# Patient Record
Sex: FEMALE | Race: WHITE | NOT HISPANIC OR LATINO
[De-identification: names, ages, dates, MRNs, and addresses within clinical notes are randomized per-mention and may not be internally consistent; named-entity substitution may affect disease eponyms.]

---

## 2017-01-05 ENCOUNTER — RESULT REVIEW (OUTPATIENT)
Age: 72
End: 2017-01-05

## 2017-01-06 ENCOUNTER — APPOINTMENT (OUTPATIENT)
Dept: HEMATOLOGY ONCOLOGY | Facility: CLINIC | Age: 72
End: 2017-01-06

## 2017-01-06 VITALS
RESPIRATION RATE: 16 BRPM | SYSTOLIC BLOOD PRESSURE: 135 MMHG | OXYGEN SATURATION: 98 % | TEMPERATURE: 97.6 F | BODY MASS INDEX: 28.38 KG/M2 | HEART RATE: 76 BPM | WEIGHT: 165.32 LBS | DIASTOLIC BLOOD PRESSURE: 79 MMHG

## 2017-01-10 ENCOUNTER — RESULT REVIEW (OUTPATIENT)
Age: 72
End: 2017-01-10

## 2017-01-11 ENCOUNTER — APPOINTMENT (OUTPATIENT)
Dept: INFUSION THERAPY | Facility: HOSPITAL | Age: 72
End: 2017-01-11

## 2017-01-11 ENCOUNTER — LABORATORY RESULT (OUTPATIENT)
Age: 72
End: 2017-01-11

## 2017-01-18 ENCOUNTER — APPOINTMENT (OUTPATIENT)
Dept: SURGERY | Facility: CLINIC | Age: 72
End: 2017-01-18

## 2017-01-20 ENCOUNTER — APPOINTMENT (OUTPATIENT)
Dept: HEMATOLOGY ONCOLOGY | Facility: CLINIC | Age: 72
End: 2017-01-20

## 2017-01-25 ENCOUNTER — OUTPATIENT (OUTPATIENT)
Dept: OUTPATIENT SERVICES | Facility: HOSPITAL | Age: 72
LOS: 1 days | Discharge: HOME | End: 2017-01-25

## 2017-01-25 ENCOUNTER — APPOINTMENT (OUTPATIENT)
Dept: INFUSION THERAPY | Facility: HOSPITAL | Age: 72
End: 2017-01-25

## 2017-01-25 DIAGNOSIS — Z98.51 TUBAL LIGATION STATUS: Chronic | ICD-10-CM

## 2017-01-25 DIAGNOSIS — Z87.442 PERSONAL HISTORY OF URINARY CALCULI: Chronic | ICD-10-CM

## 2017-01-25 DIAGNOSIS — Z98.89 OTHER SPECIFIED POSTPROCEDURAL STATES: Chronic | ICD-10-CM

## 2017-01-25 DIAGNOSIS — C50.311 MALIGNANT NEOPLASM OF LOWER-INNER QUADRANT OF RIGHT FEMALE BREAST: ICD-10-CM

## 2017-01-25 DIAGNOSIS — C44.91 BASAL CELL CARCINOMA OF SKIN, UNSPECIFIED: Chronic | ICD-10-CM

## 2017-01-25 DIAGNOSIS — I67.1 CEREBRAL ANEURYSM, NONRUPTURED: Chronic | ICD-10-CM

## 2017-01-26 ENCOUNTER — RESULT REVIEW (OUTPATIENT)
Age: 72
End: 2017-01-26

## 2017-01-27 ENCOUNTER — APPOINTMENT (OUTPATIENT)
Dept: HEMATOLOGY ONCOLOGY | Facility: CLINIC | Age: 72
End: 2017-01-27

## 2017-01-27 VITALS
TEMPERATURE: 97.9 F | WEIGHT: 167.11 LBS | SYSTOLIC BLOOD PRESSURE: 144 MMHG | BODY MASS INDEX: 28.68 KG/M2 | RESPIRATION RATE: 16 BRPM | OXYGEN SATURATION: 98 % | HEART RATE: 87 BPM | DIASTOLIC BLOOD PRESSURE: 72 MMHG

## 2017-01-27 LAB
BASOPHILS # BLD AUTO: 0 K/UL — SIGNIFICANT CHANGE UP (ref 0–0.2)
BASOPHILS NFR BLD AUTO: 0.4 % — SIGNIFICANT CHANGE UP (ref 0–2)
EOSINOPHIL # BLD AUTO: 0.3 K/UL — SIGNIFICANT CHANGE UP (ref 0–0.5)
EOSINOPHIL NFR BLD AUTO: 11.5 % — HIGH (ref 0–6)
HCT VFR BLD CALC: 39.6 % — SIGNIFICANT CHANGE UP (ref 34.5–45)
HGB BLD-MCNC: 13.7 G/DL — SIGNIFICANT CHANGE UP (ref 11.5–15.5)
LYMPHOCYTES # BLD AUTO: 1 K/UL — SIGNIFICANT CHANGE UP (ref 1–3.3)
LYMPHOCYTES # BLD AUTO: 34.1 % — SIGNIFICANT CHANGE UP (ref 13–44)
MCHC RBC-ENTMCNC: 30.4 PG — SIGNIFICANT CHANGE UP (ref 27–34)
MCHC RBC-ENTMCNC: 34.7 GM/DL — SIGNIFICANT CHANGE UP (ref 32–36)
MCV RBC AUTO: 87.7 FL — SIGNIFICANT CHANGE UP (ref 80–100)
MONOCYTES # BLD AUTO: 0.4 K/UL — SIGNIFICANT CHANGE UP (ref 0–0.9)
MONOCYTES NFR BLD AUTO: 13.9 % — SIGNIFICANT CHANGE UP (ref 2–14)
NEUTROPHILS # BLD AUTO: 1.2 K/UL — LOW (ref 1.8–7.4)
NEUTROPHILS NFR BLD AUTO: 40.2 % — LOW (ref 43–77)
PLATELET # BLD AUTO: 229 K/UL — SIGNIFICANT CHANGE UP (ref 150–400)
RBC # BLD: 4.51 M/UL — SIGNIFICANT CHANGE UP (ref 3.8–5.2)
RBC # FLD: 14.3 % — SIGNIFICANT CHANGE UP (ref 10.3–14.5)
WBC # BLD: 2.8 K/UL — LOW (ref 3.8–10.5)
WBC # FLD AUTO: 2.8 K/UL — LOW (ref 3.8–10.5)

## 2017-02-05 ENCOUNTER — RESULT REVIEW (OUTPATIENT)
Age: 72
End: 2017-02-05

## 2017-02-06 ENCOUNTER — LABORATORY RESULT (OUTPATIENT)
Age: 72
End: 2017-02-06

## 2017-02-06 ENCOUNTER — APPOINTMENT (OUTPATIENT)
Dept: INFUSION THERAPY | Facility: HOSPITAL | Age: 72
End: 2017-02-06

## 2017-02-06 LAB
HCT VFR BLD CALC: 42.3 % — SIGNIFICANT CHANGE UP (ref 34.5–45)
HGB BLD-MCNC: 14.4 G/DL — SIGNIFICANT CHANGE UP (ref 11.5–15.5)
MCHC RBC-ENTMCNC: 30 PG — SIGNIFICANT CHANGE UP (ref 27–34)
MCHC RBC-ENTMCNC: 34.1 GM/DL — SIGNIFICANT CHANGE UP (ref 32–36)
MCV RBC AUTO: 88 FL — SIGNIFICANT CHANGE UP (ref 80–100)
PLATELET # BLD AUTO: 229 K/UL — SIGNIFICANT CHANGE UP (ref 150–400)
RBC # BLD: 4.81 M/UL — SIGNIFICANT CHANGE UP (ref 3.8–5.2)
RBC # FLD: 13.8 % — SIGNIFICANT CHANGE UP (ref 10.3–14.5)
WBC # BLD: 7.1 K/UL — SIGNIFICANT CHANGE UP (ref 3.8–10.5)
WBC # FLD AUTO: 7.1 K/UL — SIGNIFICANT CHANGE UP (ref 3.8–10.5)

## 2017-02-07 DIAGNOSIS — E86.0 DEHYDRATION: ICD-10-CM

## 2017-02-07 DIAGNOSIS — Z51.11 ENCOUNTER FOR ANTINEOPLASTIC CHEMOTHERAPY: ICD-10-CM

## 2017-02-07 DIAGNOSIS — R11.2 NAUSEA WITH VOMITING, UNSPECIFIED: ICD-10-CM

## 2017-02-15 ENCOUNTER — APPOINTMENT (OUTPATIENT)
Dept: RADIATION ONCOLOGY | Facility: CLINIC | Age: 72
End: 2017-02-15

## 2017-02-15 ENCOUNTER — APPOINTMENT (OUTPATIENT)
Dept: INFUSION THERAPY | Facility: HOSPITAL | Age: 72
End: 2017-02-15

## 2017-02-15 VITALS
WEIGHT: 163.45 LBS | RESPIRATION RATE: 16 BRPM | DIASTOLIC BLOOD PRESSURE: 73 MMHG | OXYGEN SATURATION: 100 % | SYSTOLIC BLOOD PRESSURE: 151 MMHG | BODY MASS INDEX: 27.9 KG/M2 | HEIGHT: 64 IN | HEART RATE: 83 BPM | TEMPERATURE: 96.8 F

## 2017-02-17 ENCOUNTER — APPOINTMENT (OUTPATIENT)
Dept: HEMATOLOGY ONCOLOGY | Facility: CLINIC | Age: 72
End: 2017-02-17

## 2017-02-17 VITALS
OXYGEN SATURATION: 97 % | HEART RATE: 95 BPM | BODY MASS INDEX: 28.38 KG/M2 | DIASTOLIC BLOOD PRESSURE: 74 MMHG | RESPIRATION RATE: 16 BRPM | WEIGHT: 165.34 LBS | TEMPERATURE: 97.9 F | SYSTOLIC BLOOD PRESSURE: 155 MMHG

## 2017-02-24 ENCOUNTER — APPOINTMENT (OUTPATIENT)
Dept: HEMATOLOGY ONCOLOGY | Facility: CLINIC | Age: 72
End: 2017-02-24

## 2017-02-24 ENCOUNTER — EMERGENCY (EMERGENCY)
Facility: HOSPITAL | Age: 72
LOS: 1 days | Discharge: HOME | End: 2017-02-24
Attending: EMERGENCY MEDICINE | Admitting: EMERGENCY MEDICINE
Payer: MEDICARE

## 2017-02-24 VITALS
RESPIRATION RATE: 18 BRPM | OXYGEN SATURATION: 99 % | HEART RATE: 79 BPM | TEMPERATURE: 98 F | SYSTOLIC BLOOD PRESSURE: 194 MMHG | DIASTOLIC BLOOD PRESSURE: 74 MMHG

## 2017-02-24 DIAGNOSIS — Z98.89 OTHER SPECIFIED POSTPROCEDURAL STATES: Chronic | ICD-10-CM

## 2017-02-24 DIAGNOSIS — C44.91 BASAL CELL CARCINOMA OF SKIN, UNSPECIFIED: Chronic | ICD-10-CM

## 2017-02-24 DIAGNOSIS — R55 SYNCOPE AND COLLAPSE: ICD-10-CM

## 2017-02-24 DIAGNOSIS — Z98.51 TUBAL LIGATION STATUS: Chronic | ICD-10-CM

## 2017-02-24 DIAGNOSIS — I67.1 CEREBRAL ANEURYSM, NONRUPTURED: Chronic | ICD-10-CM

## 2017-02-24 DIAGNOSIS — Z87.442 PERSONAL HISTORY OF URINARY CALCULI: Chronic | ICD-10-CM

## 2017-02-24 LAB
ALBUMIN SERPL ELPH-MCNC: 4.6 G/DL — SIGNIFICANT CHANGE UP (ref 3.3–5)
ALP SERPL-CCNC: 80 U/L — SIGNIFICANT CHANGE UP (ref 40–120)
ALT FLD-CCNC: 30 U/L RC — SIGNIFICANT CHANGE UP (ref 10–45)
ANION GAP SERPL CALC-SCNC: 19 MMOL/L — HIGH (ref 5–17)
AST SERPL-CCNC: 22 U/L — SIGNIFICANT CHANGE UP (ref 10–40)
BILIRUB SERPL-MCNC: 0.2 MG/DL — SIGNIFICANT CHANGE UP (ref 0.2–1.2)
BUN SERPL-MCNC: 20 MG/DL — SIGNIFICANT CHANGE UP (ref 7–23)
CALCIUM SERPL-MCNC: 9.2 MG/DL — SIGNIFICANT CHANGE UP (ref 8.4–10.5)
CHLORIDE SERPL-SCNC: 101 MMOL/L — SIGNIFICANT CHANGE UP (ref 96–108)
CO2 SERPL-SCNC: 21 MMOL/L — LOW (ref 22–31)
CREAT SERPL-MCNC: 0.66 MG/DL — SIGNIFICANT CHANGE UP (ref 0.5–1.3)
GAS PNL BLDV: SIGNIFICANT CHANGE UP
GLUCOSE SERPL-MCNC: 228 MG/DL — HIGH (ref 70–99)
HCT VFR BLD CALC: 41.6 % — SIGNIFICANT CHANGE UP (ref 34.5–45)
HGB BLD-MCNC: 14.2 G/DL — SIGNIFICANT CHANGE UP (ref 11.5–15.5)
MCHC RBC-ENTMCNC: 30.2 PG — SIGNIFICANT CHANGE UP (ref 27–34)
MCHC RBC-ENTMCNC: 34.2 GM/DL — SIGNIFICANT CHANGE UP (ref 32–36)
MCV RBC AUTO: 88.2 FL — SIGNIFICANT CHANGE UP (ref 80–100)
PLATELET # BLD AUTO: 235 K/UL — SIGNIFICANT CHANGE UP (ref 150–400)
POTASSIUM SERPL-MCNC: 3.5 MMOL/L — SIGNIFICANT CHANGE UP (ref 3.5–5.3)
POTASSIUM SERPL-SCNC: 3.5 MMOL/L — SIGNIFICANT CHANGE UP (ref 3.5–5.3)
PROT SERPL-MCNC: 6.6 G/DL — SIGNIFICANT CHANGE UP (ref 6–8.3)
RBC # BLD: 4.71 M/UL — SIGNIFICANT CHANGE UP (ref 3.8–5.2)
RBC # FLD: 14.2 % — SIGNIFICANT CHANGE UP (ref 10.3–14.5)
SODIUM SERPL-SCNC: 141 MMOL/L — SIGNIFICANT CHANGE UP (ref 135–145)
WBC # BLD: 1.6 K/UL — LOW (ref 3.8–10.5)
WBC # FLD AUTO: 1.6 K/UL — LOW (ref 3.8–10.5)

## 2017-02-24 RX ORDER — SODIUM CHLORIDE 9 MG/ML
1000 INJECTION INTRAMUSCULAR; INTRAVENOUS; SUBCUTANEOUS ONCE
Qty: 0 | Refills: 0 | Status: COMPLETED | OUTPATIENT
Start: 2017-02-24 | End: 2017-02-24

## 2017-02-24 RX ORDER — ONDANSETRON 8 MG/1
4 TABLET, FILM COATED ORAL ONCE
Qty: 0 | Refills: 0 | Status: COMPLETED | OUTPATIENT
Start: 2017-02-24 | End: 2017-02-24

## 2017-02-24 RX ADMIN — ONDANSETRON 4 MILLIGRAM(S): 8 TABLET, FILM COATED ORAL at 22:52

## 2017-02-24 RX ADMIN — SODIUM CHLORIDE 2000 MILLILITER(S): 9 INJECTION INTRAMUSCULAR; INTRAVENOUS; SUBCUTANEOUS at 22:52

## 2017-02-24 NOTE — ED ADULT NURSE NOTE - PMH
Aneurysm  cerebral  Anxiety    Asthma    Basal cell carcinoma    Benign intracranial hypertension  1980 r/t "tetracyclines"  Breast CA  right  Chronic fatigue    CVA (cerebral vascular accident)  2012  Depression    Diverticulitis    DM (diabetes mellitus)    Fibromyalgia    Hearing loss  left  HTN (hypertension)    Kidney stones  hx of  Lumbar disc disease    Reflux esophagitis    Shoulder disorder  right bone spur  Vertigo

## 2017-02-24 NOTE — ED PROVIDER NOTE - MEDICAL DECISION MAKING DETAILS
71F Breast cancer s/p lumpectomy on chemotherapy, DM presents with near syncopal episode in the setting of acute nausea, vomiting and diarrhea. Suspect dehydration 2/2 viral gastroenteritis  Plan IVF cbc cmp vbg ekg reassess. 71F Breast cancer s/p lumpectomy on chemotherapy, DM presents with near syncopal episode in the setting of acute nausea, vomiting and diarrhea. Suspect dehydration 2/2 viral gastroenteritis  Plan IVF cbc cmp vbg ekg reassess.ZR

## 2017-02-24 NOTE — ED PROVIDER NOTE - OBJECTIVE STATEMENT
71F history of breast cancer (s/p r partial mastectomy) on chemotherapy (cycle 8 CMF one week prior) DM2 presents with near syncopal episode witnessed by  at home.   Patient reports one day of nausea, vomiting 2-3 semi formed BM and abdominal discomfort. Patient denies fever, chills, chest pain, palpitations  Patient's  had similar symptoms 2 days prior that resolved.   states fingerstick at time of 1 minute of unresponsiveness as patient attempted to stand was 190.

## 2017-02-24 NOTE — ED ADULT NURSE NOTE - OBJECTIVE STATEMENT
72 y/o female arrived to ED c.o nausea, vomiting and syncope. Pt states that she had one episode of vomiting this morning, with weakness and nausea throughout day, only drinking gatorade and having a few crackers. pt had syncople episode while sitting in chair lasting approximately one minute.  had stomach bug on wednesday with vomiting and nausea. Pt a&ox3, +weak, neg sob, neg chest pain, abd soft nontender, + nausea, pulse motor sensoryx4, skin warm dry intact. 20g IV placed LFA by EMS    Pt hx breast ca, last chemo session two weeks ago

## 2017-02-24 NOTE — ED ADULT NURSE NOTE - PSH
Basal cell carcinoma  removed - left eyelid- 3/15  Cerebral aneurysm  repair with coil and stent -   H/O shoulder surgery  right - 2001  H/O tubal ligation    H/O:     History of D&C    History of kidney stones

## 2017-02-25 ENCOUNTER — TRANSCRIPTION ENCOUNTER (OUTPATIENT)
Age: 72
End: 2017-02-25

## 2017-02-25 VITALS
SYSTOLIC BLOOD PRESSURE: 113 MMHG | HEART RATE: 79 BPM | TEMPERATURE: 98 F | OXYGEN SATURATION: 97 % | RESPIRATION RATE: 18 BRPM | DIASTOLIC BLOOD PRESSURE: 53 MMHG

## 2017-02-25 DIAGNOSIS — E86.0 DEHYDRATION: ICD-10-CM

## 2017-02-25 DIAGNOSIS — C50.911 MALIGNANT NEOPLASM OF UNSPECIFIED SITE OF RIGHT FEMALE BREAST: ICD-10-CM

## 2017-02-25 DIAGNOSIS — I10 ESSENTIAL (PRIMARY) HYPERTENSION: ICD-10-CM

## 2017-02-25 DIAGNOSIS — E11.9 TYPE 2 DIABETES MELLITUS WITHOUT COMPLICATIONS: ICD-10-CM

## 2017-02-25 DIAGNOSIS — R41.89 OTHER SYMPTOMS AND SIGNS INVOLVING COGNITIVE FUNCTIONS AND AWARENESS: ICD-10-CM

## 2017-02-25 DIAGNOSIS — I63.9 CEREBRAL INFARCTION, UNSPECIFIED: ICD-10-CM

## 2017-02-25 LAB
ANION GAP SERPL CALC-SCNC: 14 MMOL/L — SIGNIFICANT CHANGE UP (ref 5–17)
APPEARANCE UR: CLEAR — SIGNIFICANT CHANGE UP
BASOPHILS # BLD AUTO: 0 K/UL — SIGNIFICANT CHANGE UP (ref 0–0.2)
BASOPHILS # BLD AUTO: 0 K/UL — SIGNIFICANT CHANGE UP (ref 0–0.2)
BASOPHILS NFR BLD AUTO: 0 % — SIGNIFICANT CHANGE UP (ref 0–2)
BASOPHILS NFR BLD AUTO: 0.7 % — SIGNIFICANT CHANGE UP (ref 0–2)
BILIRUB UR-MCNC: NEGATIVE — SIGNIFICANT CHANGE UP
BUN SERPL-MCNC: 17 MG/DL — SIGNIFICANT CHANGE UP (ref 7–23)
CALCIUM SERPL-MCNC: 9.4 MG/DL — SIGNIFICANT CHANGE UP (ref 8.4–10.5)
CHLORIDE SERPL-SCNC: 104 MMOL/L — SIGNIFICANT CHANGE UP (ref 96–108)
CO2 SERPL-SCNC: 23 MMOL/L — SIGNIFICANT CHANGE UP (ref 22–31)
COLOR SPEC: SIGNIFICANT CHANGE UP
CREAT SERPL-MCNC: 0.78 MG/DL — SIGNIFICANT CHANGE UP (ref 0.5–1.3)
DIFF PNL FLD: NEGATIVE — SIGNIFICANT CHANGE UP
EOSINOPHIL # BLD AUTO: 0.1 K/UL — SIGNIFICANT CHANGE UP (ref 0–0.5)
EOSINOPHIL # BLD AUTO: 0.2 K/UL — SIGNIFICANT CHANGE UP (ref 0–0.5)
EOSINOPHIL NFR BLD AUTO: 13.6 % — HIGH (ref 0–6)
EOSINOPHIL NFR BLD AUTO: 7.2 % — HIGH (ref 0–6)
GLUCOSE SERPL-MCNC: 179 MG/DL — HIGH (ref 70–99)
GLUCOSE UR QL: >1000
HCT VFR BLD CALC: 37.7 % — SIGNIFICANT CHANGE UP (ref 34.5–45)
HGB BLD-MCNC: 12.5 G/DL — SIGNIFICANT CHANGE UP (ref 11.5–15.5)
IMM GRANULOCYTES NFR BLD AUTO: 1.4 % — SIGNIFICANT CHANGE UP (ref 0–1.5)
KETONES UR-MCNC: ABNORMAL
LEUKOCYTE ESTERASE UR-ACNC: NEGATIVE — SIGNIFICANT CHANGE UP
LYMPHOCYTES # BLD AUTO: 0.2 K/UL — LOW (ref 1–3.3)
LYMPHOCYTES # BLD AUTO: 0.43 K/UL — LOW (ref 1–3.3)
LYMPHOCYTES # BLD AUTO: 15.7 % — SIGNIFICANT CHANGE UP (ref 13–44)
LYMPHOCYTES # BLD AUTO: 30.9 % — SIGNIFICANT CHANGE UP (ref 13–44)
MANUAL SMEAR VERIFICATION: SIGNIFICANT CHANGE UP
MCHC RBC-ENTMCNC: 29.1 PG — SIGNIFICANT CHANGE UP (ref 27–34)
MCHC RBC-ENTMCNC: 33.2 GM/DL — SIGNIFICANT CHANGE UP (ref 32–36)
MCV RBC AUTO: 87.9 FL — SIGNIFICANT CHANGE UP (ref 80–100)
MONOCYTES # BLD AUTO: 0.1 K/UL — SIGNIFICANT CHANGE UP (ref 0–0.9)
MONOCYTES # BLD AUTO: 0.3 K/UL — SIGNIFICANT CHANGE UP (ref 0–0.9)
MONOCYTES NFR BLD AUTO: 16.6 % — HIGH (ref 2–14)
MONOCYTES NFR BLD AUTO: 7.2 % — SIGNIFICANT CHANGE UP (ref 2–14)
NEUTROPHILS # BLD AUTO: 0.74 K/UL — LOW (ref 1.8–7.4)
NEUTROPHILS # BLD AUTO: 0.8 K/UL — LOW (ref 1.8–7.4)
NEUTROPHILS NFR BLD AUTO: 53.3 % — SIGNIFICANT CHANGE UP (ref 43–77)
NEUTROPHILS NFR BLD AUTO: 53.5 % — SIGNIFICANT CHANGE UP (ref 43–77)
NITRITE UR-MCNC: NEGATIVE — SIGNIFICANT CHANGE UP
PH UR: 5.5 — SIGNIFICANT CHANGE UP (ref 4.8–8)
PLAT MORPH BLD: NORMAL — SIGNIFICANT CHANGE UP
PLATELET # BLD AUTO: 240 K/UL — SIGNIFICANT CHANGE UP (ref 150–400)
POTASSIUM SERPL-MCNC: 5 MMOL/L — SIGNIFICANT CHANGE UP (ref 3.5–5.3)
POTASSIUM SERPL-SCNC: 5 MMOL/L — SIGNIFICANT CHANGE UP (ref 3.5–5.3)
PROT UR-MCNC: NEGATIVE — SIGNIFICANT CHANGE UP
RBC # BLD: 4.29 M/UL — SIGNIFICANT CHANGE UP (ref 3.8–5.2)
RBC # FLD: 14.9 % — HIGH (ref 10.3–14.5)
RBC BLD AUTO: NORMAL — SIGNIFICANT CHANGE UP
SODIUM SERPL-SCNC: 141 MMOL/L — SIGNIFICANT CHANGE UP (ref 135–145)
SP GR SPEC: >1.03 — HIGH (ref 1.01–1.02)
UROBILINOGEN FLD QL: NEGATIVE — SIGNIFICANT CHANGE UP
WBC # BLD: 1.39 K/UL — LOW (ref 3.8–10.5)
WBC # FLD AUTO: 1.39 K/UL — LOW (ref 3.8–10.5)

## 2017-02-25 RX ORDER — DEXTROSE 50 % IN WATER 50 %
25 SYRINGE (ML) INTRAVENOUS ONCE
Qty: 0 | Refills: 0 | Status: DISCONTINUED | OUTPATIENT
Start: 2017-02-25 | End: 2017-02-25

## 2017-02-25 RX ORDER — AMLODIPINE BESYLATE 2.5 MG/1
5 TABLET ORAL DAILY
Qty: 0 | Refills: 0 | Status: DISCONTINUED | OUTPATIENT
Start: 2017-02-25 | End: 2017-02-25

## 2017-02-25 RX ORDER — INSULIN LISPRO 100/ML
VIAL (ML) SUBCUTANEOUS
Qty: 0 | Refills: 0 | Status: DISCONTINUED | OUTPATIENT
Start: 2017-02-25 | End: 2017-02-25

## 2017-02-25 RX ORDER — ONDANSETRON 8 MG/1
4 TABLET, FILM COATED ORAL EVERY 6 HOURS
Qty: 0 | Refills: 0 | Status: DISCONTINUED | OUTPATIENT
Start: 2017-02-25 | End: 2017-02-25

## 2017-02-25 RX ORDER — MONTELUKAST 4 MG/1
10 TABLET, CHEWABLE ORAL AT BEDTIME
Qty: 0 | Refills: 0 | Status: DISCONTINUED | OUTPATIENT
Start: 2017-02-25 | End: 2017-02-25

## 2017-02-25 RX ORDER — INSULIN LISPRO 100/ML
VIAL (ML) SUBCUTANEOUS AT BEDTIME
Qty: 0 | Refills: 0 | Status: DISCONTINUED | OUTPATIENT
Start: 2017-02-25 | End: 2017-02-25

## 2017-02-25 RX ORDER — ENOXAPARIN SODIUM 100 MG/ML
40 INJECTION SUBCUTANEOUS EVERY 24 HOURS
Qty: 0 | Refills: 0 | Status: DISCONTINUED | OUTPATIENT
Start: 2017-02-25 | End: 2017-02-25

## 2017-02-25 RX ORDER — DIAZEPAM 5 MG
5 TABLET ORAL
Qty: 0 | Refills: 0 | Status: DISCONTINUED | OUTPATIENT
Start: 2017-02-25 | End: 2017-02-25

## 2017-02-25 RX ORDER — DULOXETINE HYDROCHLORIDE 30 MG/1
20 CAPSULE, DELAYED RELEASE ORAL
Qty: 0 | Refills: 0 | Status: DISCONTINUED | OUTPATIENT
Start: 2017-02-25 | End: 2017-02-25

## 2017-02-25 RX ORDER — GLUCAGON INJECTION, SOLUTION 0.5 MG/.1ML
1 INJECTION, SOLUTION SUBCUTANEOUS ONCE
Qty: 0 | Refills: 0 | Status: DISCONTINUED | OUTPATIENT
Start: 2017-02-25 | End: 2017-02-25

## 2017-02-25 RX ORDER — INSULIN GLARGINE 100 [IU]/ML
34 INJECTION, SOLUTION SUBCUTANEOUS AT BEDTIME
Qty: 0 | Refills: 0 | Status: DISCONTINUED | OUTPATIENT
Start: 2017-02-25 | End: 2017-02-25

## 2017-02-25 RX ORDER — TRAZODONE HCL 50 MG
100 TABLET ORAL AT BEDTIME
Qty: 0 | Refills: 0 | Status: DISCONTINUED | OUTPATIENT
Start: 2017-02-25 | End: 2017-02-25

## 2017-02-25 RX ORDER — ASPIRIN/CALCIUM CARB/MAGNESIUM 324 MG
81 TABLET ORAL THREE TIMES A DAY
Qty: 0 | Refills: 0 | Status: DISCONTINUED | OUTPATIENT
Start: 2017-02-25 | End: 2017-02-25

## 2017-02-25 RX ORDER — FAMOTIDINE 10 MG/ML
20 INJECTION INTRAVENOUS DAILY
Qty: 0 | Refills: 0 | Status: DISCONTINUED | OUTPATIENT
Start: 2017-02-25 | End: 2017-02-25

## 2017-02-25 RX ORDER — CLOPIDOGREL BISULFATE 75 MG/1
75 TABLET, FILM COATED ORAL DAILY
Qty: 0 | Refills: 0 | Status: DISCONTINUED | OUTPATIENT
Start: 2017-02-25 | End: 2017-02-25

## 2017-02-25 RX ORDER — SODIUM CHLORIDE 9 MG/ML
1000 INJECTION INTRAMUSCULAR; INTRAVENOUS; SUBCUTANEOUS
Qty: 0 | Refills: 0 | Status: COMPLETED | OUTPATIENT
Start: 2017-02-25 | End: 2017-02-25

## 2017-02-25 RX ORDER — DEXTROSE 50 % IN WATER 50 %
12.5 SYRINGE (ML) INTRAVENOUS ONCE
Qty: 0 | Refills: 0 | Status: DISCONTINUED | OUTPATIENT
Start: 2017-02-25 | End: 2017-02-25

## 2017-02-25 RX ORDER — DEXTROSE 50 % IN WATER 50 %
1 SYRINGE (ML) INTRAVENOUS ONCE
Qty: 0 | Refills: 0 | Status: DISCONTINUED | OUTPATIENT
Start: 2017-02-25 | End: 2017-02-25

## 2017-02-25 RX ORDER — SODIUM CHLORIDE 9 MG/ML
1000 INJECTION, SOLUTION INTRAVENOUS
Qty: 0 | Refills: 0 | Status: DISCONTINUED | OUTPATIENT
Start: 2017-02-25 | End: 2017-02-25

## 2017-02-25 RX ORDER — PANTOPRAZOLE SODIUM 20 MG/1
40 TABLET, DELAYED RELEASE ORAL
Qty: 0 | Refills: 0 | Status: DISCONTINUED | OUTPATIENT
Start: 2017-02-25 | End: 2017-02-25

## 2017-02-25 RX ORDER — HEPARIN SODIUM 5000 [USP'U]/ML
5000 INJECTION INTRAVENOUS; SUBCUTANEOUS EVERY 8 HOURS
Qty: 0 | Refills: 0 | Status: DISCONTINUED | OUTPATIENT
Start: 2017-02-25 | End: 2017-02-25

## 2017-02-25 RX ORDER — LOSARTAN POTASSIUM 100 MG/1
100 TABLET, FILM COATED ORAL DAILY
Qty: 0 | Refills: 0 | Status: DISCONTINUED | OUTPATIENT
Start: 2017-02-25 | End: 2017-02-25

## 2017-02-25 RX ADMIN — AMLODIPINE BESYLATE 5 MILLIGRAM(S): 2.5 TABLET ORAL at 05:46

## 2017-02-25 RX ADMIN — PANTOPRAZOLE SODIUM 40 MILLIGRAM(S): 20 TABLET, DELAYED RELEASE ORAL at 05:46

## 2017-02-25 RX ADMIN — Medication 5 MILLIGRAM(S): at 05:46

## 2017-02-25 RX ADMIN — DULOXETINE HYDROCHLORIDE 20 MILLIGRAM(S): 30 CAPSULE, DELAYED RELEASE ORAL at 09:56

## 2017-02-25 RX ADMIN — SODIUM CHLORIDE 100 MILLILITER(S): 9 INJECTION INTRAMUSCULAR; INTRAVENOUS; SUBCUTANEOUS at 06:21

## 2017-02-25 NOTE — DISCHARGE NOTE ADULT - ADDITIONAL INSTRUCTIONS
1. please schedule an appointment with your oncologist on Monday morning   2. please schedule an appointment with your internist to follow up with your Diabetes

## 2017-02-25 NOTE — DISCHARGE NOTE ADULT - PATIENT PORTAL LINK FT
“You can access the [facility] Patient Portal, offered by [facility], by registering with the following website: [email or web address]”

## 2017-02-25 NOTE — H&P ADULT. - ASSESSMENT
71 F hx HTN, DM2, asthma, cerebral aneurysm s/p stent/coiling, CVA, anxiety, hypothyroid, basal cell ca, R breast ca s/p lumpectomy, on chemo 71 F hx HTN, DM2, asthma, cerebral aneurysm s/p stent/coiling, CVA, anxiety, hypothyroid, basal cell ca, R breast ca s/p lumpectomy, on chemo (last 2/6/17) p/w unresponsiveness, nausea/vomiting a/w dehydration likely due to gastroenteritis and syncope.

## 2017-02-25 NOTE — H&P ADULT. - PROBLEM SELECTOR PLAN 2
- suspect syncope from volume depletion  - will tele monitor for arrhythmia, EKG with NSR on presentation  - TTE ordered to eval structural and valvular heart dz, but can be done as outpatient.  Last TTE about 5 yrs ago as CVA work-up by Dr. Soler (Cardio), reported normal  - will treat with IV and oral hydration, Orthostatic neg after 1L NS bolus given  - fingersticks 190 upon EMS arrival per , here in 's

## 2017-02-25 NOTE — H&P ADULT. - PROBLEM SELECTOR PLAN 1
due to nausea, vomiting with sick contact from  - likely gastroenteritis  - will treat with IVF and anti emetics  - trial of diet and meds in am due to nausea, vomiting with sick contact from  - likely gastroenteritis  - will treat with IVF and anti emetics  - trial of diet and meds in am  - will hold naturethroid (non formulary) per patient request

## 2017-02-25 NOTE — H&P ADULT. - HISTORY OF PRESENT ILLNESS
71 F hx HTN, DM2, asthma, cerebral aneurysm s/p stent/coiling, CVA, anxiety, hypothyroid, basal cell ca, R breast ca s/p lumpectomy, on chemo (s/p 7 cycle of 5FU/cytotox/methotrexate, last 2/6/17) presented with an episode of unresponsiveness witnessed by .  Patient was not feeling well on the morning of presentation, had a loose BM, nauseous and did not eat or drink much.  Patient felt weak when got up to go to bed around 8-9pm from watching TV, helped by , who noticed the patient felt heavy, eyes fluttering, and unresponsive for a brief moment (a few seconds).  He sat her down in a chair, checked her pause, "carotid pause approx. 75 bpm", then called 911.  Patient felt nauseous and vomited (non bloody bilious).  When ambulance arrived, patient was responsive, but still feeling weak,  Patient still felt nauseous in ED, received Zofran x1 with improvement of nausea, 1L NS improved some weakness.  Denied falling, focal weakness, cough, abd pain, fever, CP or SOB.  + sick contact-  had similar GI symptoms (nausea, vomiting, loose stool) 2 days prior, now resolved. 71 F hx HTN, DM2, asthma, cerebral aneurysm s/p stent/coiling, CVA, anxiety, hypothyroid, basal cell ca, R breast ca s/p lumpectomy, on chemo (s/p 7 cycle of 5FU/cytotox/methotrexate, last 2/6/17) presented with an episode of unresponsiveness witnessed by .  Patient was not feeling well on the morning of presentation, had a loose BM, nauseous and did not eat or drink much.  Patient felt weak when got up to go to bed around 8-9pm from watching TV, helped by , who noticed the patient felt heavy, eyes fluttering, and unresponsive for a brief moment (a minute).  He sat her down in a chair, checked her pause, "carotid pause approx. 75 bpm", then called 911.  Patient felt nauseous and vomited (non bloody bilious, medications took recently).  When ambulance arrived, patient was responsive, but still feeling weak,  Patient still felt nauseous in ED, received Zofran x1 with improvement of nausea, 1L NS improved some weakness.  Denied falling, focal weakness, cough, abd pain, fever, CP or SOB.  + sick contact-  had similar GI symptoms (nausea, vomiting, loose stool) 2 days prior, now resolved.

## 2017-02-25 NOTE — DISCHARGE NOTE ADULT - HOSPITAL COURSE
Initial HPI and Brief Hospital Course:     71 F hx HTN, DM2, asthma, cerebral aneurysm s/p stent/coiling, CVA, anxiety, hypothyroid, basal cell ca, R breast ca s/p lumpectomy, on chemo (s/p 7 cycle of 5FU/cytotox/methotrexate, last 2/6/17) presented with an episode of unresponsiveness witnessed by .  Patient was not feeling well on the morning of presentation, had a loose BM, nauseous and did not eat or drink much.  Patient felt weak when got up to go to bed around 8-9pm from watching TV, helped by , who noticed the patient felt heavy, eyes fluttering, and unresponsive for a brief moment (a minute).  He sat her down in a chair, checked her pause, "carotid pause approx. 75 bpm", then called 911.  Patient felt nauseous and vomited (non bloody bilious, medications took recently).  When ambulance arrived, patient was responsive, but still feeling weak,  Patient still felt nauseous in ED, received Zofran x1 with improvement of nausea, 1L NS improved some weakness.  Denied falling, focal weakness, cough, abd pain, fever, CP or SOB.  + sick contact-  had similar GI symptoms (nausea, vomiting, loose stool) 2 days prior, now resolved.      In the ED she received, 2L NS and reports feeling significantly improved, no longer lightheaded. She was evaluated by oncology and thought to be safe to continue oncologic outpatient treatment as symptoms likely related to dehydration 2/2 viral gastroenteritis with limited PO intake. She is medically improved and medically optimized for discharge with oncology and primary care follow up. Counseled the patient and  about adequate fluid intake and returning to the ED if any clinical worsening or recurrence of symptoms. Pt and spouse agreeable with discharge plan.

## 2017-02-25 NOTE — DISCHARGE NOTE ADULT - CARE PLAN
Principal Discharge DX:	Near syncope  Goal:	Resolution of symptoms  Instructions for follow-up, activity and diet:	Fainting usually is caused by fear, stress, pain, standing too long, over tired, overheated, going to bathroom, or coughing  Blood pressure can drop if you do not drink enough, blood pressure medication, alcohol, bleeding,  Consult with your doctor about driving  Avoid activity or condition that causes your syncope  Lay down with your feet up when you feel like you might faint  Secondary Diagnosis:	Type 2 diabetes mellitus without complication, with long-term current use of insulin  Instructions for follow-up, activity and diet:	HgA1C this admission.  Make sure you get your HgA1c checked every three months.  If you take oral diabetes medications, check your blood glucose two times a day.  If you take insulin, check your blood glucose before meals and at bedtime.  It's important not to skip any meals.  Keep a log of your blood glucose results and always take it with you to your doctor appointments.  Keep a list of your current medications including injectables and over the counter medications and bring this medication list with you to all your doctor appointments.  If you have not seen your ophthalmologist this year call for appointment.  Check your feet daily for redness, sores, or openings. Do not self treat. If no improvement in two days call your primary care physician for an appointment.  Low blood sugar (hypoglycemia) is a blood sugar below 70mg/dl. Check your blood sugar if you feel signs/symptoms of hypoglycemia. If your blood sugar is below 70 take 15 grams of carbohydrates (ex 4 oz of apple juice, 3-4 glucose tablets, or 4-6 oz of regular soda) wait 15 minutes and repeat blood sugar to make sure it comes up above 70.  If your blood sugar is above 70 and you are due for a meal, have a meal.  If you are not due for a meal have a snack.  This snack helps keeps your blood sugar at a safe range.  Secondary Diagnosis:	Essential hypertension  Instructions for follow-up, activity and diet:	Low salt diet  Activity as tolerated.  Take all medication as prescribed.  Follow up with your medical doctor for routine blood pressure monitoring at your next visit.  Notify your doctor if you have any of the following symptoms:   Dizziness, Lightheadedness, Blurry vision, Headache, Chest pain, Shortness of breath  Secondary Diagnosis:	Dehydration  Instructions for follow-up, activity and diet:	Drink at least 8 glasses of water per day to prevent dehydration

## 2017-02-25 NOTE — DISCHARGE NOTE ADULT - PLAN OF CARE
Resolution of symptoms Fainting usually is caused by fear, stress, pain, standing too long, over tired, overheated, going to bathroom, or coughing  Blood pressure can drop if you do not drink enough, blood pressure medication, alcohol, bleeding,  Consult with your doctor about driving  Avoid activity or condition that causes your syncope  Lay down with your feet up when you feel like you might faint Low salt diet  Activity as tolerated.  Take all medication as prescribed.  Follow up with your medical doctor for routine blood pressure monitoring at your next visit.  Notify your doctor if you have any of the following symptoms:   Dizziness, Lightheadedness, Blurry vision, Headache, Chest pain, Shortness of breath HgA1C this admission.  Make sure you get your HgA1c checked every three months.  If you take oral diabetes medications, check your blood glucose two times a day.  If you take insulin, check your blood glucose before meals and at bedtime.  It's important not to skip any meals.  Keep a log of your blood glucose results and always take it with you to your doctor appointments.  Keep a list of your current medications including injectables and over the counter medications and bring this medication list with you to all your doctor appointments.  If you have not seen your ophthalmologist this year call for appointment.  Check your feet daily for redness, sores, or openings. Do not self treat. If no improvement in two days call your primary care physician for an appointment.  Low blood sugar (hypoglycemia) is a blood sugar below 70mg/dl. Check your blood sugar if you feel signs/symptoms of hypoglycemia. If your blood sugar is below 70 take 15 grams of carbohydrates (ex 4 oz of apple juice, 3-4 glucose tablets, or 4-6 oz of regular soda) wait 15 minutes and repeat blood sugar to make sure it comes up above 70.  If your blood sugar is above 70 and you are due for a meal, have a meal.  If you are not due for a meal have a snack.  This snack helps keeps your blood sugar at a safe range. Drink at least 8 glasses of water per day to prevent dehydration

## 2017-02-25 NOTE — DISCHARGE NOTE ADULT - MEDICATION SUMMARY - MEDICATIONS TO TAKE
I will START or STAY ON the medications listed below when I get home from the hospital:    naturethyroid  --   1 Gram by mouth 6 days a week ( tuesday/ wednesday/ thursday/ friday/ saturday/sunday)  -- Indication: For hypothroid     naturethyroid  --   2 Grams by mouth  on monday morning  -- Indication: For hypothroid     Aspirin Low Dose 81 mg oral delayed release tablet  -- 1 tab(s) by mouth 3 times a day  -- Indication: For Coronary artery disease    losartan 100 mg oral tablet  -- 1 tab(s) by mouth once a day in morning  -- Indication: For blood pressure     diazepam 5 mg oral tablet  -- 1 tab(s) by mouth 2 times a day  -- Indication: For Sedative     DULoxetine 20 mg oral delayed release capsule  -- 1 cap(s) by mouth 2 times a day (with meals)- with breakfast and lunch  -- Indication: For antidepressant     traZODone 100 mg oral tablet  -- 1 tab(s) by mouth once a day (at bedtime)  -- Indication: For antidepresant     Invokana 100 mg oral tablet  -- 1 tab(s) by mouth once a day in morning before breakfast  -- Indication: For Diabetes type 2    Glumetza 500 mg oral tablet, extended release  -- 1 tab(s) by mouth 2 times a day ( with breakfast and dinner)  -- Indication: For Diabetes type 2     Lantus 100 units/mL subcutaneous solution  -- 34 unit(s) subcutaneous once a day (at bedtime)  -- Indication: For Diabetes type 2     SymlinPen 120 subcutaneous solution  -- 120 microgram(s) subcutaneous 3 times a day - 1/2/hour before meals  -- Indication: For Diabetes type 2     Victoza 18 mg/3 mL subcutaneous solution  -- 1.8 milligram(s) subcutaneous once a day- in morning before breakfast  -- Indication: For Diabetes type 2     ZyrTEC 10 mg oral tablet  -- 1 tab(s) by mouth once a day with dinner  -- Indication: For antihistamine     clopidogrel 75 mg oral tablet  -- 1 tab(s) by mouth once a day with lunch  -- Indication: For blood thinner/ coronary artery disease    amLODIPine 5 mg oral tablet  -- 1 tab(s) by mouth once a day in morning  -- Indication: For blood pressure     Bentyl 10 mg oral capsule  --  by mouth  , 1 - 3 capsules per day as needed  -- Indication: For IBS     Pepcid 20 mg oral tablet  -- 1 tab(s) by mouth once a day before dinner  -- Indication: For prevent GI upset    montelukast 10 mg oral tablet  -- 1 tab(s) by mouth once a day (at bedtime)  -- Indication: For respiratory agent     pantoprazole 40 mg oral delayed release tablet  -- 1 tab(s) by mouth once a day in morning( before breakfast)  -- Indication: For prevent GI upset

## 2017-02-25 NOTE — DISCHARGE NOTE ADULT - CARE PROVIDER_API CALL
Brittany Howell), Medical Oncology  [address] [place] [address]  Phone: [phone]  Fax: [phone]

## 2017-02-25 NOTE — DISCHARGE NOTE ADULT - SECONDARY DIAGNOSIS.
Type 2 diabetes mellitus without complication, with long-term current use of insulin Essential hypertension Dehydration

## 2017-02-25 NOTE — H&P ADULT. - PROBLEM SELECTOR PLAN 3
- normally controlled per patient  - will monitor fingersticks and cover with short acting insulin sliding scale due to decrease PO intake  - will hold oral regiment.  Patient is unwilling to try Lantus inpatient

## 2017-02-26 LAB
CULTURE RESULTS: SIGNIFICANT CHANGE UP
SPECIMEN SOURCE: SIGNIFICANT CHANGE UP

## 2017-02-28 ENCOUNTER — OUTPATIENT (OUTPATIENT)
Dept: OUTPATIENT SERVICES | Facility: HOSPITAL | Age: 72
LOS: 1 days | Discharge: HOME | End: 2017-02-28

## 2017-02-28 ENCOUNTER — RESULT REVIEW (OUTPATIENT)
Age: 72
End: 2017-02-28

## 2017-02-28 DIAGNOSIS — C44.91 BASAL CELL CARCINOMA OF SKIN, UNSPECIFIED: Chronic | ICD-10-CM

## 2017-02-28 DIAGNOSIS — Z17.0 ESTROGEN RECEPTOR POSITIVE STATUS [ER+]: ICD-10-CM

## 2017-02-28 DIAGNOSIS — Z98.89 OTHER SPECIFIED POSTPROCEDURAL STATES: Chronic | ICD-10-CM

## 2017-02-28 DIAGNOSIS — Z87.442 PERSONAL HISTORY OF URINARY CALCULI: Chronic | ICD-10-CM

## 2017-02-28 DIAGNOSIS — Z98.51 TUBAL LIGATION STATUS: Chronic | ICD-10-CM

## 2017-02-28 DIAGNOSIS — D70.1 AGRANULOCYTOSIS SECONDARY TO CANCER CHEMOTHERAPY: ICD-10-CM

## 2017-02-28 DIAGNOSIS — C50.311 MALIGNANT NEOPLASM OF LOWER-INNER QUADRANT OF RIGHT FEMALE BREAST: ICD-10-CM

## 2017-02-28 DIAGNOSIS — I67.1 CEREBRAL ANEURYSM, NONRUPTURED: Chronic | ICD-10-CM

## 2017-03-01 ENCOUNTER — APPOINTMENT (OUTPATIENT)
Dept: INFUSION THERAPY | Facility: HOSPITAL | Age: 72
End: 2017-03-01

## 2017-03-01 LAB
HCT VFR BLD CALC: 39.3 % — SIGNIFICANT CHANGE UP (ref 34.5–45)
HGB BLD-MCNC: 13.6 G/DL — SIGNIFICANT CHANGE UP (ref 11.5–15.5)
MCHC RBC-ENTMCNC: 29.6 PG — SIGNIFICANT CHANGE UP (ref 27–34)
MCHC RBC-ENTMCNC: 34.6 G/DL — SIGNIFICANT CHANGE UP (ref 32–36)
MCV RBC AUTO: 85.7 FL — SIGNIFICANT CHANGE UP (ref 80–100)
PLATELET # BLD AUTO: 261 K/UL — SIGNIFICANT CHANGE UP (ref 150–400)
RBC # BLD: 4.58 M/UL — SIGNIFICANT CHANGE UP (ref 3.8–5.2)
RBC # FLD: 13.1 % — SIGNIFICANT CHANGE UP (ref 10.3–14.5)
WBC # BLD: 5.2 K/UL — SIGNIFICANT CHANGE UP (ref 3.8–10.5)
WBC # FLD AUTO: 5.2 K/UL — SIGNIFICANT CHANGE UP (ref 3.8–10.5)

## 2017-03-02 DIAGNOSIS — R11.2 NAUSEA WITH VOMITING, UNSPECIFIED: ICD-10-CM

## 2017-03-02 DIAGNOSIS — Z51.11 ENCOUNTER FOR ANTINEOPLASTIC CHEMOTHERAPY: ICD-10-CM

## 2017-03-02 LAB
CULTURE RESULTS: SIGNIFICANT CHANGE UP
CULTURE RESULTS: SIGNIFICANT CHANGE UP
SPECIMEN SOURCE: SIGNIFICANT CHANGE UP
SPECIMEN SOURCE: SIGNIFICANT CHANGE UP

## 2017-03-16 ENCOUNTER — RESULT REVIEW (OUTPATIENT)
Age: 72
End: 2017-03-16

## 2017-03-17 ENCOUNTER — APPOINTMENT (OUTPATIENT)
Dept: HEMATOLOGY ONCOLOGY | Facility: CLINIC | Age: 72
End: 2017-03-17

## 2017-03-17 VITALS
DIASTOLIC BLOOD PRESSURE: 78 MMHG | TEMPERATURE: 98.3 F | WEIGHT: 161.82 LBS | SYSTOLIC BLOOD PRESSURE: 152 MMHG | BODY MASS INDEX: 27.78 KG/M2 | RESPIRATION RATE: 16 BRPM | OXYGEN SATURATION: 97 % | HEART RATE: 78 BPM

## 2017-03-17 LAB
EOSINOPHIL # BLD AUTO: 0.4 K/UL — SIGNIFICANT CHANGE UP (ref 0–0.5)
EOSINOPHIL NFR BLD AUTO: 12 % — HIGH (ref 0–6)
HCT VFR BLD CALC: 39.6 % — SIGNIFICANT CHANGE UP (ref 34.5–45)
HGB BLD-MCNC: 13.5 G/DL — SIGNIFICANT CHANGE UP (ref 11.5–15.5)
LYMPHOCYTES # BLD AUTO: 0.8 K/UL — LOW (ref 1–3.3)
LYMPHOCYTES # BLD AUTO: 42 % — SIGNIFICANT CHANGE UP (ref 13–44)
MCHC RBC-ENTMCNC: 29.8 PG — SIGNIFICANT CHANGE UP (ref 27–34)
MCHC RBC-ENTMCNC: 34.2 G/DL — SIGNIFICANT CHANGE UP (ref 32–36)
MCV RBC AUTO: 87 FL — SIGNIFICANT CHANGE UP (ref 80–100)
MONOCYTES # BLD AUTO: 0.4 K/UL — SIGNIFICANT CHANGE UP (ref 0–0.9)
MONOCYTES NFR BLD AUTO: 16 % — HIGH (ref 2–14)
NEUTROPHILS # BLD AUTO: 0.7 K/UL — LOW (ref 1.8–7.4)
NEUTROPHILS NFR BLD AUTO: 30 % — LOW (ref 43–77)
PLAT MORPH BLD: NORMAL — SIGNIFICANT CHANGE UP
PLATELET # BLD AUTO: 316 K/UL — SIGNIFICANT CHANGE UP (ref 150–400)
RBC # BLD: 4.55 M/UL — SIGNIFICANT CHANGE UP (ref 3.8–5.2)
RBC # FLD: 13.7 % — SIGNIFICANT CHANGE UP (ref 10.3–14.5)
RBC BLD AUTO: SIGNIFICANT CHANGE UP
WBC # BLD: 2.3 K/UL — LOW (ref 3.8–10.5)
WBC # FLD AUTO: 2.3 K/UL — LOW (ref 3.8–10.5)

## 2017-03-24 LAB
25(OH)D3 SERPL-MCNC: 24.9 NG/ML
ALBUMIN SERPL ELPH-MCNC: 4.9 G/DL
ALP BLD-CCNC: 76 U/L
ALT SERPL-CCNC: 29 U/L
ANION GAP SERPL CALC-SCNC: 17 MMOL/L
AST SERPL-CCNC: 24 U/L
BILIRUB SERPL-MCNC: 0.2 MG/DL
BUN SERPL-MCNC: 19 MG/DL
CALCIUM SERPL-MCNC: 10.8 MG/DL
CHLORIDE SERPL-SCNC: 103 MMOL/L
CO2 SERPL-SCNC: 25 MMOL/L
CREAT SERPL-MCNC: 0.82 MG/DL
GLUCOSE SERPL-MCNC: 118 MG/DL
POTASSIUM SERPL-SCNC: 4.6 MMOL/L
PROT SERPL-MCNC: 7.1 G/DL
SODIUM SERPL-SCNC: 145 MMOL/L

## 2017-03-24 PROCEDURE — 82435 ASSAY OF BLOOD CHLORIDE: CPT

## 2017-03-24 PROCEDURE — 81003 URINALYSIS AUTO W/O SCOPE: CPT

## 2017-03-24 PROCEDURE — 99285 EMERGENCY DEPT VISIT HI MDM: CPT | Mod: 25

## 2017-03-24 PROCEDURE — 85027 COMPLETE CBC AUTOMATED: CPT

## 2017-03-24 PROCEDURE — 82565 ASSAY OF CREATININE: CPT

## 2017-03-24 PROCEDURE — 82330 ASSAY OF CALCIUM: CPT

## 2017-03-24 PROCEDURE — 82803 BLOOD GASES ANY COMBINATION: CPT

## 2017-03-24 PROCEDURE — 84132 ASSAY OF SERUM POTASSIUM: CPT

## 2017-03-24 PROCEDURE — 84295 ASSAY OF SERUM SODIUM: CPT

## 2017-03-24 PROCEDURE — 80053 COMPREHEN METABOLIC PANEL: CPT

## 2017-03-24 PROCEDURE — 82947 ASSAY GLUCOSE BLOOD QUANT: CPT

## 2017-03-24 PROCEDURE — 85014 HEMATOCRIT: CPT

## 2017-03-24 PROCEDURE — 87086 URINE CULTURE/COLONY COUNT: CPT

## 2017-03-24 PROCEDURE — 96374 THER/PROPH/DIAG INJ IV PUSH: CPT

## 2017-03-24 PROCEDURE — 93005 ELECTROCARDIOGRAM TRACING: CPT

## 2017-03-24 PROCEDURE — 83605 ASSAY OF LACTIC ACID: CPT

## 2017-03-24 PROCEDURE — 80048 BASIC METABOLIC PNL TOTAL CA: CPT

## 2017-03-24 PROCEDURE — 87040 BLOOD CULTURE FOR BACTERIA: CPT

## 2017-04-03 ENCOUNTER — OUTPATIENT (OUTPATIENT)
Dept: OUTPATIENT SERVICES | Facility: HOSPITAL | Age: 72
LOS: 1 days | Discharge: HOME | End: 2017-04-03
Payer: MEDICARE

## 2017-04-03 DIAGNOSIS — Z98.51 TUBAL LIGATION STATUS: Chronic | ICD-10-CM

## 2017-04-03 DIAGNOSIS — Z98.89 OTHER SPECIFIED POSTPROCEDURAL STATES: Chronic | ICD-10-CM

## 2017-04-03 DIAGNOSIS — C44.91 BASAL CELL CARCINOMA OF SKIN, UNSPECIFIED: Chronic | ICD-10-CM

## 2017-04-03 DIAGNOSIS — Z87.442 PERSONAL HISTORY OF URINARY CALCULI: Chronic | ICD-10-CM

## 2017-04-03 DIAGNOSIS — I67.1 CEREBRAL ANEURYSM, NONRUPTURED: Chronic | ICD-10-CM

## 2017-04-03 PROCEDURE — 77427 RADIATION TX MANAGEMENT X5: CPT

## 2017-04-10 PROCEDURE — 77427 RADIATION TX MANAGEMENT X5: CPT

## 2017-04-14 PROCEDURE — 77280 THER RAD SIMULAJ FIELD SMPL: CPT | Mod: 26

## 2017-04-17 PROCEDURE — 77427 RADIATION TX MANAGEMENT X5: CPT

## 2017-04-18 ENCOUNTER — OTHER (OUTPATIENT)
Age: 72
End: 2017-04-18

## 2017-04-18 PROCEDURE — 77334 RADIATION TREATMENT AID(S): CPT | Mod: 26

## 2017-04-18 PROCEDURE — 77321 SPECIAL TELETX PORT PLAN: CPT | Mod: 26

## 2017-04-24 ENCOUNTER — OTHER (OUTPATIENT)
Age: 72
End: 2017-04-24

## 2017-05-04 RX ORDER — NYSTATIN 100000 [USP'U]/G
100000 POWDER TOPICAL
Qty: 60 | Refills: 0 | Status: COMPLETED | COMMUNITY
Start: 2017-04-18 | End: 2017-04-19

## 2017-05-11 ENCOUNTER — OUTPATIENT (OUTPATIENT)
Dept: OUTPATIENT SERVICES | Facility: HOSPITAL | Age: 72
LOS: 1 days | Discharge: HOME | End: 2017-05-11

## 2017-05-11 DIAGNOSIS — Z98.89 OTHER SPECIFIED POSTPROCEDURAL STATES: Chronic | ICD-10-CM

## 2017-05-11 DIAGNOSIS — C50.311 MALIGNANT NEOPLASM OF LOWER-INNER QUADRANT OF RIGHT FEMALE BREAST: ICD-10-CM

## 2017-05-11 DIAGNOSIS — Z87.442 PERSONAL HISTORY OF URINARY CALCULI: Chronic | ICD-10-CM

## 2017-05-11 DIAGNOSIS — I67.1 CEREBRAL ANEURYSM, NONRUPTURED: Chronic | ICD-10-CM

## 2017-05-11 DIAGNOSIS — Z98.51 TUBAL LIGATION STATUS: Chronic | ICD-10-CM

## 2017-05-11 DIAGNOSIS — C44.91 BASAL CELL CARCINOMA OF SKIN, UNSPECIFIED: Chronic | ICD-10-CM

## 2017-05-12 ENCOUNTER — APPOINTMENT (OUTPATIENT)
Dept: HEMATOLOGY ONCOLOGY | Facility: CLINIC | Age: 72
End: 2017-05-12

## 2017-05-12 VITALS
OXYGEN SATURATION: 97 % | SYSTOLIC BLOOD PRESSURE: 147 MMHG | BODY MASS INDEX: 29.48 KG/M2 | TEMPERATURE: 97.9 F | WEIGHT: 171.74 LBS | DIASTOLIC BLOOD PRESSURE: 75 MMHG | RESPIRATION RATE: 16 BRPM | HEART RATE: 95 BPM

## 2017-05-24 ENCOUNTER — APPOINTMENT (OUTPATIENT)
Dept: RADIATION ONCOLOGY | Facility: CLINIC | Age: 72
End: 2017-05-24

## 2017-05-24 VITALS
SYSTOLIC BLOOD PRESSURE: 153 MMHG | DIASTOLIC BLOOD PRESSURE: 76 MMHG | RESPIRATION RATE: 15 BRPM | HEART RATE: 95 BPM | WEIGHT: 167.11 LBS | BODY MASS INDEX: 28.68 KG/M2 | OXYGEN SATURATION: 96 %

## 2017-06-07 ENCOUNTER — OUTPATIENT (OUTPATIENT)
Dept: OUTPATIENT SERVICES | Facility: HOSPITAL | Age: 72
LOS: 1 days | End: 2017-06-07
Payer: MEDICARE

## 2017-06-07 ENCOUNTER — APPOINTMENT (OUTPATIENT)
Dept: MAMMOGRAPHY | Facility: IMAGING CENTER | Age: 72
End: 2017-06-07

## 2017-06-07 ENCOUNTER — APPOINTMENT (OUTPATIENT)
Dept: ULTRASOUND IMAGING | Facility: IMAGING CENTER | Age: 72
End: 2017-06-07

## 2017-06-07 DIAGNOSIS — Z87.442 PERSONAL HISTORY OF URINARY CALCULI: Chronic | ICD-10-CM

## 2017-06-07 DIAGNOSIS — Z98.89 OTHER SPECIFIED POSTPROCEDURAL STATES: Chronic | ICD-10-CM

## 2017-06-07 DIAGNOSIS — I67.1 CEREBRAL ANEURYSM, NONRUPTURED: Chronic | ICD-10-CM

## 2017-06-07 DIAGNOSIS — Z98.51 TUBAL LIGATION STATUS: Chronic | ICD-10-CM

## 2017-06-07 DIAGNOSIS — Z00.8 ENCOUNTER FOR OTHER GENERAL EXAMINATION: ICD-10-CM

## 2017-06-07 DIAGNOSIS — C44.91 BASAL CELL CARCINOMA OF SKIN, UNSPECIFIED: Chronic | ICD-10-CM

## 2017-06-07 PROCEDURE — 77066 DX MAMMO INCL CAD BI: CPT

## 2017-06-07 PROCEDURE — 76641 ULTRASOUND BREAST COMPLETE: CPT

## 2017-06-07 PROCEDURE — G0279: CPT

## 2017-07-26 ENCOUNTER — OUTPATIENT (OUTPATIENT)
Dept: OUTPATIENT SERVICES | Facility: HOSPITAL | Age: 72
LOS: 1 days | Discharge: HOME | End: 2017-07-26

## 2017-07-26 DIAGNOSIS — Z98.89 OTHER SPECIFIED POSTPROCEDURAL STATES: Chronic | ICD-10-CM

## 2017-07-26 DIAGNOSIS — I67.1 CEREBRAL ANEURYSM, NONRUPTURED: Chronic | ICD-10-CM

## 2017-07-26 DIAGNOSIS — C50.311 MALIGNANT NEOPLASM OF LOWER-INNER QUADRANT OF RIGHT FEMALE BREAST: ICD-10-CM

## 2017-07-26 DIAGNOSIS — Z87.442 PERSONAL HISTORY OF URINARY CALCULI: Chronic | ICD-10-CM

## 2017-07-26 DIAGNOSIS — C44.91 BASAL CELL CARCINOMA OF SKIN, UNSPECIFIED: Chronic | ICD-10-CM

## 2017-07-26 DIAGNOSIS — Z98.51 TUBAL LIGATION STATUS: Chronic | ICD-10-CM

## 2017-07-28 ENCOUNTER — APPOINTMENT (OUTPATIENT)
Dept: HEMATOLOGY ONCOLOGY | Facility: CLINIC | Age: 72
End: 2017-07-28
Payer: COMMERCIAL

## 2017-07-28 VITALS
BODY MASS INDEX: 28.38 KG/M2 | SYSTOLIC BLOOD PRESSURE: 140 MMHG | RESPIRATION RATE: 16 BRPM | TEMPERATURE: 99.5 F | OXYGEN SATURATION: 97 % | WEIGHT: 165.32 LBS | HEART RATE: 100 BPM | DIASTOLIC BLOOD PRESSURE: 80 MMHG

## 2017-07-28 DIAGNOSIS — D70.1 AGRANULOCYTOSIS SECONDARY TO CANCER CHEMOTHERAPY: ICD-10-CM

## 2017-07-28 PROCEDURE — 99215 OFFICE O/P EST HI 40 MIN: CPT

## 2017-08-18 ENCOUNTER — APPOINTMENT (OUTPATIENT)
Dept: PHYSICAL MEDICINE AND REHAB | Facility: CLINIC | Age: 72
End: 2017-08-18
Payer: MEDICARE

## 2017-08-18 VITALS
WEIGHT: 166 LBS | DIASTOLIC BLOOD PRESSURE: 74 MMHG | TEMPERATURE: 98.3 F | HEART RATE: 88 BPM | OXYGEN SATURATION: 97 % | BODY MASS INDEX: 28.34 KG/M2 | HEIGHT: 64 IN | SYSTOLIC BLOOD PRESSURE: 131 MMHG

## 2017-08-18 PROCEDURE — 99203 OFFICE O/P NEW LOW 30 MIN: CPT

## 2017-08-21 ENCOUNTER — TRANSCRIPTION ENCOUNTER (OUTPATIENT)
Age: 72
End: 2017-08-21

## 2017-08-30 ENCOUNTER — APPOINTMENT (OUTPATIENT)
Dept: MRI IMAGING | Facility: IMAGING CENTER | Age: 72
End: 2017-08-30

## 2017-08-31 ENCOUNTER — FORM ENCOUNTER (OUTPATIENT)
Age: 72
End: 2017-08-31

## 2017-09-01 ENCOUNTER — OUTPATIENT (OUTPATIENT)
Dept: OUTPATIENT SERVICES | Facility: HOSPITAL | Age: 72
LOS: 1 days | End: 2017-09-01
Payer: MEDICARE

## 2017-09-01 ENCOUNTER — APPOINTMENT (OUTPATIENT)
Dept: MRI IMAGING | Facility: IMAGING CENTER | Age: 72
End: 2017-09-01
Payer: MEDICARE

## 2017-09-01 DIAGNOSIS — Z87.442 PERSONAL HISTORY OF URINARY CALCULI: Chronic | ICD-10-CM

## 2017-09-01 DIAGNOSIS — Z98.89 OTHER SPECIFIED POSTPROCEDURAL STATES: Chronic | ICD-10-CM

## 2017-09-01 DIAGNOSIS — Z98.51 TUBAL LIGATION STATUS: Chronic | ICD-10-CM

## 2017-09-01 DIAGNOSIS — M54.2 CERVICALGIA: ICD-10-CM

## 2017-09-01 DIAGNOSIS — C44.91 BASAL CELL CARCINOMA OF SKIN, UNSPECIFIED: Chronic | ICD-10-CM

## 2017-09-01 DIAGNOSIS — I67.1 CEREBRAL ANEURYSM, NONRUPTURED: Chronic | ICD-10-CM

## 2017-09-01 PROCEDURE — 72141 MRI NECK SPINE W/O DYE: CPT | Mod: 26

## 2017-09-01 PROCEDURE — 72141 MRI NECK SPINE W/O DYE: CPT

## 2017-09-22 ENCOUNTER — TRANSCRIPTION ENCOUNTER (OUTPATIENT)
Age: 72
End: 2017-09-22

## 2017-10-27 ENCOUNTER — APPOINTMENT (OUTPATIENT)
Dept: PHYSICAL MEDICINE AND REHAB | Facility: CLINIC | Age: 72
End: 2017-10-27
Payer: MEDICARE

## 2017-10-27 VITALS
HEART RATE: 111 BPM | OXYGEN SATURATION: 97 % | SYSTOLIC BLOOD PRESSURE: 142 MMHG | DIASTOLIC BLOOD PRESSURE: 78 MMHG | TEMPERATURE: 98.2 F

## 2017-10-27 DIAGNOSIS — M77.12 LATERAL EPICONDYLITIS, LEFT ELBOW: ICD-10-CM

## 2017-10-27 PROCEDURE — 99213 OFFICE O/P EST LOW 20 MIN: CPT

## 2017-10-30 ENCOUNTER — TRANSCRIPTION ENCOUNTER (OUTPATIENT)
Age: 72
End: 2017-10-30

## 2017-12-01 ENCOUNTER — OUTPATIENT (OUTPATIENT)
Dept: OUTPATIENT SERVICES | Facility: HOSPITAL | Age: 72
LOS: 1 days | End: 2017-12-01
Payer: MEDICARE

## 2017-12-01 ENCOUNTER — APPOINTMENT (OUTPATIENT)
Dept: MRI IMAGING | Facility: IMAGING CENTER | Age: 72
End: 2017-12-01
Payer: MEDICARE

## 2017-12-01 DIAGNOSIS — C44.91 BASAL CELL CARCINOMA OF SKIN, UNSPECIFIED: Chronic | ICD-10-CM

## 2017-12-01 DIAGNOSIS — Z87.442 PERSONAL HISTORY OF URINARY CALCULI: Chronic | ICD-10-CM

## 2017-12-01 DIAGNOSIS — Z98.89 OTHER SPECIFIED POSTPROCEDURAL STATES: Chronic | ICD-10-CM

## 2017-12-01 DIAGNOSIS — I67.1 CEREBRAL ANEURYSM, NONRUPTURED: Chronic | ICD-10-CM

## 2017-12-01 DIAGNOSIS — Z98.51 TUBAL LIGATION STATUS: Chronic | ICD-10-CM

## 2017-12-01 DIAGNOSIS — Z00.8 ENCOUNTER FOR OTHER GENERAL EXAMINATION: ICD-10-CM

## 2017-12-01 PROCEDURE — C8908: CPT

## 2017-12-01 PROCEDURE — 0159T: CPT | Mod: 26

## 2017-12-01 PROCEDURE — 82565 ASSAY OF CREATININE: CPT

## 2017-12-01 PROCEDURE — C8937: CPT

## 2017-12-01 PROCEDURE — A9585: CPT

## 2017-12-01 PROCEDURE — 77059 MRI BREAST BILATERAL: CPT | Mod: 26

## 2017-12-06 ENCOUNTER — APPOINTMENT (OUTPATIENT)
Dept: MRI IMAGING | Facility: CLINIC | Age: 72
End: 2017-12-06
Payer: SELF-PAY

## 2017-12-06 ENCOUNTER — OUTPATIENT (OUTPATIENT)
Dept: OUTPATIENT SERVICES | Facility: HOSPITAL | Age: 72
LOS: 1 days | End: 2017-12-06

## 2017-12-06 DIAGNOSIS — C44.91 BASAL CELL CARCINOMA OF SKIN, UNSPECIFIED: Chronic | ICD-10-CM

## 2017-12-06 DIAGNOSIS — I67.1 CEREBRAL ANEURYSM, NONRUPTURED: Chronic | ICD-10-CM

## 2017-12-06 DIAGNOSIS — Z98.51 TUBAL LIGATION STATUS: Chronic | ICD-10-CM

## 2017-12-06 DIAGNOSIS — Z98.89 OTHER SPECIFIED POSTPROCEDURAL STATES: Chronic | ICD-10-CM

## 2017-12-06 DIAGNOSIS — Z87.442 PERSONAL HISTORY OF URINARY CALCULI: Chronic | ICD-10-CM

## 2017-12-06 DIAGNOSIS — Z00.8 ENCOUNTER FOR OTHER GENERAL EXAMINATION: ICD-10-CM

## 2017-12-06 PROCEDURE — C8908: CPT

## 2017-12-06 PROCEDURE — 0159T: CPT | Mod: 26

## 2017-12-06 PROCEDURE — 77059 MRI BREAST BILATERAL: CPT | Mod: 26

## 2017-12-06 PROCEDURE — C8937: CPT

## 2017-12-11 ENCOUNTER — OUTPATIENT (OUTPATIENT)
Dept: OUTPATIENT SERVICES | Facility: HOSPITAL | Age: 72
LOS: 1 days | Discharge: HOME | End: 2017-12-11

## 2017-12-11 DIAGNOSIS — Z17.0 ESTROGEN RECEPTOR POSITIVE STATUS [ER+]: ICD-10-CM

## 2017-12-11 DIAGNOSIS — D70.1 AGRANULOCYTOSIS SECONDARY TO CANCER CHEMOTHERAPY: ICD-10-CM

## 2017-12-11 DIAGNOSIS — Z98.89 OTHER SPECIFIED POSTPROCEDURAL STATES: Chronic | ICD-10-CM

## 2017-12-11 DIAGNOSIS — C44.91 BASAL CELL CARCINOMA OF SKIN, UNSPECIFIED: Chronic | ICD-10-CM

## 2017-12-11 DIAGNOSIS — C50.311 MALIGNANT NEOPLASM OF LOWER-INNER QUADRANT OF RIGHT FEMALE BREAST: ICD-10-CM

## 2017-12-11 DIAGNOSIS — Z87.442 PERSONAL HISTORY OF URINARY CALCULI: Chronic | ICD-10-CM

## 2017-12-11 DIAGNOSIS — Z98.51 TUBAL LIGATION STATUS: Chronic | ICD-10-CM

## 2017-12-11 DIAGNOSIS — I67.1 CEREBRAL ANEURYSM, NONRUPTURED: Chronic | ICD-10-CM

## 2017-12-15 ENCOUNTER — APPOINTMENT (OUTPATIENT)
Dept: HEMATOLOGY ONCOLOGY | Facility: CLINIC | Age: 72
End: 2017-12-15
Payer: MEDICARE

## 2017-12-15 VITALS
BODY MASS INDEX: 29.14 KG/M2 | DIASTOLIC BLOOD PRESSURE: 80 MMHG | WEIGHT: 169.73 LBS | HEART RATE: 103 BPM | SYSTOLIC BLOOD PRESSURE: 124 MMHG | TEMPERATURE: 97.8 F | OXYGEN SATURATION: 98 % | RESPIRATION RATE: 16 BRPM

## 2017-12-15 PROCEDURE — 99215 OFFICE O/P EST HI 40 MIN: CPT

## 2017-12-15 RX ORDER — CLARITHROMYCIN 500 MG/1
500 TABLET, FILM COATED ORAL
Qty: 20 | Refills: 0 | Status: DISCONTINUED | COMMUNITY
Start: 2017-05-31

## 2017-12-15 RX ORDER — LOTEPREDNOL ETABONATE 5 MG/G
0.5 OINTMENT OPHTHALMIC
Qty: 3 | Refills: 0 | Status: DISCONTINUED | COMMUNITY
Start: 2017-08-04 | End: 2017-12-15

## 2017-12-15 RX ORDER — PEN NEEDLE, DIABETIC 29 G X1/2"
31G X 5 MM NEEDLE, DISPOSABLE MISCELLANEOUS
Qty: 450 | Refills: 0 | Status: ACTIVE | COMMUNITY
Start: 2017-06-16

## 2017-12-15 RX ORDER — DICLOFENAC SODIUM 10 MG/G
1 GEL TOPICAL
Qty: 1 | Refills: 0 | Status: DISCONTINUED | COMMUNITY
Start: 2017-10-27 | End: 2017-12-15

## 2018-02-08 ENCOUNTER — APPOINTMENT (OUTPATIENT)
Dept: GERIATRICS | Facility: CLINIC | Age: 73
End: 2018-02-08
Payer: MEDICARE

## 2018-02-08 VITALS
TEMPERATURE: 98.2 F | HEART RATE: 87 BPM | BODY MASS INDEX: 28.51 KG/M2 | RESPIRATION RATE: 16 BRPM | WEIGHT: 167 LBS | DIASTOLIC BLOOD PRESSURE: 68 MMHG | SYSTOLIC BLOOD PRESSURE: 130 MMHG | OXYGEN SATURATION: 98 % | HEIGHT: 64 IN

## 2018-02-08 PROCEDURE — 99215 OFFICE O/P EST HI 40 MIN: CPT | Mod: 25,GC

## 2018-02-08 PROCEDURE — G0439: CPT

## 2018-02-08 RX ORDER — MOXIFLOXACIN HYDROCHLORIDE 5 MG/ML
0.5 SOLUTION/ DROPS OPHTHALMIC
Qty: 3 | Refills: 0 | Status: COMPLETED | COMMUNITY
Start: 2017-08-04 | End: 2018-02-08

## 2018-02-08 RX ORDER — LIDOCAINE 5% 700 MG/1
5 PATCH TOPICAL
Qty: 30 | Refills: 3 | Status: COMPLETED | COMMUNITY
Start: 2017-08-18 | End: 2018-02-08

## 2018-02-11 LAB
24R-OH-CALCIDIOL SERPL-MCNC: 59.2 PG/ML
ALBUMIN SERPL ELPH-MCNC: 4.8 G/DL
ALP BLD-CCNC: 95 U/L
ALT SERPL-CCNC: 26 U/L
ANION GAP SERPL CALC-SCNC: 17 MMOL/L
AST SERPL-CCNC: 21 U/L
BASOPHILS # BLD AUTO: 0.03 K/UL
BASOPHILS NFR BLD AUTO: 0.4 %
BILIRUB SERPL-MCNC: 0.2 MG/DL
BUN SERPL-MCNC: 15 MG/DL
CALCIUM SERPL-MCNC: 10.2 MG/DL
CHLORIDE SERPL-SCNC: 102 MMOL/L
CO2 SERPL-SCNC: 26 MMOL/L
CREAT SERPL-MCNC: 0.91 MG/DL
EOSINOPHIL # BLD AUTO: 0.32 K/UL
EOSINOPHIL NFR BLD AUTO: 4.6 %
FOLATE SERPL-MCNC: >20 NG/ML
GLUCOSE SERPL-MCNC: 163 MG/DL
HBA1C MFR BLD HPLC: 7.3 %
HCT VFR BLD CALC: 45.5 %
HGB BLD-MCNC: 14.8 G/DL
IMM GRANULOCYTES NFR BLD AUTO: 0 %
LYMPHOCYTES # BLD AUTO: 1.54 K/UL
LYMPHOCYTES NFR BLD AUTO: 22.4 %
MAGNESIUM SERPL-MCNC: 2 MG/DL
MAN DIFF?: NORMAL
MCHC RBC-ENTMCNC: 28 PG
MCHC RBC-ENTMCNC: 32.5 GM/DL
MCV RBC AUTO: 86.2 FL
MONOCYTES # BLD AUTO: 0.43 K/UL
MONOCYTES NFR BLD AUTO: 6.2 %
NEUTROPHILS # BLD AUTO: 4.57 K/UL
NEUTROPHILS NFR BLD AUTO: 66.4 %
PLATELET # BLD AUTO: 286 K/UL
POTASSIUM SERPL-SCNC: 4.9 MMOL/L
PROT SERPL-MCNC: 6.8 G/DL
RBC # BLD: 5.28 M/UL
RBC # FLD: 14.3 %
SODIUM SERPL-SCNC: 145 MMOL/L
TSH SERPL-ACNC: 0.09 UIU/ML
VIT B12 SERPL-MCNC: >2000 PG/ML
WBC # FLD AUTO: 6.89 K/UL

## 2018-02-28 ENCOUNTER — APPOINTMENT (OUTPATIENT)
Dept: GERIATRICS | Facility: CLINIC | Age: 73
End: 2018-02-28
Payer: MEDICARE

## 2018-02-28 VITALS
RESPIRATION RATE: 16 BRPM | SYSTOLIC BLOOD PRESSURE: 158 MMHG | DIASTOLIC BLOOD PRESSURE: 70 MMHG | OXYGEN SATURATION: 97 % | TEMPERATURE: 97 F | WEIGHT: 165 LBS | HEIGHT: 64 IN | HEART RATE: 94 BPM | BODY MASS INDEX: 28.17 KG/M2

## 2018-02-28 PROCEDURE — 99215 OFFICE O/P EST HI 40 MIN: CPT

## 2018-02-28 PROCEDURE — 99354: CPT

## 2018-03-26 ENCOUNTER — APPOINTMENT (OUTPATIENT)
Dept: CARDIOLOGY | Facility: CLINIC | Age: 73
End: 2018-03-26
Payer: MEDICARE

## 2018-03-26 VITALS
WEIGHT: 162 LBS | OXYGEN SATURATION: 97 % | TEMPERATURE: 98.3 F | HEIGHT: 64 IN | SYSTOLIC BLOOD PRESSURE: 138 MMHG | BODY MASS INDEX: 27.66 KG/M2 | HEART RATE: 92 BPM | RESPIRATION RATE: 18 BRPM | DIASTOLIC BLOOD PRESSURE: 80 MMHG

## 2018-03-26 DIAGNOSIS — Z92.21 PERSONAL HISTORY OF ANTINEOPLASTIC CHEMOTHERAPY: ICD-10-CM

## 2018-03-26 PROCEDURE — 93000 ELECTROCARDIOGRAM COMPLETE: CPT

## 2018-03-26 PROCEDURE — 99204 OFFICE O/P NEW MOD 45 MIN: CPT

## 2018-03-28 LAB
CHOLEST SERPL-MCNC: 254 MG/DL
CHOLEST/HDLC SERPL: 4.3 RATIO
HDLC SERPL-MCNC: 59 MG/DL
LDLC SERPL CALC-MCNC: 147 MG/DL
TRIGL SERPL-MCNC: 242 MG/DL
TSH SERPL-ACNC: 0.78 UIU/ML

## 2018-04-16 ENCOUNTER — APPOINTMENT (OUTPATIENT)
Dept: SURGERY | Facility: CLINIC | Age: 73
End: 2018-04-16
Payer: MEDICARE

## 2018-04-16 PROCEDURE — 99213K: CUSTOM

## 2018-04-17 ENCOUNTER — RX RENEWAL (OUTPATIENT)
Age: 73
End: 2018-04-17

## 2018-05-02 ENCOUNTER — RX RENEWAL (OUTPATIENT)
Age: 73
End: 2018-05-02

## 2018-06-13 ENCOUNTER — APPOINTMENT (OUTPATIENT)
Dept: GERIATRICS | Facility: CLINIC | Age: 73
End: 2018-06-13
Payer: MEDICARE

## 2018-06-13 VITALS
HEIGHT: 64 IN | TEMPERATURE: 98.7 F | WEIGHT: 165 LBS | DIASTOLIC BLOOD PRESSURE: 70 MMHG | SYSTOLIC BLOOD PRESSURE: 130 MMHG | BODY MASS INDEX: 28.17 KG/M2 | RESPIRATION RATE: 16 BRPM | HEART RATE: 90 BPM | OXYGEN SATURATION: 97 %

## 2018-06-13 PROCEDURE — 99215 OFFICE O/P EST HI 40 MIN: CPT

## 2018-07-06 ENCOUNTER — OUTPATIENT (OUTPATIENT)
Dept: OUTPATIENT SERVICES | Facility: HOSPITAL | Age: 73
LOS: 1 days | End: 2018-07-06
Payer: MEDICARE

## 2018-07-06 ENCOUNTER — APPOINTMENT (OUTPATIENT)
Dept: ULTRASOUND IMAGING | Facility: CLINIC | Age: 73
End: 2018-07-06
Payer: MEDICARE

## 2018-07-06 ENCOUNTER — APPOINTMENT (OUTPATIENT)
Dept: MAMMOGRAPHY | Facility: CLINIC | Age: 73
End: 2018-07-06
Payer: MEDICARE

## 2018-07-06 DIAGNOSIS — Z98.89 OTHER SPECIFIED POSTPROCEDURAL STATES: Chronic | ICD-10-CM

## 2018-07-06 DIAGNOSIS — Z00.8 ENCOUNTER FOR OTHER GENERAL EXAMINATION: ICD-10-CM

## 2018-07-06 DIAGNOSIS — Z98.51 TUBAL LIGATION STATUS: Chronic | ICD-10-CM

## 2018-07-06 DIAGNOSIS — I67.1 CEREBRAL ANEURYSM, NONRUPTURED: Chronic | ICD-10-CM

## 2018-07-06 DIAGNOSIS — C44.91 BASAL CELL CARCINOMA OF SKIN, UNSPECIFIED: Chronic | ICD-10-CM

## 2018-07-06 DIAGNOSIS — Z87.442 PERSONAL HISTORY OF URINARY CALCULI: Chronic | ICD-10-CM

## 2018-07-06 PROCEDURE — G0279: CPT

## 2018-07-06 PROCEDURE — 76641 ULTRASOUND BREAST COMPLETE: CPT | Mod: 26,50

## 2018-07-06 PROCEDURE — G0279: CPT | Mod: 26

## 2018-07-06 PROCEDURE — 77066 DX MAMMO INCL CAD BI: CPT | Mod: 26

## 2018-07-06 PROCEDURE — 76641 ULTRASOUND BREAST COMPLETE: CPT

## 2018-07-06 PROCEDURE — 77066 DX MAMMO INCL CAD BI: CPT

## 2018-07-26 ENCOUNTER — OUTPATIENT (OUTPATIENT)
Dept: OUTPATIENT SERVICES | Facility: HOSPITAL | Age: 73
LOS: 1 days | Discharge: HOME | End: 2018-07-26

## 2018-07-26 DIAGNOSIS — D70.1 AGRANULOCYTOSIS SECONDARY TO CANCER CHEMOTHERAPY: ICD-10-CM

## 2018-07-26 DIAGNOSIS — I67.1 CEREBRAL ANEURYSM, NONRUPTURED: Chronic | ICD-10-CM

## 2018-07-26 DIAGNOSIS — C44.91 BASAL CELL CARCINOMA OF SKIN, UNSPECIFIED: Chronic | ICD-10-CM

## 2018-07-26 DIAGNOSIS — Z98.51 TUBAL LIGATION STATUS: Chronic | ICD-10-CM

## 2018-07-26 DIAGNOSIS — Z98.89 OTHER SPECIFIED POSTPROCEDURAL STATES: Chronic | ICD-10-CM

## 2018-07-26 DIAGNOSIS — Z17.0 ESTROGEN RECEPTOR POSITIVE STATUS [ER+]: ICD-10-CM

## 2018-07-26 DIAGNOSIS — C50.311 MALIGNANT NEOPLASM OF LOWER-INNER QUADRANT OF RIGHT FEMALE BREAST: ICD-10-CM

## 2018-07-26 DIAGNOSIS — Z87.442 PERSONAL HISTORY OF URINARY CALCULI: Chronic | ICD-10-CM

## 2018-08-03 ENCOUNTER — APPOINTMENT (OUTPATIENT)
Dept: HEMATOLOGY ONCOLOGY | Facility: CLINIC | Age: 73
End: 2018-08-03
Payer: MEDICARE

## 2018-08-03 VITALS
HEART RATE: 90 BPM | SYSTOLIC BLOOD PRESSURE: 163 MMHG | BODY MASS INDEX: 28 KG/M2 | TEMPERATURE: 98.2 F | WEIGHT: 163.14 LBS | OXYGEN SATURATION: 99 % | DIASTOLIC BLOOD PRESSURE: 66 MMHG | RESPIRATION RATE: 16 BRPM

## 2018-08-03 PROCEDURE — 99215 OFFICE O/P EST HI 40 MIN: CPT

## 2018-08-03 RX ORDER — ANASTROZOLE TABLETS 1 MG/1
1 TABLET ORAL
Qty: 90 | Refills: 3 | Status: DISCONTINUED | COMMUNITY
Start: 2017-05-12 | End: 2018-08-03

## 2018-09-28 ENCOUNTER — APPOINTMENT (OUTPATIENT)
Dept: GERIATRICS | Facility: CLINIC | Age: 73
End: 2018-09-28
Payer: MEDICARE

## 2018-09-28 VITALS
WEIGHT: 164.6 LBS | HEART RATE: 91 BPM | OXYGEN SATURATION: 95 % | TEMPERATURE: 98.1 F | DIASTOLIC BLOOD PRESSURE: 70 MMHG | SYSTOLIC BLOOD PRESSURE: 136 MMHG | BODY MASS INDEX: 28.25 KG/M2

## 2018-09-28 DIAGNOSIS — K11.7 DISTURBANCES OF SALIVARY SECRETION: ICD-10-CM

## 2018-09-28 PROCEDURE — 99214 OFFICE O/P EST MOD 30 MIN: CPT

## 2018-12-11 ENCOUNTER — FORM ENCOUNTER (OUTPATIENT)
Age: 73
End: 2018-12-11

## 2018-12-12 ENCOUNTER — APPOINTMENT (OUTPATIENT)
Dept: RADIOLOGY | Facility: CLINIC | Age: 73
End: 2018-12-12
Payer: MEDICARE

## 2018-12-12 ENCOUNTER — OUTPATIENT (OUTPATIENT)
Dept: OUTPATIENT SERVICES | Facility: HOSPITAL | Age: 73
LOS: 1 days | End: 2018-12-12
Payer: MEDICARE

## 2018-12-12 ENCOUNTER — APPOINTMENT (OUTPATIENT)
Dept: ULTRASOUND IMAGING | Facility: CLINIC | Age: 73
End: 2018-12-12
Payer: MEDICARE

## 2018-12-12 DIAGNOSIS — Z98.51 TUBAL LIGATION STATUS: Chronic | ICD-10-CM

## 2018-12-12 DIAGNOSIS — Z98.89 OTHER SPECIFIED POSTPROCEDURAL STATES: Chronic | ICD-10-CM

## 2018-12-12 DIAGNOSIS — C50.311 MALIGNANT NEOPLASM OF LOWER-INNER QUADRANT OF RIGHT FEMALE BREAST: ICD-10-CM

## 2018-12-12 DIAGNOSIS — I67.1 CEREBRAL ANEURYSM, NONRUPTURED: Chronic | ICD-10-CM

## 2018-12-12 DIAGNOSIS — Z87.442 PERSONAL HISTORY OF URINARY CALCULI: Chronic | ICD-10-CM

## 2018-12-12 DIAGNOSIS — C44.91 BASAL CELL CARCINOMA OF SKIN, UNSPECIFIED: Chronic | ICD-10-CM

## 2018-12-12 PROCEDURE — 77080 DXA BONE DENSITY AXIAL: CPT | Mod: 26

## 2018-12-12 PROCEDURE — 77080 DXA BONE DENSITY AXIAL: CPT

## 2018-12-12 PROCEDURE — 76700 US EXAM ABDOM COMPLETE: CPT

## 2018-12-12 PROCEDURE — 76700 US EXAM ABDOM COMPLETE: CPT | Mod: 26

## 2018-12-14 ENCOUNTER — OUTPATIENT (OUTPATIENT)
Dept: OUTPATIENT SERVICES | Facility: HOSPITAL | Age: 73
LOS: 1 days | End: 2018-12-14
Payer: MEDICARE

## 2018-12-14 ENCOUNTER — APPOINTMENT (OUTPATIENT)
Dept: ULTRASOUND IMAGING | Facility: CLINIC | Age: 73
End: 2018-12-14
Payer: MEDICARE

## 2018-12-14 ENCOUNTER — APPOINTMENT (OUTPATIENT)
Dept: RADIOLOGY | Facility: CLINIC | Age: 73
End: 2018-12-14
Payer: MEDICARE

## 2018-12-14 ENCOUNTER — APPOINTMENT (OUTPATIENT)
Dept: MRI IMAGING | Facility: CLINIC | Age: 73
End: 2018-12-14
Payer: MEDICARE

## 2018-12-14 DIAGNOSIS — Z98.89 OTHER SPECIFIED POSTPROCEDURAL STATES: Chronic | ICD-10-CM

## 2018-12-14 DIAGNOSIS — I67.1 CEREBRAL ANEURYSM, NONRUPTURED: Chronic | ICD-10-CM

## 2018-12-14 DIAGNOSIS — Z98.51 TUBAL LIGATION STATUS: Chronic | ICD-10-CM

## 2018-12-14 DIAGNOSIS — Z00.8 ENCOUNTER FOR OTHER GENERAL EXAMINATION: ICD-10-CM

## 2018-12-14 DIAGNOSIS — C44.91 BASAL CELL CARCINOMA OF SKIN, UNSPECIFIED: Chronic | ICD-10-CM

## 2018-12-14 DIAGNOSIS — Z87.442 PERSONAL HISTORY OF URINARY CALCULI: Chronic | ICD-10-CM

## 2018-12-14 PROCEDURE — A9585: CPT

## 2018-12-14 PROCEDURE — 82565 ASSAY OF CREATININE: CPT

## 2018-12-14 PROCEDURE — 0159T: CPT | Mod: 26

## 2018-12-14 PROCEDURE — 77059 MRI BREAST BILATERAL: CPT | Mod: 26

## 2018-12-14 PROCEDURE — C8908: CPT

## 2018-12-14 PROCEDURE — C8937: CPT

## 2018-12-28 DIAGNOSIS — R92.8 OTHER ABNORMAL AND INCONCLUSIVE FINDINGS ON DIAGNOSTIC IMAGING OF BREAST: ICD-10-CM

## 2018-12-28 DIAGNOSIS — Z85.3 PERSONAL HISTORY OF MALIGNANT NEOPLASM OF BREAST: ICD-10-CM

## 2019-01-23 ENCOUNTER — RX RENEWAL (OUTPATIENT)
Age: 74
End: 2019-01-23

## 2019-03-20 ENCOUNTER — OUTPATIENT (OUTPATIENT)
Dept: OUTPATIENT SERVICES | Facility: HOSPITAL | Age: 74
LOS: 1 days | Discharge: HOME | End: 2019-03-20

## 2019-03-20 DIAGNOSIS — Z87.442 PERSONAL HISTORY OF URINARY CALCULI: Chronic | ICD-10-CM

## 2019-03-20 DIAGNOSIS — I67.1 CEREBRAL ANEURYSM, NONRUPTURED: Chronic | ICD-10-CM

## 2019-03-20 DIAGNOSIS — Z98.89 OTHER SPECIFIED POSTPROCEDURAL STATES: Chronic | ICD-10-CM

## 2019-03-20 DIAGNOSIS — C44.91 BASAL CELL CARCINOMA OF SKIN, UNSPECIFIED: Chronic | ICD-10-CM

## 2019-03-20 DIAGNOSIS — C50.311 MALIGNANT NEOPLASM OF LOWER-INNER QUADRANT OF RIGHT FEMALE BREAST: ICD-10-CM

## 2019-03-20 DIAGNOSIS — Z98.51 TUBAL LIGATION STATUS: Chronic | ICD-10-CM

## 2019-03-27 NOTE — OB HISTORY
[Menarche Age: ____] : age at menarche was [unfilled] [Menopause Age: ____] : age at menopause was [unfilled] [___] :  Spontaneous: [unfilled]

## 2019-03-28 ENCOUNTER — TRANSCRIPTION ENCOUNTER (OUTPATIENT)
Age: 74
End: 2019-03-28

## 2019-03-28 ENCOUNTER — APPOINTMENT (OUTPATIENT)
Dept: HEMATOLOGY ONCOLOGY | Facility: CLINIC | Age: 74
End: 2019-03-28
Payer: MEDICARE

## 2019-03-28 ENCOUNTER — RESULT REVIEW (OUTPATIENT)
Age: 74
End: 2019-03-28

## 2019-03-28 VITALS
DIASTOLIC BLOOD PRESSURE: 75 MMHG | WEIGHT: 161.6 LBS | TEMPERATURE: 97.7 F | SYSTOLIC BLOOD PRESSURE: 153 MMHG | HEART RATE: 83 BPM | RESPIRATION RATE: 16 BRPM | BODY MASS INDEX: 27.74 KG/M2 | OXYGEN SATURATION: 98 %

## 2019-03-28 LAB
HCT VFR BLD CALC: 43.5 % — SIGNIFICANT CHANGE UP (ref 34.5–45)
HGB BLD-MCNC: 15 G/DL — SIGNIFICANT CHANGE UP (ref 11.5–15.5)
MCHC RBC-ENTMCNC: 28.8 PG — SIGNIFICANT CHANGE UP (ref 27–34)
MCHC RBC-ENTMCNC: 34.6 G/DL — SIGNIFICANT CHANGE UP (ref 32–36)
MCV RBC AUTO: 83.3 FL — SIGNIFICANT CHANGE UP (ref 80–100)
PLATELET # BLD AUTO: 281 K/UL — SIGNIFICANT CHANGE UP (ref 150–400)
RBC # BLD: 5.22 M/UL — HIGH (ref 3.8–5.2)
RBC # FLD: 12.7 % — SIGNIFICANT CHANGE UP (ref 10.3–14.5)
WBC # BLD: 6.8 K/UL — SIGNIFICANT CHANGE UP (ref 3.8–10.5)
WBC # FLD AUTO: 6.8 K/UL — SIGNIFICANT CHANGE UP (ref 3.8–10.5)

## 2019-03-28 PROCEDURE — 99214 OFFICE O/P EST MOD 30 MIN: CPT

## 2019-03-28 RX ORDER — ANASTROZOLE TABLETS 1 MG/1
1 TABLET ORAL
Qty: 90 | Refills: 0 | Status: DISCONTINUED | COMMUNITY
Start: 2019-01-24 | End: 2019-03-28

## 2019-03-29 LAB
25(OH)D3 SERPL-MCNC: 49.1 NG/ML
ALBUMIN SERPL ELPH-MCNC: 5 G/DL
ALP BLD-CCNC: 119 U/L
ALT SERPL-CCNC: 29 U/L
ANION GAP SERPL CALC-SCNC: 15 MMOL/L
AST SERPL-CCNC: 21 U/L
BILIRUB SERPL-MCNC: 0.3 MG/DL
BUN SERPL-MCNC: 21 MG/DL
CALCIUM SERPL-MCNC: 10.3 MG/DL
CHLORIDE SERPL-SCNC: 101 MMOL/L
CO2 SERPL-SCNC: 26 MMOL/L
CREAT SERPL-MCNC: 0.79 MG/DL
GLUCOSE SERPL-MCNC: 118 MG/DL
POTASSIUM SERPL-SCNC: 4.8 MMOL/L
PROT SERPL-MCNC: 6.8 G/DL
SODIUM SERPL-SCNC: 142 MMOL/L

## 2019-03-30 NOTE — REVIEW OF SYSTEMS
[Joint Pain] : joint pain [Joint Stiffness] : joint stiffness [Muscle Pain] : muscle pain [Negative] : Allergic/Immunologic [Fever] : no fever [Chills] : no chills [Night Sweats] : no night sweats [Fatigue] : no fatigue [Abdominal Pain] : no abdominal pain [Constipation] : no constipation [Diarrhea] : no diarrhea [Muscle Weakness] : no muscle weakness [Confused] : no confusion [Dizziness] : no dizziness [Fainting] : no fainting [Difficulty Walking] : no difficulty walking [de-identified] : Peripheral numbness and tingling

## 2019-03-30 NOTE — HISTORY OF PRESENT ILLNESS
[Disease: _____________________] : Disease: [unfilled] [T: ___] : T[unfilled] [N: ___] : N[unfilled] [M: ___] : M[unfilled] [AJCC Stage: ____] : AJCC Stage: [unfilled] [de-identified] : Right breast cancer diagnosed at the age of 70\par 7/19/16 Right breast partial mastectomy with sentinel lymph node biopsy showed  a 6 mm invasive ductal carcinoma, SBR 6/9, ER 90%, IL 0%, HER-2 negative, 2 negative SLN\par 8/1/16 Oncotype DX recurrence score of 37, which predicts a 10 year risk of distant recurrence on Tamoxifen alone of 25%\par 9/16- 2/17 CMF given IV x 8 cycles \par 3/27/17 - 4/21/17 Radiation therapy with Dr. Agudelo (total of 5240 cGY in 25 fractions)\par 6/19/17 Anastrozole started [de-identified] : 6 mm IDC, SBR 6/9, ER 90%, OK 0%, HER-2 negative, Ki-67 15%, 2 negative SLN, Oncotype score = 37 (RR 25%) [de-identified] : Isabelle continues on anastrozole which she has been on since  06/17. We had previously discussed a change to letrozole but she did not start it as she been experiencing a lot of GI issues with more GERD and diarrhea and her gastroenterologist suggested that she hold the Letrozole for several months until her symptoms improve.\par In the last 6 months she has noticed increased arthralgias in the hands, wrists and elbow with some difficulty in opening bottles. She has also developed trigger fingers in the right 3 and 4th digit and left 3rd digit. She continues to have chronic lower back pain and new onset of mild ankle pain. \par The neuropathy persists and adds to the arthralgia pain.  She is feeling overwhelmed by the constant struggle to manage all her symptoms.\par She finds massages help and had tried acupuncture but the person was too far away to go on a regular basis\par She still gets hot flashes at night but they are not too problematic. \par \par Routine Health Maintenance:\par Mammogram and breast ultrasound: 7/6/18 [facility]\par Breast MRI 12/18 [facility]\par Pap Smear: 5/16\par Bone density: 8/18  T scores of 0.3 in the spine, -0.3 in the total hip and -0.7 in the femoral neck\par Colonoscopy: overdue so will schedule in 2018 not scheduled yet due to IBS; f/u GI\par \par \par \par \par

## 2019-03-30 NOTE — PHYSICAL EXAM
[Fully active, able to carry on all pre-disease performance without restriction] : Status 0 - Fully active, able to carry on all pre-disease performance without restriction [Normal] : affect appropriate [de-identified] : Right breast: + erythema from radiation therapy and peeling of the skin especially along the areolar region with some tenderness on examination

## 2019-05-29 ENCOUNTER — APPOINTMENT (OUTPATIENT)
Dept: SURGERY | Facility: CLINIC | Age: 74
End: 2019-05-29
Payer: MEDICARE

## 2019-05-29 PROCEDURE — 99213K: CUSTOM

## 2019-06-06 RX ORDER — LETROZOLE TABLETS 2.5 MG/1
2.5 TABLET, FILM COATED ORAL DAILY
Qty: 90 | Refills: 3 | Status: DISCONTINUED | COMMUNITY
Start: 2018-08-03 | End: 2019-06-06

## 2019-06-11 ENCOUNTER — APPOINTMENT (OUTPATIENT)
Dept: GERIATRICS | Facility: CLINIC | Age: 74
End: 2019-06-11

## 2019-06-11 ENCOUNTER — APPOINTMENT (OUTPATIENT)
Dept: ORTHOPEDIC SURGERY | Facility: CLINIC | Age: 74
End: 2019-06-11
Payer: MEDICARE

## 2019-06-11 VITALS — SYSTOLIC BLOOD PRESSURE: 133 MMHG | HEART RATE: 99 BPM | DIASTOLIC BLOOD PRESSURE: 73 MMHG
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
## 2019-06-11 DIAGNOSIS — M54.16 RADICULOPATHY, LUMBAR REGION: ICD-10-CM

## 2019-06-11 PROCEDURE — 99204 OFFICE O/P NEW MOD 45 MIN: CPT

## 2019-06-11 PROCEDURE — 73522 X-RAY EXAM HIPS BI 3-4 VIEWS: CPT

## 2019-06-11 NOTE — PHYSICAL EXAM
[de-identified] : Patient is well nourished, well-developed, in no acute distress, with appropriate mood and affect. The patient is oriented to time, place, and person. Respirations are even and unlabored. Gait evaluation does not reveal a limp. There is no inguinal adenopathy. \par The right limb is well-perfused and showed 2+ dp/pt pulses, without skin lesions, shows a grossly normal motor and sensory examination. Examination of the hip shows no skin lesions. Hip motion is full and painless from 0-90 degrees extension to flexion, 20 degrees adduction and 20 degrees abduction, and 15 degrees internal and 30 degrees external rotation. Stinchfield test is negative. FADIR test is negative. JAEL test is negative. The left limb is well-perfused and showed 2+ dp/pt pulses, without skin lesions, shows a grossly normal motor and sensory examination. Examination of the hip shows no skin lesions. Hip motion is full and painless from 0-90 degrees extension to flexion, 20 degrees adduction and 20 degrees abduction, and 15 degrees internal and 30 degrees external rotation. Stinchfield test is negative.  FADIR test is negative. JAEL test is negative. Leg lengths are approximately equal. Both hips are stable and muscle strength is normal with good strength with resisted abduction and adduction. Pedal pulses are palpable.  Examination of the lumbar spine reveals full range of motion without pain. There is no tenderness to palpation of the osseous structures but there is tenderness to palpation about the right more than left paravertebral soft tissues. There is no muscle spasm. Straight leg raise is negative bilaterally.  She does have tenderness to palpation over the greater trochanter of the left. [de-identified] : AP and lateral x-rays of the bilateral hip, pelvis, and femur were ordered and taken in the office and demonstrate no evidence of degenerative joint disease of the hip with maintained joint space and no evidence of fractures or other intraarticular pathology.  The lower levels of the lumbar spine are seen on the AP pelvis radiograph demonstrates degenerative disc disease of the lumbar spine.

## 2019-06-11 NOTE — DISCUSSION/SUMMARY
[de-identified] : This patient has bilateral lower buttock contusions particularly over the left greater trochanter.  Further she has a strain of her lumbar paraspinal musculature.  I gave her a Medrol Dosepak for some of her pain as she cannot take NSAIDs.  I recommended ice or heat.  I also recommended following up with physiatry in 1 week if the pain does not improve and gave her a referral for this.  Follow-up on a as needed basis.

## 2019-06-20 ENCOUNTER — APPOINTMENT (OUTPATIENT)
Dept: GERIATRICS | Facility: CLINIC | Age: 74
End: 2019-06-20
Payer: MEDICARE

## 2019-06-20 VITALS
OXYGEN SATURATION: 97 % | SYSTOLIC BLOOD PRESSURE: 140 MMHG | DIASTOLIC BLOOD PRESSURE: 70 MMHG | WEIGHT: 161 LBS | TEMPERATURE: 97.7 F | BODY MASS INDEX: 27.49 KG/M2 | HEIGHT: 64 IN | HEART RATE: 97 BPM | RESPIRATION RATE: 14 BRPM

## 2019-06-20 PROCEDURE — 99215 OFFICE O/P EST HI 40 MIN: CPT

## 2019-06-20 RX ORDER — LEVOFLOXACIN 500 MG/1
500 TABLET, FILM COATED ORAL
Qty: 10 | Refills: 0 | Status: COMPLETED | COMMUNITY
Start: 2019-02-11 | End: 2019-06-20

## 2019-06-20 RX ORDER — DICYCLOMINE HYDROCHLORIDE 20 MG/1
20 TABLET ORAL
Qty: 360 | Refills: 0 | Status: COMPLETED | COMMUNITY
Start: 2019-03-01 | End: 2019-06-20

## 2019-06-20 RX ORDER — METHYLPREDNISOLONE 4 MG/1
4 TABLET ORAL
Qty: 1 | Refills: 0 | Status: COMPLETED | COMMUNITY
Start: 2019-06-11 | End: 2019-06-20

## 2019-06-20 NOTE — ASSESSMENT
[FreeTextEntry1] : 74 yo female with breast ca history who was on anastrazole but stopped it bc of pain.  Wanted to see if hse stopped it would her pain decreased. But she doesn’t think she should stop anstrazole now bc she hurst bc of fall.  She is consider restarting anastrazole - held it bc of pain and wanted to see how she did off of it and if her aches and pains improved off of it. But since she is sore with the fall she cant tell so she might as well restart it. I agree and will notify Dr. Howell.\par \par Wants to consider Cannibis restart at higher ratio. \par Will discuss with Demetrice Rodriguez who prescribed it originally.\par \par No change in meds\par Continue nystatin until skin rash resolved. then use powder to keep skin dry. \par \par Prevnar given\par Shingrix needed. Recommend she go to pharmacy to get.\par \par Check labs - cbc, cmp, hgbaic, lipid, tsh, b12 and folate, \par

## 2019-06-20 NOTE — HISTORY OF PRESENT ILLNESS
[FreeTextEntry1] : 74 yo female who here for complete evaluation.  Pt with bilateral cataracts last month - doing well\par SHe has occasional loose stool - stool samples done by Dr Bingham and were negative for c diff.\par Pt fell 2 weeks ago in her home.  She saw ortho who xrayed leg. Not fracutred.  Pt now with Candida rash under breast\par \par Pt has a recent history of breast ca - on anastrazole, gives her aches and recently stopped to see if feels better off of it but then fell and ow in pain. Pt wondering if she should restart until her fall injuries resolve.\par \par Bome densitometyr in recent past says normal bone mass. \par

## 2019-06-20 NOTE — PHYSICAL EXAM
[General Appearance - Alert] : alert [General Appearance - In No Acute Distress] : in no acute distress [Sclera] : the sclera and conjunctiva were normal [PERRL With Normal Accommodation] : pupils were equal in size, round, and reactive to light [Extraocular Movements] : extraocular movements were intact [Normal Oral Mucosa] : normal oral mucosa [No Oral Pallor] : no oral pallor [No Oral Cyanosis] : no oral cyanosis [Outer Ear] : the ears and nose were normal in appearance [Oropharynx] : The oropharynx was normal [Neck Appearance] : the appearance of the neck was normal [Neck Cervical Mass (___cm)] : no neck mass was observed [Jugular Venous Distention Increased] : there was no jugular-venous distention [Thyroid Diffuse Enlargement] : the thyroid was not enlarged [Thyroid Nodule] : there were no palpable thyroid nodules [Auscultation Breath Sounds / Voice Sounds] : lungs were clear to auscultation bilaterally [Heart Rate And Rhythm] : heart rate was normal and rhythm regular [Heart Sounds] : normal S1 and S2 [Heart Sounds Gallop] : no gallops [Murmurs] : no murmurs [Heart Sounds Pericardial Friction Rub] : no pericardial rub [Full Pulse] : the pedal pulses are present [Edema] : there was no peripheral edema [Breast Appearance] : normal in appearance [Breast Palpation Mass] : no palpable masses [Bowel Sounds] : normal bowel sounds [Abdomen Soft] : soft [Abdomen Tenderness] : non-tender [Abdomen Mass (___ Cm)] : no abdominal mass palpated [External Female Genitalia] : normal external genitalia [Urethral Meatus] : normal urethra [Urinary Bladder Findings] : the bladder was normal on palpation [Cervical Lymph Nodes Enlarged Posterior Bilaterally] : posterior cervical [Cervical Lymph Nodes Enlarged Anterior Bilaterally] : anterior cervical [Supraclavicular Lymph Nodes Enlarged Bilaterally] : supraclavicular [Axillary Lymph Nodes Enlarged Bilaterally] : axillary [Femoral Lymph Nodes Enlarged Bilaterally] : femoral [No CVA Tenderness] : no ~M costovertebral angle tenderness [Inguinal Lymph Nodes Enlarged Bilaterally] : inguinal [No Spinal Tenderness] : no spinal tenderness [Nail Clubbing] : no clubbing  or cyanosis of the fingernails [Musculoskeletal - Swelling] : no joint swelling seen [Motor Tone] : muscle strength and tone were normal [Skin Color & Pigmentation] : normal skin color and pigmentation [Skin Turgor] : normal skin turgor [] : no rash [Sensation] : the sensory exam was normal to light touch and pinprick [Deep Tendon Reflexes (DTR)] : deep tendon reflexes were 2+ and symmetric [Oriented To Time, Place, And Person] : oriented to person, place, and time [No Focal Deficits] : no focal deficits [Impaired Insight] : insight and judgment were intact [Affect] : the affect was normal

## 2019-07-13 ENCOUNTER — APPOINTMENT (OUTPATIENT)
Dept: MAMMOGRAPHY | Facility: CLINIC | Age: 74
End: 2019-07-13
Payer: MEDICARE

## 2019-07-13 ENCOUNTER — OUTPATIENT (OUTPATIENT)
Dept: OUTPATIENT SERVICES | Facility: HOSPITAL | Age: 74
LOS: 1 days | End: 2019-07-13
Payer: MEDICARE

## 2019-07-13 ENCOUNTER — APPOINTMENT (OUTPATIENT)
Dept: ULTRASOUND IMAGING | Facility: CLINIC | Age: 74
End: 2019-07-13
Payer: MEDICARE

## 2019-07-13 DIAGNOSIS — I67.1 CEREBRAL ANEURYSM, NONRUPTURED: Chronic | ICD-10-CM

## 2019-07-13 DIAGNOSIS — C44.91 BASAL CELL CARCINOMA OF SKIN, UNSPECIFIED: Chronic | ICD-10-CM

## 2019-07-13 DIAGNOSIS — Z00.00 ENCOUNTER FOR GENERAL ADULT MEDICAL EXAMINATION WITHOUT ABNORMAL FINDINGS: ICD-10-CM

## 2019-07-13 DIAGNOSIS — Z87.442 PERSONAL HISTORY OF URINARY CALCULI: Chronic | ICD-10-CM

## 2019-07-13 DIAGNOSIS — Z98.89 OTHER SPECIFIED POSTPROCEDURAL STATES: Chronic | ICD-10-CM

## 2019-07-13 DIAGNOSIS — Z98.51 TUBAL LIGATION STATUS: Chronic | ICD-10-CM

## 2019-07-13 PROCEDURE — 77066 DX MAMMO INCL CAD BI: CPT | Mod: 26

## 2019-07-13 PROCEDURE — G0279: CPT | Mod: 26

## 2019-07-13 PROCEDURE — 76641 ULTRASOUND BREAST COMPLETE: CPT

## 2019-07-13 PROCEDURE — 77066 DX MAMMO INCL CAD BI: CPT

## 2019-07-13 PROCEDURE — G0279: CPT

## 2019-07-13 PROCEDURE — 76641 ULTRASOUND BREAST COMPLETE: CPT | Mod: 26,50

## 2019-08-26 ENCOUNTER — RX RENEWAL (OUTPATIENT)
Age: 74
End: 2019-08-26

## 2019-08-26 RX ORDER — ANASTROZOLE TABLETS 1 MG/1
1 TABLET ORAL
Qty: 90 | Refills: 1 | Status: DISCONTINUED | COMMUNITY
Start: 2019-06-06 | End: 2019-08-26

## 2019-11-29 ENCOUNTER — OUTPATIENT (OUTPATIENT)
Dept: OUTPATIENT SERVICES | Facility: HOSPITAL | Age: 74
LOS: 1 days | Discharge: HOME | End: 2019-11-29

## 2019-11-29 DIAGNOSIS — C44.91 BASAL CELL CARCINOMA OF SKIN, UNSPECIFIED: Chronic | ICD-10-CM

## 2019-11-29 DIAGNOSIS — I67.1 CEREBRAL ANEURYSM, NONRUPTURED: Chronic | ICD-10-CM

## 2019-11-29 DIAGNOSIS — Z98.89 OTHER SPECIFIED POSTPROCEDURAL STATES: Chronic | ICD-10-CM

## 2019-11-29 DIAGNOSIS — D70.1 AGRANULOCYTOSIS SECONDARY TO CANCER CHEMOTHERAPY: ICD-10-CM

## 2019-11-29 DIAGNOSIS — Z98.51 TUBAL LIGATION STATUS: Chronic | ICD-10-CM

## 2019-11-29 DIAGNOSIS — Z87.442 PERSONAL HISTORY OF URINARY CALCULI: Chronic | ICD-10-CM

## 2019-11-29 DIAGNOSIS — Z17.0 ESTROGEN RECEPTOR POSITIVE STATUS [ER+]: ICD-10-CM

## 2019-11-29 DIAGNOSIS — C50.311 MALIGNANT NEOPLASM OF LOWER-INNER QUADRANT OF RIGHT FEMALE BREAST: ICD-10-CM

## 2019-12-04 ENCOUNTER — APPOINTMENT (OUTPATIENT)
Dept: HEMATOLOGY ONCOLOGY | Facility: CLINIC | Age: 74
End: 2019-12-04
Payer: MEDICARE

## 2019-12-04 VITALS
BODY MASS INDEX: 27.85 KG/M2 | HEART RATE: 88 BPM | SYSTOLIC BLOOD PRESSURE: 127 MMHG | WEIGHT: 162.26 LBS | RESPIRATION RATE: 15 BRPM | TEMPERATURE: 97.7 F | DIASTOLIC BLOOD PRESSURE: 69 MMHG | OXYGEN SATURATION: 98 %

## 2019-12-04 PROCEDURE — 99214 OFFICE O/P EST MOD 30 MIN: CPT

## 2019-12-04 NOTE — PHYSICAL EXAM
[Sclera] : the sclera and conjunctiva were normal [General Appearance - In No Acute Distress] : in no acute distress [General Appearance - Alert] : alert [Normal Oral Mucosa] : normal oral mucosa [Neck Appearance] : the appearance of the neck was normal [Heart Rate And Rhythm] : heart rate was normal and rhythm regular [] : no respiratory distress [Auscultation Breath Sounds / Voice Sounds] : lungs were clear to auscultation bilaterally [Heart Sounds] : normal S1 and S2 [Edema] : there was no peripheral edema [Bowel Sounds] : normal bowel sounds [Abnormal Walk] : normal gait [Abdomen Soft] : soft [Abdomen Tenderness] : non-tender [No Focal Deficits] : no focal deficits [Skin Color & Pigmentation] : normal skin color and pigmentation [Affect] : the affect was normal [Oriented To Time, Place, And Person] : oriented to person, place, and time

## 2019-12-17 NOTE — DATA REVIEWED
[FreeTextEntry1] : MRI C-Spine (11/2017) - \par C1/2: There is moderate arthrosis between the dens and the anterior arch of \par C1. There is prominent cystic change within the dens. \par C2/C3: No central canal or foraminal narrowing. \par C3/C4: Mild circumferential disc bulging and osteophyte formation is present \par with moderate right and mild left facet arthrosis. Disc material minimally \par indents the ventral thecal sac without contact upon the cord. There is mild \par to moderate bilateral foraminal narrowing. \par C4/C5: Mild to moderate circumferential disc bulging and osteophyte \par formation is present with disc indenting the ventral thecal sac and nearly \par contacting the cord. There is mild to moderate bilateral foraminal narrowing. \par C5/C6: Mild to moderate circumferential disc bulging and osteophyte \par formation is present with moderate to severe right and moderate left \par foraminal narrowing. Disc material indents the ventral thecal sac and nearly \par contacts the ventral aspect of the cord. \par C6/C7: Mild to moderate circumferential disc bulging and osteophyte \par formation is present with disc material indenting the ventral thecal sac and \par contacting the ventral aspect of the cord. There is moderate to severe \par bilateral foraminal narrowing. \par C7/T1: There is a small broad-based posterior disc protrusion that indents \par the ventral thecal sac and nearly contacts the cord. There is mild right and \par moderate left foraminal narrowing. \par \par ALIGNMENT: Normal \par CORD: The cord signal is normal. \par MARROW: There is no bone marrow edema or fracture. \par IMAGED BRAIN: T2 signal abnormality is noted within the patrick and brainstem \par and is likely from chronic microvascular ischemic change. \par PERIPHERAL/NECK SOFT TISSUES: Normal \par \par IMPRESSION: Moderate multilevel cervical spondylosis with multilevel central \par canal and foraminal narrowing that is most advanced from C3 through C7 as \par detailed above. \par
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
## 2019-12-17 NOTE — ASSESSMENT
[FreeTextEntry1] : 74yoF with:\par \par 1. Chronic pain syndrome - Re-certification for medicinal cannabis completed in office today. \par Recommend she re-initiate 1:20 THC:CBD.\par May benefit from implementation of hemp-derived CBD.\par \par 2. Depression/anxiety - follows regularly with therapist which has been very helpful for her.\par \par 3. Breast cancer - on exemestane 25mg ; med onc follow up. \par \par RTO as needed, call with any questions.

## 2019-12-23 ENCOUNTER — APPOINTMENT (OUTPATIENT)
Dept: HEMATOLOGY ONCOLOGY | Facility: CLINIC | Age: 74
End: 2019-12-23

## 2020-01-06 ENCOUNTER — APPOINTMENT (OUTPATIENT)
Dept: MRI IMAGING | Facility: CLINIC | Age: 75
End: 2020-01-06
Payer: MEDICARE

## 2020-01-06 ENCOUNTER — OUTPATIENT (OUTPATIENT)
Dept: OUTPATIENT SERVICES | Facility: HOSPITAL | Age: 75
LOS: 1 days | End: 2020-01-06
Payer: MEDICARE

## 2020-01-06 DIAGNOSIS — Z00.8 ENCOUNTER FOR OTHER GENERAL EXAMINATION: ICD-10-CM

## 2020-01-06 DIAGNOSIS — I67.1 CEREBRAL ANEURYSM, NONRUPTURED: Chronic | ICD-10-CM

## 2020-01-06 DIAGNOSIS — Z98.89 OTHER SPECIFIED POSTPROCEDURAL STATES: Chronic | ICD-10-CM

## 2020-01-06 DIAGNOSIS — C44.91 BASAL CELL CARCINOMA OF SKIN, UNSPECIFIED: Chronic | ICD-10-CM

## 2020-01-06 DIAGNOSIS — Z87.442 PERSONAL HISTORY OF URINARY CALCULI: Chronic | ICD-10-CM

## 2020-01-06 DIAGNOSIS — Z98.51 TUBAL LIGATION STATUS: Chronic | ICD-10-CM

## 2020-01-06 PROCEDURE — C8908: CPT

## 2020-01-06 PROCEDURE — A9585: CPT

## 2020-01-06 PROCEDURE — 77049 MRI BREAST C-+ W/CAD BI: CPT | Mod: 26

## 2020-01-06 PROCEDURE — C8937: CPT

## 2020-01-31 ENCOUNTER — OUTPATIENT (OUTPATIENT)
Dept: OUTPATIENT SERVICES | Facility: HOSPITAL | Age: 75
LOS: 1 days | Discharge: HOME | End: 2020-01-31

## 2020-01-31 DIAGNOSIS — Z98.89 OTHER SPECIFIED POSTPROCEDURAL STATES: Chronic | ICD-10-CM

## 2020-01-31 DIAGNOSIS — Z87.442 PERSONAL HISTORY OF URINARY CALCULI: Chronic | ICD-10-CM

## 2020-01-31 DIAGNOSIS — D70.1 AGRANULOCYTOSIS SECONDARY TO CANCER CHEMOTHERAPY: ICD-10-CM

## 2020-01-31 DIAGNOSIS — Z17.0 ESTROGEN RECEPTOR POSITIVE STATUS [ER+]: ICD-10-CM

## 2020-01-31 DIAGNOSIS — C50.311 MALIGNANT NEOPLASM OF LOWER-INNER QUADRANT OF RIGHT FEMALE BREAST: ICD-10-CM

## 2020-01-31 DIAGNOSIS — Z98.51 TUBAL LIGATION STATUS: Chronic | ICD-10-CM

## 2020-01-31 DIAGNOSIS — I67.1 CEREBRAL ANEURYSM, NONRUPTURED: Chronic | ICD-10-CM

## 2020-01-31 DIAGNOSIS — C44.91 BASAL CELL CARCINOMA OF SKIN, UNSPECIFIED: Chronic | ICD-10-CM

## 2020-02-27 ENCOUNTER — APPOINTMENT (OUTPATIENT)
Dept: OBGYN | Facility: CLINIC | Age: 75
End: 2020-02-27

## 2020-03-09 ENCOUNTER — INPATIENT (INPATIENT)
Facility: HOSPITAL | Age: 75
LOS: 2 days | Discharge: HOME | DRG: 379 | End: 2020-03-12
Attending: STUDENT IN AN ORGANIZED HEALTH CARE EDUCATION/TRAINING PROGRAM | Admitting: HOSPITALIST
Payer: MEDICARE

## 2020-03-09 VITALS
TEMPERATURE: 99 F | HEIGHT: 64 IN | WEIGHT: 154.98 LBS | RESPIRATION RATE: 19 BRPM | DIASTOLIC BLOOD PRESSURE: 76 MMHG | SYSTOLIC BLOOD PRESSURE: 161 MMHG | OXYGEN SATURATION: 96 % | HEART RATE: 108 BPM

## 2020-03-09 DIAGNOSIS — Z98.89 OTHER SPECIFIED POSTPROCEDURAL STATES: Chronic | ICD-10-CM

## 2020-03-09 DIAGNOSIS — Z98.51 TUBAL LIGATION STATUS: Chronic | ICD-10-CM

## 2020-03-09 DIAGNOSIS — C44.91 BASAL CELL CARCINOMA OF SKIN, UNSPECIFIED: Chronic | ICD-10-CM

## 2020-03-09 DIAGNOSIS — Z87.442 PERSONAL HISTORY OF URINARY CALCULI: Chronic | ICD-10-CM

## 2020-03-09 DIAGNOSIS — I67.1 CEREBRAL ANEURYSM, NONRUPTURED: Chronic | ICD-10-CM

## 2020-03-09 LAB
ALBUMIN SERPL ELPH-MCNC: 4.6 G/DL — SIGNIFICANT CHANGE UP (ref 3.3–5)
ALP SERPL-CCNC: 102 U/L — SIGNIFICANT CHANGE UP (ref 40–120)
ALT FLD-CCNC: 24 U/L — SIGNIFICANT CHANGE UP (ref 10–45)
ANION GAP SERPL CALC-SCNC: 16 MMOL/L — SIGNIFICANT CHANGE UP (ref 5–17)
APPEARANCE UR: CLEAR — SIGNIFICANT CHANGE UP
APTT BLD: 30.5 SEC — SIGNIFICANT CHANGE UP (ref 27.5–36.3)
AST SERPL-CCNC: 16 U/L — SIGNIFICANT CHANGE UP (ref 10–40)
BASOPHILS # BLD AUTO: 0.05 K/UL — SIGNIFICANT CHANGE UP (ref 0–0.2)
BASOPHILS NFR BLD AUTO: 0.6 % — SIGNIFICANT CHANGE UP (ref 0–2)
BILIRUB SERPL-MCNC: 0.1 MG/DL — LOW (ref 0.2–1.2)
BILIRUB UR-MCNC: NEGATIVE — SIGNIFICANT CHANGE UP
BLD GP AB SCN SERPL QL: NEGATIVE — SIGNIFICANT CHANGE UP
BUN SERPL-MCNC: 23 MG/DL — SIGNIFICANT CHANGE UP (ref 7–23)
CALCIUM SERPL-MCNC: 10.1 MG/DL — SIGNIFICANT CHANGE UP (ref 8.4–10.5)
CHLORIDE SERPL-SCNC: 105 MMOL/L — SIGNIFICANT CHANGE UP (ref 96–108)
CO2 SERPL-SCNC: 21 MMOL/L — LOW (ref 22–31)
COLOR SPEC: YELLOW — SIGNIFICANT CHANGE UP
CREAT SERPL-MCNC: 0.66 MG/DL — SIGNIFICANT CHANGE UP (ref 0.5–1.3)
DIFF PNL FLD: NEGATIVE — SIGNIFICANT CHANGE UP
EOSINOPHIL # BLD AUTO: 0.18 K/UL — SIGNIFICANT CHANGE UP (ref 0–0.5)
EOSINOPHIL NFR BLD AUTO: 2 % — SIGNIFICANT CHANGE UP (ref 0–6)
GLUCOSE SERPL-MCNC: 162 MG/DL — HIGH (ref 70–99)
GLUCOSE UR QL: ABNORMAL
HCT VFR BLD CALC: 41.3 % — SIGNIFICANT CHANGE UP (ref 34.5–45)
HGB BLD-MCNC: 13.2 G/DL — SIGNIFICANT CHANGE UP (ref 11.5–15.5)
IMM GRANULOCYTES NFR BLD AUTO: 0.3 % — SIGNIFICANT CHANGE UP (ref 0–1.5)
INR BLD: 1.01 RATIO — SIGNIFICANT CHANGE UP (ref 0.88–1.16)
KETONES UR-MCNC: NEGATIVE — SIGNIFICANT CHANGE UP
LACTATE BLDV-MCNC: 1.7 MMOL/L — SIGNIFICANT CHANGE UP (ref 0.7–2)
LEUKOCYTE ESTERASE UR-ACNC: NEGATIVE — SIGNIFICANT CHANGE UP
LYMPHOCYTES # BLD AUTO: 1.24 K/UL — SIGNIFICANT CHANGE UP (ref 1–3.3)
LYMPHOCYTES # BLD AUTO: 13.9 % — SIGNIFICANT CHANGE UP (ref 13–44)
MCHC RBC-ENTMCNC: 26.7 PG — LOW (ref 27–34)
MCHC RBC-ENTMCNC: 32 GM/DL — SIGNIFICANT CHANGE UP (ref 32–36)
MCV RBC AUTO: 83.4 FL — SIGNIFICANT CHANGE UP (ref 80–100)
MONOCYTES # BLD AUTO: 0.49 K/UL — SIGNIFICANT CHANGE UP (ref 0–0.9)
MONOCYTES NFR BLD AUTO: 5.5 % — SIGNIFICANT CHANGE UP (ref 2–14)
NEUTROPHILS # BLD AUTO: 6.92 K/UL — SIGNIFICANT CHANGE UP (ref 1.8–7.4)
NEUTROPHILS NFR BLD AUTO: 77.7 % — HIGH (ref 43–77)
NITRITE UR-MCNC: NEGATIVE — SIGNIFICANT CHANGE UP
NRBC # BLD: 0 /100 WBCS — SIGNIFICANT CHANGE UP (ref 0–0)
OB PNL STL: POSITIVE
PH UR: 6 — SIGNIFICANT CHANGE UP (ref 5–8)
PLATELET # BLD AUTO: 330 K/UL — SIGNIFICANT CHANGE UP (ref 150–400)
POTASSIUM SERPL-MCNC: 4.2 MMOL/L — SIGNIFICANT CHANGE UP (ref 3.5–5.3)
POTASSIUM SERPL-SCNC: 4.2 MMOL/L — SIGNIFICANT CHANGE UP (ref 3.5–5.3)
PROT SERPL-MCNC: 7 G/DL — SIGNIFICANT CHANGE UP (ref 6–8.3)
PROT UR-MCNC: NEGATIVE — SIGNIFICANT CHANGE UP
PROTHROM AB SERPL-ACNC: 11.5 SEC — SIGNIFICANT CHANGE UP (ref 10–12.9)
RBC # BLD: 4.95 M/UL — SIGNIFICANT CHANGE UP (ref 3.8–5.2)
RBC # FLD: 14.1 % — SIGNIFICANT CHANGE UP (ref 10.3–14.5)
RH IG SCN BLD-IMP: NEGATIVE — SIGNIFICANT CHANGE UP
RH IG SCN BLD-IMP: NEGATIVE — SIGNIFICANT CHANGE UP
SODIUM SERPL-SCNC: 142 MMOL/L — SIGNIFICANT CHANGE UP (ref 135–145)
SP GR SPEC: 1.03 — HIGH (ref 1.01–1.02)
UROBILINOGEN FLD QL: NEGATIVE — SIGNIFICANT CHANGE UP
WBC # BLD: 8.91 K/UL — SIGNIFICANT CHANGE UP (ref 3.8–10.5)
WBC # FLD AUTO: 8.91 K/UL — SIGNIFICANT CHANGE UP (ref 3.8–10.5)

## 2020-03-09 PROCEDURE — 71045 X-RAY EXAM CHEST 1 VIEW: CPT | Mod: 26

## 2020-03-09 PROCEDURE — 99285 EMERGENCY DEPT VISIT HI MDM: CPT | Mod: GC

## 2020-03-09 PROCEDURE — 74177 CT ABD & PELVIS W/CONTRAST: CPT | Mod: 26

## 2020-03-09 NOTE — ED PROVIDER NOTE - PHYSICAL EXAMINATION
PHYSICAL EXAM:  GENERAL: Sitting comfortable in bed, in no acute distress  HENMT: Atraumatic, moist mucous membranes, no oropharyngeal exudates or vesicles, uvula is midline  EYES: Clear bilaterally, PERRL, EOMs intact b/l  HEART: RRR, S1/S2, no murmur/gallops/rubs  RESPIRATORY: Clear to auscultation bilaterally, no wheezes/rhonchi/rales  ABDOMEN: +BS, soft, nontender, nondistended  RECTUM: No active bleeding, no external lesions, no masses palpated, no tenderness, dark red blood present on glove after exam (chaperone SALOME Ash)  EXTREMITIES: No lower extremity edema, +2 radial pulses b/l  NEURO:  A&Ox4, no focal motor deficits or sensory deficits   Heme/LYMPH: No ecchymosis or bruising, no anterior/posterior cervical or supraclavicular LAD  SKIN:  Skin normal color for race, warm, dry and intact. No evidence of rash.

## 2020-03-09 NOTE — ED ADULT NURSE NOTE - OBJECTIVE STATEMENT
74 year old A&Ox3 female presents to ED with steady coordinated gait complaining of 5-6 episodes of rectal bleeding x 3 days. PMH CVA stent s/p aneurysm, HTN, breast ca, GERD, diverticulitis. Pt states yesterday she noticed " stomach rumbling and dark stools," then progressing to bright red stools today. +ASA and Plavix use. Abdomen soft, nondistended, nontender. Confirms feeling weak, denies dizziness. Pt 100.5 rectally, MD aware. Denies CP, SOB, n/v/,  abdominal pain, urinary symptoms, fatigue, numbness, tingling in upper and lower extremities, HA, blurry vision. Family at bedside. Updated on plan of care.

## 2020-03-09 NOTE — ED PROVIDER NOTE - CLINICAL SUMMARY MEDICAL DECISION MAKING FREE TEXT BOX
71 F hx HTN, DM2, diverticulitis, asthma, cerebral aneurysm s/p stent/coiling, CVA, anxiety, hypothyroid, basal cell ca, R breast ca s/p lumpectomy, on chemo (s/p 7 cycle of 5FU/cytotox/methotrexate, last 2/6/17) presenting with rectal bleeding. Low grade fever, tachycardic, otherwise hemodynamically stable. Mild epigastric TTP on exam. Rectal exam with no sebas bleeding. DDx includes bleeding PUD vs diverticulosis vs diverticulitis vs other lower GI bleeding etiologies. Will obtain basic labs, lactate, type and screen, coags, blood/urine cxs, CXR, CT A/P w/ IV contrast, and reevaluate.

## 2020-03-09 NOTE — ED PROVIDER NOTE - PROGRESS NOTE DETAILS
[facility] EM/IM PGY2: Spoke with GI, recommend keeping NPO, f/u CBC on AM labs. Hemodynamically stable. Started on maintenance fluids, endorsed to medicine for admission.

## 2020-03-09 NOTE — ED PROVIDER NOTE - OBJECTIVE STATEMENT
71 F hx HTN, DM2, diverticulitis, asthma, cerebral aneurysm s/p stent/coiling, CVA, anxiety, hypothyroid, basal cell ca, R breast ca s/p lumpectomy, on chemo (s/p 7 cycle of 5FU/cytotox/methotrexate, last 2/6/17) presenting with rectal bleeding. Patient reports over the past 2 weeks has had increased frequency of BMs (normally 3-4 soft stools per day, but recently more), which progressively became darker colored (dark brown, not black) over the past few days, and today experienced small amount of bright red blood mixed with stool in toilet this AM followed by increased amount of bleeding with next BM this afternoon. She denies prior rectal bleeding. She denies any fevers/chills, lightheadedness/dizziness, chest pain, palpitations, cough SOB, abd pain, n/v, dysuria/hematuria, joint pain/swelling, or rashes, or other bleeding/bruising. No recent travel, no abx use, no recent hospitalizations. Notes last endoscopy ~1-2 years ago with no significant findings, last colonoscopy ~5 years ago.

## 2020-03-09 NOTE — ED PROVIDER NOTE - NS ED ROS FT
Constitutional: no fever and no chills  Eyes: no discharge, no irritation, no pain, no visual changes  ENMT: no ear pain or hearing loss, no dysphagia or throat pain  Neck: no pain, no stiffness, no swollen glands  CV: no chest pain, no palpitations, no edema  Resp: no cough, no shortness of breath  Abd: (+) rectal bleeding, (+) diarrhea, no abdominal pain, no nausea or vomiting  : no dysuria, no hematuria  MSK: no back pain, no neck pain, no joint pain  Neuro: no LOC, no gait abnormality, no headache, no sensory deficits, no weakness  Skin: no rashes, no lacerations, no lesions

## 2020-03-09 NOTE — ED PROVIDER NOTE - ATTENDING CONTRIBUTION TO CARE
74F, extensive pmh, including r breast ca on chemo, presents with rectal bleeding. Pt noted to have dark colored stool over past two weeks, today noted to have bright red blood mixed with stool. No lightheadedness, dizziness, cp, sob, abd pain. No n/v. No f/c.    PE: NAD, NCAT, MMM, Trachea midline, Normal conjunctiva, lungs CTAB, S1/S2 RRR, Normal perfusion, 2+ radial pulses bilat, Abdomen Soft, Mild llq ttp, No rebound/guarding, No LE edema, No deformity of extremities, No rashes,  No focal motor or sensory deficits.     Obtain labs, ct ap. Plan to admit. - Hi Pretty MD

## 2020-03-10 DIAGNOSIS — K62.5 HEMORRHAGE OF ANUS AND RECTUM: ICD-10-CM

## 2020-03-10 DIAGNOSIS — K92.2 GASTROINTESTINAL HEMORRHAGE, UNSPECIFIED: ICD-10-CM

## 2020-03-10 DIAGNOSIS — E11.9 TYPE 2 DIABETES MELLITUS WITHOUT COMPLICATIONS: ICD-10-CM

## 2020-03-10 DIAGNOSIS — Z29.9 ENCOUNTER FOR PROPHYLACTIC MEASURES, UNSPECIFIED: ICD-10-CM

## 2020-03-10 DIAGNOSIS — Z02.9 ENCOUNTER FOR ADMINISTRATIVE EXAMINATIONS, UNSPECIFIED: ICD-10-CM

## 2020-03-10 DIAGNOSIS — C50.919 MALIGNANT NEOPLASM OF UNSPECIFIED SITE OF UNSPECIFIED FEMALE BREAST: ICD-10-CM

## 2020-03-10 LAB
ALBUMIN SERPL ELPH-MCNC: 4.6 G/DL — SIGNIFICANT CHANGE UP (ref 3.3–5)
ALP SERPL-CCNC: 101 U/L — SIGNIFICANT CHANGE UP (ref 40–120)
ALT FLD-CCNC: 23 U/L — SIGNIFICANT CHANGE UP (ref 10–45)
ANION GAP SERPL CALC-SCNC: 15 MMOL/L — SIGNIFICANT CHANGE UP (ref 5–17)
AST SERPL-CCNC: 19 U/L — SIGNIFICANT CHANGE UP (ref 10–40)
BASOPHILS # BLD AUTO: 0.06 K/UL — SIGNIFICANT CHANGE UP (ref 0–0.2)
BASOPHILS NFR BLD AUTO: 0.6 % — SIGNIFICANT CHANGE UP (ref 0–2)
BILIRUB SERPL-MCNC: 0.3 MG/DL — SIGNIFICANT CHANGE UP (ref 0.2–1.2)
BUN SERPL-MCNC: 20 MG/DL — SIGNIFICANT CHANGE UP (ref 7–23)
CALCIUM SERPL-MCNC: 9.9 MG/DL — SIGNIFICANT CHANGE UP (ref 8.4–10.5)
CHLORIDE SERPL-SCNC: 104 MMOL/L — SIGNIFICANT CHANGE UP (ref 96–108)
CO2 SERPL-SCNC: 23 MMOL/L — SIGNIFICANT CHANGE UP (ref 22–31)
CREAT SERPL-MCNC: 0.64 MG/DL — SIGNIFICANT CHANGE UP (ref 0.5–1.3)
EOSINOPHIL # BLD AUTO: 0.29 K/UL — SIGNIFICANT CHANGE UP (ref 0–0.5)
EOSINOPHIL NFR BLD AUTO: 2.9 % — SIGNIFICANT CHANGE UP (ref 0–6)
GLUCOSE BLDC GLUCOMTR-MCNC: 163 MG/DL — HIGH (ref 70–99)
GLUCOSE BLDC GLUCOMTR-MCNC: 176 MG/DL — HIGH (ref 70–99)
GLUCOSE BLDC GLUCOMTR-MCNC: 197 MG/DL — HIGH (ref 70–99)
GLUCOSE BLDC GLUCOMTR-MCNC: 289 MG/DL — HIGH (ref 70–99)
GLUCOSE SERPL-MCNC: 157 MG/DL — HIGH (ref 70–99)
HCT VFR BLD CALC: 37.3 % — SIGNIFICANT CHANGE UP (ref 34.5–45)
HCT VFR BLD CALC: 39.9 % — SIGNIFICANT CHANGE UP (ref 34.5–45)
HGB BLD-MCNC: 11.9 G/DL — SIGNIFICANT CHANGE UP (ref 11.5–15.5)
HGB BLD-MCNC: 12.5 G/DL — SIGNIFICANT CHANGE UP (ref 11.5–15.5)
IMM GRANULOCYTES NFR BLD AUTO: 0.4 % — SIGNIFICANT CHANGE UP (ref 0–1.5)
LYMPHOCYTES # BLD AUTO: 19.9 % — SIGNIFICANT CHANGE UP (ref 13–44)
LYMPHOCYTES # BLD AUTO: 2.01 K/UL — SIGNIFICANT CHANGE UP (ref 1–3.3)
MAGNESIUM SERPL-MCNC: 2.2 MG/DL — SIGNIFICANT CHANGE UP (ref 1.6–2.6)
MCHC RBC-ENTMCNC: 26.5 PG — LOW (ref 27–34)
MCHC RBC-ENTMCNC: 27.2 PG — SIGNIFICANT CHANGE UP (ref 27–34)
MCHC RBC-ENTMCNC: 31.3 GM/DL — LOW (ref 32–36)
MCHC RBC-ENTMCNC: 31.9 GM/DL — LOW (ref 32–36)
MCV RBC AUTO: 84.7 FL — SIGNIFICANT CHANGE UP (ref 80–100)
MCV RBC AUTO: 85.2 FL — SIGNIFICANT CHANGE UP (ref 80–100)
MONOCYTES # BLD AUTO: 0.67 K/UL — SIGNIFICANT CHANGE UP (ref 0–0.9)
MONOCYTES NFR BLD AUTO: 6.6 % — SIGNIFICANT CHANGE UP (ref 2–14)
NEUTROPHILS # BLD AUTO: 7.02 K/UL — SIGNIFICANT CHANGE UP (ref 1.8–7.4)
NEUTROPHILS NFR BLD AUTO: 69.6 % — SIGNIFICANT CHANGE UP (ref 43–77)
NRBC # BLD: 0 /100 WBCS — SIGNIFICANT CHANGE UP (ref 0–0)
NRBC # BLD: 0 /100 WBCS — SIGNIFICANT CHANGE UP (ref 0–0)
PHOSPHATE SERPL-MCNC: 3.3 MG/DL — SIGNIFICANT CHANGE UP (ref 2.5–4.5)
PLATELET # BLD AUTO: 333 K/UL — SIGNIFICANT CHANGE UP (ref 150–400)
PLATELET # BLD AUTO: 367 K/UL — SIGNIFICANT CHANGE UP (ref 150–400)
POTASSIUM SERPL-MCNC: 4.1 MMOL/L — SIGNIFICANT CHANGE UP (ref 3.5–5.3)
POTASSIUM SERPL-SCNC: 4.1 MMOL/L — SIGNIFICANT CHANGE UP (ref 3.5–5.3)
PROT SERPL-MCNC: 7.2 G/DL — SIGNIFICANT CHANGE UP (ref 6–8.3)
RBC # BLD: 4.38 M/UL — SIGNIFICANT CHANGE UP (ref 3.8–5.2)
RBC # BLD: 4.71 M/UL — SIGNIFICANT CHANGE UP (ref 3.8–5.2)
RBC # FLD: 14.2 % — SIGNIFICANT CHANGE UP (ref 10.3–14.5)
RBC # FLD: 14.3 % — SIGNIFICANT CHANGE UP (ref 10.3–14.5)
SODIUM SERPL-SCNC: 142 MMOL/L — SIGNIFICANT CHANGE UP (ref 135–145)
WBC # BLD: 10.09 K/UL — SIGNIFICANT CHANGE UP (ref 3.8–10.5)
WBC # BLD: 8.62 K/UL — SIGNIFICANT CHANGE UP (ref 3.8–10.5)
WBC # FLD AUTO: 10.09 K/UL — SIGNIFICANT CHANGE UP (ref 3.8–10.5)
WBC # FLD AUTO: 8.62 K/UL — SIGNIFICANT CHANGE UP (ref 3.8–10.5)

## 2020-03-10 PROCEDURE — 99223 1ST HOSP IP/OBS HIGH 75: CPT | Mod: GC

## 2020-03-10 PROCEDURE — 99223 1ST HOSP IP/OBS HIGH 75: CPT

## 2020-03-10 RX ORDER — GLUCAGON INJECTION, SOLUTION 0.5 MG/.1ML
1 INJECTION, SOLUTION SUBCUTANEOUS ONCE
Refills: 0 | Status: DISCONTINUED | OUTPATIENT
Start: 2020-03-10 | End: 2020-03-12

## 2020-03-10 RX ORDER — SODIUM CHLORIDE 9 MG/ML
1000 INJECTION INTRAMUSCULAR; INTRAVENOUS; SUBCUTANEOUS
Refills: 0 | Status: DISCONTINUED | OUTPATIENT
Start: 2020-03-10 | End: 2020-03-12

## 2020-03-10 RX ORDER — TRAZODONE HCL 50 MG
100 TABLET ORAL AT BEDTIME
Refills: 0 | Status: DISCONTINUED | OUTPATIENT
Start: 2020-03-10 | End: 2020-03-12

## 2020-03-10 RX ORDER — MONTELUKAST 4 MG/1
10 TABLET, CHEWABLE ORAL DAILY
Refills: 0 | Status: DISCONTINUED | OUTPATIENT
Start: 2020-03-10 | End: 2020-03-12

## 2020-03-10 RX ORDER — SOD SULF/SODIUM/NAHCO3/KCL/PEG
4000 SOLUTION, RECONSTITUTED, ORAL ORAL ONCE
Refills: 0 | Status: DISCONTINUED | OUTPATIENT
Start: 2020-03-10 | End: 2020-03-10

## 2020-03-10 RX ORDER — SODIUM CHLORIDE 9 MG/ML
1000 INJECTION, SOLUTION INTRAVENOUS
Refills: 0 | Status: DISCONTINUED | OUTPATIENT
Start: 2020-03-10 | End: 2020-03-12

## 2020-03-10 RX ORDER — FAMOTIDINE 10 MG/ML
20 INJECTION INTRAVENOUS
Refills: 0 | Status: DISCONTINUED | OUTPATIENT
Start: 2020-03-10 | End: 2020-03-12

## 2020-03-10 RX ORDER — LOSARTAN POTASSIUM 100 MG/1
100 TABLET, FILM COATED ORAL DAILY
Refills: 0 | Status: DISCONTINUED | OUTPATIENT
Start: 2020-03-10 | End: 2020-03-12

## 2020-03-10 RX ORDER — DIAZEPAM 5 MG
5 TABLET ORAL
Refills: 0 | Status: DISCONTINUED | OUTPATIENT
Start: 2020-03-10 | End: 2020-03-12

## 2020-03-10 RX ORDER — AMLODIPINE BESYLATE 2.5 MG/1
5 TABLET ORAL DAILY
Refills: 0 | Status: DISCONTINUED | OUTPATIENT
Start: 2020-03-10 | End: 2020-03-12

## 2020-03-10 RX ORDER — INSULIN LISPRO 100/ML
VIAL (ML) SUBCUTANEOUS EVERY 6 HOURS
Refills: 0 | Status: DISCONTINUED | OUTPATIENT
Start: 2020-03-10 | End: 2020-03-11

## 2020-03-10 RX ORDER — SOD SULF/SODIUM/NAHCO3/KCL/PEG
1000 SOLUTION, RECONSTITUTED, ORAL ORAL EVERY 4 HOURS
Refills: 0 | Status: COMPLETED | OUTPATIENT
Start: 2020-03-10 | End: 2020-03-10

## 2020-03-10 RX ORDER — DEXTROSE 50 % IN WATER 50 %
25 SYRINGE (ML) INTRAVENOUS ONCE
Refills: 0 | Status: DISCONTINUED | OUTPATIENT
Start: 2020-03-10 | End: 2020-03-12

## 2020-03-10 RX ORDER — PANTOPRAZOLE SODIUM 20 MG/1
40 TABLET, DELAYED RELEASE ORAL
Refills: 0 | Status: DISCONTINUED | OUTPATIENT
Start: 2020-03-10 | End: 2020-03-12

## 2020-03-10 RX ORDER — DEXTROSE 50 % IN WATER 50 %
15 SYRINGE (ML) INTRAVENOUS ONCE
Refills: 0 | Status: DISCONTINUED | OUTPATIENT
Start: 2020-03-10 | End: 2020-03-12

## 2020-03-10 RX ORDER — DEXTROSE 50 % IN WATER 50 %
12.5 SYRINGE (ML) INTRAVENOUS ONCE
Refills: 0 | Status: DISCONTINUED | OUTPATIENT
Start: 2020-03-10 | End: 2020-03-12

## 2020-03-10 RX ADMIN — LOSARTAN POTASSIUM 100 MILLIGRAM(S): 100 TABLET, FILM COATED ORAL at 06:52

## 2020-03-10 RX ADMIN — MONTELUKAST 10 MILLIGRAM(S): 4 TABLET, CHEWABLE ORAL at 11:11

## 2020-03-10 RX ADMIN — Medication 1000 MILLILITER(S): at 18:04

## 2020-03-10 RX ADMIN — Medication 1: at 18:04

## 2020-03-10 RX ADMIN — PANTOPRAZOLE SODIUM 40 MILLIGRAM(S): 20 TABLET, DELAYED RELEASE ORAL at 18:04

## 2020-03-10 RX ADMIN — FAMOTIDINE 20 MILLIGRAM(S): 10 INJECTION INTRAVENOUS at 06:52

## 2020-03-10 RX ADMIN — AMLODIPINE BESYLATE 5 MILLIGRAM(S): 2.5 TABLET ORAL at 06:52

## 2020-03-10 RX ADMIN — Medication 3: at 23:30

## 2020-03-10 RX ADMIN — Medication 100 MILLIGRAM(S): at 21:37

## 2020-03-10 RX ADMIN — Medication 1: at 07:02

## 2020-03-10 RX ADMIN — Medication 1000 MILLILITER(S): at 21:36

## 2020-03-10 RX ADMIN — PANTOPRAZOLE SODIUM 40 MILLIGRAM(S): 20 TABLET, DELAYED RELEASE ORAL at 06:52

## 2020-03-10 RX ADMIN — SODIUM CHLORIDE 50 MILLILITER(S): 9 INJECTION INTRAMUSCULAR; INTRAVENOUS; SUBCUTANEOUS at 01:11

## 2020-03-10 RX ADMIN — SODIUM CHLORIDE 75 MILLILITER(S): 9 INJECTION INTRAMUSCULAR; INTRAVENOUS; SUBCUTANEOUS at 23:31

## 2020-03-10 RX ADMIN — Medication 1: at 13:02

## 2020-03-10 RX ADMIN — FAMOTIDINE 20 MILLIGRAM(S): 10 INJECTION INTRAVENOUS at 18:04

## 2020-03-10 NOTE — H&P ADULT - HISTORY OF PRESENT ILLNESS
74 F PMH HTN, T2DM, diverticulitis, asthma, cerebral aneurysm s/p stent/coiling, CVA, anxiety, hypothyroid, basal cell ca s/p excision, R breast ca s/p lumpectomy/chemo/RT, p/w rectal bleeding.  Partner at bedside providing collateral.  Pt states she noticed "darkening" stools that still remained brown over last few days.  Starting this am, she noticed progressively more blood in her stool; first streaks, then eventually whole bowl filled. No associated discrete abd pain, just "churning" sensation. No postprandial sx. No melena. Denies cp, sob, f/c, n/v/d, dysuria, cough, syncope, palpitations, pallor, so, lightheadedness, dizziness.  Last colo 3-4 yrs ago, reportedly normal. In total, has had 4-5 bloody bm today, most recently in ER, with quantity appearing to have slowed down since sx onset.     VS: Tm 100.5, 108 --> 80, 142/65, 18, 99% RA.  Labs: no leukocytosis, hgb stable without acute anemia, rest cbc unrevealing, occult positive, coags unrevealing, cmp with mild hyperglycemia otherwise unrevealing. Lactate wnl. UA nondx. CXR prelim clear lungs. CTAP with +diverticulosis and evidence of contrast extrav in sigmoid colon compatible with active gib. 74 F PMH HTN, T2DM, diverticulitis, asthma, cerebral aneurysm s/p stent/coiling, CVA, anxiety, hypothyroid, basal cell ca s/p excision, R breast ca s/p lumpectomy/chemo/RT, p/w rectal bleeding.  Partner at bedside providing collateral.  Pt states she noticed "darkening" stools that still remained brown over last few days.  Starting this am, she noticed progressively more blood in her stool; first streaks, then eventually whole bowl filled. No associated discrete abd pain, just "churning" sensation. No postprandial sx. No melena. Denies cp, sob, f/c, n/v/d, dysuria, cough, syncope, palpitations, pallor, so, lightheadedness, dizziness. Denies a/c or nsaid exposure.   Last colo 3-4 yrs ago, reportedly normal. In total, has had 4-5 bloody bm today, most recently in ER, with quantity appearing to have slowed down since sx onset.     VS: Tm 100.5, 108 --> 80, 142/65, 18, 99% RA.  Labs: no leukocytosis, hgb stable without acute anemia, rest cbc unrevealing, occult positive, coags unrevealing, cmp with mild hyperglycemia otherwise unrevealing. Lactate wnl. UA nondx. CXR prelim clear lungs. CTAP with +diverticulosis and evidence of contrast extrav in sigmoid colon compatible with active gib.

## 2020-03-10 NOTE — CHART NOTE - NSCHARTNOTEFT_GEN_A_CORE
74 F PMH HTN, T2DM, diverticulitis, asthma, cerebral aneurysm s/p stent/coiling, CVA, anxiety, hypothyroid, basal cell ca s/p excision, R breast ca s/p lumpectomy/chemo/RT, p/w rectal bleeding.    Patient states she did have right blood w/ stool this AM. Denies CP, SOB, abdominal pain, N/V, dizziness.    Vital Signs Last 24 Hrs  T(C): 36.7 (10 Mar 2020 10:58), Max: 38.1 (09 Mar 2020 19:36)  T(F): 98 (10 Mar 2020 10:58), Max: 100.5 (09 Mar 2020 19:36)  HR: 84 (10 Mar 2020 10:58) (80 - 108)  BP: 146/76 (10 Mar 2020 10:58) (142/65 - 175/70)  BP(mean): --  RR: 18 (10 Mar 2020 10:58) (18 - 19)  SpO2: 97% (10 Mar 2020 10:58) (96% - 99%)    Plan:  -f/u gi recs, plan for colonoscopy tomorrow  -cbc q12    [facility]  [phone]

## 2020-03-10 NOTE — H&P ADULT - NSICDXPASTMEDICALHX_GEN_ALL_CORE_FT
PAST MEDICAL HISTORY:  Aneurysm cerebral    Anxiety     Asthma     Basal cell carcinoma     Benign intracranial hypertension 1980 r/t "tetracyclines"    Breast CA right    Chronic fatigue     CVA (cerebral vascular accident) 2012    Depression     Diverticulitis     DM (diabetes mellitus)     Fibromyalgia     Hearing loss left    HTN (hypertension)     Kidney stones hx of    Lumbar disc disease     Reflux esophagitis     Shoulder disorder right bone spur    Vertigo

## 2020-03-10 NOTE — CONSULT NOTE ADULT - ATTENDING COMMENTS
Patient seen and examined. Agree with above. Patient admitted with hematochezia with positive CT for active bleeding in the mid-sigmoid colon. She has no pain, and CT shows no evidence of inflammation or colitis. Most likely a diverticular bleed. Her last BM was a few hours ago, and there is already significantly less blood. Discussed with patient colonoscopy to rule out another etiology and to stop bleeding if a culprit lesion is seen, she is agreeable. Would place on liquid diet today, and prep tonight for colonoscopy tomorrow. All questions answered.

## 2020-03-10 NOTE — H&P ADULT - NSHPPHYSICALEXAM_GEN_ALL_CORE
PHYSICAL EXAM:    Vital Signs Last 24 Hrs  T(C): 36.8 (10 Mar 2020 02:03), Max: 38.1 (09 Mar 2020 19:36)  T(F): 98.3 (10 Mar 2020 02:03), Max: 100.5 (09 Mar 2020 19:36)  HR: 80 (10 Mar 2020 02:03) (80 - 108)  BP: 142/65 (10 Mar 2020 02:03) (142/65 - 169/70)  BP(mean): --  RR: 18 (10 Mar 2020 02:03) (18 - 19)  SpO2: 99% (10 Mar 2020 02:03) (96% - 99%)    GENERAL: NAD, well-groomed, well-developed  HEAD:  Atraumatic, Normocephalic  EYES: EOMI, PERRLA, conjunctiva and sclera clear  ENMT: No tonsillar erythema, exudates, or enlargement; Moist mucous membranes, Good dentition, No lesions  NECK: Supple, No JVD, Normal thyroid  CHEST/LUNG: Clear to percussion bilaterally; No rales, rhonchi, wheezing, or rubs  HEART: Regular rate and rhythm; No murmurs, rubs, or gallops  ABDOMEN: Soft, Nontender, Nondistended; Bowel sounds present  EXTREMITIES:  2+ Peripheral Pulses, No clubbing, cyanosis, or edema  LYMPH: No lymphadenopathy noted  SKIN: No rashes or lesions  NERVOUS SYSTEM:  Alert & Oriented X3, Good concentration; Motor Strength 5/5 B/L upper and lower extremities; DTRs 2+ intact and symmetric PHYSICAL EXAM:    Vital Signs Last 24 Hrs  T(C): 36.8 (10 Mar 2020 02:03), Max: 38.1 (09 Mar 2020 19:36)  T(F): 98.3 (10 Mar 2020 02:03), Max: 100.5 (09 Mar 2020 19:36)  HR: 80 (10 Mar 2020 02:03) (80 - 108)  BP: 142/65 (10 Mar 2020 02:03) (142/65 - 169/70)  BP(mean): --  RR: 18 (10 Mar 2020 02:03) (18 - 19)  SpO2: 99% (10 Mar 2020 02:03) (96% - 99%)    GENERAL: NAD, well-groomed, well-developed  HEAD:  Atraumatic, Normocephalic  EYES: EOMI, PERRLA, conjunctiva and sclera clear  ENMT: No tonsillar erythema, exudates, or enlargement; Moist mucous membranes, Good dentition, No lesions  NECK: Supple, No JVD, Normal thyroid  CHEST/LUNG: Clear to percussion bilaterally; No rales, rhonchi, wheezing, or rubs no resp distress or accessory muscle usage.   HEART: Regular rate and rhythm; No murmurs, rubs, or gallops, no sig Le edema  ABDOMEN: Soft, Nontender, Nondistended; Bowel sounds present, no rebound/guarding  EXTREMITIES:  2+ Peripheral Pulses, No clubbing, cyanosis  LYMPH: No lymphadenopathy noted, no lymphangitis  SKIN: No rashes or lesions, no petechiae  NERVOUS SYSTEM:  Alert & Oriented X3, Good concentration; Motor Strength 5/5 B/L upper and lower extremities; no facial droop, no dysarthria, tongue midline.   PSYCH: no si, no hi, normal affect.

## 2020-03-10 NOTE — CONSULT NOTE ADULT - ASSESSMENT
Impression:    75 y/o F w/ hx of DM, HTN, CVA, anxiety, hypothyroidism, cerebral aneurysm s/p coiling and stenting on DAPT with aspirin and Plavix, diverticulosis, presenting with BRBPR.    # Hematochezia: Likely represents divercular bleeding given findings on CT A/P, however, differential includes colorectal malignancy, colonic angioectasias, and colonic Dieulafoy's lesion. Currently with minimal BRBPR, HD stable, and with Hgb of 13.  # Fever: Patient noted to be febrile in ED with temperature of 100.5, however, with no localizing symptoms. Blood cultures pending. UA is negative. Prelim CXR negative.   # CVA  # Cerebral aneurysm s/p coiling and stenting on DAPT  # DM  # HTN  # Hypothyroidism  # Anxiety    Recommendations:  - Tentatively plan for colonoscopy tomorrow  - Clear liquid diet today  - NPO after midnight  - Monitor CBCs q12h  - Monitor bowel movements  - Transfuse for goal Hgb >/= 7.0  - Maintain active type and screen  - Rest of care per primary team    Please call GI ([phone]) if there are any additional questions or concerns. Please call on-call GI fellow after 5pm and before 8am, and on weekends.

## 2020-03-10 NOTE — H&P ADULT - NSICDXPASTSURGICALHX_GEN_ALL_CORE_FT
PAST SURGICAL HISTORY:  Basal cell carcinoma removed - left eyelid- 3/15    Cerebral aneurysm repair with coil and stent -     H/O shoulder surgery right - 2001    H/O tubal ligation     H/O:      History of D&C     History of kidney stones

## 2020-03-10 NOTE — H&P ADULT - ASSESSMENT
74 F PMH HTN, T2DM, diverticulitis, asthma, cerebral aneurysm s/p stent/coiling, CVA, anxiety, hypothyroid, basal cell ca s/p excision, R breast ca s/p lumpectomy/chemo/RT, p/w rectal bleeding.

## 2020-03-10 NOTE — H&P ADULT - NSHPSOCIALHISTORY_GEN_ALL_CORE
Social History:    Marital Status:  (   )    (   ) Single    (   )    (  )   Occupation:   Lives with: (  ) alone  (  ) children   (  ) spouse   (  ) parents  (  ) other    Substance Use (street drugs): (  ) never used  (  ) other:  Tobacco Usage:  (   ) never smoked   (   ) former smoker   (   ) current smoker  (     ) pack year  (        ) last cigarette date  Alcohol Usage:  Sexual History: Social History:    Marital Status:  (  x )    (   ) Single    (   )    (  )   Occupation: retired   Lives with: (  ) alone  (  ) children   (  x) spouse   (  ) parents  (  ) other    Substance Use (street drugs): ( x ) never used  (  ) other:  Tobacco Usage:  (  ) never smoked   ( x  ) former smoker   (   ) current smoker  (     ) pack year  (        ) last cigarette date  Alcohol Usage: denies

## 2020-03-10 NOTE — CONSULT NOTE ADULT - SUBJECTIVE AND OBJECTIVE BOX
Chief Complaint: BRBPR    HPI:    75 y/o F w/ hx of DM, HTN, CVA, anxiety, hypothyroidism, cerebral aneurysm s/p coiling and stenting on DAPT with aspirin and Plavix, diverticulosis, presenting with BRBPR; GI consulted for evaluation.    The patient endorses acute onset of BRBPR yesterday morning. She noticed a small amount of blood mixed in with her stool at first, however, it quickly increased and she then noticed blood filling up the whole toilet bowel. She had approximately 5 bowel movements yesterday. Her last bowel movement was just prior to my evaluation, and she noted a small amount of bright red blood and normal colored stool. She denied abdominal pain, but stated that she felt like her abdomen was "churning." She otherwise denies black tarry stools and bloody emesis. She denies prior history of GI bleeding. She takes aspirin, but does not take any other NSAIDs.    The patient endorses prior colonoscopy 3 to 4 years ago which was reportedly normal. She had an upper endoscopy last year for regurgitation which she reports as normal. Her primary GI is at [facility].    The patient was noted to be febrile in the ED with a rectal temperature of 100.5.     Allergies:  Augmentin (Rash)  Ceclor (Other)  codeine (Nausea)  Cytotec (Unknown)  Entex (Other)  Norflex (Other)  penicillin (Urticaria; Rash)  ramipril (Other)  shellfish (Rash)  strawberry (Rash)  sulfites - nausea/ gi upset (Other)  tetracycline (Other)    Home Medications:    · 	amLODIPine 5 mg oral tablet: Last Dose Taken:  , 1 tab(s) orally once a day in morning  · 	Aspirin Low Dose 81 mg oral delayed release tablet: Last Dose Taken:  , 1 tab(s) orally 3 times a day  · 	diazepam 5 mg oral tablet: Last Dose Taken:  , 1 tab(s) orally 2 times a day  · 	losartan 100 mg oral tablet: Last Dose Taken:  , 1 tab(s) orally once a day in morning  · 	montelukast 10 mg oral tablet: Last Dose Taken:  , 1 tab(s) orally once a day (at bedtime)  · 	naturethyroid: Last Dose Taken:  ,   1 Gram orally 6 days a week ( tuesday/ wednesday/ thursday/ friday/ saturday/)  · 	clopidogrel 75 mg oral tablet: Last Dose Taken:  , 1 tab(s) orally once a day with lunch  · 	traZODone 100 mg oral tablet: Last Dose Taken:  , 1 tab(s) orally once a day (at bedtime)  · 	ZyrTEC 10 mg oral tablet: Last Dose Taken:  , 1 tab(s) orally once a day with dinner  · 	Lantus 100 units/mL subcutaneous solution: Last Dose Taken:  , 28-32 unit(s) subcutaneous once a day (at bedtime)  · 	SymlinPen 120 subcutaneous solution: Last Dose Taken:  , 120 microgram(s) subcutaneous 3 times a day - 1/2/hour before meals  · 	Victoza 18 mg/3 mL subcutaneous solution: Last Dose Taken:  , 1.8 milligram(s) subcutaneous once a day- in morning before breakfast  · 	exemestane 25 mg oral tablet: Last Dose Taken:  , 1 tab(s) orally once a day  · 	Bentyl 20 mg oral tablet: Last Dose Taken:  , 1 tab(s) orally 4 times a day, As Needed  · 	metFORMIN 500 mg oral tablet, extended release: Last Dose Taken:  , 1 tab(s) orally 2 times a day  · 	pantoprazole 40 mg oral delayed release tablet: Last Dose Taken:  , 1 tab(s) orally 2 times a day  · 	Pepcid 20 mg oral tablet: Last Dose Taken:  , 1 tab(s) orally 2 times a day  · 	Jardiance 10 mg oral tablet: 1 tab(s) orally once a day (in the morning)    Hospital Medications:  amLODIPine   Tablet 5 milliGRAM(s) Oral daily  dextrose 40% Gel 15 Gram(s) Oral once PRN  dextrose 5%. 1000 milliLiter(s) IV Continuous <Continuous>  dextrose 50% Injectable 12.5 Gram(s) IV Push once  dextrose 50% Injectable 25 Gram(s) IV Push once  dextrose 50% Injectable 25 Gram(s) IV Push once  diazepam    Tablet 5 milliGRAM(s) Oral two times a day PRN  dicyclomine 20 milliGRAM(s) Oral four times a day before meals PRN  famotidine    Tablet 20 milliGRAM(s) Oral two times a day  glucagon  Injectable 1 milliGRAM(s) IntraMuscular once PRN  insulin lispro (HumaLOG) corrective regimen sliding scale   SubCutaneous every 6 hours  losartan 100 milliGRAM(s) Oral daily  montelukast 10 milliGRAM(s) Oral daily  pantoprazole    Tablet 40 milliGRAM(s) Oral two times a day  sodium chloride 0.9%. 1000 milliLiter(s) IV Continuous <Continuous>  traZODone 100 milliGRAM(s) Oral at bedtime    PMHX/PSHX:  Asthma  Reflux esophagitis  Depression  Anxiety  DM (diabetes mellitus)  HTN (hypertension)  Kidney stones  Lumbar disc disease  Chronic fatigue  Fibromyalgia  Benign intracranial hypertension  Shoulder disorder  CVA (cerebral vascular accident)  Diverticulitis  Basal cell carcinoma  Hearing loss  Vertigo  Aneurysm  Breast CA  History of kidney stones  H/O tubal ligation  H/O shoulder surgery  Basal cell carcinoma  Cerebral aneurysm  H/O:   History of D&C    Family history:  Family history of CVA    Denies family history of colon cancer/polyps, stomach cancer/polyps, pancreatic cancer/masses, liver cancer/disease, ovarian cancer and endometrial cancer.    Social History:     Tob: Denies  EtOH: Denies  Illicit Drugs: Denies    ROS:     General:  No wt loss, fevers, chills, night sweats, fatigue  Eyes:  Good vision, no reported pain  ENT:  No sore throat, pain, runny nose, dysphagia  CV:  No pain, palpitations, hypo/hypertension  Pulm:  No dyspnea, cough, tachypnea, wheezing  GI:  No pain, No nausea, No vomiting, No diarrhea, No constipation, No weight loss, No fever, No pruritis, + rectal bleeding, No tarry stools, No dysphagia,  :  No pain, bleeding, incontinence, nocturia  Muscle:  No pain, weakness  Neuro:  No weakness, tingling, memory problems  Psych:  No fatigue, insomnia, mood problems, depression  Endocrine:  No polyuria, polydipsia, cold/heat intolerance  Heme:  No petechiae, ecchymosis, easy bruisability  Skin:  No rash, tattoos, scars, edema    PHYSICAL EXAM:     GENERAL:  No acute distress  HEENT:  Normocephalic/atraumatic, no scleral icterus  CHEST:  Clear to auscultation bilaterally, no wheezes/rales/ronchi, no accessory muscle use  HEART:  Regular rate and rhythm, no murmurs/rubs/gallops  ABDOMEN:  Soft, non-tender, non-distended, normoactive bowel sounds  RECTAL: Declined  EXTREMITIES: No cyanosis, clubbing, or edema  SKIN:  No rash/erythema  NEURO:  Alert and oriented x 3    Vital Signs:  Vital Signs Last 24 Hrs  T(C): 36.6 (10 Mar 2020 06:27), Max: 38.1 (09 Mar 2020 19:36)  T(F): 97.9 (10 Mar 2020 06:27), Max: 100.5 (09 Mar 2020 19:36)  HR: 85 (10 Mar 2020 06:27) (80 - 108)  BP: 175/70 (10 Mar 2020 06:27) (142/65 - 175/70)  BP(mean): --  RR: 18 (10 Mar 2020 06:27) (18 - 19)  SpO2: 97% (10 Mar 2020 06:27) (96% - 99%)  Daily Height in cm: 162.56 (09 Mar 2020 18:15)      LABS:                        13.2   8.91  )-----------( 330      ( 09 Mar 2020 20:14 )             41.3     Mean Cell Volume: 83.4 fl (- @ 20:14)        142  |  105  |  23  ----------------------------<  162<H>  4.2   |  21<L>  |  0.66    Ca    10.1      09 Mar 2020 20:14    TPro  7.0  /  Alb  4.6  /  TBili  0.1<L>  /  DBili  x   /  AST  16  /  ALT  24  /  AlkPhos  102  03-09    LIVER FUNCTIONS - ( 09 Mar 2020 20:14 )  Alb: 4.6 g/dL / Pro: 7.0 g/dL / ALK PHOS: 102 U/L / ALT: 24 U/L / AST: 16 U/L / GGT: x           PT/INR - ( 09 Mar 2020 20:14 )   PT: 11.5 sec;   INR: 1.01 ratio         PTT - ( 09 Mar 2020 20:14 )  PTT:30.5 sec  Urinalysis Basic - ( 09 Mar 2020 21:45 )    Color: Yellow / Appearance: Clear / S.028 / pH: x  Gluc: x / Ketone: Negative  / Bili: Negative / Urobili: Negative   Blood: x / Protein: Negative / Nitrite: Negative   Leuk Esterase: Negative / RBC: x / WBC x   Sq Epi: x / Non Sq Epi: x / Bacteria: x               13.2   8.91  )-----------( 330      ( 09 Mar 2020 20:14 )             41.3     Imaging:    < from: CT Abdomen and Pelvis w/ IV Cont (20 @ 21:04) >    EXAM:  CT ABDOMEN AND PELVIS IC                          PROCEDURE DATE:  2020      INTERPRETATION:  CLINICAL INFORMATION: Melena and sebas red blood per rectum. History of diverticulitis. Evaluate for GI bleed.     COMPARISON: None available.    PROCEDURE:   CT of the Abdomen and Pelvis was performed with and without intravenous contrast.  Precontrast, Arterial and Delayed phases were performed.  Intravenous contrast: 90 ml Omnipaque 350. 10 ml discarded.  Oral contrast: None.  Sagittal and coronal reformats were performed.    FINDINGS:    LOWER CHEST: A 3 mm right lower lobe pulmonary nodule (series 7, image 4), a 2 mm right lower lobe pulmonary nodule (series 7, image 7), and a 2 mm left lower lobe nodule (series 7, image 8).    LIVER: Within normal limits.  BILE DUCTS: Normal caliber.  GALLBLADDER: Within normal limits.  SPLEEN: Within normal limits.  PANCREAS: Fatty atrophy.  ADRENALS: Within normal limits.  KIDNEYS/URETERS: The kidneys enhance symmetrically. No hydronephrosis. Bilateral renal cysts. Additional bilateral subcentimeter hypodensities, too small to characterize.    BLADDER: Within normal limits.  REPRODUCTIVE ORGANS: Calcified uterine fibroids. No adnexal masses.    BOWEL: There is intraluminal linear high density material within the sigmoid colon on arterial phase (series 6, image 76), which becomes more amorphous on delayed venous phase compatible with extravasation of contrast and active GI bleed. Colonic diverticulosis without diverticulitis.. No bowel wall thickening or inflammatory changes. No bowel obstruction. Appendix is normal. Duodenal diverticulum in the ampullary region.  PERITONEUM: No ascites. No pneumoperitoneum.  VESSELS: Atherosclerotic calcification. The origins of the celiac axis, SMA, COLLINS and bilateral renal arteries are patent.  RETROPERITONEUM/LYMPH NODES: No lymphadenopathy.    ABDOMINAL WALL: Tiny fat-containing umbilical hernia. Nonspecific skin thickening in the lower ventral abdominal wall.  BONES: Degenerativechanges of the spine. Age-indeterminate superior plate compression deformity of L1.    IMPRESSION:     Extravasation of contrast within the sigmoid colon compatible with active GI bleed.    Colonic diverticulosis without diverticulitis.    Tiny bilateral lower lobe pulmonary nodules measuring up to 3 mm. Recommend correlation with any available prior CT chest or consider nonemergent CT chest for complete evaluation.    These findings were discussed with Dr. LLANES on 3/9/2020 at 9:22 PM by Dr. Linares with read back confirmation.    GEORGETTE LINARES M.D., RADIOLGY RESIDENT  This document has been electronically signed.  KENZIE MARTE M.D., ATTENDING RADIOLOGIST  This document has been electronically signed. Mar  9 2020  9:55PM      < end of copied text >

## 2020-03-10 NOTE — H&P ADULT - NSHPREVIEWOFSYSTEMS_GEN_ALL_CORE
REVIEW OF SYSTEMS:  CONSTITUTIONAL: No weakness. No fevers. No chills. No weight loss. Good appetite.  EYES: No visual changes. No eye pain.  ENT: No hearing difficulty. No vertigo. No dysphagia. No Sinusitis/rhinorrhea.  NECK: No pain. No stiffness/rigidity.  CARDIAC: No chest pain. No palpitations.  RESPIRATORY: No cough. No SOB. No hemoptysis.  GASTROINTESTINAL: No abdominal pain. No nausea. No vomiting. No hematemesis. No diarrhea. No constipation. No melena. No hematochezia.  GENITOURINARY: No dysuria. No frequency. No hesitancy. No hematuria.  NEUROLOGICAL: No numbness. No focal weakness. No incontinence. No headache.  BACK: No back pain.  EXTREMITIES: No lower extremity edema. Full ROM.  SKIN: No rashes. No itching. No other lesions.  PSYCHIATRIC: No depression. No anxiety. No SI/HI.  ALLERGIC: No lip swelling. No hives.  All other review of systems is negative unless indicated above.  [  ] Unable to assess ROS because REVIEW OF SYSTEMS:  CONSTITUTIONAL: No weakness. No fevers. No chills. No weight loss. Good appetite.  EYES: No visual changes. No eye pain.  ENT: No hearing difficulty. No vertigo. No dysphagia. No Sinusitis/rhinorrhea.  NECK: No pain. No stiffness/rigidity.  CARDIAC: No chest pain. No palpitations.  RESPIRATORY: No cough. No SOB. No hemoptysis.  GASTROINTESTINAL: No abdominal pain. No nausea. No vomiting. No hematemesis. No diarrhea. No constipation. No melena. +hematochezia.  GENITOURINARY: No dysuria. No frequency. No hesitancy. No hematuria.  NEUROLOGICAL: No numbness. No focal weakness. No incontinence. No headache.  BACK: No back pain.  EXTREMITIES: No lower extremity edema. Full ROM.  SKIN: No rashes. No itching. No other lesions.  PSYCHIATRIC: No depression. No anxiety. No SI/HI.  ALLERGIC: No lip swelling. No hives.  All other review of systems is negative unless indicated above.

## 2020-03-10 NOTE — H&P ADULT - NSHPPOADEEPVENOUSTHROMB_GEN_A_CORE
Patient becoming increasingly agitated. Patient frequently having urge to go to the bathroom but no urination occuring. RN attempted to bladder scan but patient refused and tried hitting writer.    Patient fixated on cigarettes. Nicotine gum given; however, patient spit the gum out.   no

## 2020-03-10 NOTE — H&P ADULT - NSHPLABSRESULTS_GEN_ALL_CORE
Labs personally reviewed : no leukocytosis, hgb stable without acute anemia, rest cbc unrevealing, occult positive, coags unrevealing, cmp with mild hyperglycemia otherwise unrevealing. Lactate wnl. UA nondx.   Imaging personally reviewed CXR prelim clear lungs. CTAP with +diverticulosis and evidence of contrast extrav in sigmoid colon compatible with active gib.  No eKG in chart.

## 2020-03-10 NOTE — H&P ADULT - PROBLEM SELECTOR PLAN 1
-Pt with painless BRBPR, hx of diverticuli, and evidence of active extrav on CTAP with oral contrast suggestive of diverticular bleed.  Currently hemodynamically stable, without acute anemia on initial labs.   -Keep NPO  -GI eval pending  -temporarily holding aspirin, plavix.  hold pharm vte ppx  -check cbc q12  -hold home antihypertensives for time being unless sbp >180

## 2020-03-11 LAB
GLUCOSE BLDC GLUCOMTR-MCNC: 162 MG/DL — HIGH (ref 70–99)
GLUCOSE BLDC GLUCOMTR-MCNC: 176 MG/DL — HIGH (ref 70–99)
GLUCOSE BLDC GLUCOMTR-MCNC: 194 MG/DL — HIGH (ref 70–99)
HBA1C BLD-MCNC: 7.5 % — HIGH (ref 4–5.6)
HCT VFR BLD CALC: 27.3 % — LOW (ref 34.5–45)
HCT VFR BLD CALC: 28.8 % — LOW (ref 34.5–45)
HCV AB S/CO SERPL IA: 0.16 S/CO — SIGNIFICANT CHANGE UP (ref 0–0.99)
HCV AB SERPL-IMP: SIGNIFICANT CHANGE UP
HGB BLD-MCNC: 8.9 G/DL — LOW (ref 11.5–15.5)
HGB BLD-MCNC: 9.3 G/DL — LOW (ref 11.5–15.5)
MCHC RBC-ENTMCNC: 27.6 PG — SIGNIFICANT CHANGE UP (ref 27–34)
MCHC RBC-ENTMCNC: 27.9 PG — SIGNIFICANT CHANGE UP (ref 27–34)
MCHC RBC-ENTMCNC: 32.3 GM/DL — SIGNIFICANT CHANGE UP (ref 32–36)
MCHC RBC-ENTMCNC: 32.6 GM/DL — SIGNIFICANT CHANGE UP (ref 32–36)
MCV RBC AUTO: 84.5 FL — SIGNIFICANT CHANGE UP (ref 80–100)
MCV RBC AUTO: 86.5 FL — SIGNIFICANT CHANGE UP (ref 80–100)
NRBC # BLD: 0 /100 WBCS — SIGNIFICANT CHANGE UP (ref 0–0)
NRBC # BLD: 0 /100 WBCS — SIGNIFICANT CHANGE UP (ref 0–0)
PLATELET # BLD AUTO: 229 K/UL — SIGNIFICANT CHANGE UP (ref 150–400)
PLATELET # BLD AUTO: 245 K/UL — SIGNIFICANT CHANGE UP (ref 150–400)
RBC # BLD: 3.23 M/UL — LOW (ref 3.8–5.2)
RBC # BLD: 3.33 M/UL — LOW (ref 3.8–5.2)
RBC # FLD: 14.4 % — SIGNIFICANT CHANGE UP (ref 10.3–14.5)
RBC # FLD: 14.6 % — HIGH (ref 10.3–14.5)
WBC # BLD: 5.31 K/UL — SIGNIFICANT CHANGE UP (ref 3.8–10.5)
WBC # BLD: 7.86 K/UL — SIGNIFICANT CHANGE UP (ref 3.8–10.5)
WBC # FLD AUTO: 5.31 K/UL — SIGNIFICANT CHANGE UP (ref 3.8–10.5)
WBC # FLD AUTO: 7.86 K/UL — SIGNIFICANT CHANGE UP (ref 3.8–10.5)

## 2020-03-11 PROCEDURE — 99233 SBSQ HOSP IP/OBS HIGH 50: CPT

## 2020-03-11 PROCEDURE — 45382 COLONOSCOPY W/CONTROL BLEED: CPT | Mod: GC

## 2020-03-11 RX ORDER — INSULIN LISPRO 100/ML
VIAL (ML) SUBCUTANEOUS AT BEDTIME
Refills: 0 | Status: DISCONTINUED | OUTPATIENT
Start: 2020-03-11 | End: 2020-03-12

## 2020-03-11 RX ORDER — INSULIN LISPRO 100/ML
VIAL (ML) SUBCUTANEOUS
Refills: 0 | Status: DISCONTINUED | OUTPATIENT
Start: 2020-03-11 | End: 2020-03-12

## 2020-03-11 RX ADMIN — MONTELUKAST 10 MILLIGRAM(S): 4 TABLET, CHEWABLE ORAL at 13:09

## 2020-03-11 RX ADMIN — Medication 1: at 13:09

## 2020-03-11 RX ADMIN — Medication 100 MILLIGRAM(S): at 21:03

## 2020-03-11 RX ADMIN — Medication 1: at 17:18

## 2020-03-11 RX ADMIN — PANTOPRAZOLE SODIUM 40 MILLIGRAM(S): 20 TABLET, DELAYED RELEASE ORAL at 17:18

## 2020-03-11 RX ADMIN — FAMOTIDINE 20 MILLIGRAM(S): 10 INJECTION INTRAVENOUS at 17:18

## 2020-03-11 RX ADMIN — LOSARTAN POTASSIUM 100 MILLIGRAM(S): 100 TABLET, FILM COATED ORAL at 05:12

## 2020-03-11 RX ADMIN — Medication 1: at 05:11

## 2020-03-11 RX ADMIN — AMLODIPINE BESYLATE 5 MILLIGRAM(S): 2.5 TABLET ORAL at 05:12

## 2020-03-11 RX ADMIN — PANTOPRAZOLE SODIUM 40 MILLIGRAM(S): 20 TABLET, DELAYED RELEASE ORAL at 05:12

## 2020-03-11 RX ADMIN — FAMOTIDINE 20 MILLIGRAM(S): 10 INJECTION INTRAVENOUS at 05:12

## 2020-03-11 NOTE — PROGRESS NOTE ADULT - PROBLEM SELECTOR PLAN 2
-pt on lantus 28-32 units qhs at home, + multiple oral hypoglycemics  -hold oral hypoglycemics  -d/w family option of giving half dose home Lantus given NPO.  At this time, family and pt prefer to hold standing Lantus and continue with only HISS for now.   -fsg qac/qhs

## 2020-03-11 NOTE — PROGRESS NOTE ADULT - SUBJECTIVE AND OBJECTIVE BOX
Pre-Endoscopy Evaluation      Referring Physician: dr. armendariz                                  Procedure: colonoscopy    Indication for Procedure: gib    Pertinent History: 74y old female with PMH of DM, HTN, CVA, anxiety, hypothyroidism, cerebral aneurysm s/p coiling and stenting on DAPT with aspirin and Plavix, diverticulosis, presenting with BRBPR      Sedation by Anesthesia [X]    PAST MEDICAL & SURGICAL HISTORY:  Asthma  Reflux esophagitis  Depression  Anxiety  DM (diabetes mellitus)  HTN (hypertension)  Kidney stones: hx of  Lumbar disc disease  Chronic fatigue  Fibromyalgia  Benign intracranial hypertension:  r/t &quot;tetracyclines&quot;  Shoulder disorder: right bone spur  CVA (cerebral vascular accident):   Diverticulitis  Basal cell carcinoma  Hearing loss: left  Vertigo  Aneurysm: cerebral  Breast CA: right  History of kidney stones:   H/O tubal ligation  H/O shoulder surgery: right - 2001  Basal cell carcinoma: removed - left eyelid- 3/15  Cerebral aneurysm: repair with coil and stent -   H/O:   History of D&C      PMH of Gastroparesis [ ]  Gastric Surgery [ ]  Gastric Outlet Obstruction [ ]    Allergies:    Augmentin (Rash)  Ceclor (Other)  codeine (Nausea)  Cytotec (Unknown)  Entex (Other)  Norflex (Other)  penicillin (Urticaria; Rash)  ramipril (Other)  shellfish (Rash)  strawberry (Rash)  sulfites - nausea/ gi upset (Other)  tetracycline (Other)    Intolerances:    Latex allergy: [ ] yes [X] no    Medications:MEDICATIONS  (STANDING):  amLODIPine   Tablet 5 milliGRAM(s) Oral daily  dextrose 5%. 1000 milliLiter(s) (50 mL/Hr) IV Continuous <Continuous>  dextrose 50% Injectable 12.5 Gram(s) IV Push once  dextrose 50% Injectable 25 Gram(s) IV Push once  dextrose 50% Injectable 25 Gram(s) IV Push once  famotidine    Tablet 20 milliGRAM(s) Oral two times a day  insulin lispro (HumaLOG) corrective regimen sliding scale   SubCutaneous every 6 hours  losartan 100 milliGRAM(s) Oral daily  montelukast 10 milliGRAM(s) Oral daily  pantoprazole    Tablet 40 milliGRAM(s) Oral two times a day  sodium chloride 0.9%. 1000 milliLiter(s) (75 mL/Hr) IV Continuous <Continuous>  sodium chloride 0.9%. 1000 milliLiter(s) (50 mL/Hr) IV Continuous <Continuous>  traZODone 100 milliGRAM(s) Oral at bedtime    MEDICATIONS  (PRN):  dextrose 40% Gel 15 Gram(s) Oral once PRN Blood Glucose LESS THAN 70 milliGRAM(s)/deciliter  diazepam    Tablet 5 milliGRAM(s) Oral two times a day PRN back spasm  dicyclomine 20 milliGRAM(s) Oral four times a day before meals PRN gi upset  glucagon  Injectable 1 milliGRAM(s) IntraMuscular once PRN Glucose LESS THAN 70 milligrams/deciliter      Smoking: [ ] yes  [X] no    AICD/PPM: [ ] yes   [X] no    Pertinent lab data:                        8.9    5.31  )-----------( 245      ( 11 Mar 2020 06:36 )             27.3     03-10    142  |  104  |  20  ----------------------------<  157<H>  4.1   |  23  |  0.64    Ca    9.9      10 Mar 2020 08:54  Phos  3.3     03-10  Mg     2.2     03-10    TPro  7.2  /  Alb  4.6  /  TBili  0.3  /  DBili  x   /  AST  19  /  ALT  23  /  AlkPhos  101  03-10    PT/INR - ( 09 Mar 2020 20:14 )   PT: 11.5 sec;   INR: 1.01 ratio         PTT - ( 09 Mar 2020 20:14 )  PTT:30.5 sec      CAPILLARY BLOOD GLUCOSE  POCT Blood Glucose.: 176 mg/dL (11 Mar 2020 05:10)      Physical Examination:    Daily   Vital Signs Last 24 Hrs  T(C): 36.6 (11 Mar 2020 08:36), Max: 36.7 (10 Mar 2020 10:58)  T(F): 97.9 (11 Mar 2020 08:36), Max: 98.1 (11 Mar 2020 01:05)  HR: 86 (11 Mar 2020 08:36) (82 - 98)  BP: 154/68 (11 Mar 2020 08:36) (127/70 - 157/67)  BP(mean): --  RR: 18 (11 Mar 2020 08:36) (18 - 18)  SpO2: 98% (11 Mar 2020 08:36) (96% - 99%)    Drug Dosing Weight  Height (cm): 162.56 (09 Mar 2020 18:15)  Weight (kg): 70.3 (09 Mar 2020 18:15)  BMI (kg/m2): 26.6 (09 Mar 2020 18:15)  BSA (m2): 1.76 (09 Mar 2020 18:15)    Constitutional: NAD     Neck:  No JVD    Respiratory: CTAB/L    Cardiovascular: S1 and S2    Gastrointestinal: BS+, soft, NT/ND    Extremities: No peripheral edema    Neurological: A/O x 3    : No Antunez    Skin: No rashes    Comments:      The patient is a suitable candidate for the planned procedure unless box checked [ ]  No, explain:

## 2020-03-11 NOTE — PROGRESS NOTE ADULT - PROBLEM SELECTOR PLAN 1
-Pt with painless BRBPR, hx of diverticuli, and evidence of active extrav on CTAP with oral contrast suggestive of diverticular bleed.  Currently hemodynamically stable, today does have hgb drop, vitals stable  -colonoscopy today  -temporarily holding aspirin, plavix.  hold pharm vte ppx  -check cbc q12  -hold home antihypertensives for time being unless sbp >180

## 2020-03-11 NOTE — PROGRESS NOTE ADULT - SUBJECTIVE AND OBJECTIVE BOX
Alee Haq MD  Division of Hospital Medicine  Pager [phone]  If no response or off hours page: [phone]  ---------------------------------------------------------    TORRIE SHETH  74y  Female      Patient is a 74y old  Female who presents with a chief complaint of gib (11 Mar 2020 10:32)      INTERVAL HPI/OVERNIGHT EVENTS:  No acute events overnight. Patient denies abdominal pain, N/V. s/p procedure        REVIEW OF SYSTEMS: 14 point ROS negative unless listed above    T(C): 36.7 (20 @ 12:30), Max: 36.7 (20 @ 01:05)  HR: 85 (20 @ 12:30) (82 - 98)  BP: 124/60 (20 @ 12:30) (124/60 - 157/67)  RR: 18 (20 @ 12:30) (18 - 18)  SpO2: 96% (20 @ 12:30) (96% - 99%)  Wt(kg): --Vital Signs Last 24 Hrs  T(C): 36.7 (11 Mar 2020 12:30), Max: 36.7 (11 Mar 2020 01:05)  T(F): 98.1 (11 Mar 2020 12:30), Max: 98.1 (11 Mar 2020 01:05)  HR: 85 (11 Mar 2020 12:30) (82 - 98)  BP: 124/60 (11 Mar 2020 12:30) (124/60 - 157/67)  BP(mean): --  RR: 18 (11 Mar 2020 12:30) (18 - 18)  SpO2: 96% (11 Mar 2020 12:30) (96% - 99%)    PHYSICAL EXAM:  GENERAL: NAD, well-groomed, well-developed  EYES: conjunctiva and sclera clear  ENMT: Moist mucous membranes  NECK: Supple  CHEST/LUNG: Clear to auscultation bilaterally; No rales, rhonchi, wheezing, or rubs  HEART: Regular rate and rhythm; No murmurs, rubs, or gallops  ABDOMEN: Soft, Nontender, Nondistended  EXTREMITIES:  No clubbing, cyanosis, or edema  SKIN: No rashes or lesions      Consultant(s) Notes Reviewed:  [x ] YES  [ ] NO  Care Discussed with Consultants/Other Providers [ ] YES  [ ] NO    LABS:                        8.9    5.31  )-----------( 245      ( 11 Mar 2020 06:36 )             27.3     03-10    142  |  104  |  20  ----------------------------<  157<H>  4.1   |  23  |  0.64    Ca    9.9      10 Mar 2020 08:54  Phos  3.3     03-10  Mg     2.2     03-10    TPro  7.2  /  Alb  4.6  /  TBili  0.3  /  DBili  x   /  AST  19  /  ALT  23  /  AlkPhos  101  03-10    PT/INR - ( 09 Mar 2020 20:14 )   PT: 11.5 sec;   INR: 1.01 ratio         PTT - ( 09 Mar 2020 20:14 )  PTT:30.5 sec  Urinalysis Basic - ( 09 Mar 2020 21:45 )    Color: Yellow / Appearance: Clear / S.028 / pH: x  Gluc: x / Ketone: Negative  / Bili: Negative / Urobili: Negative   Blood: x / Protein: Negative / Nitrite: Negative   Leuk Esterase: Negative / RBC: x / WBC x   Sq Epi: x / Non Sq Epi: x / Bacteria: x      CAPILLARY BLOOD GLUCOSE      POCT Blood Glucose.: 194 mg/dL (11 Mar 2020 13:04)  POCT Blood Glucose.: 176 mg/dL (11 Mar 2020 05:10)  POCT Blood Glucose.: 289 mg/dL (10 Mar 2020 23:23)  POCT Blood Glucose.: 176 mg/dL (10 Mar 2020 18:03)        Urinalysis Basic - ( 09 Mar 2020 21:45 )    Color: Yellow / Appearance: Clear / S.028 / pH: x  Gluc: x / Ketone: Negative  / Bili: Negative / Urobili: Negative   Blood: x / Protein: Negative / Nitrite: Negative   Leuk Esterase: Negative / RBC: x / WBC x   Sq Epi: x / Non Sq Epi: x / Bacteria: x        RADIOLOGY & ADDITIONAL TESTS:    Imaging Personally Reviewed:  [ ] YES  [ ] NO

## 2020-03-12 ENCOUNTER — TRANSCRIPTION ENCOUNTER (OUTPATIENT)
Age: 75
End: 2020-03-12

## 2020-03-12 VITALS
OXYGEN SATURATION: 98 % | SYSTOLIC BLOOD PRESSURE: 134 MMHG | RESPIRATION RATE: 17 BRPM | DIASTOLIC BLOOD PRESSURE: 79 MMHG | TEMPERATURE: 98 F | HEART RATE: 77 BPM

## 2020-03-12 DIAGNOSIS — I63.9 CEREBRAL INFARCTION, UNSPECIFIED: ICD-10-CM

## 2020-03-12 LAB
ANION GAP SERPL CALC-SCNC: 13 MMOL/L — SIGNIFICANT CHANGE UP (ref 5–17)
BUN SERPL-MCNC: 10 MG/DL — SIGNIFICANT CHANGE UP (ref 7–23)
CALCIUM SERPL-MCNC: 8.9 MG/DL — SIGNIFICANT CHANGE UP (ref 8.4–10.5)
CHLORIDE SERPL-SCNC: 107 MMOL/L — SIGNIFICANT CHANGE UP (ref 96–108)
CO2 SERPL-SCNC: 22 MMOL/L — SIGNIFICANT CHANGE UP (ref 22–31)
CREAT SERPL-MCNC: 0.61 MG/DL — SIGNIFICANT CHANGE UP (ref 0.5–1.3)
GLUCOSE BLDC GLUCOMTR-MCNC: 185 MG/DL — HIGH (ref 70–99)
GLUCOSE BLDC GLUCOMTR-MCNC: 191 MG/DL — HIGH (ref 70–99)
GLUCOSE BLDC GLUCOMTR-MCNC: 197 MG/DL — HIGH (ref 70–99)
GLUCOSE SERPL-MCNC: 171 MG/DL — HIGH (ref 70–99)
HCT VFR BLD CALC: 28.6 % — LOW (ref 34.5–45)
HGB BLD-MCNC: 9 G/DL — LOW (ref 11.5–15.5)
MCHC RBC-ENTMCNC: 26.9 PG — LOW (ref 27–34)
MCHC RBC-ENTMCNC: 31.5 GM/DL — LOW (ref 32–36)
MCV RBC AUTO: 85.4 FL — SIGNIFICANT CHANGE UP (ref 80–100)
NRBC # BLD: 0 /100 WBCS — SIGNIFICANT CHANGE UP (ref 0–0)
PLATELET # BLD AUTO: 221 K/UL — SIGNIFICANT CHANGE UP (ref 150–400)
POTASSIUM SERPL-MCNC: 4 MMOL/L — SIGNIFICANT CHANGE UP (ref 3.5–5.3)
POTASSIUM SERPL-SCNC: 4 MMOL/L — SIGNIFICANT CHANGE UP (ref 3.5–5.3)
RBC # BLD: 3.35 M/UL — LOW (ref 3.8–5.2)
RBC # FLD: 14.5 % — SIGNIFICANT CHANGE UP (ref 10.3–14.5)
SODIUM SERPL-SCNC: 142 MMOL/L — SIGNIFICANT CHANGE UP (ref 135–145)
WBC # BLD: 7.04 K/UL — SIGNIFICANT CHANGE UP (ref 3.8–10.5)
WBC # FLD AUTO: 7.04 K/UL — SIGNIFICANT CHANGE UP (ref 3.8–10.5)

## 2020-03-12 PROCEDURE — 84100 ASSAY OF PHOSPHORUS: CPT

## 2020-03-12 PROCEDURE — 99239 HOSP IP/OBS DSCHRG MGMT >30: CPT | Mod: 25

## 2020-03-12 PROCEDURE — C1889: CPT

## 2020-03-12 PROCEDURE — 85730 THROMBOPLASTIN TIME PARTIAL: CPT

## 2020-03-12 PROCEDURE — 74177 CT ABD & PELVIS W/CONTRAST: CPT

## 2020-03-12 PROCEDURE — 83036 HEMOGLOBIN GLYCOSYLATED A1C: CPT

## 2020-03-12 PROCEDURE — 86803 HEPATITIS C AB TEST: CPT

## 2020-03-12 PROCEDURE — 86901 BLOOD TYPING SEROLOGIC RH(D): CPT

## 2020-03-12 PROCEDURE — 99233 SBSQ HOSP IP/OBS HIGH 50: CPT

## 2020-03-12 PROCEDURE — 85027 COMPLETE CBC AUTOMATED: CPT

## 2020-03-12 PROCEDURE — 82962 GLUCOSE BLOOD TEST: CPT

## 2020-03-12 PROCEDURE — 80053 COMPREHEN METABOLIC PANEL: CPT

## 2020-03-12 PROCEDURE — 86850 RBC ANTIBODY SCREEN: CPT

## 2020-03-12 PROCEDURE — 82272 OCCULT BLD FECES 1-3 TESTS: CPT

## 2020-03-12 PROCEDURE — 87040 BLOOD CULTURE FOR BACTERIA: CPT

## 2020-03-12 PROCEDURE — 85610 PROTHROMBIN TIME: CPT

## 2020-03-12 PROCEDURE — 99232 SBSQ HOSP IP/OBS MODERATE 35: CPT | Mod: GC

## 2020-03-12 PROCEDURE — 71045 X-RAY EXAM CHEST 1 VIEW: CPT

## 2020-03-12 PROCEDURE — 83605 ASSAY OF LACTIC ACID: CPT

## 2020-03-12 PROCEDURE — 86900 BLOOD TYPING SEROLOGIC ABO: CPT

## 2020-03-12 PROCEDURE — 99285 EMERGENCY DEPT VISIT HI MDM: CPT

## 2020-03-12 PROCEDURE — 83735 ASSAY OF MAGNESIUM: CPT

## 2020-03-12 PROCEDURE — 80048 BASIC METABOLIC PNL TOTAL CA: CPT

## 2020-03-12 PROCEDURE — 81003 URINALYSIS AUTO W/O SCOPE: CPT

## 2020-03-12 RX ORDER — ASPIRIN/CALCIUM CARB/MAGNESIUM 324 MG
81 TABLET ORAL THREE TIMES A DAY
Refills: 0 | Status: DISCONTINUED | OUTPATIENT
Start: 2020-03-12 | End: 2020-03-12

## 2020-03-12 RX ORDER — CLOPIDOGREL BISULFATE 75 MG/1
75 TABLET, FILM COATED ORAL DAILY
Refills: 0 | Status: DISCONTINUED | OUTPATIENT
Start: 2020-03-12 | End: 2020-03-12

## 2020-03-12 RX ORDER — ASPIRIN/CALCIUM CARB/MAGNESIUM 324 MG
325 TABLET ORAL DAILY
Refills: 0 | Status: DISCONTINUED | OUTPATIENT
Start: 2020-03-12 | End: 2020-03-12

## 2020-03-12 RX ADMIN — LOSARTAN POTASSIUM 100 MILLIGRAM(S): 100 TABLET, FILM COATED ORAL at 05:55

## 2020-03-12 RX ADMIN — Medication 1: at 07:47

## 2020-03-12 RX ADMIN — FAMOTIDINE 20 MILLIGRAM(S): 10 INJECTION INTRAVENOUS at 17:42

## 2020-03-12 RX ADMIN — AMLODIPINE BESYLATE 5 MILLIGRAM(S): 2.5 TABLET ORAL at 05:55

## 2020-03-12 RX ADMIN — MONTELUKAST 10 MILLIGRAM(S): 4 TABLET, CHEWABLE ORAL at 11:42

## 2020-03-12 RX ADMIN — PANTOPRAZOLE SODIUM 40 MILLIGRAM(S): 20 TABLET, DELAYED RELEASE ORAL at 17:42

## 2020-03-12 RX ADMIN — PANTOPRAZOLE SODIUM 40 MILLIGRAM(S): 20 TABLET, DELAYED RELEASE ORAL at 05:55

## 2020-03-12 RX ADMIN — Medication 1: at 14:41

## 2020-03-12 RX ADMIN — FAMOTIDINE 20 MILLIGRAM(S): 10 INJECTION INTRAVENOUS at 05:55

## 2020-03-12 NOTE — DISCHARGE NOTE PROVIDER - HOSPITAL COURSE
74 F PMH HTN, T2DM, diverticulitis, asthma, cerebral aneurysm s/p stent/coiling, CVA, anxiety, hypothyroid, basal cell ca s/p excision, R breast ca s/p lumpectomy/chemo/RT, p/w rectal bleeding.  Partner at bedside providing collateral.  Pt states she noticed "darkening" stools that still remained brown over last few days.  Starting this am, she noticed progressively more blood in her stool; first streaks, then eventually whole bowl filled. No associated discrete abd pain, just "churning" sensation. No postprandial sx. No melena. Denies cp, sob, f/c, n/v/d, dysuria, cough, syncope, palpitations, pallor, so, lightheadedness, dizziness. Denies a/c or nsaid exposure.   Last colo 3-4 yrs ago, reportedly normal. In total, has had 4-5 bloody bm today, most recently in ER, with quantity appearing to have slowed down since sx onset.         VS: Tm 100.5, 108 --> 80, 142/65, 18, 99% RA.  Labs: no leukocytosis, hgb stable without acute anemia, rest cbc unrevealing, occult positive, coags unrevealing, cmp with mild hyperglycemia otherwise unrevealing. Lactate wnl. UA nondx. CXR prelim clear lungs. CTAP with +diverticulosis and evidence of contrast extrav in sigmoid colon compatible with active gib.     Patient had a colonoscopy which show a diverticular bleed, pt had a clip placed. Patient had no further bleeding. HCT stable 28 percent and no further bleeding. Gastroenterologist advised to resume aspirin and platelet but patient and  would like to hold for another day. Patient is cleared for discharge and follow up with her primary care and gastroenterologist outpatient 74 F PMH HTN, T2DM, diverticulitis, asthma, cerebral aneurysm s/p stent/coiling, CVA, anxiety, hypothyroid, basal cell ca s/p excision, R breast ca s/p lumpectomy/chemo/RT, p/w rectal bleeding.  Partner at bedside providing collateral.  Pt states she noticed "darkening" stools that still remained brown over last few days.  Starting this am, she noticed progressively more blood in her stool; first streaks, then eventually whole bowl filled. No associated discrete abd pain, just "churning" sensation. No postprandial sx. No melena. Denies cp, sob, f/c, n/v/d, dysuria, cough, syncope, palpitations, pallor, so, lightheadedness, dizziness. Denies a/c or nsaid exposure.   Last colo 3-4 yrs ago, reportedly normal. In total, has had 4-5 bloody bm today, most recently in ER, with quantity appearing to have slowed down since sx onset.         VS: Tm 100.5, 108 --> 80, 142/65, 18, 99% RA.  Labs: no leukocytosis, hgb stable without acute anemia, rest cbc unrevealing, occult positive, coags unrevealing, cmp with mild hyperglycemia otherwise unrevealing. Lactate wnl. UA nondx. CXR prelim clear lungs. CTAP with +diverticulosis and evidence of contrast extrav in sigmoid colon compatible with active gib.     Patient had a colonoscopy which show a diverticular bleed, pt had a clip placed. Patient had no further bleeding. HCT stable 28 percent and no further bleeding. Gastroenterologist advised to resume aspirin and platelet but patient and  would like to hold for another day. Patient is cleared for discharge and follow up with her primary care and gastroenterologist outpatient. Patient was also told of findings of pulm nodules captured on Ct abdomen, to be followed up outpatient w/ PCP-Dr. Reyes. Dr. reyes also notified of these findings. 74 F PMH HTN, T2DM, diverticulitis, asthma, cerebral aneurysm s/p stent/coiling, CVA, anxiety, hypothyroid, basal cell ca s/p excision, R breast ca s/p lumpectomy/chemo/RT, p/w rectal bleeding.  Partner at bedside providing collateral.  Pt states she noticed "darkening" stools that still remained brown over last few days.  Starting this am, she noticed progressively more blood in her stool; first streaks, then eventually whole bowl filled. No associated discrete abd pain, just "churning" sensation. No postprandial sx. No melena. Denies cp, sob, f/c, n/v/d, dysuria, cough, syncope, palpitations, pallor, so, lightheadedness, dizziness. Denies a/c or nsaid exposure.   Last colo 3-4 yrs ago, reportedly normal. In total, has had 4-5 bloody bm today, most recently in ER, with quantity appearing to have slowed down since sx onset.         VS: Tm 100.5, 108 --> 80, 142/65, 18, 99% RA.  Labs: no leukocytosis, hgb stable without acute anemia, rest cbc unrevealing, occult positive, coags unrevealing, cmp with mild hyperglycemia otherwise unrevealing. Lactate wnl. UA nondx. CXR prelim clear lungs. CTAP with +diverticulosis and evidence of contrast extrav in sigmoid colon compatible with active gib.     Patient had a colonoscopy which show a diverticular bleed, pt had a clip placed. Patient had no further bleeding. HCT stable 28 percent and no further bleeding. Gastroenterologist advised to resume aspirin and platelet but patient and  would like to hold for another day. Patient is cleared for discharge and follow up with her primary care and gastroenterologist outpatient. Patient was also told of findings of pulm nodules captured on Ct abdomen, to be followed up outpatient w/ PCP-Dr. Reyes. Dr. reyes also notified of these findings.  Discharge planning 40 minutes.

## 2020-03-12 NOTE — PROGRESS NOTE ADULT - ASSESSMENT
73 y/o F w/ hx of DM, HTN, CVA, anxiety, hypothyroidism, cerebral aneurysm s/p coiling and stenting on DAPT with aspirin and Plavix, diverticulosis, presenting with BRBPR.  s/p colonoscopy 03/11:  Diverticulosis in the recto-sigmoid colon, in the sigmoid colon, in the descending colon, at the splenic flexure, in the transverse colon and in ascending colon.- Diverticulosis in the sigmoid colon. There was evidence of recent bleeding from the diverticular opening with a visible vessel. Clips (MR conditional) were placed.- Non-bleeding internal hemorrhoids.    Impression:  # Hematochezia 2nd to diverticular bleed s/p colonoscopy with clip placement as above   # CVA  # Cerebral aneurysm s/p coiling and stenting on DAPT  # DM  # HTN  # Hypothyroidism  # Anxiety    Recommendations:  - awaiting cbc from this morning - if stable Advance diet to regular.  - if h&h stable, No GI contraindication to resume DAPT   - If rebleeds, will consider repeat colonoscopy vs IR intervention 73 y/o F w/ hx of DM, HTN, CVA, anxiety, hypothyroidism, cerebral aneurysm s/p coiling and stenting on DAPT with aspirin and Plavix, diverticulosis, presenting with BRBPR.  s/p colonoscopy 03/11:  Diverticulosis in the recto-sigmoid colon, in the sigmoid colon, in the descending colon, at the splenic flexure, in the transverse colon and in ascending colon.- Diverticulosis in the sigmoid colon. There was evidence of recent bleeding from the diverticular opening with a visible vessel. Clips (MR conditional) were placed.- Non-bleeding internal hemorrhoids.    Impression:  # Hematochezia 2nd to diverticular bleed s/p colonoscopy with clip placement as above   # CVA  # Cerebral aneurysm s/p coiling and stenting on DAPT  # DM  # HTN  # Hypothyroidism  # Anxiety    Recommendations:  - Advance diet to regular.  - No GI contraindication to resume DAPT   - Call us back with questions 73 y/o F w/ hx of DM, HTN, CVA, anxiety, hypothyroidism, cerebral aneurysm s/p coiling and stenting on DAPT with aspirin and Plavix, diverticulosis, presenting with BRBPR.  s/p colonoscopy 03/11:  Diverticulosis in the recto-sigmoid colon, in the sigmoid colon, in the descending colon, at the splenic flexure, in the transverse colon and in ascending colon.- Diverticulosis in the sigmoid colon. There was evidence of recent bleeding from the diverticular opening with a visible vessel. Clips (MR conditional) were placed.- Non-bleeding internal hemorrhoids.    Impression:  # Hematochezia 2nd to diverticular bleed s/p colonoscopy with clip placement as above   # CVA  # Cerebral aneurysm s/p coiling and stenting on DAPT  # DM  # HTN  # Hypothyroidism  # Anxiety    Recommendations:  - Advance diet to regular.  - No GI contraindication to resume DAPT tomorrow.  - If tolerating regular diet, no objection for discharge.  - Call us back with questions

## 2020-03-12 NOTE — PROGRESS NOTE ADULT - ATTENDING COMMENTS
Patient seen and examined. Agree with above. Bleeding now resolved from diverticular bleeding, s/p endoscopic clips therapy. Advance diet as tolerated, no objection to discharge. Resume DAPT as needed tomorrow.
Likely dispo tomorrow if hgb remains stable

## 2020-03-12 NOTE — PROGRESS NOTE ADULT - PROBLEM SELECTOR PLAN 1
-Pt with painless BRBPR, hx of diverticuli, and evidence of active extrav on CTAP with oral contrast suggestive of diverticular bleed.  Currently hemodynamically stable  -s/p colonoscopy: diverticular bleed w/ bleeding vessel s/p clip placement  -temporarily holding aspirin, plavix-will discuss with gi when to start.  hold pharm vte ppx  -check cbc q12  -restarted home meds

## 2020-03-12 NOTE — DISCHARGE NOTE PROVIDER - NSDCMRMEDTOKEN_GEN_ALL_CORE_FT
amLODIPine 5 mg oral tablet: 1 tab(s) orally once a day in morning  Aspirin Low Dose 81 mg oral delayed release tablet: 1 tab(s) orally 3 times a day  Bentyl 20 mg oral tablet: 1 tab(s) orally 4 times a day, As Needed  clopidogrel 75 mg oral tablet: 1 tab(s) orally once a day with lunch  diazepam 5 mg oral tablet: 1 tab(s) orally 2 times a day  exemestane 25 mg oral tablet: 1 tab(s) orally once a day  Jardiance 10 mg oral tablet: 1 tab(s) orally once a day (in the morning)  Lantus 100 units/mL subcutaneous solution: 28-32 unit(s) subcutaneous once a day (at bedtime)  losartan 100 mg oral tablet: 1 tab(s) orally once a day in morning  metFORMIN 500 mg oral tablet, extended release: 1 tab(s) orally 2 times a day  montelukast 10 mg oral tablet: 1 tab(s) orally once a day (at bedtime)  naturethyroid:   1 Gram orally 6 days a week ( tuesday/ wednesday/ thursday/ friday/ saturday/sunday)  pantoprazole 40 mg oral delayed release tablet: 1 tab(s) orally 2 times a day  Pepcid 20 mg oral tablet: 1 tab(s) orally 2 times a day  SymlinPen 120 subcutaneous solution: 120 microgram(s) subcutaneous 3 times a day - 1/2/hour before meals  traZODone 100 mg oral tablet: 1 tab(s) orally once a day (at bedtime)  Victoza 18 mg/3 mL subcutaneous solution: 1.8 milligram(s) subcutaneous once a day- in morning before breakfast  ZyrTEC 10 mg oral tablet: 1 tab(s) orally once a day with dinner

## 2020-03-12 NOTE — DISCHARGE NOTE NURSING/CASE MANAGEMENT/SOCIAL WORK - NSDCPEPTSTRK_GEN_ALL_CORE
Call 911 for stroke/Stroke support groups for patients, families, and friends/Prescribed medications/Stroke education booklet/Stroke warning signs and symptoms/Signs and symptoms of stroke/Need for follow up after discharge/Risk factors for stroke

## 2020-03-12 NOTE — PROGRESS NOTE ADULT - SUBJECTIVE AND OBJECTIVE BOX
Alee Haq MD  Division of Hospital Medicine  Pager [phone]  If no response or off hours page: [phone]  ---------------------------------------------------------    TORRIE SHETH  74y  Female      Patient is a 74y old  Female who presents with a chief complaint of gib (12 Mar 2020 06:25)      INTERVAL HPI/OVERNIGHT EVENTS:  Patient had 3BMs this morning, w/ no blood or dark stool. denies abdominal pain, CP, N/v. able to tolerate po breakfast today.         REVIEW OF SYSTEMS: 14 point ROS negative unless listed above    T(C): 36.9 (03-12-20 @ 13:53), Max: 36.9 (03-12-20 @ 13:53)  HR: 77 (03-12-20 @ 13:53) (77 - 86)  BP: 134/79 (03-12-20 @ 13:53) (134/76 - 138/68)  RR: 17 (03-12-20 @ 13:53) (17 - 18)  SpO2: 98% (03-12-20 @ 13:53) (97% - 98%)  Wt(kg): --Vital Signs Last 24 Hrs  T(C): 36.9 (12 Mar 2020 13:53), Max: 36.9 (12 Mar 2020 13:53)  T(F): 98.4 (12 Mar 2020 13:53), Max: 98.4 (12 Mar 2020 13:53)  HR: 77 (12 Mar 2020 13:53) (77 - 86)  BP: 134/79 (12 Mar 2020 13:53) (134/76 - 138/68)  BP(mean): --  RR: 17 (12 Mar 2020 13:53) (17 - 18)  SpO2: 98% (12 Mar 2020 13:53) (97% - 98%)    PHYSICAL EXAM:  GENERAL: NAD, well-groomed  EYES: conjunctiva and sclera clear  ENMT:  Moist mucous membranes  NECK: Supple  CHEST/LUNG: Clear to auscultation bilaterally; No rales, rhonchi, wheezing, or rubs  HEART: Regular rate and rhythm; No murmurs, rubs, or gallops  ABDOMEN: Soft, Nontender, Nondistended  EXTREMITIES: No clubbing, cyanosis, or edema  SKIN: No rashes or lesions      Consultant(s) Notes Reviewed:  [x ] YES  [ ] NO  Care Discussed with Consultants/Other Providers [ x] YES-NP   [ ] NO    LABS:                        9.0    7.04  )-----------( 221      ( 12 Mar 2020 06:38 )             28.6     03-12    142  |  107  |  10  ----------------------------<  171<H>  4.0   |  22  |  0.61    Ca    8.9      12 Mar 2020 06:36          CAPILLARY BLOOD GLUCOSE  191 (11 Mar 2020 21:02)      POCT Blood Glucose.: 185 mg/dL (12 Mar 2020 07:29)  POCT Blood Glucose.: 191 mg/dL (11 Mar 2020 21:02)  POCT Blood Glucose.: 162 mg/dL (11 Mar 2020 17:16)            RADIOLOGY & ADDITIONAL TESTS:    Imaging Personally Reviewed:  [ ] YES  [ ] NO

## 2020-03-12 NOTE — DISCHARGE NOTE PROVIDER - CARE PROVIDER_API CALL
Jean-Paul Self)  Gastroenterology; Internal Medicine  [address] [place] [address]  Phone: [phone]  Fax: [phone]  Established Patient  Follow Up Time: 2 weeks

## 2020-03-12 NOTE — PROGRESS NOTE ADULT - SUBJECTIVE AND OBJECTIVE BOX
Interval Events:   s/p colonoscopy   no abdominal pain    Hospital Medications:  amLODIPine   Tablet 5 milliGRAM(s) Oral daily  dextrose 40% Gel 15 Gram(s) Oral once PRN  dextrose 5%. 1000 milliLiter(s) IV Continuous <Continuous>  dextrose 50% Injectable 12.5 Gram(s) IV Push once  dextrose 50% Injectable 25 Gram(s) IV Push once  dextrose 50% Injectable 25 Gram(s) IV Push once  diazepam    Tablet 5 milliGRAM(s) Oral two times a day PRN  dicyclomine 20 milliGRAM(s) Oral four times a day before meals PRN  famotidine    Tablet 20 milliGRAM(s) Oral two times a day  glucagon  Injectable 1 milliGRAM(s) IntraMuscular once PRN  insulin lispro (HumaLOG) corrective regimen sliding scale   SubCutaneous three times a day before meals  insulin lispro (HumaLOG) corrective regimen sliding scale   SubCutaneous at bedtime  losartan 100 milliGRAM(s) Oral daily  montelukast 10 milliGRAM(s) Oral daily  pantoprazole    Tablet 40 milliGRAM(s) Oral two times a day  sodium chloride 0.9%. 1000 milliLiter(s) IV Continuous <Continuous>  sodium chloride 0.9%. 1000 milliLiter(s) IV Continuous <Continuous>  traZODone 100 milliGRAM(s) Oral at bedtime        ROS:   General:  No fevers, chills or night sweats  ENT:  No sore throat or dysphagia  CV:  No pain or palpitations  Resp:  No dyspnea, cough or  wheezing  GI:  as above  Skin:  No rash or edema  Neuro: no weakness   Hematologic: no bleeding  Musculoskeletal: no muscle pain or join pain  Psych: no agitation      PHYSICAL EXAM:   Vital Signs:  Vital Signs Last 24 Hrs  T(C): 36.4 (12 Mar 2020 05:52), Max: 36.8 (11 Mar 2020 21:09)  T(F): 97.6 (12 Mar 2020 05:52), Max: 98.3 (11 Mar 2020 21:09)  HR: 86 (12 Mar 2020 05:52) (77 - 86)  BP: 138/68 (12 Mar 2020 05:52) (124/60 - 154/68)  BP(mean): --  RR: 17 (12 Mar 2020 05:52) (17 - 18)  SpO2: 97% (12 Mar 2020 05:52) (96% - 98%)  Daily     Daily Weight in k.4 (11 Mar 2020 12:30)    GENERAL:  NAD, Appears stated age  HEENT:  NC/AT,  conjunctivae clear and pink, sclera -anicteric  CHEST:  Normal Effort, Breath sounds clear  HEART:  RRR, S1 + S2, no murmurs  ABDOMEN:  Soft, non-tender, non-distended, normoactive bowel sounds,  no masses  EXTREMITIES  no cyanosis or edema  SKIN:  Warm & Dry. No rash or erythema  NEURO:  Alert, oriented    LABS:                        9.3    7.86  )-----------( 229      ( 11 Mar 2020 19:40 )             28.8     Mean Cell Volume: 86.5 fl (20 @ 19:40)    03-10    142  |  104  |  20  ----------------------------<  157<H>  4.1   |  23  |  0.64    Ca    9.9      10 Mar 2020 08:54  Phos  3.3     03-10  Mg     2.2     03-10    TPro  7.2  /  Alb  4.6  /  TBili  0.3  /  DBili  x   /  AST  19  /  ALT  23  /  AlkPhos  101  03-10    LIVER FUNCTIONS - ( 10 Mar 2020 08:54 )  Alb: 4.6 g/dL / Pro: 7.2 g/dL / ALK PHOS: 101 U/L / ALT: 23 U/L / AST: 19 U/L / GGT: x                                       9.3    7.86  )-----------( 229      ( 11 Mar 2020 19:40 )             28.8                         8.9    5.31  )-----------( 245      ( 11 Mar 2020 06:36 )             27.3                         11.9   8.62  )-----------( 367      ( 10 Mar 2020 20:36 )             37.3                         12.5   10.09 )-----------( 333      ( 10 Mar 2020 08:54 )             39.9                         13.2   8.91  )-----------( 330      ( 09 Mar 2020 20:14 )             41.3       Imaging: Interval Events:   s/p colonoscopy   no abdominal pain  no hematochezia     Hospital Medications:  amLODIPine   Tablet 5 milliGRAM(s) Oral daily  dextrose 40% Gel 15 Gram(s) Oral once PRN  dextrose 5%. 1000 milliLiter(s) IV Continuous <Continuous>  dextrose 50% Injectable 12.5 Gram(s) IV Push once  dextrose 50% Injectable 25 Gram(s) IV Push once  dextrose 50% Injectable 25 Gram(s) IV Push once  diazepam    Tablet 5 milliGRAM(s) Oral two times a day PRN  dicyclomine 20 milliGRAM(s) Oral four times a day before meals PRN  famotidine    Tablet 20 milliGRAM(s) Oral two times a day  glucagon  Injectable 1 milliGRAM(s) IntraMuscular once PRN  insulin lispro (HumaLOG) corrective regimen sliding scale   SubCutaneous three times a day before meals  insulin lispro (HumaLOG) corrective regimen sliding scale   SubCutaneous at bedtime  losartan 100 milliGRAM(s) Oral daily  montelukast 10 milliGRAM(s) Oral daily  pantoprazole    Tablet 40 milliGRAM(s) Oral two times a day  sodium chloride 0.9%. 1000 milliLiter(s) IV Continuous <Continuous>  sodium chloride 0.9%. 1000 milliLiter(s) IV Continuous <Continuous>  traZODone 100 milliGRAM(s) Oral at bedtime        ROS:   General:  No fevers, chills or night sweats  ENT:  No sore throat or dysphagia  CV:  No pain or palpitations  Resp:  No dyspnea, cough or  wheezing  GI:  as above  Skin:  No rash or edema  Neuro: no weakness   Hematologic: no bleeding  Musculoskeletal: no muscle pain or join pain  Psych: no agitation      PHYSICAL EXAM:   Vital Signs:  Vital Signs Last 24 Hrs  T(C): 36.4 (12 Mar 2020 05:52), Max: 36.8 (11 Mar 2020 21:09)  T(F): 97.6 (12 Mar 2020 05:52), Max: 98.3 (11 Mar 2020 21:09)  HR: 86 (12 Mar 2020 05:52) (77 - 86)  BP: 138/68 (12 Mar 2020 05:52) (124/60 - 154/68)  BP(mean): --  RR: 17 (12 Mar 2020 05:52) (17 - 18)  SpO2: 97% (12 Mar 2020 05:52) (96% - 98%)  Daily     Daily Weight in k.4 (11 Mar 2020 12:30)    GENERAL:  NAD, Appears stated age  HEENT:  NC/AT,  conjunctivae clear and pink, sclera -anicteric  CHEST:  Normal Effort, Breath sounds clear  HEART:  RRR, S1 + S2, no murmurs  ABDOMEN:  Soft, non-tender, non-distended, normoactive bowel sounds,  no masses  EXTREMITIES  no cyanosis or edema  SKIN:  Warm & Dry. No rash or erythema  NEURO:  Alert, oriented    LABS:                        9.3    7.86  )-----------( 229      ( 11 Mar 2020 19:40 )             28.8     Mean Cell Volume: 86.5 fl (20 @ 19:40)    03-10    142  |  104  |  20  ----------------------------<  157<H>  4.1   |  23  |  0.64    Ca    9.9      10 Mar 2020 08:54  Phos  3.3     03-10  Mg     2.2     03-10    TPro  7.2  /  Alb  4.6  /  TBili  0.3  /  DBili  x   /  AST  19  /  ALT  23  /  AlkPhos  101  03-10    LIVER FUNCTIONS - ( 10 Mar 2020 08:54 )  Alb: 4.6 g/dL / Pro: 7.2 g/dL / ALK PHOS: 101 U/L / ALT: 23 U/L / AST: 19 U/L / GGT: x                                       9.3    7.86  )-----------( 229      ( 11 Mar 2020 19:40 )             28.8                         8.9    5.31  )-----------( 245      ( 11 Mar 2020 06:36 )             27.3                         11.9   8.62  )-----------( 367      ( 10 Mar 2020 20:36 )             37.3                         12.5   10.09 )-----------( 333      ( 10 Mar 2020 08:54 )             39.9                         13.2   8.91  )-----------( 330      ( 09 Mar 2020 20:14 )             41.3       Imaging: Interval Events:   s/p colonoscopy   no abdominal pain  no hematochezia . Had brown bowel movement.    Hospital Medications:  amLODIPine   Tablet 5 milliGRAM(s) Oral daily  dextrose 40% Gel 15 Gram(s) Oral once PRN  dextrose 5%. 1000 milliLiter(s) IV Continuous <Continuous>  dextrose 50% Injectable 12.5 Gram(s) IV Push once  dextrose 50% Injectable 25 Gram(s) IV Push once  dextrose 50% Injectable 25 Gram(s) IV Push once  diazepam    Tablet 5 milliGRAM(s) Oral two times a day PRN  dicyclomine 20 milliGRAM(s) Oral four times a day before meals PRN  famotidine    Tablet 20 milliGRAM(s) Oral two times a day  glucagon  Injectable 1 milliGRAM(s) IntraMuscular once PRN  insulin lispro (HumaLOG) corrective regimen sliding scale   SubCutaneous three times a day before meals  insulin lispro (HumaLOG) corrective regimen sliding scale   SubCutaneous at bedtime  losartan 100 milliGRAM(s) Oral daily  montelukast 10 milliGRAM(s) Oral daily  pantoprazole    Tablet 40 milliGRAM(s) Oral two times a day  sodium chloride 0.9%. 1000 milliLiter(s) IV Continuous <Continuous>  sodium chloride 0.9%. 1000 milliLiter(s) IV Continuous <Continuous>  traZODone 100 milliGRAM(s) Oral at bedtime        ROS:   General:  No fevers, chills or night sweats  ENT:  No sore throat or dysphagia  CV:  No pain or palpitations  Resp:  No dyspnea, cough or  wheezing  GI:  as above  Skin:  No rash or edema  Neuro: no weakness   Hematologic: no bleeding  Musculoskeletal: no muscle pain or join pain  Psych: no agitation      PHYSICAL EXAM:   Vital Signs:  Vital Signs Last 24 Hrs  T(C): 36.4 (12 Mar 2020 05:52), Max: 36.8 (11 Mar 2020 21:09)  T(F): 97.6 (12 Mar 2020 05:52), Max: 98.3 (11 Mar 2020 21:09)  HR: 86 (12 Mar 2020 05:52) (77 - 86)  BP: 138/68 (12 Mar 2020 05:52) (124/60 - 154/68)  BP(mean): --  RR: 17 (12 Mar 2020 05:52) (17 - 18)  SpO2: 97% (12 Mar 2020 05:52) (96% - 98%)  Daily     Daily Weight in k.4 (11 Mar 2020 12:30)    GENERAL:  NAD, Appears stated age  HEENT:  NC/AT,  conjunctivae clear and pink, sclera -anicteric  CHEST:  Normal Effort, Breath sounds clear  HEART:  RRR, S1 + S2, no murmurs  ABDOMEN:  Soft, non-tender, non-distended, normoactive bowel sounds,  no masses  EXTREMITIES  no cyanosis or edema  SKIN:  Warm & Dry. No rash or erythema  NEURO:  Alert, oriented    LABS:                        9.3    7.86  )-----------( 229      ( 11 Mar 2020 19:40 )             28.8     Mean Cell Volume: 86.5 fl (20 @ 19:40)    03-10    142  |  104  |  20  ----------------------------<  157<H>  4.1   |  23  |  0.64    Ca    9.9      10 Mar 2020 08:54  Phos  3.3     03-10  Mg     2.2     03-10    TPro  7.2  /  Alb  4.6  /  TBili  0.3  /  DBili  x   /  AST  19  /  ALT  23  /  AlkPhos  101  03-10    LIVER FUNCTIONS - ( 10 Mar 2020 08:54 )  Alb: 4.6 g/dL / Pro: 7.2 g/dL / ALK PHOS: 101 U/L / ALT: 23 U/L / AST: 19 U/L / GGT: x                                       9.3    7.86  )-----------( 229      ( 11 Mar 2020 19:40 )             28.8                         8.9    5.31  )-----------( 245      ( 11 Mar 2020 06:36 )             27.3                         11.9   8.62  )-----------( 367      ( 10 Mar 2020 20:36 )             37.3                         12.5   10.09 )-----------( 333      ( 10 Mar 2020 08:54 )             39.9                         13.2   8.91  )-----------( 330      ( 09 Mar 2020 20:14 )             41.3       Imaging:

## 2020-03-12 NOTE — DISCHARGE NOTE PROVIDER - NSDCCPCAREPLAN_GEN_ALL_CORE_FT
PRINCIPAL DISCHARGE DIAGNOSIS  Diagnosis: Rectal bleeding  Assessment and Plan of Treatment:    As per GI  resume plavix and aspirin.  Colonoscopy results :       The perianal exam findings include skin tags.       Hematin (altered blood/coffee-ground-like material) was found in the rectum, in the        recto-sigmoid colon, in the sigmoid colon, in the descending colon, at the splenic flexure        and in the transverse colon.       Multiple small and large-mouthed diverticula were found in the recto-sigmoid colon, in the        sigmoid colon, in the descending colon, at the splenic flexure, transverse colon and        ascending colon.       A cluster of 2 large-mouthed diverticula with a visible vessel in between them was found in        the sigmoid colon. There was evidence of recent bleeding from the diverticular opening with        notable visible vessel and surrounding old blood. For hemostasis and to prevent rebleeding,        two hemostatic clips were successfully placed (MR conditional). There was no bleeding at the        end of the procedure.       Non-bleeding internal hemorrhoids were found during retroflexion. The hemorrhoids were small.                                                                                                        Impression:          - Perianal skin tags found on perianal exam.                       - Diverticulosis in the recto-sigmoid colon, in the sigmoid colon, in the                        descending colon, at the splenic flexure, in the transverse colon and in                        ascending colon.                       - Diverticulosis in the sigmoid colon. There was evidence of recent bleeding                        from the diverticular opening with a visible vessel. Clips (MR conditional)                        were placed.                       - Non-bleeding internal hemorrhoids.                       - No specimens collected.        SECONDARY DISCHARGE DIAGNOSES  Diagnosis: HTN (hypertension)  Assessment and Plan of Treatment: continue cozaar and norvasc , Follow up with your medical doctor to establish long term blood pressure treatment goals.      Diagnosis: Cerebrovascular accident (CVA)  Assessment and Plan of Treatment: s/p cerebral aneurysm s/p stent/coiling, CVA,  resume plavix and aspirin and follow up with neurology outpatient    Diagnosis: Type 2 diabetes mellitus without complication, with long-term current use of insulin  Assessment and Plan of Treatment:   Make sure you get your HgA1c checked every three months.  If you take oral diabetes medications, check your blood glucose two times a day.  If you take insulin, check your blood glucose before meals and at bedtime.  It's important not to skip any meals.  Keep a log of your blood glucose results and always take it with you to your doctor appointments.  Keep a list of your current medications including injectables and over the counter medications and bring this medication list with you to all your doctor appointments.  If you have not seen your ophthalmologist this year call for appointment.  Check your feet daily for redness, sores, or openings. Do not self treat. If no improvement in two days call your primary care physician for an appointment.  Low blood sugar (hypoglycemia) is a blood sugar below 70mg/dl. Check your blood sugar if you feel signs/symptoms of hypoglycemia. If your blood sugar is below 70 take 15 grams of carbohydrates (ex 4 oz of apple juice, 3-4 glucose tablets, or 4-6 oz of regular soda) wait 15 minutes and repeat blood sugar to make sure it comes up above 70.  If your blood sugar is above 70 and you are due for a meal, have a meal.  If you are not due for a meal have a snack.  This snack helps keeps your blood sugar at a safe range.

## 2020-03-12 NOTE — DISCHARGE NOTE PROVIDER - NSDCFUSCHEDAPPT_GEN_ALL_CORE_FT
TORRIE SHETH ; 03/25/2020 ; [facility]  TORRIE SHETH ; 04/02/2020 ; [facility] TORRIE SHETH ; 03/25/2020 ; [facility]  TORRIE SHETH ; 04/02/2020 ; [facility] TORRIE SHETH ; 03/25/2020 ; [facility]  TORRIE SHETH ; 04/02/2020 ; [facility] TORRIE SHETH ; 03/25/2020 ; [facility]  TORRIE SHETH ; 04/02/2020 ; [facility]

## 2020-03-12 NOTE — CHART NOTE - NSCHARTNOTEFT_GEN_A_CORE
Patient diagnosed with a diverticular bleed s/p hemostasis with clip. Gastroenterology cleared for patient to resume DAPT and advanced diet. Patient and  desire to not resume DAPT today and instead tomorrow. Both patient and  are aware of the purpose of both meds and risk of not taking as well,

## 2020-03-12 NOTE — PROGRESS NOTE ADULT - PROBLEM SELECTOR PLAN 2
-pt on lantus 28-32 units qhs at home, + multiple oral hypoglycemics  -hold oral hypoglycemics  -ISS + FG's  -fsg qac/qhs

## 2020-03-12 NOTE — DISCHARGE NOTE NURSING/CASE MANAGEMENT/SOCIAL WORK - PATIENT PORTAL LINK FT
You can access the FollowMyHealth Patient Portal offered by [facility] by registering at the following website: [email or web address]. By joining [facility] FollowMyHealth portal, you will also be able to view your health information using other applications (apps) compatible with our system.

## 2020-03-25 ENCOUNTER — APPOINTMENT (OUTPATIENT)
Dept: OBGYN | Facility: CLINIC | Age: 75
End: 2020-03-25

## 2020-04-02 ENCOUNTER — APPOINTMENT (OUTPATIENT)
Dept: HEMATOLOGY ONCOLOGY | Facility: CLINIC | Age: 75
End: 2020-04-02

## 2020-04-13 ENCOUNTER — LABORATORY RESULT (OUTPATIENT)
Age: 75
End: 2020-04-13

## 2020-04-14 RX ORDER — DICLOFENAC SODIUM 10 MG/G
1 GEL TOPICAL
Qty: 1 | Refills: 3 | Status: COMPLETED | COMMUNITY
Start: 2019-03-28 | End: 2020-04-14

## 2020-04-20 ENCOUNTER — APPOINTMENT (OUTPATIENT)
Dept: INFECTIOUS DISEASE | Facility: CLINIC | Age: 75
End: 2020-04-20
Payer: MEDICARE

## 2020-04-20 PROCEDURE — 99443: CPT

## 2020-04-20 NOTE — CONSULT LETTER
[Dear  ___] : Dear  [unfilled], [Consult Letter:] : I had the pleasure of evaluating your patient, [unfilled]. [Please see my note below.] : Please see my note below. [Consult Closing:] : Thank you very much for allowing me to participate in the care of this patient.  If you have any questions, please do not hesitate to contact me. [FreeTextEntry2] : Dr. Vilma Reyes [Sincerely,] : Sincerely, [FreeTextEntry3] : \par Darcy Mcdaniels MD\par  of Medicine\par Division of Infectious Diseases\par [employer] [facility] [employer] [facility]\par [address] [place] [address] DrCarmine\[place] [address]\par Tel: [phone]\par Fax: [phone]

## 2020-04-20 NOTE — PHYSICAL EXAM
[General Appearance - In No Acute Distress] : in no acute distress [General Appearance - Alert] : alert [] : no respiratory distress [Oriented To Time, Place, And Person] : oriented to person, place, and time [Affect] : the affect was normal

## 2020-04-20 NOTE — ASSESSMENT
[FreeTextEntry1] : This is a 73 yo F with h/o prior brain aneurysm, complicated by CVA 2012, breast cancer, chronic fatigue syndrome, diabetes, hypertension, recent rectal bleed and hospitalized at [facility] 3/2020.  S/p colonoscopy during that admission. Found to have perianal skin tags, diverticulosis with active bleeding.\par \par Recent diarrheal illness x 3 days followed by low grade fevers. Fevers and diarrhea now resolved but pt still with neck pain since early April.\par \par Overall pt improved. Labs reassuring. WBC normal and Hg stable from hospital admission.\par CRP was elevated.\par \par Suspect possible viral illness vs mild diverticultis flare.  COVID19 seems less likely as she has been home and it is has been more than 2 weeks since hospital discharge.  who does her shopping also was tested and was negative.\par \par Regarding fungal dermatitis, recommended she continue nystatin powder.\par \par For now will observe Dr Reyes will plan to repeat her labs next week and will trend CRP.  \par \par Regarding diarrhea - resolved. If returns will check stool for c diff pcr and GI PCR.\par \par Regarding head and neck pain - temporal arteritis seems less likely. Denies jaw claudication, temporal pain or vision changes. \par \par Pt given my contact info in case her status changes.\par \par Time spent >30 minutes.

## 2020-04-20 NOTE — REVIEW OF SYSTEMS
[Fever] : no fever [Chills] : no chills [Body Aches] : body aches [Feeling Tired] : feeling tired [Normal Appetite] : normal appetite [Shortness Of Breath] : no shortness of breath [Sputum] : not coughing up ~M sputum [Cough] : no cough [Joint Pain] : joint pain [Skin Lesions] : skin lesion [As Noted in HPI] : as noted in HPI [Negative] : Heme/Lymph [de-identified] : yeast infection abdomen, under breast

## 2020-04-20 NOTE — HISTORY OF PRESENT ILLNESS
[Home] : at home, [unfilled] , at the time of the visit. [Medical Office: ([facility])___] : at the medical office located in  [Self] : self [Patient] : the patient [FreeTextEntry1] : Telephone Visit:\par \par This is a 75 yo F with h/o prior brain aneurysm, complicated by CVA 2012, breast cancer, chronic fatigue syndrome, diabetes, hypertension, recent rectal bleed and hospitalized at [facility] 3/2020.  S/p colonoscopy during that admission. Found to have perianal skin tags, diverticulosis with active bleeding.\par \par Since hospital stay has neck discomfort with lock.  Happened after vomiting at home 4/2/20. Was eating different foods.  Did have some stomach upset. She thinks this change in diet led to stomach upset. \par \par 3/20/20 developed yeast infection.  She has had this in the past 2014.  She had been heavier in the past and lost weight. Had skin surgery, flap, in the past. Given nystatin-triamcinolone for this with nystatin powder. Did improve and bright redness improved.  But was spreading wider to other side of stomach and more laterally under the breast. She was then given diflucan 100 mg po daily.  Didn't help much 4/7-4/14.   Also given oxycodone 5 mg 1/2 tab twice a day for diffuse pain.  Most severe pain in neck and shoulders. Normally goes for massage therapy but now not able to go so this may causing increased pain. \par \par Pt has had ongoing loose stool since hospital admission. 4/5/20 developed diarrhea through 4/7/20.  \par \par Developed fever 4/12/20 - .3.  Highest 100.3  Diarrhea had stopped by then.  Stool is now formed but not solidy which her normal.  She definitely feels better now.  Fever stopped 4/15/20.  She was taking Tylenol intermittently.\par \par No one else sick in her house.\par She was tested for COVID 4/13/20 and was negative.\par  lives with her.\par Has full time , and he has been doing shopping and driving.\par , , and patient were all tested and were negative.\par  does not live with the patient. \par \par She feels overall better but still has pain in her neck and radiates to her ear and forehead.\par Vision is fine. \par Has pain on left of forehead.  Some pain under jaw on the left side also and the right side.  Top of head is tender to touch. Denies any change in vision. \par

## 2020-04-21 LAB
ALBUMIN SERPL ELPH-MCNC: 4.5 G/DL
ALP BLD-CCNC: 110 U/L
ALT SERPL-CCNC: 14 U/L
ANION GAP SERPL CALC-SCNC: 14 MMOL/L
APPEARANCE: CLEAR
AST SERPL-CCNC: 17 U/L
BASOPHILS # BLD AUTO: 0.05 K/UL
BASOPHILS NFR BLD AUTO: 0.5 %
BILIRUB SERPL-MCNC: <0.2 MG/DL
BILIRUBIN URINE: NEGATIVE
BLOOD URINE: NORMAL
BUN SERPL-MCNC: 18 MG/DL
CALCIUM SERPL-MCNC: 9.8 MG/DL
CHLORIDE SERPL-SCNC: 101 MMOL/L
CO2 SERPL-SCNC: 26 MMOL/L
COLOR: NORMAL
CREAT SERPL-MCNC: 0.62 MG/DL
CRP SERPL-MCNC: 4.64 MG/DL
EOSINOPHIL # BLD AUTO: 0.39 K/UL
EOSINOPHIL NFR BLD AUTO: 3.8 %
ESTIMATED AVERAGE GLUCOSE: 157 MG/DL
GLUCOSE QUALITATIVE U: ABNORMAL
GLUCOSE SERPL-MCNC: 110 MG/DL
HBA1C MFR BLD HPLC: 7.1 %
HCT VFR BLD CALC: 34.5 %
HGB BLD-MCNC: 10.4 G/DL
IMM GRANULOCYTES NFR BLD AUTO: 0.5 %
KETONES URINE: NEGATIVE
LEUKOCYTE ESTERASE URINE: NEGATIVE
LYMPHOCYTES # BLD AUTO: 1.55 K/UL
LYMPHOCYTES NFR BLD AUTO: 14.9 %
MAN DIFF?: NORMAL
MCHC RBC-ENTMCNC: 24.1 PG
MCHC RBC-ENTMCNC: 30.1 GM/DL
MCV RBC AUTO: 80 FL
MONOCYTES # BLD AUTO: 0.53 K/UL
MONOCYTES NFR BLD AUTO: 5.1 %
NEUTROPHILS # BLD AUTO: 7.81 K/UL
NEUTROPHILS NFR BLD AUTO: 75.2 %
NITRITE URINE: NEGATIVE
PH URINE: 5
PLATELET # BLD AUTO: 500 K/UL
POTASSIUM SERPL-SCNC: 5.2 MMOL/L
PROT SERPL-MCNC: 6.9 G/DL
PROTEIN URINE: NORMAL
RBC # BLD: 4.31 M/UL
RBC # FLD: 14.9 %
SODIUM SERPL-SCNC: 141 MMOL/L
SPECIFIC GRAVITY URINE: 1.03
UROBILINOGEN URINE: NORMAL
WBC # FLD AUTO: 10.38 K/UL

## 2020-05-05 ENCOUNTER — OUTPATIENT (OUTPATIENT)
Dept: OUTPATIENT SERVICES | Facility: HOSPITAL | Age: 75
LOS: 1 days | Discharge: HOME | End: 2020-05-05

## 2020-05-05 DIAGNOSIS — Z98.89 OTHER SPECIFIED POSTPROCEDURAL STATES: Chronic | ICD-10-CM

## 2020-05-05 DIAGNOSIS — C44.91 BASAL CELL CARCINOMA OF SKIN, UNSPECIFIED: Chronic | ICD-10-CM

## 2020-05-05 DIAGNOSIS — I67.1 CEREBRAL ANEURYSM, NONRUPTURED: Chronic | ICD-10-CM

## 2020-05-05 DIAGNOSIS — C50.311 MALIGNANT NEOPLASM OF LOWER-INNER QUADRANT OF RIGHT FEMALE BREAST: ICD-10-CM

## 2020-05-05 DIAGNOSIS — Z98.51 TUBAL LIGATION STATUS: Chronic | ICD-10-CM

## 2020-05-05 DIAGNOSIS — D70.0 CONGENITAL AGRANULOCYTOSIS: ICD-10-CM

## 2020-05-05 DIAGNOSIS — Z87.442 PERSONAL HISTORY OF URINARY CALCULI: Chronic | ICD-10-CM

## 2020-05-07 ENCOUNTER — APPOINTMENT (OUTPATIENT)
Dept: HEMATOLOGY ONCOLOGY | Facility: CLINIC | Age: 75
End: 2020-05-07
Payer: MEDICARE

## 2020-05-07 DIAGNOSIS — G62.9 POLYNEUROPATHY, UNSPECIFIED: ICD-10-CM

## 2020-05-07 PROCEDURE — 99214 OFFICE O/P EST MOD 30 MIN: CPT | Mod: 95

## 2020-05-10 PROBLEM — G62.9 NEUROPATHY: Status: ACTIVE | Noted: 2017-07-28

## 2020-05-10 NOTE — REASON FOR VISIT
[Follow-Up Visit] : a follow-up [Home] : at home, [unfilled] , at the time of the visit. [Medical Office: ([facility])___] : at the medical office located in  [Patient] : the patient [Self] : self [FreeTextEntry2] : Breast Cancer

## 2020-05-10 NOTE — HISTORY OF PRESENT ILLNESS
[Disease: _____________________] : Disease: [unfilled] [N: ___] : N[unfilled] [T: ___] : T[unfilled] [M: ___] : M[unfilled] [AJCC Stage: ____] : AJCC Stage: [unfilled] [de-identified] : Right breast cancer diagnosed at the age of 70\par 7/19/16 Right breast partial mastectomy with sentinel lymph node biopsy showed  a 6 mm invasive ductal carcinoma, SBR 6/9, ER 90%, [place] 0%, HER-2 negative, 2 negative SLN\par 8/1/16 Oncotype DX recurrence score of 37, which predicts a 10 year risk of distant recurrence on Tamoxifen alone of 25%\par 9/16- 2/17 CMF given IV x 8 cycles \par 3/27/17 - 4/21/17 Radiation therapy with Dr. Agudelo (total of 5240 cGY in 25 fractions)\par 6/19/17 Anastrozole started, then tried letrozole due to arthralgias which was worse so then went on exemestane in 9/19 [de-identified] : 6 mm IDC, SBR 6/9, ER 90%, CT 0%, HER-2 negative, Ki-67 15%, 2 negative SLN, Oncotype score = 37 (RR 25%) [de-identified] : Isabelle started anastrozole 6/17 but struggled with arthralgias so changed to letrozole which was worse so then started exemestane 9/19. She has noticed considerably less pain on the exemestane. She has much less joint pain aside from the neck which has always been a problem. She is now at what she would consider normal pain. She has no other side effects aside from occasional night sweats. 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 She still has some difficulty focusing for things like reading, but does not have worse memory. She finds this very frustrating as she was an avid reader prior to chemotherapy. \par She was hospitalized in early March for rectal bleeding and diagnosed with a diverticular bleed. She had a colonoscopy repair with staples.\par She followed up with Dr. Reyes and after she got home developed a fungal infection on her skin and then developed a febrile infection but was negative for COVID. She was also seen by Dr. Darcy France in [place]. During that she developed worsening pain in her neck and shoulders and due to the COVID crisis could not get a massage. She was put on 2.5 mg oxycodone for a short time which helped. She had follow up blood worked which showed a mildly elevated CRP but normal ferritin and procalcitonin.\par \par Routine Health Maintenance:\par Mammogram and breast ultrasound: 7/13/19 [facility]\par Breast MRI 12/18 [facility]\par Pap Smear: 5/16\par Bone density: 8/18 T scores of 0.3 in the spine, -0.3 in the total hip and -0.7 in the femoral neck\par Colonoscopy: overdue so will schedule in 2018 not scheduled yet due to IBS; f/u GI\par \par \par \par \par

## 2020-05-10 NOTE — REVIEW OF SYSTEMS
[Joint Stiffness] : joint stiffness [Joint Pain] : joint pain [Muscle Pain] : muscle pain [Negative] : Allergic/Immunologic [Night Sweats] : no night sweats [Fever] : no fever [Chills] : no chills [Fatigue] : no fatigue [Abdominal Pain] : no abdominal pain [Constipation] : no constipation [Diarrhea] : no diarrhea [Muscle Weakness] : no muscle weakness [Confused] : no confusion [Fainting] : no fainting [Dizziness] : no dizziness [Difficulty Walking] : no difficulty walking [de-identified] : Peripheral numbness and tingling

## 2020-05-12 LAB
ALBUMIN SERPL ELPH-MCNC: 4.6 G/DL
ALP BLD-CCNC: 109 U/L
ALT SERPL-CCNC: 19 U/L
ANION GAP SERPL CALC-SCNC: 16 MMOL/L
AST SERPL-CCNC: 17 U/L
BASOPHILS # BLD AUTO: 0.05 K/UL
BASOPHILS NFR BLD AUTO: 0.6 %
BILIRUB SERPL-MCNC: 0.2 MG/DL
BUN SERPL-MCNC: 14 MG/DL
CALCIUM SERPL-MCNC: 10.2 MG/DL
CHLORIDE SERPL-SCNC: 100 MMOL/L
CO2 SERPL-SCNC: 25 MMOL/L
CREAT SERPL-MCNC: 0.71 MG/DL
CRP SERPL-MCNC: 1.09 MG/DL
EOSINOPHIL # BLD AUTO: 0.45 K/UL
EOSINOPHIL NFR BLD AUTO: 5.5 %
FERRITIN SERPL-MCNC: 15 NG/ML
GLUCOSE SERPL-MCNC: 118 MG/DL
HCT VFR BLD CALC: 35.7 %
HGB BLD-MCNC: 10.6 G/DL
IMM GRANULOCYTES NFR BLD AUTO: 0.5 %
LYMPHOCYTES # BLD AUTO: 1.45 K/UL
LYMPHOCYTES NFR BLD AUTO: 17.6 %
MAN DIFF?: NORMAL
MCHC RBC-ENTMCNC: 23.2 PG
MCHC RBC-ENTMCNC: 29.7 GM/DL
MCV RBC AUTO: 78.3 FL
MONOCYTES # BLD AUTO: 0.51 K/UL
MONOCYTES NFR BLD AUTO: 6.2 %
NEUTROPHILS # BLD AUTO: 5.73 K/UL
NEUTROPHILS NFR BLD AUTO: 69.6 %
PLATELET # BLD AUTO: 463 K/UL
POTASSIUM SERPL-SCNC: 4.6 MMOL/L
PROCALCITONIN SERPL-MCNC: 0.05 NG/ML
PROT SERPL-MCNC: 6.7 G/DL
RBC # BLD: 4.56 M/UL
RBC # FLD: 15.3 %
SODIUM SERPL-SCNC: 141 MMOL/L
WBC # FLD AUTO: 8.23 K/UL

## 2020-05-13 ENCOUNTER — APPOINTMENT (OUTPATIENT)
Dept: RHEUMATOLOGY | Facility: CLINIC | Age: 75
End: 2020-05-13
Payer: MEDICARE

## 2020-05-13 DIAGNOSIS — M54.2 CERVICALGIA: ICD-10-CM

## 2020-05-13 PROCEDURE — 99205 OFFICE O/P NEW HI 60 MIN: CPT | Mod: 95

## 2020-05-13 NOTE — REASON FOR VISIT
[Home] : at home, [unfilled] , at the time of the visit. [Patient] : the patient [Self] : self [Other Location: e.g. Home (Enter Location, City,State)___] : at [unfilled] [FreeTextEntry1] : pain [Consultation] : a consultation visit

## 2020-05-15 NOTE — CONSULT LETTER
[Dear  ___] : Dear  [unfilled], [Consult Letter:] : I had the pleasure of evaluating your patient, [unfilled]. [Please see my note below.] : Please see my note below. [Sincerely,] : Sincerely, [Consult Closing:] : Thank you very much for allowing me to participate in the care of this patient.  If you have any questions, please do not hesitate to contact me. [FreeTextEntry2] : Dr. Vilma Reyes\osbaldo Sullivan [FreeTextEntry3] : Binta Kay MD\par

## 2020-05-15 NOTE — ASSESSMENT
[FreeTextEntry1] : Pt with multiple medical issues, multiple medications, and years of pain\par Due to present circumstances she has been without the relief of massage tx and PT \par she denies that this is a total body pain and reports most significant issues fatigue, neck and upper back pain\par Seems most c/w myofacial pain syndrome and also due to the abnl spine noted 3 years ago which has likely progressed.  While she has a fibromyalgia-ness to her symptoms, the lack of far spreading pain makes FMS less likely\par Do not believe her chronic sx are 2/2 inflammatory arthritis or PMR.\par Suggested trial of neurontin initially QHS, advance as tolerated.\par \par Aware that in high doses combined with her current rx could produce excess drowsiness.\par patient counseled on risks/benefits/potential SE of medication\par patient is aware of my assessment and plans\par fuv 4 wks\par

## 2020-05-15 NOTE — HISTORY OF PRESENT ILLNESS
[Arthralgias] : arthralgias [Decreased ROM] : decreased range of motion [Morning Stiffness] : morning stiffness [Muscle Spasms] : muscle spasms [FreeTextEntry1] : Snow\par spoke to patient for 35 min\par \par 74 yof with multiple medical issues, chronic pain included, referred for assessment of pain.\par Of note, she has previously been evaluated by PMR and Ortho as well for pain.\par Notes severe neck pain, stiffness, limited ROM.\par This pain dates back decades - she also has low back pain as well.\par \par [facility] moderate multilevel spondylosis and central canal and foraminal narrowing C3-C7.  Pt admits that prior to the pandemic she was able to get massage and PT therapies that reduced her pain and increased her ROM.\par She has had other joint pain in the past deemed 2/2 OA \par At this time she denies having total body pain \par No numbness or weakness.\par She has not been on neurontin or lyrica in the past.\par She did not tolerate cymbalta.\par Takes Trazodone for sleep.  Valium for muscle relaxer.\par She also is on exemestane given hx of breast ca.  Has had to change this Rx many times due to arthrlagias 2/2 other aromatase inhibitors. Compared to others this is the one for her with least SE.\par pain not specific to AM \par no rashes \par no personal or FHx of psoriasis or IBD\par CRP was mild elevated earlier last month, then trended to near normal\par She was offered but declined to take oxycodone\par  [Chills] : no chills [Fever] : no fever [Skin Lesions] : no lesions [Malar Facial Rash] : no malar facial rash [Skin Nodules] : no skin nodules [Oral Ulcers] : no oral ulcers [Dry Mouth] : no dry mouth [Shortness of Breath] : no shortness of breath [Chest Pain] : no chest pain [Joint Swelling] : no joint swelling [Joint Warmth] : no joint warmth [Joint Deformity] : no joint deformity [Falls] : no falls [Myalgias] : no myalgias [Muscle Weakness] : no muscle weakness [Dry Eyes] : no dry eyes

## 2020-05-15 NOTE — REASON FOR VISIT
[Consultation] : a consultation visit [Home] : at home, [unfilled] , at the time of the visit. [Other Location: e.g. Home (Enter Location, City,State)___] : at [unfilled] [Patient] : the patient [Self] : self [FreeTextEntry1] : pain

## 2020-05-16 LAB
SARS-COV-2 IGG SERPL IA-ACNC: <0.1 INDEX
SARS-COV-2 IGG SERPL QL IA: NEGATIVE

## 2020-07-20 ENCOUNTER — APPOINTMENT (OUTPATIENT)
Dept: SURGERY | Facility: CLINIC | Age: 75
End: 2020-07-20
Payer: MEDICARE

## 2020-07-20 PROCEDURE — 99213K: CUSTOM

## 2020-08-03 ENCOUNTER — TRANSCRIPTION ENCOUNTER (OUTPATIENT)
Age: 75
End: 2020-08-03

## 2020-09-02 ENCOUNTER — APPOINTMENT (OUTPATIENT)
Dept: OBGYN | Facility: CLINIC | Age: 75
End: 2020-09-02
Payer: MEDICARE

## 2020-09-02 VITALS
DIASTOLIC BLOOD PRESSURE: 70 MMHG | HEIGHT: 64 IN | SYSTOLIC BLOOD PRESSURE: 158 MMHG | WEIGHT: 154 LBS | BODY MASS INDEX: 26.29 KG/M2

## 2020-09-02 DIAGNOSIS — Z01.419 ENCOUNTER FOR GYNECOLOGICAL EXAMINATION (GENERAL) (ROUTINE) W/OUT ABNORMAL FINDINGS: ICD-10-CM

## 2020-09-02 DIAGNOSIS — Z87.440 PERSONAL HISTORY OF URINARY (TRACT) INFECTIONS: ICD-10-CM

## 2020-09-02 DIAGNOSIS — Z86.19 PERSONAL HISTORY OF OTHER INFECTIOUS AND PARASITIC DISEASES: ICD-10-CM

## 2020-09-02 PROCEDURE — G0101: CPT

## 2020-09-02 NOTE — PHYSICAL EXAM
[Awake] : awake [Mass] : no breast mass [Acute Distress] : no acute distress [Alert] : alert [Nipple Discharge] : no nipple discharge [Axillary LAD] : no axillary lymphadenopathy [Examination Of The Breasts] : a normal appearance [No Masses] : no breast masses were palpable [Oriented x3] : oriented to person, place, and time [Tender] : non tender [Soft] : soft [Normal] : uterus [Atrophy] : atrophy [Uterine Adnexae] : were not tender and not enlarged [No Bleeding] : there was no active vaginal bleeding

## 2020-09-02 NOTE — CHIEF COMPLAINT
[Initial Visit] : initial GYN visit [FreeTextEntry1] : 2016 Diagnosis of right breast cancer\par Right lumpectomy / RT /Chemo\par She did not get BRAC tested\par emestane currently \par Paps normal

## 2020-09-08 LAB — CYTOLOGY CVX/VAG DOC THIN PREP: ABNORMAL

## 2020-09-17 ENCOUNTER — RESULT REVIEW (OUTPATIENT)
Age: 75
End: 2020-09-17

## 2020-09-17 ENCOUNTER — APPOINTMENT (OUTPATIENT)
Dept: ULTRASOUND IMAGING | Facility: CLINIC | Age: 75
End: 2020-09-17
Payer: MEDICARE

## 2020-09-17 ENCOUNTER — APPOINTMENT (OUTPATIENT)
Dept: MAMMOGRAPHY | Facility: CLINIC | Age: 75
End: 2020-09-17
Payer: MEDICARE

## 2020-09-17 ENCOUNTER — OUTPATIENT (OUTPATIENT)
Dept: OUTPATIENT SERVICES | Facility: HOSPITAL | Age: 75
LOS: 1 days | End: 2020-09-17
Payer: MEDICARE

## 2020-09-17 DIAGNOSIS — Z87.442 PERSONAL HISTORY OF URINARY CALCULI: Chronic | ICD-10-CM

## 2020-09-17 DIAGNOSIS — I67.1 CEREBRAL ANEURYSM, NONRUPTURED: Chronic | ICD-10-CM

## 2020-09-17 DIAGNOSIS — Z98.89 OTHER SPECIFIED POSTPROCEDURAL STATES: Chronic | ICD-10-CM

## 2020-09-17 DIAGNOSIS — Z98.51 TUBAL LIGATION STATUS: Chronic | ICD-10-CM

## 2020-09-17 DIAGNOSIS — C44.91 BASAL CELL CARCINOMA OF SKIN, UNSPECIFIED: Chronic | ICD-10-CM

## 2020-09-17 PROCEDURE — 76641 ULTRASOUND BREAST COMPLETE: CPT | Mod: 26,50

## 2020-09-17 PROCEDURE — G0279: CPT | Mod: 26

## 2020-09-17 PROCEDURE — G0279: CPT

## 2020-09-17 PROCEDURE — 77066 DX MAMMO INCL CAD BI: CPT

## 2020-09-17 PROCEDURE — 77066 DX MAMMO INCL CAD BI: CPT | Mod: 26

## 2020-09-17 PROCEDURE — 76641 ULTRASOUND BREAST COMPLETE: CPT

## 2020-11-09 ENCOUNTER — OUTPATIENT (OUTPATIENT)
Dept: OUTPATIENT SERVICES | Facility: HOSPITAL | Age: 75
LOS: 1 days | Discharge: HOME | End: 2020-11-09

## 2020-11-09 DIAGNOSIS — C50.311 MALIGNANT NEOPLASM OF LOWER-INNER QUADRANT OF RIGHT FEMALE BREAST: ICD-10-CM

## 2020-11-09 DIAGNOSIS — I67.1 CEREBRAL ANEURYSM, NONRUPTURED: Chronic | ICD-10-CM

## 2020-11-09 DIAGNOSIS — Z87.442 PERSONAL HISTORY OF URINARY CALCULI: Chronic | ICD-10-CM

## 2020-11-09 DIAGNOSIS — Z98.51 TUBAL LIGATION STATUS: Chronic | ICD-10-CM

## 2020-11-09 DIAGNOSIS — Z98.89 OTHER SPECIFIED POSTPROCEDURAL STATES: Chronic | ICD-10-CM

## 2020-11-09 DIAGNOSIS — C44.91 BASAL CELL CARCINOMA OF SKIN, UNSPECIFIED: Chronic | ICD-10-CM

## 2020-11-12 ENCOUNTER — APPOINTMENT (OUTPATIENT)
Dept: HEMATOLOGY ONCOLOGY | Facility: CLINIC | Age: 75
End: 2020-11-12
Payer: MEDICARE

## 2020-11-12 PROCEDURE — 99215 OFFICE O/P EST HI 40 MIN: CPT | Mod: 95

## 2020-11-12 NOTE — REVIEW OF SYSTEMS
[Fever] : no fever [Chills] : no chills [Night Sweats] : no night sweats [Fatigue] : no fatigue [Abdominal Pain] : no abdominal pain [Constipation] : no constipation [Diarrhea] : no diarrhea [Muscle Weakness] : no muscle weakness [Confused] : no confusion [Dizziness] : no dizziness [Fainting] : no fainting [Difficulty Walking] : no difficulty walking [de-identified] : Peripheral numbness and tingling

## 2020-11-12 NOTE — HISTORY OF PRESENT ILLNESS
[de-identified] : Right breast cancer diagnosed at the age of 70\par 7/19/16 Right breast partial mastectomy with sentinel lymph node biopsy showed  a 6 mm invasive ductal carcinoma, SBR 6/9, ER 90%, [place] 0%, HER-2 negative, 2 negative SLN\par 8/1/16 Oncotype DX recurrence score of 37, which predicts a 10 year risk of distant recurrence on Tamoxifen alone of 25%\par 9/16- 2/17 CMF given IV x 8 cycles \par 3/27/17 - 4/21/17 Radiation therapy with Dr. Agudelo (total of 5240 cGY in 25 fractions)\par 6/19/17 Anastrozole started, then tried letrozole due to arthralgias which was worse so then went on exemestane in 9/19 [de-identified] : 6 mm IDC, SBR 6/9, ER 90%, [place] 0%, HER-2 negative, Ki-67 15%, 2 negative SLN, Oncotype score = 37 (RR 25%) [de-identified] : Isabelle started anastrozole 6/17 but struggled with arthralgias so changed to letrozole which was worse so then started exemestane 9/19. She has noticed considerably less pain on the exemestane. She has much less joint pain aside from the neck which has always been a problem. She is now at what she would consider normal pain, but that is still a considerable amount of pain. She has no other side effects aside from occasional night sweats. No vaginal dryness.\par Dr. 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 She finds it is worse in the morning and then improves as the day goes on.\par She still has some difficulty focusing for things like reading, but does not have worse memory. She finds this very frustrating as she was an avid reader prior to chemotherapy. \par She has not had any recurrent rectal bleeding.\par She is worried about COVID and it has changed her life a great deal. She is anticipating seeing less of her family now that the weather is getting colder.\par She is seeing a psychologist, Dr. Blackmon in [place], who has been helpful in managing all the stresses and anxieties.\par \par Routine Health Maintenance:\par Mammogram and breast ultrasound: 9/17/20 [facility]IA\par Breast MRI 12/19 [facility]\par Pap Smear: 9/20\par Bone density: 8/18 showed T scores of 0.3 in the spine, -0.3 in the total hip and -0.7 in the femoral neck\par Colonoscopy: 3/20 showed diverticuli\par \par \par \par \par

## 2020-11-12 NOTE — PHYSICAL EXAM
[Normal] : clear to auscultation bilaterally, no dullness, no wheezing [Restricted in physically strenuous activity but ambulatory and able to carry out work of a light or sedentary nature] : Status 1- Restricted in physically strenuous activity but ambulatory and able to carry out work of a light or sedentary nature, e.g., light house work, office work [de-identified] : Normal respiratory effort. Speaking in full sentences without difficulty

## 2020-11-19 ENCOUNTER — APPOINTMENT (OUTPATIENT)
Dept: GERIATRICS | Facility: CLINIC | Age: 75
End: 2020-11-19
Payer: MEDICARE

## 2020-11-19 VITALS
OXYGEN SATURATION: 98 % | RESPIRATION RATE: 16 BRPM | BODY MASS INDEX: 27.66 KG/M2 | WEIGHT: 162 LBS | HEIGHT: 64 IN | DIASTOLIC BLOOD PRESSURE: 74 MMHG | SYSTOLIC BLOOD PRESSURE: 122 MMHG | TEMPERATURE: 97.9 F | HEART RATE: 92 BPM

## 2020-11-19 DIAGNOSIS — Z23 ENCOUNTER FOR IMMUNIZATION: ICD-10-CM

## 2020-11-19 PROCEDURE — 90670 PCV13 VACCINE IM: CPT

## 2020-11-19 PROCEDURE — G0009: CPT

## 2020-11-19 PROCEDURE — 99214 OFFICE O/P EST MOD 30 MIN: CPT | Mod: 25

## 2020-12-01 NOTE — REVIEW OF SYSTEMS
[Feeling Tired] : feeling tired [As Noted in HPI] : as noted in HPI [Arthralgias] : arthralgias [Joint Stiffness] : joint stiffness [Limb Weakness] : limb weakness [Emotional Problems] : emotional problems [Negative] : Heme/Lymph [de-identified] : left sided weakness  [de-identified] : sadness related to her situation

## 2020-12-01 NOTE — PHYSICAL EXAM
[General Appearance - Alert] : alert [General Appearance - In No Acute Distress] : in no acute distress [General Appearance - Well Nourished] : well nourished [Normal Oral Mucosa] : normal oral mucosa [No Oral Pallor] : no oral pallor [Hearing Threshold Finger Rub Not [place]] : hearing was normal [Neck Appearance] : the appearance of the neck was normal [Neck Cervical Mass (___cm)] : no neck mass was observed [Jugular Venous Distention Increased] : there was no jugular-venous distention [Thyroid Nodule] : there were no palpable thyroid nodules [] : no respiratory distress [Respiration, Rhythm And Depth] : normal respiratory rhythm and effort [Exaggerated Use Of Accessory Muscles For Inspiration] : no accessory muscle use [Auscultation Breath Sounds / Voice Sounds] : lungs were clear to auscultation bilaterally [Heart Rate And Rhythm] : heart rate was normal and rhythm regular [Heart Sounds] : normal S1 and S2 [Murmurs] : no murmurs [No CVA Tenderness] : no ~M costovertebral angle tenderness [Involuntary Movements] : no involuntary movements were seen [Musculoskeletal - Swelling] : no joint swelling seen [Hemiplegia Of Right Side] : hemiplegia was present on the right [Motor Strength Lower Extremities Left] : there was weakness of the left lower extremity [Skin Color & Pigmentation] : normal skin color and pigmentation [Skin Lesions] : no skin lesions [Deep Tendon Reflexes (DTR)] : deep tendon reflexes were 2+ and symmetric [Sensation] : the sensory exam was normal to light touch and pinprick [Motor Exam] : the motor exam was normal [5] : knee flexion 5/5 [Motor Strength Knee Left Weakness] : knee weakness was present [4] : knee flexion 4/5 [Oriented To Time, Place, And Person] : oriented to person, place, and time [Impaired Insight] : insight and judgment were intact [Memory Recent] : recent memory was not impaired [Memory Remote] : remote memory was not impaired [Motor Strength Hips Left Weakness] : normal hip strength [Motor Strength Hips Right Weakness] : normal hip strength [Motor Strength Knee Right Weakness] : normal knee strength [FreeTextEntry1] : sad mood and congruent affect

## 2020-12-01 NOTE — HISTORY OF PRESENT ILLNESS
[0] : 1) Little interest or pleasure doing things: Not at all [1] : 2) Feeling down, depressed, or hopeless for several days [PHQ-2 Score ___] : PHQ-2 Score [unfilled] [FreeTextEntry1] : She describes her depression as sadness not hopeless or any other associated signs or symptoms

## 2020-12-01 NOTE — ASSESSMENT
[Daily physical exercise as tolerated] : Daily physical exercise as tolerated [Weight loss counseling given] : Weight loss counseling given [Social support] : social support [Medication Management] : medication management [FreeTextEntry1] : 76 yo female with multiple medical problems but stable and well supported\par \par 1) Firbromyalgia like symptoms - saw rheum in past, pain over shoulder girlde area, takes valium for this\par 2) Breast ca - stable, right breast partial mastectomy s/p radiation HER -2 Negative on exemestane\par 3) CVA - stable \par 4)  Multiple vitamins and supplements taken - recommend stop one of VItamin B supplements. Limit to MVI, Calcium and VIt D.\par 5) HTN - amlodipine\par 6) CAD and CVA - asa ,plavix\par 7) DIabetes - metfomrin, jardiance,lantus, victoza\par \par supplements: vitamin E, folic acid, vit d , co q 10, vit b12 (also taking additional vit b12 complex ) \par

## 2020-12-17 ENCOUNTER — RESULT REVIEW (OUTPATIENT)
Age: 75
End: 2020-12-17

## 2020-12-17 ENCOUNTER — APPOINTMENT (OUTPATIENT)
Dept: RADIOLOGY | Facility: CLINIC | Age: 75
End: 2020-12-17
Payer: MEDICARE

## 2020-12-17 ENCOUNTER — OUTPATIENT (OUTPATIENT)
Dept: OUTPATIENT SERVICES | Facility: HOSPITAL | Age: 75
LOS: 1 days | End: 2020-12-17
Payer: MEDICARE

## 2020-12-17 DIAGNOSIS — Z87.442 PERSONAL HISTORY OF URINARY CALCULI: Chronic | ICD-10-CM

## 2020-12-17 DIAGNOSIS — Z98.89 OTHER SPECIFIED POSTPROCEDURAL STATES: Chronic | ICD-10-CM

## 2020-12-17 DIAGNOSIS — C50.311 MALIGNANT NEOPLASM OF LOWER-INNER QUADRANT OF RIGHT FEMALE BREAST: ICD-10-CM

## 2020-12-17 DIAGNOSIS — I67.1 CEREBRAL ANEURYSM, NONRUPTURED: Chronic | ICD-10-CM

## 2020-12-17 DIAGNOSIS — C44.91 BASAL CELL CARCINOMA OF SKIN, UNSPECIFIED: Chronic | ICD-10-CM

## 2020-12-17 DIAGNOSIS — Z98.51 TUBAL LIGATION STATUS: Chronic | ICD-10-CM

## 2020-12-17 PROCEDURE — 77080 DXA BONE DENSITY AXIAL: CPT | Mod: 26

## 2020-12-17 PROCEDURE — 77080 DXA BONE DENSITY AXIAL: CPT

## 2020-12-18 DIAGNOSIS — Z20.828 CONTACT WITH AND (SUSPECTED) EXPOSURE TO OTHER VIRAL COMMUNICABLE DISEASES: ICD-10-CM

## 2020-12-20 LAB
25(OH)D3 SERPL-MCNC: 41.6 NG/ML
ALBUMIN SERPL ELPH-MCNC: 4.8 G/DL
ALP BLD-CCNC: 136 U/L
ALT SERPL-CCNC: 25 U/L
ANION GAP SERPL CALC-SCNC: 11 MMOL/L
AST SERPL-CCNC: 19 U/L
BASOPHILS # BLD AUTO: 0.06 K/UL
BASOPHILS NFR BLD AUTO: 0.8 %
BILIRUB SERPL-MCNC: 0.2 MG/DL
BUN SERPL-MCNC: 38 MG/DL
CALCIUM SERPL-MCNC: 10.1 MG/DL
CHLORIDE SERPL-SCNC: 100 MMOL/L
CHOLEST SERPL-MCNC: 225 MG/DL
CO2 SERPL-SCNC: 25 MMOL/L
CREAT SERPL-MCNC: 0.84 MG/DL
CRP SERPL-MCNC: 0.81 MG/DL
EOSINOPHIL # BLD AUTO: 0.38 K/UL
EOSINOPHIL NFR BLD AUTO: 5.2 %
ERYTHROCYTE [SEDIMENTATION RATE] IN BLOOD BY WESTERGREN METHOD: 16 MM/HR
ESTIMATED AVERAGE GLUCOSE: 183 MG/DL
FOLATE SERPL-MCNC: 18.5 NG/ML
GLUCOSE SERPL-MCNC: 157 MG/DL
HBA1C MFR BLD HPLC: 8 %
HCT VFR BLD CALC: 37.7 %
HDLC SERPL-MCNC: 50 MG/DL
HGB BLD-MCNC: 11.1 G/DL
IMM GRANULOCYTES NFR BLD AUTO: 0.4 %
LDLC SERPL CALC-MCNC: 145 MG/DL
LYMPHOCYTES # BLD AUTO: 1.38 K/UL
LYMPHOCYTES NFR BLD AUTO: 18.9 %
MAN DIFF?: NORMAL
MCHC RBC-ENTMCNC: 20.7 PG
MCHC RBC-ENTMCNC: 29.4 GM/DL
MCV RBC AUTO: 70.5 FL
MONOCYTES # BLD AUTO: 0.66 K/UL
MONOCYTES NFR BLD AUTO: 9 %
NEUTROPHILS # BLD AUTO: 4.79 K/UL
NEUTROPHILS NFR BLD AUTO: 65.7 %
NONHDLC SERPL-MCNC: 175 MG/DL
PLATELET # BLD AUTO: 346 K/UL
POTASSIUM SERPL-SCNC: 5 MMOL/L
PROT SERPL-MCNC: 6.8 G/DL
RBC # BLD: 5.35 M/UL
RBC # FLD: 19.8 %
SARS-COV-2 N GENE NPH QL NAA+PROBE: NOT DETECTED
SODIUM SERPL-SCNC: 136 MMOL/L
TRIGL SERPL-MCNC: 148 MG/DL
TSH SERPL-ACNC: 0.72 UIU/ML
VIT B12 SERPL-MCNC: 1012 PG/ML
WBC # FLD AUTO: 7.3 K/UL

## 2020-12-23 PROBLEM — Z01.419 ENCOUNTER FOR GYNECOLOGICAL EXAMINATION: Status: RESOLVED | Noted: 2020-09-02 | Resolved: 2020-12-23

## 2020-12-31 RX ORDER — PSYLLIUM HUSK 0.4 G
0.52 CAPSULE ORAL
Refills: 0 | Status: COMPLETED | COMMUNITY
Start: 2020-11-19 | End: 2020-12-31

## 2020-12-31 RX ORDER — LEVOCETIRIZINE DIHYDROCHLORIDE 5 MG/1
5 TABLET ORAL DAILY
Qty: 30 | Refills: 0 | Status: COMPLETED | COMMUNITY
Start: 2020-11-19 | End: 2020-12-31

## 2020-12-31 RX ORDER — NYSTATIN 100000 U/G
100000 OINTMENT TOPICAL 3 TIMES DAILY
Qty: 1 | Refills: 0 | Status: COMPLETED | COMMUNITY
Start: 2019-06-20 | End: 2020-12-31

## 2021-04-15 ENCOUNTER — OUTPATIENT (OUTPATIENT)
Dept: OUTPATIENT SERVICES | Facility: HOSPITAL | Age: 76
LOS: 1 days | End: 2021-04-15
Payer: MEDICARE

## 2021-04-15 ENCOUNTER — RESULT REVIEW (OUTPATIENT)
Age: 76
End: 2021-04-15

## 2021-04-15 ENCOUNTER — APPOINTMENT (OUTPATIENT)
Dept: MRI IMAGING | Facility: CLINIC | Age: 76
End: 2021-04-15
Payer: MEDICARE

## 2021-04-15 DIAGNOSIS — I67.1 CEREBRAL ANEURYSM, NONRUPTURED: Chronic | ICD-10-CM

## 2021-04-15 DIAGNOSIS — Z00.00 ENCOUNTER FOR GENERAL ADULT MEDICAL EXAMINATION WITHOUT ABNORMAL FINDINGS: ICD-10-CM

## 2021-04-15 DIAGNOSIS — Z87.442 PERSONAL HISTORY OF URINARY CALCULI: Chronic | ICD-10-CM

## 2021-04-15 DIAGNOSIS — Z98.51 TUBAL LIGATION STATUS: Chronic | ICD-10-CM

## 2021-04-15 DIAGNOSIS — Z98.89 OTHER SPECIFIED POSTPROCEDURAL STATES: Chronic | ICD-10-CM

## 2021-04-15 DIAGNOSIS — C44.91 BASAL CELL CARCINOMA OF SKIN, UNSPECIFIED: Chronic | ICD-10-CM

## 2021-04-15 PROCEDURE — A9585: CPT

## 2021-04-15 PROCEDURE — C8908: CPT

## 2021-04-15 PROCEDURE — C8937: CPT

## 2021-04-15 PROCEDURE — 77049 MRI BREAST C-+ W/CAD BI: CPT | Mod: 26,MH

## 2021-04-19 ENCOUNTER — RX RENEWAL (OUTPATIENT)
Age: 76
End: 2021-04-19

## 2021-04-20 ENCOUNTER — RESULT REVIEW (OUTPATIENT)
Age: 76
End: 2021-04-20

## 2021-04-20 ENCOUNTER — APPOINTMENT (OUTPATIENT)
Dept: MRI IMAGING | Facility: IMAGING CENTER | Age: 76
End: 2021-04-20
Payer: MEDICARE

## 2021-04-20 ENCOUNTER — OUTPATIENT (OUTPATIENT)
Dept: OUTPATIENT SERVICES | Facility: HOSPITAL | Age: 76
LOS: 1 days | End: 2021-04-20
Payer: MEDICARE

## 2021-04-20 DIAGNOSIS — I67.1 CEREBRAL ANEURYSM, NONRUPTURED: Chronic | ICD-10-CM

## 2021-04-20 DIAGNOSIS — Z98.89 OTHER SPECIFIED POSTPROCEDURAL STATES: Chronic | ICD-10-CM

## 2021-04-20 DIAGNOSIS — Z98.51 TUBAL LIGATION STATUS: Chronic | ICD-10-CM

## 2021-04-20 DIAGNOSIS — Z00.8 ENCOUNTER FOR OTHER GENERAL EXAMINATION: ICD-10-CM

## 2021-04-20 DIAGNOSIS — C44.91 BASAL CELL CARCINOMA OF SKIN, UNSPECIFIED: Chronic | ICD-10-CM

## 2021-04-20 DIAGNOSIS — Z87.442 PERSONAL HISTORY OF URINARY CALCULI: Chronic | ICD-10-CM

## 2021-04-20 PROCEDURE — 77065 DX MAMMO INCL CAD UNI: CPT | Mod: 26,RT

## 2021-04-20 PROCEDURE — 88305 TISSUE EXAM BY PATHOLOGIST: CPT | Mod: 26

## 2021-04-20 PROCEDURE — 19086 BX BREAST ADD LESION MR IMAG: CPT

## 2021-04-20 PROCEDURE — 19086 BX BREAST ADD LESION MR IMAG: CPT | Mod: RT

## 2021-04-20 PROCEDURE — A9585: CPT

## 2021-04-20 PROCEDURE — 88305 TISSUE EXAM BY PATHOLOGIST: CPT

## 2021-04-20 PROCEDURE — 77065 DX MAMMO INCL CAD UNI: CPT

## 2021-04-20 PROCEDURE — 19085 BX BREAST 1ST LESION MR IMAG: CPT | Mod: RT

## 2021-04-20 PROCEDURE — 19085 BX BREAST 1ST LESION MR IMAG: CPT

## 2021-05-14 ENCOUNTER — OUTPATIENT (OUTPATIENT)
Dept: OUTPATIENT SERVICES | Facility: HOSPITAL | Age: 76
LOS: 1 days | Discharge: HOME | End: 2021-05-14

## 2021-05-14 DIAGNOSIS — Z98.89 OTHER SPECIFIED POSTPROCEDURAL STATES: Chronic | ICD-10-CM

## 2021-05-14 DIAGNOSIS — C44.91 BASAL CELL CARCINOMA OF SKIN, UNSPECIFIED: Chronic | ICD-10-CM

## 2021-05-14 DIAGNOSIS — Z87.442 PERSONAL HISTORY OF URINARY CALCULI: Chronic | ICD-10-CM

## 2021-05-14 DIAGNOSIS — C50.311 MALIGNANT NEOPLASM OF LOWER-INNER QUADRANT OF RIGHT FEMALE BREAST: ICD-10-CM

## 2021-05-14 DIAGNOSIS — I67.1 CEREBRAL ANEURYSM, NONRUPTURED: Chronic | ICD-10-CM

## 2021-05-14 DIAGNOSIS — Z98.51 TUBAL LIGATION STATUS: Chronic | ICD-10-CM

## 2021-05-18 ENCOUNTER — APPOINTMENT (OUTPATIENT)
Dept: HEMATOLOGY ONCOLOGY | Facility: CLINIC | Age: 76
End: 2021-05-18

## 2021-06-10 ENCOUNTER — NON-APPOINTMENT (OUTPATIENT)
Age: 76
End: 2021-06-10

## 2021-06-22 ENCOUNTER — OUTPATIENT (OUTPATIENT)
Dept: OUTPATIENT SERVICES | Facility: HOSPITAL | Age: 76
LOS: 1 days | Discharge: HOME | End: 2021-06-22

## 2021-06-22 DIAGNOSIS — Z87.442 PERSONAL HISTORY OF URINARY CALCULI: Chronic | ICD-10-CM

## 2021-06-22 DIAGNOSIS — Z98.51 TUBAL LIGATION STATUS: Chronic | ICD-10-CM

## 2021-06-22 DIAGNOSIS — Z98.89 OTHER SPECIFIED POSTPROCEDURAL STATES: Chronic | ICD-10-CM

## 2021-06-22 DIAGNOSIS — C50.311 MALIGNANT NEOPLASM OF LOWER-INNER QUADRANT OF RIGHT FEMALE BREAST: ICD-10-CM

## 2021-06-22 DIAGNOSIS — I67.1 CEREBRAL ANEURYSM, NONRUPTURED: Chronic | ICD-10-CM

## 2021-06-22 DIAGNOSIS — C44.91 BASAL CELL CARCINOMA OF SKIN, UNSPECIFIED: Chronic | ICD-10-CM

## 2021-06-24 ENCOUNTER — APPOINTMENT (OUTPATIENT)
Dept: GERIATRICS | Facility: CLINIC | Age: 76
End: 2021-06-24
Payer: MEDICARE

## 2021-06-24 VITALS
OXYGEN SATURATION: 97 % | HEIGHT: 64 IN | BODY MASS INDEX: 27 KG/M2 | TEMPERATURE: 95.7 F | DIASTOLIC BLOOD PRESSURE: 80 MMHG | WEIGHT: 158.13 LBS | HEART RATE: 92 BPM | RESPIRATION RATE: 14 BRPM | SYSTOLIC BLOOD PRESSURE: 130 MMHG

## 2021-06-24 DIAGNOSIS — R51.9 HEADACHE, UNSPECIFIED: ICD-10-CM

## 2021-06-24 PROCEDURE — 99215 OFFICE O/P EST HI 40 MIN: CPT

## 2021-06-24 RX ORDER — CHLORHEXIDINE GLUCONATE 4 %
400 LIQUID (ML) TOPICAL DAILY
Qty: 30 | Refills: 3 | Status: COMPLETED | COMMUNITY
Start: 2020-11-19 | End: 2021-06-24

## 2021-06-24 RX ORDER — GLUCOSA SU 2KCL/CHONDROITIN SU 500-400 MG
100 CAPSULE ORAL
Qty: 30 | Refills: 0 | Status: COMPLETED | COMMUNITY
Start: 2020-11-19 | End: 2021-06-24

## 2021-06-24 RX ORDER — MULTIVITAMIN
TABLET ORAL DAILY
Qty: 30 | Refills: 0 | Status: COMPLETED | COMMUNITY
Start: 2020-11-19 | End: 2021-06-24

## 2021-06-25 ENCOUNTER — APPOINTMENT (OUTPATIENT)
Dept: HEMATOLOGY ONCOLOGY | Facility: CLINIC | Age: 76
End: 2021-06-25
Payer: MEDICARE

## 2021-06-25 VITALS
TEMPERATURE: 97.2 F | OXYGEN SATURATION: 97 % | WEIGHT: 157.41 LBS | SYSTOLIC BLOOD PRESSURE: 156 MMHG | HEART RATE: 93 BPM | RESPIRATION RATE: 16 BRPM | HEIGHT: 62.01 IN | DIASTOLIC BLOOD PRESSURE: 73 MMHG | BODY MASS INDEX: 28.6 KG/M2

## 2021-06-25 PROCEDURE — 99214 OFFICE O/P EST MOD 30 MIN: CPT

## 2021-06-25 NOTE — HISTORY OF PRESENT ILLNESS
[No falls in past year] : Patient reported no falls in the past year [Independent] : managing finances [FreeTextEntry1] : 76 yo female seen in office for comprehensive evaluation. Diabetic, fibromyalgia like pain over shoulders, head. No visual loss, saw ophthalmologist recently. No falls. Pt coping with covid. Was difficult with isolation. Her FS have been hard to control recently with new rishabh technology.\par \par Pt now checking both fs and rishabh to compare.\par \par Pt skin rashes resolved under breasts\par \par Eating better,sleeping well [de-identified] : none [de-identified] : No safety concerns identified. [de-identified] : No safety concerns identified. [Getupandgo] : <10

## 2021-06-25 NOTE — ASSESSMENT
[FreeTextEntry1] : 76 yo female with fatigue and feeling cold. Reviewed all meds with pharmacist.  No changes made.  Pt needed reassurance\par \par Draw new labs\par \par 1) DM - check hgb a1c, no changes at this time\par 2) Shoulder and neck and head pain - check esr, now signs of tempoaral arteritis.  More fibromyalgia like, consider gabapentin, pt resonsds to massage therapy, continue, check esr\par 3) Candida skin - controlled\par 4) HTN - controlled check cmp\par 5) Fatigue - check cbc\par 6) thyroid - chek tsh\par 7) hm return in fall for flu shot.

## 2021-06-25 NOTE — PHYSICAL EXAM
[General Appearance - Alert] : alert [General Appearance - In No Acute Distress] : in no acute distress [PERRL With Normal Accommodation] : pupils were equal in size, round, and reactive to light [Sclera] : the sclera and conjunctiva were normal [Extraocular Movements] : extraocular movements were intact [Normal Oral Mucosa] : normal oral mucosa [No Oral Pallor] : no oral pallor [No Oral Cyanosis] : no oral cyanosis [Outer Ear] : the ears and nose were normal in appearance [Oropharynx] : The oropharynx was normal [Neck Appearance] : the appearance of the neck was normal [Neck Cervical Mass (___cm)] : no neck mass was observed [Jugular Venous Distention Increased] : there was no jugular-venous distention [Thyroid Diffuse Enlargement] : the thyroid was not enlarged [Thyroid Nodule] : there were no palpable thyroid nodules [Auscultation Breath Sounds / Voice Sounds] : lungs were clear to auscultation bilaterally [Heart Rate And Rhythm] : heart rate was normal and rhythm regular [Heart Sounds] : normal S1 and S2 [Heart Sounds Gallop] : no gallops [Murmurs] : no murmurs [Heart Sounds Pericardial Friction Rub] : no pericardial rub [Full Pulse] : the pedal pulses are present [Edema] : there was no peripheral edema [Breast Appearance] : normal in appearance [Breast Palpation Mass] : no palpable masses [Bowel Sounds] : normal bowel sounds [Abdomen Soft] : soft [Abdomen Tenderness] : non-tender [Abdomen Mass (___ Cm)] : no abdominal mass palpated [External Female Genitalia] : normal external genitalia [Urinary Bladder Findings] : the bladder was normal on palpation [Urethral Meatus] : normal urethra [Cervical Lymph Nodes Enlarged Posterior Bilaterally] : posterior cervical [Cervical Lymph Nodes Enlarged Anterior Bilaterally] : anterior cervical [Supraclavicular Lymph Nodes Enlarged Bilaterally] : supraclavicular [Axillary Lymph Nodes Enlarged Bilaterally] : axillary [Femoral Lymph Nodes Enlarged Bilaterally] : femoral [Inguinal Lymph Nodes Enlarged Bilaterally] : inguinal [No CVA Tenderness] : no ~M costovertebral angle tenderness [No Spinal Tenderness] : no spinal tenderness [Nail Clubbing] : no clubbing  or cyanosis of the fingernails [Musculoskeletal - Swelling] : no joint swelling seen [Motor Tone] : muscle strength and tone were normal [Skin Color & Pigmentation] : normal skin color and pigmentation [Skin Turgor] : normal skin turgor [] : no rash [Sensation] : the sensory exam was normal to light touch and pinprick [Deep Tendon Reflexes (DTR)] : deep tendon reflexes were 2+ and symmetric [No Focal Deficits] : no focal deficits [Oriented To Time, Place, And Person] : oriented to person, place, and time [Impaired Insight] : insight and judgment were intact [Affect] : the affect was normal

## 2021-06-25 NOTE — HISTORY OF PRESENT ILLNESS
[Disease: _____________________] : Disease: [unfilled] [T: ___] : T[unfilled] [N: ___] : N[unfilled] [M: ___] : M[unfilled] [AJCC Stage: ____] : AJCC Stage: [unfilled] [de-identified] : Right breast cancer diagnosed at the age of 70\par 7/19/16 Right breast partial mastectomy with sentinel lymph node biopsy showed  a 6 mm invasive ductal carcinoma, SBR 6/9, ER 90%, MD 0%, HER-2 negative, 2 negative SLN\par 8/1/16 Oncotype DX recurrence score of 37, which predicts a 10 year risk of distant recurrence on Tamoxifen alone of 25%\par 9/16- 2/17 CMF given IV x 8 cycles \par 3/27/17 - 4/21/17 Radiation therapy with Dr. Agudelo (total of 5240 cGY in 25 fractions)\par 6/19/17 Anastrozole started, then tried letrozole due to arthralgias which was worse so then went on exemestane in 9/19 [de-identified] : 6 mm IDC, SBR 6/9, ER 90%, [place] 0%, HER-2 negative, Ki-67 15%, 2 negative SLN, Oncotype score = 37 (RR 25%) [de-identified] : Isabelle started anastrozole 6/17 but struggled with arthralgias so changed to letrozole which was worse so then started exemestane 9/19. She has noticed less pain on the exemestane. She is now at what she would consider normal pain, but that is still considerable.\par Recently she has been struggling with more pain and particularly increased pain in her sinuses for the last 3-4 months. She saw Dr. 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 She finds it is worse in the morning and then improves as the day goes on.\par Recently she has noticed that things seem to slip out of her hands and she discussed this with Dr. Reyes who thought it could not be simply attributed to aging.\par She still has some difficulty focusing for things like reading, but does not have worse memory. She finds this very frustrating as she was an avid reader prior to chemotherapy. \par She is seeing a psychologist, Dr. Blackmon in [place], who has been helpful in managing all the stresses and anxieties.\par \par Routine Health Maintenance:\par Mammogram and breast ultrasound: 9/17/20 [facility]\par Breast MRI 4/15/21 with biopsy 4/20 which showed fat necrosis and fibrocystic changes\par Pap Smear: 9/20\par Bone density: 12/23/20 showed T scores of 1.1 in spine, -0.5 in total hip, -0.9 in femoral neck\par                        8/18 showed T scores of 0.3 in the spine, -0.3 in the total hip and -0.7 in the femoral neck\par Colonoscopy: 3/20 showed diverticuli\par \par \par \par \par

## 2021-06-25 NOTE — REVIEW OF SYSTEMS
[Joint Pain] : joint pain [Joint Stiffness] : joint stiffness [Muscle Pain] : muscle pain [Negative] : Allergic/Immunologic [Fever] : no fever [Chills] : no chills [Fatigue] : no fatigue [Night Sweats] : no night sweats [Abdominal Pain] : no abdominal pain [Constipation] : no constipation [Diarrhea] : no diarrhea [Muscle Weakness] : no muscle weakness [Confused] : no confusion [Dizziness] : no dizziness [Fainting] : no fainting [Difficulty Walking] : no difficulty walking [de-identified] : Peripheral numbness and tingling

## 2021-06-26 ENCOUNTER — LABORATORY RESULT (OUTPATIENT)
Age: 76
End: 2021-06-26

## 2021-06-28 ENCOUNTER — APPOINTMENT (OUTPATIENT)
Dept: SURGERY | Facility: CLINIC | Age: 76
End: 2021-06-28
Payer: MEDICARE

## 2021-06-28 DIAGNOSIS — R91.1 SOLITARY PULMONARY NODULE: ICD-10-CM

## 2021-06-28 LAB
ALBUMIN SERPL ELPH-MCNC: 4.6 G/DL
ALP BLD-CCNC: 127 U/L
ALT SERPL-CCNC: 14 U/L
ANION GAP SERPL CALC-SCNC: 12 MMOL/L
AST SERPL-CCNC: 15 U/L
BASOPHILS # BLD AUTO: 0.05 K/UL
BASOPHILS NFR BLD AUTO: 0.6 %
BILIRUB SERPL-MCNC: 0.3 MG/DL
BUN SERPL-MCNC: 21 MG/DL
CALCIUM SERPL-MCNC: 10.1 MG/DL
CHLORIDE SERPL-SCNC: 102 MMOL/L
CHOLEST SERPL-MCNC: 200 MG/DL
CO2 SERPL-SCNC: 27 MMOL/L
CREAT SERPL-MCNC: 0.84 MG/DL
EOSINOPHIL # BLD AUTO: 0.36 K/UL
EOSINOPHIL NFR BLD AUTO: 4.6 %
ERYTHROCYTE [SEDIMENTATION RATE] IN BLOOD BY WESTERGREN METHOD: 7 MM/HR
ESTIMATED AVERAGE GLUCOSE: 177 MG/DL
FERRITIN SERPL-MCNC: 13 NG/ML
GLUCOSE SERPL-MCNC: 99 MG/DL
HBA1C MFR BLD HPLC: 7.8 %
HCT VFR BLD CALC: 39.5 %
HDLC SERPL-MCNC: 45 MG/DL
HGB BLD-MCNC: 11.7 G/DL
IMM GRANULOCYTES NFR BLD AUTO: 0.1 %
IRON SATN MFR SERPL: 6 %
IRON SERPL-MCNC: 29 UG/DL
LDLC SERPL CALC-MCNC: 133 MG/DL
LYMPHOCYTES # BLD AUTO: 1.77 K/UL
LYMPHOCYTES NFR BLD AUTO: 22.6 %
MAN DIFF?: NORMAL
MCHC RBC-ENTMCNC: 22 PG
MCHC RBC-ENTMCNC: 29.6 GM/DL
MCV RBC AUTO: 74.4 FL
MONOCYTES # BLD AUTO: 0.51 K/UL
MONOCYTES NFR BLD AUTO: 6.5 %
NEUTROPHILS # BLD AUTO: 5.12 K/UL
NEUTROPHILS NFR BLD AUTO: 65.6 %
NONHDLC SERPL-MCNC: 155 MG/DL
PLATELET # BLD AUTO: 346 K/UL
POTASSIUM SERPL-SCNC: 5 MMOL/L
PROT SERPL-MCNC: 6.4 G/DL
RBC # BLD: 5.31 M/UL
RBC # FLD: 19 %
SODIUM SERPL-SCNC: 141 MMOL/L
TIBC SERPL-MCNC: 445 UG/DL
TRIGL SERPL-MCNC: 111 MG/DL
TSH SERPL-ACNC: 0.38 UIU/ML
UIBC SERPL-MCNC: 416 UG/DL
WBC # FLD AUTO: 7.82 K/UL

## 2021-06-28 PROCEDURE — 99213K: CUSTOM

## 2021-06-29 ENCOUNTER — APPOINTMENT (OUTPATIENT)
Dept: PHYSICAL MEDICINE AND REHAB | Facility: CLINIC | Age: 76
End: 2021-06-29
Payer: MEDICARE

## 2021-06-29 VITALS — SYSTOLIC BLOOD PRESSURE: 133 MMHG | DIASTOLIC BLOOD PRESSURE: 76 MMHG | HEART RATE: 85 BPM | OXYGEN SATURATION: 97 %

## 2021-06-29 DIAGNOSIS — M76.32 ILIOTIBIAL BAND SYNDROME, LEFT LEG: ICD-10-CM

## 2021-06-29 PROCEDURE — 99203 OFFICE O/P NEW LOW 30 MIN: CPT

## 2021-06-29 NOTE — ASSESSMENT
[FreeTextEntry1] : 76 yo female pmh diabetes, CVA with trace L sided residual weakness, fibromyalgia, R breast ca presenting for LLE pain, possible IT band syndrome\par -less likely radicular pain however will consider MRI LS spine if not improving\par -PT ordered\par -patient advised to continue heat prn, can space out valium dose (1/2 tab at a time) as she reports it helps her pain.  \par -follow up in 3 weeks

## 2021-06-29 NOTE — PHYSICAL EXAM
[FreeTextEntry1] : GEN: AAOx3, NAD.\par PSYCH: Normal mood and affect. Responds appropriately to commands.\par EYES: Sclerae Anicteric. No discharge. EOMI.\par RESP: Breathing unlabored.\par CV: DP pulses 2+ and equal. No varicosities noted.\par EXT: No C/C/E.  no warmth appreciated\par SKIN: No lesions noted. \par STRENGTH: b/l UE 5/5,  b/l LE 4+/5\par TONE: Normal, No clonus.\par REFLEXES: 2+ symmetric patella \par SENS: Grossly intact to light touch bilateral lower extremities.\par INSP: Spine alignment is midline, with no evidence of scoliosis.\par GAIT: Non antalgic, normal reciprocating heel to toe during exam, however after sitting for prolonged period has antalgic gait \par LUMBAR ROM: Flexion to 80 degrees\par HIP ROM: Full and pain free bilaterally.\par PALP: There is no tenderness over the midline spinous processes, paravertebral muscles, SIJ, or greater trochanters bilaterally.  + mild upper trapezius ttp, + mild ttp to cervical paraspinals \par SPECIAL: SLR and Slump test negative bilaterally. FADIR, JAEL negative bilaterally.  [place]'s neg b/l\par \par

## 2021-06-30 LAB — CELIACPAN: NORMAL

## 2021-07-02 ENCOUNTER — OUTPATIENT (OUTPATIENT)
Dept: OUTPATIENT SERVICES | Facility: HOSPITAL | Age: 76
LOS: 1 days | End: 2021-07-02
Payer: MEDICARE

## 2021-07-02 ENCOUNTER — APPOINTMENT (OUTPATIENT)
Dept: CT IMAGING | Facility: CLINIC | Age: 76
End: 2021-07-02
Payer: MEDICARE

## 2021-07-02 DIAGNOSIS — I67.1 CEREBRAL ANEURYSM, NONRUPTURED: Chronic | ICD-10-CM

## 2021-07-02 DIAGNOSIS — Z98.89 OTHER SPECIFIED POSTPROCEDURAL STATES: Chronic | ICD-10-CM

## 2021-07-02 DIAGNOSIS — C44.91 BASAL CELL CARCINOMA OF SKIN, UNSPECIFIED: Chronic | ICD-10-CM

## 2021-07-02 DIAGNOSIS — R91.1 SOLITARY PULMONARY NODULE: ICD-10-CM

## 2021-07-02 DIAGNOSIS — Z87.442 PERSONAL HISTORY OF URINARY CALCULI: Chronic | ICD-10-CM

## 2021-07-02 DIAGNOSIS — Z98.51 TUBAL LIGATION STATUS: Chronic | ICD-10-CM

## 2021-07-02 PROCEDURE — 71250 CT THORAX DX C-: CPT

## 2021-07-02 PROCEDURE — 71250 CT THORAX DX C-: CPT | Mod: 26,MH

## 2021-07-20 ENCOUNTER — APPOINTMENT (OUTPATIENT)
Dept: PHYSICAL MEDICINE AND REHAB | Facility: CLINIC | Age: 76
End: 2021-07-20

## 2021-07-22 RX ORDER — AZITHROMYCIN 250 MG/1
250 TABLET, FILM COATED ORAL
Qty: 6 | Refills: 0 | Status: COMPLETED | COMMUNITY
Start: 2020-07-27 | End: 2021-07-22

## 2021-07-22 RX ORDER — NYSTATIN 100000 [USP'U]/G
100000 CREAM TOPICAL TWICE DAILY
Qty: 1 | Refills: 3 | Status: COMPLETED | COMMUNITY
Start: 2021-05-22 | End: 2021-07-22

## 2021-08-26 ENCOUNTER — APPOINTMENT (OUTPATIENT)
Dept: GERIATRICS | Facility: CLINIC | Age: 76
End: 2021-08-26
Payer: MEDICARE

## 2021-08-26 VITALS
RESPIRATION RATE: 15 BRPM | BODY MASS INDEX: 27.62 KG/M2 | HEART RATE: 94 BPM | HEIGHT: 62.01 IN | SYSTOLIC BLOOD PRESSURE: 140 MMHG | OXYGEN SATURATION: 96 % | WEIGHT: 152 LBS | DIASTOLIC BLOOD PRESSURE: 80 MMHG

## 2021-08-26 VITALS — TEMPERATURE: 98.6 F

## 2021-08-26 DIAGNOSIS — J20.8 ACUTE BRONCHITIS DUE TO OTHER SPECIFIED ORGANISMS: ICD-10-CM

## 2021-08-26 DIAGNOSIS — R91.8 OTHER NONSPECIFIC ABNORMAL FINDING OF LUNG FIELD: ICD-10-CM

## 2021-08-26 PROCEDURE — 99214 OFFICE O/P EST MOD 30 MIN: CPT

## 2021-08-30 LAB
ALBUMIN SERPL ELPH-MCNC: 4.7 G/DL
ALP BLD-CCNC: 129 U/L
ALT SERPL-CCNC: 18 U/L
ANION GAP SERPL CALC-SCNC: 13 MMOL/L
AST SERPL-CCNC: 18 U/L
BASOPHILS # BLD AUTO: 0.02 K/UL
BASOPHILS NFR BLD AUTO: 0.4 %
BILIRUB SERPL-MCNC: 0.2 MG/DL
BUN SERPL-MCNC: 16 MG/DL
CALCIUM SERPL-MCNC: 9.9 MG/DL
CHLORIDE SERPL-SCNC: 104 MMOL/L
CO2 SERPL-SCNC: 26 MMOL/L
CREAT SERPL-MCNC: 0.76 MG/DL
EOSINOPHIL # BLD AUTO: 0.35 K/UL
EOSINOPHIL NFR BLD AUTO: 6.5 %
ESTIMATED AVERAGE GLUCOSE: 189 MG/DL
GLUCOSE SERPL-MCNC: 182 MG/DL
HBA1C MFR BLD HPLC: 8.2 %
HCT VFR BLD CALC: 39.2 %
HGB BLD-MCNC: 12.2 G/DL
IMM GRANULOCYTES NFR BLD AUTO: 0.2 %
LYMPHOCYTES # BLD AUTO: 1.21 K/UL
LYMPHOCYTES NFR BLD AUTO: 22.5 %
MAN DIFF?: NORMAL
MCHC RBC-ENTMCNC: 23.3 PG
MCHC RBC-ENTMCNC: 31.1 GM/DL
MCV RBC AUTO: 74.8 FL
MONOCYTES # BLD AUTO: 0.37 K/UL
MONOCYTES NFR BLD AUTO: 6.9 %
NEUTROPHILS # BLD AUTO: 3.42 K/UL
NEUTROPHILS NFR BLD AUTO: 63.5 %
PLATELET # BLD AUTO: 275 K/UL
POTASSIUM SERPL-SCNC: 4.8 MMOL/L
PROT SERPL-MCNC: 6.7 G/DL
RBC # BLD: 5.24 M/UL
RBC # FLD: 19 %
SODIUM SERPL-SCNC: 143 MMOL/L
WBC # FLD AUTO: 5.38 K/UL

## 2021-08-30 NOTE — DATA REVIEWED
[FreeTextEntry1] : CT scan 3/2020: incidental lung nodules bilaterally, tiny B/L pulm nodules <3mm \par CT scan interval f/u 7/2021: RUL and RML jaquan nodules up to 3mm, reassessment needed in 3 mos

## 2021-08-30 NOTE — HISTORY OF PRESENT ILLNESS
[No falls in past year] : Patient reported no falls in the past year [Completely Independent] : Completely independent. [0] : 2) Feeling down, depressed, or hopeless: Not at all (0) [Designated Healthcare Proxy] : Designated healthcare proxy [Relationship: ___] : Relationship: [unfilled] [FreeTextEntry1] : Mrs. Gray is a 74yo female with pmhx of DM2, h/o CVA with fibromyalgia, iron deficiency anemia, h/o breast cancer dx 2016 s/p mastectomy and LN BX, lung nodules that are stable after a year. \par \par Patient presents today with acute complaint of cough, fever, feelings of head and sinus congestion. Sx all started 5 days ago following her 's similar symptoms. Both pt and  were tested on Monday 8/23 for COVID and tests were negative. Patient denies any known covid+ contacts. She had a fever of 100.4-6 for the first 2 days, and then she has remained afebrile. She reports frequent coughing, non-productive, non-bloody. She denies SOB, palpitations, nausea, vomiting. She is tolerating PO foods and liquids without issues. Normal urination patterns. Pt is fully vaccinated for COVID-19, last dose 2/5/2021.\par \par Patient is afebrile in office today and appears in no acute distress. She is able to carry on conversation with full sentences. Intermittent coughing noted. \par \par DM2: patient reports FS are much better controlled. Ranging 150-250. No issues reported, no episodes of hypoglycemia reported. \par \par Lung nodule: CT scan results reviewed: 3/2020 ct ab/pel showed incidental finding of lung nodules in B/L lobes <3mm. Interval follow-up CT July 2021 reviewed, repeat CT scan recommended 3 months later re identified RUL and RML nodules up to 3mm detected. Context - pt has h/o breast cancer. Follow up CT needed Octover 2021\par

## 2021-08-30 NOTE — REVIEW OF SYSTEMS
[Fever] : fever [Earache] : earache [Nasal Discharge] : nasal discharge [Cough] : cough [Chills] : no chills [Feeling Poorly] : not feeling poorly [Feeling Tired] : not feeling tired [Red Eyes] : eyes not red [Loss Of Hearing] : no hearing loss [Sore Throat] : no sore throat [Chest Pain] : no chest pain [Palpitations] : no palpitations [Lower Ext Edema] : no lower extremity edema [Shortness Of Breath] : no shortness of breath [SOB on Exertion] : no shortness of breath during exertion [Abdominal Pain] : no abdominal pain [Vomiting] : no vomiting

## 2021-08-30 NOTE — PHYSICAL EXAM
[Alert] : alert [Well Nourished] : well nourished [No Acute Distress] : in no acute distress [Sclera] : the sclera and conjunctiva were normal [No Oral Pallor] : no oral pallor [Normal Outer Ear/Nose] : the ears and nose were normal in appearance [Normal Lips/Gums] : the lips and gums were normal [Normal Appearance] : the appearance of the neck was normal [Supple] : the neck was supple [No Respiratory Distress] : no respiratory distress [No Acc Muscle Use] : no accessory muscle use [Respiration, Rhythm And Depth] : normal respiratory rhythm and effort [Normal S1, S2] : normal S1 and S2 [Heart Rate And Rhythm] : heart rate was normal and rhythm regular [Edema] : edema was not present [Abdomen Soft] : soft [Abdomen Tenderness] : non-tender [Normal Color / Pigmentation] : normal skin color and pigmentation [Oriented To Time, Place, And Person] : oriented to person, place, and time [Normal Affect] : the affect was normal [Normal Mood] : the mood was normal [de-identified] : Minimal erythema of posterior pharynx; no drainage; TMs not visualized due to cerumen, but no active drainage noted, no pain with examination

## 2021-09-01 ENCOUNTER — NON-APPOINTMENT (OUTPATIENT)
Age: 76
End: 2021-09-01

## 2021-09-13 ENCOUNTER — RX RENEWAL (OUTPATIENT)
Age: 76
End: 2021-09-13

## 2021-10-01 ENCOUNTER — OUTPATIENT (OUTPATIENT)
Dept: OUTPATIENT SERVICES | Facility: HOSPITAL | Age: 76
LOS: 1 days | End: 2021-10-01

## 2021-10-01 ENCOUNTER — APPOINTMENT (OUTPATIENT)
Dept: NUCLEAR MEDICINE | Facility: HOSPITAL | Age: 76
End: 2021-10-01

## 2021-10-01 ENCOUNTER — APPOINTMENT (OUTPATIENT)
Dept: NUCLEAR MEDICINE | Facility: HOSPITAL | Age: 76
End: 2021-10-01
Payer: MEDICARE

## 2021-10-01 DIAGNOSIS — I67.1 CEREBRAL ANEURYSM, NONRUPTURED: Chronic | ICD-10-CM

## 2021-10-01 DIAGNOSIS — Z98.89 OTHER SPECIFIED POSTPROCEDURAL STATES: Chronic | ICD-10-CM

## 2021-10-01 DIAGNOSIS — Z87.442 PERSONAL HISTORY OF URINARY CALCULI: Chronic | ICD-10-CM

## 2021-10-01 DIAGNOSIS — R11.0 NAUSEA: ICD-10-CM

## 2021-10-01 DIAGNOSIS — Z98.51 TUBAL LIGATION STATUS: Chronic | ICD-10-CM

## 2021-10-01 DIAGNOSIS — C44.91 BASAL CELL CARCINOMA OF SKIN, UNSPECIFIED: Chronic | ICD-10-CM

## 2021-10-01 PROCEDURE — 78264 GASTRIC EMPTYING IMG STUDY: CPT | Mod: 26

## 2021-10-14 ENCOUNTER — APPOINTMENT (OUTPATIENT)
Dept: MAMMOGRAPHY | Facility: CLINIC | Age: 76
End: 2021-10-14

## 2021-10-14 ENCOUNTER — TRANSCRIPTION ENCOUNTER (OUTPATIENT)
Age: 76
End: 2021-10-14

## 2021-10-14 ENCOUNTER — APPOINTMENT (OUTPATIENT)
Dept: ULTRASOUND IMAGING | Facility: CLINIC | Age: 76
End: 2021-10-14

## 2021-10-18 ENCOUNTER — MED ADMIN CHARGE (OUTPATIENT)
Age: 76
End: 2021-10-18

## 2021-10-18 ENCOUNTER — APPOINTMENT (OUTPATIENT)
Dept: GERIATRICS | Facility: CLINIC | Age: 76
End: 2021-10-18
Payer: MEDICARE

## 2021-10-18 ENCOUNTER — RX RENEWAL (OUTPATIENT)
Age: 76
End: 2021-10-18

## 2021-10-18 DIAGNOSIS — Z23 ENCOUNTER FOR IMMUNIZATION: ICD-10-CM

## 2021-10-18 PROCEDURE — 90662 IIV NO PRSV INCREASED AG IM: CPT

## 2021-10-18 PROCEDURE — G0008: CPT

## 2021-10-21 PROBLEM — Z23 NEED FOR INFLUENZA VACCINATION: Status: ACTIVE | Noted: 2021-10-18

## 2021-11-01 ENCOUNTER — NON-APPOINTMENT (OUTPATIENT)
Age: 76
End: 2021-11-01

## 2021-12-02 ENCOUNTER — RESULT REVIEW (OUTPATIENT)
Age: 76
End: 2021-12-02

## 2021-12-02 ENCOUNTER — OUTPATIENT (OUTPATIENT)
Dept: OUTPATIENT SERVICES | Facility: HOSPITAL | Age: 76
LOS: 1 days | End: 2021-12-02
Payer: MEDICARE

## 2021-12-02 ENCOUNTER — APPOINTMENT (OUTPATIENT)
Dept: ULTRASOUND IMAGING | Facility: CLINIC | Age: 76
End: 2021-12-02
Payer: MEDICARE

## 2021-12-02 ENCOUNTER — APPOINTMENT (OUTPATIENT)
Dept: MAMMOGRAPHY | Facility: CLINIC | Age: 76
End: 2021-12-02
Payer: MEDICARE

## 2021-12-02 DIAGNOSIS — Z98.89 OTHER SPECIFIED POSTPROCEDURAL STATES: Chronic | ICD-10-CM

## 2021-12-02 DIAGNOSIS — Z00.8 ENCOUNTER FOR OTHER GENERAL EXAMINATION: ICD-10-CM

## 2021-12-02 DIAGNOSIS — I67.1 CEREBRAL ANEURYSM, NONRUPTURED: Chronic | ICD-10-CM

## 2021-12-02 DIAGNOSIS — Z87.442 PERSONAL HISTORY OF URINARY CALCULI: Chronic | ICD-10-CM

## 2021-12-02 DIAGNOSIS — C44.91 BASAL CELL CARCINOMA OF SKIN, UNSPECIFIED: Chronic | ICD-10-CM

## 2021-12-02 DIAGNOSIS — Z98.51 TUBAL LIGATION STATUS: Chronic | ICD-10-CM

## 2021-12-02 PROCEDURE — 76641 ULTRASOUND BREAST COMPLETE: CPT

## 2021-12-02 PROCEDURE — 77067 SCR MAMMO BI INCL CAD: CPT

## 2021-12-02 PROCEDURE — 76641 ULTRASOUND BREAST COMPLETE: CPT | Mod: 26,50

## 2021-12-02 PROCEDURE — 77067 SCR MAMMO BI INCL CAD: CPT | Mod: 26

## 2021-12-02 PROCEDURE — 77063 BREAST TOMOSYNTHESIS BI: CPT | Mod: 26

## 2021-12-02 PROCEDURE — 77063 BREAST TOMOSYNTHESIS BI: CPT

## 2021-12-09 ENCOUNTER — APPOINTMENT (OUTPATIENT)
Dept: ULTRASOUND IMAGING | Facility: CLINIC | Age: 76
End: 2021-12-09

## 2021-12-09 ENCOUNTER — APPOINTMENT (OUTPATIENT)
Dept: MAMMOGRAPHY | Facility: CLINIC | Age: 76
End: 2021-12-09

## 2022-01-07 NOTE — END OF VISIT
Virtual Regular Visit    Verification of patient location:    Patient is located in the following state in which I hold an active license [place]      Assessment/Plan:  1  Chronic diastolic congestive heart failure  Presently she is on furosemide 40 mg daily  Advised her to take 60 mg for 3 days and then 40 mg  Will follow-up in about 4-5 days  Will also request CBC and BMP    2  Essential hypertension  Continue with present regimen    3  Hypothyroidism  Continue with present dose of levothyroxine        Problem List Items Addressed This Visit        Digestive    Gastroesophageal reflux disease - Primary    Relevant Orders    CBC       Endocrine    Hypothyroidism       Cardiovascular and Mediastinum    Chronic congestive heart failure ([facility] [address] )    Diastolic CHF with preserved left ventricular function, NYHA class 2 ([facility] [address] )    Relevant Orders    Basic metabolic panel    CBC               Reason for visit is   Chief Complaint   Patient presents with    Virtual Regular Visit     crackling in the lungs, cough, fatigue         Encounter provider Katrin Grewal MD    Provider located at [address] [id number]      Recent Visits  No visits were found meeting these conditions  Showing recent visits within past 7 days and meeting all other requirements  Today's Visits  Date Type Provider Dept   01/07/22 Telemedicine Katrin Grewal MD [facility]   Showing today's visits and meeting all other requirements  Future Appointments  No visits were found meeting these conditions  Showing future appointments within next 150 days and meeting all other requirements       The patient was identified by name and date of birth   Shay Pelletier was informed that this is a telemedicine visit and that the visit is being conducted through [address] [place] [address] Road [employer] and patient was informed that this is a secure, HIPAA-compliant [] : Fellow platform  She agrees to proceed     My office door was closed  No one else was in the room  She acknowledged consent and understanding of privacy and security of the video platform  The patient has agreed to participate and understands they can discontinue the visit at any time  Patient is aware this is a billable service  Subjective  Jose R Smith is a 80 y o  female complaining of mild cough especially worse at night  No fever chills  Lower extremity swelling is stable         Complaining of mild cough especially at night  No fever chills  No shortness of breath  Lower extremity swelling is stable  Past Medical History:   Diagnosis Date    Allergic     Anemia     Arthritis     Cancer ([facility] )     Carcinoma of stomach ([facility] )     Depression 7/15/2020    Disease of thyroid gland     Fatigue     last assessed 8/5/15; resolved 9/30/16    Gastric cancer ([facility] ) 2013    GERD (gastroesophageal reflux disease)     Hyperlipidemia     Hypertension     Osteoporosis     Senile cataract     last assessed 10/18/13; resolved 2/6/15    Situational depression     last assessed 2/11/15; resolved 7/20/15    Visual impairment        Past Surgical History:   Procedure Laterality Date    COLONOSCOPY      DENTAL SURGERY      EYE SURGERY      GASTRIC RESTRICTION SURGERY      for cancer    JOINT REPLACEMENT      KNEE SURGERY Right 01/2017    TKR    LYMPH NODE BIOPSY      STOMACH SURGERY         Current Outpatient Medications   Medication Sig Dispense Refill    acetaminophen (TYLENOL) 500 mg tablet Take 1 tablet by mouth 2 (two) times a day       amLODIPine (NORVASC) 5 mg tablet Take 1 tablet (5 mg total) by mouth 2 (two) times a day 180 tablet 1    Aspirin 325 MG CAPS Take 325 mg by mouth daily        bisoprolol (ZEBETA) 5 mg tablet Take 1 tablet (5 mg total) by mouth daily Hold for heart rate < 50/min   90 tablet 1    calcium carbonate (TUMS EX) 750 mg chewable tablet Chew 750 mg Three times [FreeTextEntry3] : Pt seen with fellow. Agree with note as written by fellow.  High functioning female with nasal congestion, covid negative, feeling a bit better. Agree with proventil inhaler, labs. I will inform Dr. Howell of need for repeat CT scan of chest in October 2021. No abx as symptoms suggestive of viral infection.  improving. Pt slightly improved. daily as needed      carboxymethylcellulose 0 5 % SOLN Apply 1 drop to eye every other day       Cholecalciferol (VITAMIN D3) 1000 units CAPS Take 1 tablet by mouth daily      ciprofloxacin (CILOXAN) 0 3 % ophthalmic solution Administer 1 drop into the left eye every other day        ferrous sulfate (FeroSul) 325 (65 Fe) mg tablet Take 1 tablet (325 mg total) by mouth 2 (two) times a day with meals (Patient taking differently: Take 325 mg by mouth daily with breakfast )      furosemide (LASIX) 40 mg tablet Take 1 tablet (40 mg total) by mouth daily 5 tablet 1    levothyroxine 25 mcg tablet Take 1 tablet (25 mcg total) by mouth daily 90 tablet 1    mirtazapine (REMERON) 7 5 MG tablet Take 1 tablet (7 5 mg total) by mouth daily at bedtime 90 tablet 1    Multiple Vitamins-Minerals (OCUVITE PRESERVISION PO) Take 1 tablet by mouth daily      omeprazole (PriLOSEC) 40 MG capsule Take 1 capsule (40 mg total) by mouth daily 90 capsule 1    polyethylene glycol-propylene glycol (SYSTANE) 0 4-0 3 % Administer 2 drops to both eyes as needed        prednisoLONE acetate (PRED FORTE) 1 % ophthalmic suspension Administer 1 drop into the left eye every other day        pyridoxine (VITAMIN B6) 100 mg tablet Take 100 mg by mouth daily        losartan (COZAAR) 25 mg tablet Take 3 tablets (75 mg total) by mouth daily (Patient not taking: Reported on 1/7/2022 ) 5 tablet 1    losartan (COZAAR) 50 mg tablet Take 1 tablet (50 mg total) by mouth 2 (two) times a day (Patient not taking: Reported on 1/7/2022 ) 180 tablet 1    potassium chloride (K-DUR,KLOR-CON) 20 mEq tablet Take 1 tablet (20 mEq total) by mouth 2 (two) times a day (Patient not taking: Reported on 1/7/2022 ) 10 tablet 1     No current facility-administered medications for this visit          Allergies   Allergen Reactions    Latex Rash    Oxycodone Nausea Only, Hallucinations and Other (See Comments)     Hallucinations  Hallucinations  Other reaction(s): Hallucinations  Hallucinations  Hallucinations  Other reaction(s): Hallucinations  Hallucinations  Other reaction(s): Hallucinations, Other (See Comments)  Hallucinations  Hallucinations  Other reaction(s): Hallucinations  Hallucinations    Oxycodone-Acetaminophen Nausea Only and Hallucinations    Pollen Extract Allergic Rhinitis       Review of Systems   Constitutional: Negative for fatigue and fever  HENT: Negative for congestion, ear discharge, ear pain, postnasal drip, sinus pressure, sore throat, tinnitus and trouble swallowing  Eyes: Negative for discharge, itching and visual disturbance  Respiratory: Positive for cough  Negative for shortness of breath  Cardiovascular: Positive for leg swelling  Negative for chest pain and palpitations  Gastrointestinal: Negative for abdominal pain, diarrhea, nausea and vomiting  Endocrine: Negative for cold intolerance and polyuria  Genitourinary: Negative for difficulty urinating, dysuria and urgency  Musculoskeletal: Positive for arthralgias and gait problem  Negative for neck pain  Skin: Negative for rash  Allergic/Immunologic: Negative for environmental allergies  Neurological: Negative for dizziness, weakness and headaches  Psychiatric/Behavioral: The patient is not nervous/anxious  Video Exam    There were no vitals filed for this visit  Physical Exam  Constitutional:       General: She is not in acute distress  Appearance: Normal appearance  She is not ill-appearing  HENT:      Right Ear: External ear normal       Left Ear: External ear normal       Nose: No rhinorrhea  Mouth/Throat:      Pharynx: No posterior oropharyngeal erythema  Eyes:      Conjunctiva/sclera: Conjunctivae normal    Cardiovascular:      Comments: 1+ bilateral  lower extremity edema  Surgical wound over right knee area healing well  Pulmonary:      Effort: Pulmonary effort is normal  No respiratory distress     Abdominal:      General: There is no distension  Musculoskeletal:      Cervical back: Normal range of motion  Right lower leg: Edema present  Left lower leg: Edema present  Skin:     Findings: No erythema or rash  Neurological:      Mental Status: She is oriented to person, place, and time  Psychiatric:         Mood and Affect: Mood normal          Behavior: Behavior normal           I spent 15 minutes directly with the patient during this visit    VIRTUAL VISIT DISCLAIMER      Andrea Morenoharpal verbally agrees to participate in [employer]  Pt is aware that [employer] could be limited without vital signs or the ability to perform a full hands-on physical Fern Scruggs understands she or the provider may request at any time to terminate the video visit and request the patient to seek care or treatment in person

## 2022-01-14 NOTE — ED ADULT NURSE NOTE - GENITOURINARY ASSESSMENT
HISTORIAN: mother    Chief Complaint:  Cough and congestion.    Reba Brooks 34 month old female is here for evaluation of upper respiratory infection symptoms. It started a few days ago with cough and congestion. fever to 102.4. They have not noticed any vomiting or diarrhea. No rashes. No other major symptoms. Sick contacts: multiple cases of RSV and COVID at .    All other systems reviewed and are negative.    Problem list, past medical history, and social history reviewed.    Past Medical History:   Diagnosis Date   • Allergy        Current Outpatient Medications   Medication Sig Dispense Refill   • EPINEPHrine (Auvi-Q) 0.1 MG/0.1ML auto-injector Inject 0.1 mLs into the muscle 1 time as needed (anaphylaxis). 2 each 1   • triamcinolone (KENALOG) 0.025 % ointment Apply topically 2 times daily as needed (as needed for irritation). 30 g 0     No current facility-administered medications for this visit.       Allergies as of 01/14/2022 - Reviewed 01/14/2022   Allergen Reaction Noted   • Egg white   (food or med) HIVES 02/17/2020   • Garlic   (food or med) HIVES 02/17/2020   • Tree nuts    (food) Other (See Comments) 02/17/2020       Social History     Tobacco Use   • Smoking status: Never Smoker   • Smokeless tobacco: Never Used   Vaping Use   • Vaping Use: never used   Substance Use Topics   • Alcohol use: Never   • Drug use: Never       Family History   Problem Relation Age of Onset   • Allergic Rhinitis Mother    • Diabetes Maternal Grandmother        History reviewed. No pertinent surgical history.    Objective:     VITAL SIGNS:   Visit Vitals  Pulse 120   Temp 99.1 °F (37.3 °C) (Temporal)   Wt 12.8 kg (28 lb 2.5 oz)     GENERAL: Well-appearing and alert 34 month old female in no acute distress.   HEAD: Normocephalic, atraumatic.   EYES: Conjunctivae clear. Pupils equal, round, and reactive to light. Extraocular movements intact.   EARS: Tympanic membranes clear bilaterally. Normal external ears.   NOSE:  Clear rhinorrhea. Normal nasal mucosa.   MOUTH: Clear. Mucous membranes moist.   NECK: No mass. No lymphadenopathy. Full range of motion.   CHEST: Bilaterally clear to auscultation. No increased work of breathing.   HEART: Regular rate and rhythm. No rubs, murmurs, or gallops. Radial pulses 2+.  ABDOMEN: Soft, nontender, and nondistended. Positive bowel sounds. No hepatosplenomegaly. No masses.   DERMATOLOGY: No rash. Capillary refill less than 2 seconds. Normal skin turgor.         Assessment:     Viral upper respiratory infection with no complications.  COVID testing performed today.     Plan:     The signs and symptoms of worrisome developments, alternative and additional diagnoses, were discussed. They will return to clinic or call as needed. Symptomatic treatment was recommended.  Will contact parents over the weekend for COVID results.      Follow-up:   Return if symptoms worsen or fail to improve.    Associated diagnoses for this encounter:  1. Cough    2. URTI (acute upper respiratory infection)        Orders Placed This Encounter   • 2019 Novel Coronavirus (SARS-CoV-2)        WDL

## 2022-04-21 ENCOUNTER — APPOINTMENT (OUTPATIENT)
Dept: GERIATRICS | Facility: CLINIC | Age: 77
End: 2022-04-21
Payer: MEDICARE

## 2022-04-21 VITALS
BODY MASS INDEX: 29.17 KG/M2 | TEMPERATURE: 97 F | SYSTOLIC BLOOD PRESSURE: 140 MMHG | RESPIRATION RATE: 16 BRPM | OXYGEN SATURATION: 98 % | WEIGHT: 158.5 LBS | DIASTOLIC BLOOD PRESSURE: 64 MMHG | HEIGHT: 62 IN | HEART RATE: 93 BPM

## 2022-04-21 DIAGNOSIS — E83.52 HYPERCALCEMIA: ICD-10-CM

## 2022-04-21 DIAGNOSIS — R42 DIZZINESS AND GIDDINESS: ICD-10-CM

## 2022-04-21 DIAGNOSIS — R79.0 ABNORMAL LVL OF BLOOD MINERAL: ICD-10-CM

## 2022-04-21 PROCEDURE — 99215 OFFICE O/P EST HI 40 MIN: CPT

## 2022-04-21 RX ORDER — AZITHROMYCIN 250 MG/1
250 TABLET, FILM COATED ORAL
Qty: 1 | Refills: 0 | Status: COMPLETED | COMMUNITY
Start: 2021-09-01 | End: 2022-04-21

## 2022-04-21 NOTE — ASSESSMENT
[FreeTextEntry1] : 75 yo female with IDDM,breast ca, vertigo, dysmotility of bowels,  alopecia and family concerns whose weight and vss are stable.\par \par 1) GI symptoms - Dysmotility - Agree with use of  colestipol for motility disorders. She hasn’t started yet\par uses imodium.  Would start with half tab, may need to stop imodium. \par \par 2) Vertigo  - BP controlled\par Has done Cawthorne exercises and improving. Reviewed exercises on youtube video on computer with her.\par She has had no falls and sxs improving\par \par 3) Alopecia evident. Loss of hair - is worse over past 6 months\par taking biotin My biotin proclinical\par Asking about rogaine topical. But advised not to use rogain and let me check labs.  Explained alopecia might be related to exemestane and suggested she ask  Dr. Howell regarding this possiblity. \par will check labs for aplopecia - thyroid stim hormone, fsh, prolactin, VDRL, DHEA , dihydrotestosterone, Androstenindeone , Fee t3 free t4, CMP, luteinizing hormone\par \par 4) Low Fe levels due to GI bleed - check levels today, pt taking fe supplements and tolerating.\par had colonoscopy, had GI bleed as cause of Low iron\par Check ferritin, fe and TIBC and cbc\par \par 5) IDDM - no issues. Sees endocrinologist. diet has been erratic bc of bowel issues. Check hgba1c and lipids.\par \par 6) Breast Ca - seeing dr Howell in 2 weeks.  on exemestane for HR positive Breast cancer. Do not stop bc of alopecia concerns. Pt agrees, but wants to know how long she needs to take.\par \par 7) Caregiver -   had ischemic ophthalmic artery and has vision loss in his left eye. Her independent  [place] struggiling [place] and she is worried about him coping through this. We discussed resources for vision for him. She is comfortable as a caregiver and health care advocate. \par \par Spent 90 minutes with pt.\par \par \par \par \par \par \par

## 2022-04-21 NOTE — HISTORY OF PRESENT ILLNESS
[FreeTextEntry1] : 77 yo female here for followup. Last visit was Aug 2021.  She suffers with multiple medical problems: Breast cancer, HTN, Diabetes type 1, iron def due to a GI bleed, anxiety, muscle cramps almost like a fibromyalgia picture, GI dysmotility, elvated ALP, low magnesium but her primary complaint today is vertigo, alopcia and questions about gi motility and GI doctor recommendation.\par \par 1) GI symptoms - went to GI doc bc having multiple BMs loose in am - trying yogurt and other dietary changes without much benefit. \par Had nuclear study - ok\par Barium swallow - ok\par Did not show slow peristalsis\par Her GI doctor, Dr. Kendrick recommended Colestipol which i believe he did to enhance stool binding/bulking.\par \par He prescribed colestipol for motility disorders. She hasn’t started yet\par uses imodium.  \par \par weight stable\par \par 2) vertigo  - orthostatics\par Has done Cawthorne exercises and improving. Reviewed exercises on youtube video on computer with her.\par She has had no falls and sxs improving\par \par 3) Losing hair - It is worse over past 6 months\par taking biotin My biotin proclinical\par Asking about rogaine topical. \par COuld be related to exemestane? but defer to Dr. Howell\par will check labs for aplopecia - thyroid, fsh, prolactin, DHEA , dihydrotestosterone, Androsteine\par Fee t3 free t4\par \par 4) Low Fe levels - check levels today, pt taking fe supplements and tolerating.\par had colonoscopy, had GI bleed as cause of Low iron\par \par 5) IDDM - no issues. Sees endocrinologist. diet has been erratic bc of bowel issues. Has not had a1c recently.\par \par 6) Breast Ca - seeing dr Howell in 2 weeks.  on exemestane for HR positive Breast cancer. \par \par 7)  had ischemic ophthalmic artery and has vision loss in his left eye. Her independent  [place] and she is worried about him coping through this. We discussed resources for vision for him. She is comfortable as a caregiver and health care advocate. \par \par \par overall pt feels well and coping \par weight stable [No falls in past year] : Patient reported no falls in the past year [Completely Independent] : Completely independent. [0] : 2) Feeling down, depressed, or hopeless: Not at all (0) [Designated Healthcare Proxy] : Designated healthcare proxy [Name: ___] : Health Care Proxy's Name: [unfilled]  [Relationship: ___] : Relationship: [unfilled]

## 2022-04-21 NOTE — PHYSICAL EXAM
[Alert] : alert [No Acute Distress] : in no acute distress [Normal] : the sclera and conjunctiva were normal, extraocular movements were intact, pupils were equal in size, round, and reactive to light [Normal Outer Ear/Nose] : the ears and nose were normal in appearance [Normal Appearance] : the appearance of the neck was normal [Supple] : the neck was supple [No Respiratory Distress] : no respiratory distress 4H: Impaired Joint Mobility, Motor Function, Muscle Performance, and Range of Motion Associated with Joint Arthroplasty [No Acc Muscle Use] : no accessory muscle use [Respiration, Rhythm And Depth] : normal respiratory rhythm and effort [Auscultation Breath Sounds / Voice Sounds] : lungs were clear to auscultation bilaterally [Heart Rate And Rhythm] : heart rate was normal and rhythm regular [Bowel Sounds] : normal bowel sounds [Abdomen Tenderness] : non-tender [Abdomen Soft] : soft [No Spinal Tenderness] : no spinal tenderness [Normal Color / Pigmentation] : normal skin color and pigmentation [Normal Turgor] : normal skin turgor [No Focal Deficits] : no focal deficits [Normal Affect] : the affect was normal [Normal Mood] : the mood was normal [de-identified] : tight muscles  [de-identified] : central obesity  [de-identified] : no aches or pains

## 2022-04-22 LAB
ALBUMIN SERPL ELPH-MCNC: 4.7 G/DL
ALP BLD-CCNC: 129 U/L
ALT SERPL-CCNC: 29 U/L
ANION GAP SERPL CALC-SCNC: 24 MMOL/L
AST SERPL-CCNC: 33 U/L
BASOPHILS # BLD AUTO: 0.05 K/UL
BASOPHILS NFR BLD AUTO: 0.6 %
BILIRUB SERPL-MCNC: 0.2 MG/DL
BUN SERPL-MCNC: 21 MG/DL
CALCIUM SERPL-MCNC: 9.9 MG/DL
CHLORIDE SERPL-SCNC: 103 MMOL/L
CHOLEST SERPL-MCNC: 229 MG/DL
CO2 SERPL-SCNC: 14 MMOL/L
CREAT SERPL-MCNC: 0.7 MG/DL
EGFR: 90 ML/MIN/1.73M2
EOSINOPHIL # BLD AUTO: 0.43 K/UL
EOSINOPHIL NFR BLD AUTO: 5.3 %
ESTIMATED AVERAGE GLUCOSE: 180 MG/DL
FERRITIN SERPL-MCNC: 27 NG/ML
FSH SERPL-MCNC: 40 IU/L
GLUCOSE SERPL-MCNC: 260 MG/DL
HBA1C MFR BLD HPLC: 7.9 %
HCT VFR BLD CALC: 46.1 %
HDLC SERPL-MCNC: 54 MG/DL
HGB BLD-MCNC: 14.9 G/DL
IMM GRANULOCYTES NFR BLD AUTO: 0.4 %
LDLC SERPL CALC-MCNC: 152 MG/DL
LH SERPL-ACNC: 24.5 IU/L
LYMPHOCYTES # BLD AUTO: 1.54 K/UL
LYMPHOCYTES NFR BLD AUTO: 18.8 %
MAGNESIUM SERPL-MCNC: 2.2 MG/DL
MAN DIFF?: NORMAL
MCHC RBC-ENTMCNC: 26.7 PG
MCHC RBC-ENTMCNC: 32.3 GM/DL
MCV RBC AUTO: 82.5 FL
MONOCYTES # BLD AUTO: 0.48 K/UL
MONOCYTES NFR BLD AUTO: 5.9 %
NEUTROPHILS # BLD AUTO: 5.66 K/UL
NEUTROPHILS NFR BLD AUTO: 69 %
NONHDLC SERPL-MCNC: 175 MG/DL
PLATELET # BLD AUTO: 285 K/UL
POTASSIUM SERPL-SCNC: 5.1 MMOL/L
PROLACTIN SERPL-MCNC: 7.9 NG/ML
PROT SERPL-MCNC: 7.3 G/DL
RBC # BLD: 5.59 M/UL
RBC # FLD: 14.5 %
SODIUM SERPL-SCNC: 141 MMOL/L
T PALLIDUM AB SER QL IA: NEGATIVE
T3FREE SERPL-MCNC: 3.31 PG/ML
T4 FREE SERPL-MCNC: 1.1 NG/DL
TRIGL SERPL-MCNC: 117 MG/DL
TSH SERPL-ACNC: 0.69 UIU/ML
WBC # FLD AUTO: 8.19 K/UL

## 2022-04-23 ENCOUNTER — INPATIENT (INPATIENT)
Facility: HOSPITAL | Age: 77
LOS: 2 days | Discharge: HOME | DRG: 379 | End: 2022-04-26
Attending: HOSPITALIST | Admitting: HOSPITALIST
Payer: MEDICARE

## 2022-04-23 VITALS
RESPIRATION RATE: 16 BRPM | HEART RATE: 111 BPM | SYSTOLIC BLOOD PRESSURE: 191 MMHG | HEIGHT: 64 IN | OXYGEN SATURATION: 97 % | DIASTOLIC BLOOD PRESSURE: 76 MMHG | TEMPERATURE: 99 F | WEIGHT: 151.02 LBS

## 2022-04-23 DIAGNOSIS — Z87.442 PERSONAL HISTORY OF URINARY CALCULI: Chronic | ICD-10-CM

## 2022-04-23 DIAGNOSIS — I10 ESSENTIAL (PRIMARY) HYPERTENSION: ICD-10-CM

## 2022-04-23 DIAGNOSIS — Z98.51 TUBAL LIGATION STATUS: Chronic | ICD-10-CM

## 2022-04-23 DIAGNOSIS — K57.30 DIVERTICULOSIS OF LARGE INTESTINE WITHOUT PERFORATION OR ABSCESS WITHOUT BLEEDING: ICD-10-CM

## 2022-04-23 DIAGNOSIS — K92.2 GASTROINTESTINAL HEMORRHAGE, UNSPECIFIED: ICD-10-CM

## 2022-04-23 DIAGNOSIS — F41.9 ANXIETY DISORDER, UNSPECIFIED: ICD-10-CM

## 2022-04-23 DIAGNOSIS — Z98.89 OTHER SPECIFIED POSTPROCEDURAL STATES: Chronic | ICD-10-CM

## 2022-04-23 DIAGNOSIS — I67.1 CEREBRAL ANEURYSM, NONRUPTURED: Chronic | ICD-10-CM

## 2022-04-23 DIAGNOSIS — Z86.59 PERSONAL HISTORY OF OTHER MENTAL AND BEHAVIORAL DISORDERS: ICD-10-CM

## 2022-04-23 DIAGNOSIS — C44.91 BASAL CELL CARCINOMA OF SKIN, UNSPECIFIED: Chronic | ICD-10-CM

## 2022-04-23 DIAGNOSIS — E11.9 TYPE 2 DIABETES MELLITUS WITHOUT COMPLICATIONS: ICD-10-CM

## 2022-04-23 DIAGNOSIS — Z29.9 ENCOUNTER FOR PROPHYLACTIC MEASURES, UNSPECIFIED: ICD-10-CM

## 2022-04-23 DIAGNOSIS — J45.909 UNSPECIFIED ASTHMA, UNCOMPLICATED: ICD-10-CM

## 2022-04-23 LAB
ALBUMIN SERPL ELPH-MCNC: 4.9 G/DL — SIGNIFICANT CHANGE UP (ref 3.3–5)
ALP SERPL-CCNC: 133 U/L — HIGH (ref 40–120)
ALT FLD-CCNC: 27 U/L — SIGNIFICANT CHANGE UP (ref 10–45)
ANION GAP SERPL CALC-SCNC: 17 MMOL/L — SIGNIFICANT CHANGE UP (ref 5–17)
APPEARANCE UR: CLEAR — SIGNIFICANT CHANGE UP
AST SERPL-CCNC: 19 U/L — SIGNIFICANT CHANGE UP (ref 10–40)
BACTERIA # UR AUTO: NEGATIVE — SIGNIFICANT CHANGE UP
BASE EXCESS BLDV CALC-SCNC: 0.7 MMOL/L — SIGNIFICANT CHANGE UP (ref -2–2)
BASOPHILS # BLD AUTO: 0.05 K/UL — SIGNIFICANT CHANGE UP (ref 0–0.2)
BASOPHILS NFR BLD AUTO: 0.5 % — SIGNIFICANT CHANGE UP (ref 0–2)
BILIRUB SERPL-MCNC: 0.3 MG/DL — SIGNIFICANT CHANGE UP (ref 0.2–1.2)
BILIRUB UR-MCNC: NEGATIVE — SIGNIFICANT CHANGE UP
BLD GP AB SCN SERPL QL: NEGATIVE — SIGNIFICANT CHANGE UP
BLOOD GAS VENOUS - CREATININE: SIGNIFICANT CHANGE UP MG/DL (ref 0.5–1.3)
BUN SERPL-MCNC: 24 MG/DL — HIGH (ref 7–23)
CA-I SERPL-SCNC: 1.22 MMOL/L — SIGNIFICANT CHANGE UP (ref 1.15–1.33)
CALCIUM SERPL-MCNC: 10 MG/DL — SIGNIFICANT CHANGE UP (ref 8.4–10.5)
CHLORIDE BLDV-SCNC: 102 MMOL/L — SIGNIFICANT CHANGE UP (ref 96–108)
CHLORIDE SERPL-SCNC: 100 MMOL/L — SIGNIFICANT CHANGE UP (ref 96–108)
CO2 BLDV-SCNC: 27 MMOL/L — HIGH (ref 22–26)
CO2 SERPL-SCNC: 22 MMOL/L — SIGNIFICANT CHANGE UP (ref 22–31)
COLOR SPEC: COLORLESS — SIGNIFICANT CHANGE UP
CREAT SERPL-MCNC: 0.75 MG/DL — SIGNIFICANT CHANGE UP (ref 0.5–1.3)
DIFF PNL FLD: ABNORMAL
EGFR: 82 ML/MIN/1.73M2 — SIGNIFICANT CHANGE UP
EOSINOPHIL # BLD AUTO: 0.19 K/UL — SIGNIFICANT CHANGE UP (ref 0–0.5)
EOSINOPHIL NFR BLD AUTO: 1.9 % — SIGNIFICANT CHANGE UP (ref 0–6)
EPI CELLS # UR: 1 /HPF — SIGNIFICANT CHANGE UP
GAS PNL BLDV: 135 MMOL/L — LOW (ref 136–145)
GAS PNL BLDV: SIGNIFICANT CHANGE UP
GAS PNL BLDV: SIGNIFICANT CHANGE UP
GLUCOSE BLDC GLUCOMTR-MCNC: 161 MG/DL — HIGH (ref 70–99)
GLUCOSE BLDC GLUCOMTR-MCNC: 324 MG/DL — HIGH (ref 70–99)
GLUCOSE BLDV-MCNC: 340 MG/DL — HIGH (ref 70–99)
GLUCOSE SERPL-MCNC: 326 MG/DL — HIGH (ref 70–99)
GLUCOSE UR QL: ABNORMAL
HCO3 BLDV-SCNC: 25 MMOL/L — SIGNIFICANT CHANGE UP (ref 22–29)
HCT VFR BLD CALC: 38.8 % — SIGNIFICANT CHANGE UP (ref 34.5–45)
HCT VFR BLD CALC: 40.9 % — SIGNIFICANT CHANGE UP (ref 34.5–45)
HCT VFR BLDA CALC: 41 % — SIGNIFICANT CHANGE UP (ref 34.5–46.5)
HGB BLD CALC-MCNC: 13.5 G/DL — SIGNIFICANT CHANGE UP (ref 11.7–16.1)
HGB BLD-MCNC: 12.7 G/DL — SIGNIFICANT CHANGE UP (ref 11.5–15.5)
HGB BLD-MCNC: 13.3 G/DL — SIGNIFICANT CHANGE UP (ref 11.5–15.5)
HYALINE CASTS # UR AUTO: 0 /LPF — SIGNIFICANT CHANGE UP (ref 0–2)
IMM GRANULOCYTES NFR BLD AUTO: 0.7 % — SIGNIFICANT CHANGE UP (ref 0–1.5)
KETONES UR-MCNC: NEGATIVE — SIGNIFICANT CHANGE UP
LACTATE BLDV-MCNC: 1.7 MMOL/L — SIGNIFICANT CHANGE UP (ref 0.7–2)
LEUKOCYTE ESTERASE UR-ACNC: NEGATIVE — SIGNIFICANT CHANGE UP
LIDOCAIN IGE QN: 21 U/L — SIGNIFICANT CHANGE UP (ref 7–60)
LYMPHOCYTES # BLD AUTO: 0.82 K/UL — LOW (ref 1–3.3)
LYMPHOCYTES # BLD AUTO: 8.4 % — LOW (ref 13–44)
MCHC RBC-ENTMCNC: 26.2 PG — LOW (ref 27–34)
MCHC RBC-ENTMCNC: 26.4 PG — LOW (ref 27–34)
MCHC RBC-ENTMCNC: 32.5 GM/DL — SIGNIFICANT CHANGE UP (ref 32–36)
MCHC RBC-ENTMCNC: 32.7 GM/DL — SIGNIFICANT CHANGE UP (ref 32–36)
MCV RBC AUTO: 80 FL — SIGNIFICANT CHANGE UP (ref 80–100)
MCV RBC AUTO: 81.3 FL — SIGNIFICANT CHANGE UP (ref 80–100)
MONOCYTES # BLD AUTO: 0.64 K/UL — SIGNIFICANT CHANGE UP (ref 0–0.9)
MONOCYTES NFR BLD AUTO: 6.6 % — SIGNIFICANT CHANGE UP (ref 2–14)
NEUTROPHILS # BLD AUTO: 7.99 K/UL — HIGH (ref 1.8–7.4)
NEUTROPHILS NFR BLD AUTO: 81.9 % — HIGH (ref 43–77)
NITRITE UR-MCNC: NEGATIVE — SIGNIFICANT CHANGE UP
NRBC # BLD: 0 /100 WBCS — SIGNIFICANT CHANGE UP (ref 0–0)
NRBC # BLD: 0 /100 WBCS — SIGNIFICANT CHANGE UP (ref 0–0)
PCO2 BLDV: 40 MMHG — SIGNIFICANT CHANGE UP (ref 39–42)
PH BLDV: 7.41 — SIGNIFICANT CHANGE UP (ref 7.32–7.43)
PH UR: 7.5 — SIGNIFICANT CHANGE UP (ref 5–8)
PLATELET # BLD AUTO: 297 K/UL — SIGNIFICANT CHANGE UP (ref 150–400)
PLATELET # BLD AUTO: 297 K/UL — SIGNIFICANT CHANGE UP (ref 150–400)
PO2 BLDV: 54 MMHG — HIGH (ref 25–45)
POTASSIUM BLDV-SCNC: 4.3 MMOL/L — SIGNIFICANT CHANGE UP (ref 3.5–5.1)
POTASSIUM SERPL-MCNC: 4.3 MMOL/L — SIGNIFICANT CHANGE UP (ref 3.5–5.3)
POTASSIUM SERPL-SCNC: 4.3 MMOL/L — SIGNIFICANT CHANGE UP (ref 3.5–5.3)
PROT SERPL-MCNC: 7.3 G/DL — SIGNIFICANT CHANGE UP (ref 6–8.3)
PROT UR-MCNC: NEGATIVE — SIGNIFICANT CHANGE UP
RBC # BLD: 4.85 M/UL — SIGNIFICANT CHANGE UP (ref 3.8–5.2)
RBC # BLD: 5.03 M/UL — SIGNIFICANT CHANGE UP (ref 3.8–5.2)
RBC # FLD: 14.5 % — SIGNIFICANT CHANGE UP (ref 10.3–14.5)
RBC # FLD: 14.6 % — HIGH (ref 10.3–14.5)
RBC CASTS # UR COMP ASSIST: 2 /HPF — SIGNIFICANT CHANGE UP (ref 0–4)
RH IG SCN BLD-IMP: NEGATIVE — SIGNIFICANT CHANGE UP
SAO2 % BLDV: 85.3 % — SIGNIFICANT CHANGE UP (ref 67–88)
SARS-COV-2 RNA SPEC QL NAA+PROBE: SIGNIFICANT CHANGE UP
SODIUM SERPL-SCNC: 139 MMOL/L — SIGNIFICANT CHANGE UP (ref 135–145)
SP GR SPEC: 1.04 — HIGH (ref 1.01–1.02)
UROBILINOGEN FLD QL: NEGATIVE — SIGNIFICANT CHANGE UP
WBC # BLD: 7.54 K/UL — SIGNIFICANT CHANGE UP (ref 3.8–10.5)
WBC # BLD: 9.76 K/UL — SIGNIFICANT CHANGE UP (ref 3.8–10.5)
WBC # FLD AUTO: 7.54 K/UL — SIGNIFICANT CHANGE UP (ref 3.8–10.5)
WBC # FLD AUTO: 9.76 K/UL — SIGNIFICANT CHANGE UP (ref 3.8–10.5)
WBC UR QL: 0 /HPF — SIGNIFICANT CHANGE UP (ref 0–5)

## 2022-04-23 PROCEDURE — 99223 1ST HOSP IP/OBS HIGH 75: CPT | Mod: GC

## 2022-04-23 PROCEDURE — 99285 EMERGENCY DEPT VISIT HI MDM: CPT | Mod: GC

## 2022-04-23 PROCEDURE — 74174 CTA ABD&PLVS W/CONTRAST: CPT | Mod: 26,MA

## 2022-04-23 RX ORDER — INSULIN LISPRO 100/ML
VIAL (ML) SUBCUTANEOUS AT BEDTIME
Refills: 0 | Status: DISCONTINUED | OUTPATIENT
Start: 2022-04-23 | End: 2022-04-26

## 2022-04-23 RX ORDER — SODIUM CHLORIDE 9 MG/ML
1000 INJECTION, SOLUTION INTRAVENOUS
Refills: 0 | Status: DISCONTINUED | OUTPATIENT
Start: 2022-04-23 | End: 2022-04-26

## 2022-04-23 RX ORDER — AMLODIPINE BESYLATE 2.5 MG/1
5 TABLET ORAL DAILY
Refills: 0 | Status: DISCONTINUED | OUTPATIENT
Start: 2022-04-23 | End: 2022-04-23

## 2022-04-23 RX ORDER — LOSARTAN POTASSIUM 100 MG/1
100 TABLET, FILM COATED ORAL DAILY
Refills: 0 | Status: DISCONTINUED | OUTPATIENT
Start: 2022-04-23 | End: 2022-04-26

## 2022-04-23 RX ORDER — DEXTROSE 50 % IN WATER 50 %
12.5 SYRINGE (ML) INTRAVENOUS ONCE
Refills: 0 | Status: DISCONTINUED | OUTPATIENT
Start: 2022-04-23 | End: 2022-04-26

## 2022-04-23 RX ORDER — GLUCAGON INJECTION, SOLUTION 0.5 MG/.1ML
1 INJECTION, SOLUTION SUBCUTANEOUS ONCE
Refills: 0 | Status: DISCONTINUED | OUTPATIENT
Start: 2022-04-23 | End: 2022-04-26

## 2022-04-23 RX ORDER — LORATADINE 10 MG/1
10 TABLET ORAL DAILY
Refills: 0 | Status: DISCONTINUED | OUTPATIENT
Start: 2022-04-23 | End: 2022-04-26

## 2022-04-23 RX ORDER — PANTOPRAZOLE SODIUM 20 MG/1
40 TABLET, DELAYED RELEASE ORAL
Refills: 0 | Status: DISCONTINUED | OUTPATIENT
Start: 2022-04-23 | End: 2022-04-26

## 2022-04-23 RX ORDER — AMLODIPINE BESYLATE 2.5 MG/1
5 TABLET ORAL DAILY
Refills: 0 | Status: DISCONTINUED | OUTPATIENT
Start: 2022-04-23 | End: 2022-04-26

## 2022-04-23 RX ORDER — DEXTROSE 50 % IN WATER 50 %
15 SYRINGE (ML) INTRAVENOUS ONCE
Refills: 0 | Status: DISCONTINUED | OUTPATIENT
Start: 2022-04-23 | End: 2022-04-26

## 2022-04-23 RX ORDER — INSULIN GLARGINE 100 [IU]/ML
22 INJECTION, SOLUTION SUBCUTANEOUS AT BEDTIME
Refills: 0 | Status: DISCONTINUED | OUTPATIENT
Start: 2022-04-23 | End: 2022-04-24

## 2022-04-23 RX ORDER — MONTELUKAST 4 MG/1
10 TABLET, CHEWABLE ORAL AT BEDTIME
Refills: 0 | Status: DISCONTINUED | OUTPATIENT
Start: 2022-04-23 | End: 2022-04-26

## 2022-04-23 RX ORDER — INSULIN LISPRO 100/ML
VIAL (ML) SUBCUTANEOUS
Refills: 0 | Status: DISCONTINUED | OUTPATIENT
Start: 2022-04-23 | End: 2022-04-26

## 2022-04-23 RX ORDER — ACETAMINOPHEN 500 MG
650 TABLET ORAL EVERY 6 HOURS
Refills: 0 | Status: DISCONTINUED | OUTPATIENT
Start: 2022-04-23 | End: 2022-04-26

## 2022-04-23 RX ORDER — TRAZODONE HCL 50 MG
100 TABLET ORAL AT BEDTIME
Refills: 0 | Status: DISCONTINUED | OUTPATIENT
Start: 2022-04-23 | End: 2022-04-26

## 2022-04-23 RX ORDER — INSULIN GLARGINE 100 [IU]/ML
24 INJECTION, SOLUTION SUBCUTANEOUS AT BEDTIME
Refills: 0 | Status: DISCONTINUED | OUTPATIENT
Start: 2022-04-23 | End: 2022-04-23

## 2022-04-23 RX ORDER — FAMOTIDINE 10 MG/ML
20 INJECTION INTRAVENOUS
Refills: 0 | Status: DISCONTINUED | OUTPATIENT
Start: 2022-04-23 | End: 2022-04-23

## 2022-04-23 RX ORDER — LANOLIN ALCOHOL/MO/W.PET/CERES
3 CREAM (GRAM) TOPICAL AT BEDTIME
Refills: 0 | Status: DISCONTINUED | OUTPATIENT
Start: 2022-04-23 | End: 2022-04-26

## 2022-04-23 RX ORDER — DEXTROSE 50 % IN WATER 50 %
25 SYRINGE (ML) INTRAVENOUS ONCE
Refills: 0 | Status: DISCONTINUED | OUTPATIENT
Start: 2022-04-23 | End: 2022-04-26

## 2022-04-23 RX ORDER — ASPIRIN/CALCIUM CARB/MAGNESIUM 324 MG
81 TABLET ORAL THREE TIMES A DAY
Refills: 0 | Status: DISCONTINUED | OUTPATIENT
Start: 2022-04-23 | End: 2022-04-26

## 2022-04-23 RX ORDER — LOSARTAN POTASSIUM 100 MG/1
100 TABLET, FILM COATED ORAL DAILY
Refills: 0 | Status: DISCONTINUED | OUTPATIENT
Start: 2022-04-23 | End: 2022-04-23

## 2022-04-23 RX ORDER — DIAZEPAM 5 MG
5 TABLET ORAL
Refills: 0 | Status: DISCONTINUED | OUTPATIENT
Start: 2022-04-23 | End: 2022-04-24

## 2022-04-23 RX ADMIN — Medication 100 MILLIGRAM(S): at 22:24

## 2022-04-23 RX ADMIN — MONTELUKAST 10 MILLIGRAM(S): 4 TABLET, CHEWABLE ORAL at 22:25

## 2022-04-23 RX ADMIN — Medication 5 MILLIGRAM(S): at 22:26

## 2022-04-23 RX ADMIN — PANTOPRAZOLE SODIUM 40 MILLIGRAM(S): 20 TABLET, DELAYED RELEASE ORAL at 20:05

## 2022-04-23 RX ADMIN — INSULIN GLARGINE 22 UNIT(S): 100 INJECTION, SOLUTION SUBCUTANEOUS at 22:30

## 2022-04-23 RX ADMIN — Medication 2: at 22:32

## 2022-04-23 NOTE — H&P ADULT - PROBLEM SELECTOR PLAN 2
- Pt home regimen: jardiance 10mg qd, metformin 500 BID, symlin 120mcg TID, tresiba 26-30 U/day, victoza 1.8mg qd  - UA +1000 glucose  - Will start pt on Lantus 24U (80% of 30U triseba) + ISS  - CC diet

## 2022-04-23 NOTE — H&P ADULT - ATTENDING COMMENTS
76 year old female with extensive PMH including HTN, DM2, hypothyroidism, CVA and diverticulosis s/p GI bleed and clipping 2 years ago who presents for GI bleed. Earlier this morning, the patient had a BM with sebas blood which prompted her to come to the ED. Since then, each subsequent BM has gotten less bloody and her most recent one was brown without blood. She had a negative CT A/P and her hemoglobin has been stable as well. We will email GI for evaluation and possible endoscopic intervention if warranted, will follow up recs. Keep on CLD in the event she needs to be prepped for a colonoscopy. Rest of plan as above.    Bo Convissar, DO  Pager [phone]  If no answer [phone]

## 2022-04-23 NOTE — PATIENT PROFILE ADULT - FALL HARM RISK - HARM RISK INTERVENTIONS

## 2022-04-23 NOTE — H&P ADULT - NSHPREVIEWOFSYSTEMS_GEN_ALL_CORE
REVIEW OF SYSTEMS:  CONSTITUTIONAL: No weakness, fevers, chills, sick contacts, or unintended weight loss  EYES: No visual changes or vertigo  ENT: No throat pain, rhinorrhea, or hearing loss   NECK: No pain or stiffness  RESPIRATORY: No cough, wheezing, hemoptysis; No shortness of breath  CARDIOVASCULAR: No chest pain or palpitations  GASTROINTESTINAL: No abdominal or epigastric pain. No nausea, vomiting, or hematemesis; No diarrhea or constipation. No melena or hematochezia.  GENITOURINARY: No dysuria, frequency or hematuria  NEUROLOGICAL: No numbness or weakness  SKIN: No itching, rashes, or bruises  Psych: Good mood, no substance use REVIEW OF SYSTEMS:  CONSTITUTIONAL: No weakness, fevers, chills, sick contacts, or unintended weight loss  EYES: No visual changes or vertigo  ENT: No throat pain, rhinorrhea, or hearing loss   NECK: No pain or stiffness  RESPIRATORY: No cough, wheezing, hemoptysis; No shortness of breath  CARDIOVASCULAR: No chest pain or palpitations  GASTROINTESTINAL: No abdominal or epigastric pain. No nausea, vomiting, or hematemesis; No diarrhea or constipation. +bloody BM  GENITOURINARY: No dysuria, frequency or hematuria  NEUROLOGICAL: No numbness or weakness  SKIN: No itching, rashes, or bruises  Psych: Good mood, no substance use

## 2022-04-23 NOTE — ED PROVIDER NOTE - ATTENDING CONTRIBUTION TO CARE
74F PMH HTN, T2DM, diverticulitis, asthma, cerebral aneurysm s/p stent/coiling, CVA, anxiety, hypothyroid, basal cell ca s/p excision, R breast ca s/p lumpectomy/chemo/RT, p/w rectal bleeding on ASA/Plavix . Pt states that this AM she went to go to the bathroom at 4 AM, pt states that since then her symptoms have been improving, has gone to the bathroom 4 times and every time she sees some brbpr. Pt w/ no blood clots. Pt w/ mild abd pain. Pt w/ no cp, no sob, no nausea no vomiting. pt has had prior episodein the past where she has had prpbr pt had a positive CTA w/ active diverticular bleed. On exam,   Const: appearing stated age, no acute distress  Head: atraumatic, normocephalic  Eyes: no conjunctival injection and no scleral icterus  ENMT: Atraumatic external nose and ears, Moist mucus membranes  Neck: Symmetric, trachea midline,   BACK: no bruising  CVS: +S1/S2, dorsalis pedis/radial pulse 2+ bilaterally  RESP: Unlabored respiratory effort, Clear to auscultation bilaterally  GI: mild LLQ tenderness, Nondistended, soft abdomen  MSK: Extremities w/o deformity or ttp   Skin: Warm, Dry w/ no rashes  Neuro: GCS=15, CNs II-XII intact, motor in all 4 extremities equal, Sensation grossly intact   Psych: Awake, Alert, & Orientedx3;  Appropriate mood and affect, cooperative  Plan for labs imaging and reassessment. concern for diverticular bleed given hx of Plavix usage and asa, pt last dose was yesterday.

## 2022-04-23 NOTE — PATIENT PROFILE ADULT - FUNCTIONAL ASSESSMENT - DAILY ACTIVITY 5.
Samuel Mack is a 50 y.o. male was seen today in follow-up of recent interstim removal     Pt has a hx of polyuria and OAB spanning back to 2011. He has tried and failed vesicare, ditropan, and myrbetriq. Pt reports he has tried PTNS therapy in the past with improvement in symptoms initially but they returned after a period of time. He is also on Flomax. He underwent office cystoscopy with Dr. Melinda Ibanez 8/6/18 and was noted to have moderate BPH and a tight bladder neck. Dr. Melinda Ibanez recommended interstim placement and pt underwent trial in the office on 8/27/18 and placement of interstim sacral nerve stimulator, stage 1 and 2 by Dr. Melinda Ibanez on 10/16/18. Pt reported 70-75% improvement in symptoms following interstim placement but reported discomfort when lying on his right side with sleeping. Device was turned off and pt did not feel the benefits of the device outweighed the discomfort he was experiencing with sleeping. Interstim device and leads removed 5/16/19 by Dr. Melinda Ibanez. He notes following removal he is urinating 12-17 times per day and every 45 minutes at night. Cathlyn Mansoor denies dysuria, hesitancy, weak stream, gross hematuria, flank pain, suprapubic pain, and feeling of incomplete emptying. No chest pain, shortness of breath, nausea, vomiting, fever, chills, leg pain, leg swelling.       Current Outpatient Medications   Medication Sig Dispense Refill    risperiDONE (RISPERDAL) 0.5 MG tablet Take 0.5 mg by mouth 2 times daily      oxybutynin (DITROPAN-XL) 5 MG extended release tablet Take 1 tablet by mouth daily 30 tablet 3    MAGNESIUM OXIDE PO Take by mouth daily      NAPROXEN PO Take by mouth      insulin aspart (NOVOLOG FLEXPEN) 100 UNIT/ML injection pen Inject into the skin 3 times daily (before meals) Up to 50 units daily      metoprolol succinate (TOPROL XL) 50 MG extended release tablet Take 50 mg by mouth daily      FLUoxetine (PROZAC) 40 MG capsule Take 40 mg by mouth daily      traZODone (DESYREL) 150 MG tablet Take 300 mg by mouth nightly      pantoprazole (PROTONIX) 40 MG tablet Take 1 tablet by mouth every morning (before breakfast) 30 tablet 6    losartan (COZAAR) 100 MG tablet take 1 tablet by mouth once daily 30 tablet 5    aspirin EC 81 MG EC tablet Take 1 tablet by mouth daily 30 tablet 11    tiotropium (SPIRIVA RESPIMAT) 2.5 MCG/ACT AERS inhaler Inhale 2 puffs into the lungs every morning (before breakfast) 1 Inhaler 5    budesonide-formoterol (SYMBICORT) 160-4.5 MCG/ACT AERO Inhale 2 puffs into the lungs 2 times daily 1 Inhaler 5    amLODIPine (NORVASC) 5 MG tablet take 1 tablet by mouth once daily 30 tablet 5    Insulin Degludec (TRESIBA FLEXTOUCH SC) Inject 32 Units into the skin every morning       nitroGLYCERIN (NITROSTAT) 0.4 MG SL tablet Place 1 tablet under the tongue every 5 minutes as needed for Chest pain 25 tablet 3    Multiple Vitamin (MULTIVITAMIN) tablet take 1 tablet by mouth once daily 30 tablet 11    albuterol sulfate HFA (PROAIR HFA) 108 (90 BASE) MCG/ACT inhaler Inhale 2 puffs into the lungs every 4 hours as needed for Wheezing Dx: 493.90 1 Inhaler 5    zonisamide (ZONEGRAN) 100 MG capsule Take 1 capsule by mouth daily 30 capsule 5    atorvastatin (LIPITOR) 20 MG tablet Take 1 tablet by mouth daily 30 tablet 5    GuanFACINE ER (INTUNIV) 1 MG TB24 tablet Take 4 mg by mouth nightly       loratadine (CLARITIN) 10 MG tablet Take 1 tablet by mouth daily 30 tablet 11    OXcarbazepine (TRILEPTAL) 300 MG tablet Take 600 mg by mouth daily        No current facility-administered medications for this visit.         Past Medical History  Marco Hartley  has a past medical history of ADHD (attention deficit hyperactivity disorder), Anxiety, Arthritis, Asthma, Bladder dysfunction, Depression, Diabetes mellitus ([address] Fatigue, GERD (gastroesophageal reflux disease), Headache, Hyperlipidemia, Hypertension, MDRO (multiple drug resistant organisms) resistance, OAB (overactive bladder), Seizures ([facility] [address] Sleep apnea, and Type II or unspecified type diabetes mellitus without mention of complication, not stated as uncontrolled. Past Surgical History  The patient  has a past surgical history that includes Eye surgery ([address]); Cataract removal (2010); Foot surgery (Bilateral, 2006); Foot surgery (1996); other surgical history (11/4/15); eye surgery (Right, 92,96); Elbow surgery (Right); Upper gastrointestinal endoscopy (2017); cervical fusion (N/A, 9/27/2017); Hand surgery (Right, 07/2017); Colonoscopy (N/A, 2/26/2018); Upper gastrointestinal endoscopy (N/A, 8/9/2018); pr office/outpt visit,procedure only (N/A, 10/16/2018); and pr revise/remove neuroelectrode (N/A, 5/16/2019). Family History  This patient's family history includes Dementia in his mother; Diabetes in his paternal aunt and paternal grandmother; High Blood Pressure in his brother, father, and sister; Hypertension in his father; Other in his father. Social History  Nica Max  reports that he has never smoked. He has never used smokeless tobacco. He reports that he drinks about 2.4 oz of alcohol per week. He reports that he does not use drugs. Review of Systems  No problems with ears, nose or throat. No problems with eyes. No chest pain, shortness of breath, abdominal pain, extremity pain or weakness, and no neurological deficits. No rashes. No swollen glands or lymph nodes.  symptoms per HPI. The remainder of the review of symptoms is negative. Exam  There were no vitals taken for this visit. Nursing note and vitals reviewed. Constitutional: Alert and oriented times 3, no acute distress and cooperative to examination with appropriate mood and affect. HENT:   Head:        Normocephalic and atraumatic. Mouth/Throat:         Mucous membranes are normal.   Eyes:         EOM are normal. No scleral icterus. PERRLA.    Neck:        Supple, symmetrical, trachea midline, no adenopathy, thyroid symmetric, not enlarged and no tenderness. Cardiovascular:        Normal rate, regular rhythm, S1 S2 heart sounds. No murmurs, rub, or gallops. Pulses:       Radial pulses are 2+/4 bilateral and equal. Posterior tibialis 2+/4 bilateral and equal  Pulmonary/Chest:      Chest symmetric with normal A/P diameter,  CTA with no wheezes, rales, or rhonchi noted. Normal respiratory rate and rhthym. No use of accessory muscles. Abdominal:         Soft. No tenderness. No rebound, no guarding and no CVA tenderness. Bowel sounds present. Musculoskeletal:         Normal range of motion. No edema or tenderness of lower extremities. Incision to right back with edges well approximated. No erythema or drainage. Lymphadenopathy:        No cervical adenopathy. Bilateral supraclavicular adenopathy absent. Extremities: No cyanosis, clubbing, or edema present. Neurological:        Alert and oriented. No cranial nerve deficit. There are no focalizing motor or sensory deficits. CN II-XII are grossly intact. Psychiatric:        Normal mood and affect. Labs   Urine dip demonstrates   No results found for this visit on 05/29/19. Patients recent PSA values are as follows  Lab Results   Component Value Date    PSA 0.48 08/03/2018    PSA 0.46 10/19/2015    PSA 0.52 12/19/2011        Recent BUN/Creatinine:  Lab Results   Component Value Date    BUN 9 05/06/2019    CREATININE 0.9 05/06/2019         Assessment & Plan  Severe urinary incontinence, frequency, urgency, nocturia  BPH    Recovering well from recent procedure. Increase oxybutynin to 10 mg daily. Discussed consideration of bladder botox injections and further PTNS therapy with patient today.      F/u in 6-8 weeks with PVR 4 = No assist / stand by assistance

## 2022-04-23 NOTE — ED PROVIDER NOTE - PROGRESS NOTE DETAILS
Zain PGY3 - W/u notable for colonic diverticula, which is the likely source of bleeding.  Will admit for GI eval.

## 2022-04-23 NOTE — H&P ADULT - NSHPLABSRESULTS_GEN_ALL_CORE
LABS:  cret                        12.7   7.54  )-----------( 297      ( 23 Apr 2022 14:36 )             38.8     04-23    139  |  100  |  24<H>  ----------------------------<  326<H>  4.3   |  22  |  0.75    Ca    10.0      23 Apr 2022 10:11    TPro  7.3  /  Alb  4.9  /  TBili  0.3  /  DBili  x   /  AST  19  /  ALT  27  /  AlkPhos  133<H>  04-23              CT Angio Abdomen and Pelvis w/ IV Cont    IMPRESSION:  No definite site of active GI bleeding.

## 2022-04-23 NOTE — ED ADULT NURSE NOTE - OBJECTIVE STATEMENT
Patient  is alert  and  oriented  x3. Color is  good  and skin warm to touch. She  is  c/o  rectal bleeding and abdominal pain . She  denies  nausea, vomiting  or dizziness.  Vital signs are  stable.

## 2022-04-23 NOTE — H&P ADULT - PROBLEM SELECTOR PLAN 7
- DVT PPx: Lovenox  - Diet: CC for now >> transition to clears 4/24  - PT unnecessary, pt ambulatory > encourage to walk  - Full Code.

## 2022-04-23 NOTE — H&P ADULT - ASSESSMENT
76 F PMH HTN, T2DM, colonic diverticulosis (s/p clipping 2 years ago) diverticulitis, asthma, cerebral aneurysm s/p stent/coiling, CVA, anxiety, hypothyroid, basal cell ca s/p excision, R breast ca s/p lumpectomy/chemo/RT, p/w rectal bleeding. HDS, Hgb 13.3, CT-A/P w/IV contrast without any GI bleed source. Pt admitted for r/o diverticular/lower GI bleed.

## 2022-04-23 NOTE — ED PROVIDER NOTE - OBJECTIVE STATEMENT
76F w/ PMHx of colonic diverticulosis s/p clipping two years ago, asthma, depression, anxiety, HTN, DM, CVA, basal cell carcinoma, breast ca p/w rectal bleeding.  PT. had a BM in the morning per usual and noticed sebas blood.  Denies any abd pain.  States she had multiple BMs after, with progressively less bleeding.  Had similar sxs two years ago and was found to have a recto-sigmoid diverticular bleed that had to be clipped.  Pt. is on asa and plavix.  Denies any n/v, f/c, sick contacts, CP, SOB.

## 2022-04-23 NOTE — ED PROVIDER NOTE - CLINICAL SUMMARY MEDICAL DECISION MAKING FREE TEXT BOX
76F w/ PMHx of recto-sigmoid diverticular bleed p/w painless sebas rectal bleeding since today morning.  Exam as above, VSS.  Sxs likely 2/2 diverticular bleed again.  Will assess for anemia vs elec derangements.  Low likelihood of pt. requiring transfusion, but will do so if needed.  Will obtain CTA abd/pelvis to assess for active GI bleed.  Will reassess and dispo pending results.

## 2022-04-23 NOTE — H&P ADULT - HISTORY OF PRESENT ILLNESS
76 F PMH HTN, T2DM, colonic diverticulosis (s/p clipping 2 years ago) diverticulitis, asthma, cerebral aneurysm s/p stent/coiling, CVA, anxiety, hypothyroid, basal cell ca s/p excision, R breast ca s/p lumpectomy/chemo/RT, p/w rectal bleeding. Pt had a BM this morning and noticed sebas blood (pt on ASA and plavix). Pt denies any abdominal pain, fever/chills, n/v/d, CP/SOB, sick contacts. Pt was admitted for similar presentation in 03/2020 and was found to have a diverticular bleed which was clipped. 76 F PMH HTN, T2DM, colonic diverticulosis (s/p clipping 2 years ago) diverticulitis, asthma, cerebral aneurysm s/p stent/coiling, CVA, anxiety, hypothyroid, basal cell ca s/p excision, R breast ca s/p lumpectomy/chemo/RT, p/w rectal bleeding. Pt states she woke up this morning at 4AM to urinate, and noticed blood in her BM which she describes as brown stool but with surrounding red blood and bloody water (pt on ASA and Plavix). Pt had several bowel movements afterwards, most recent of which was without any blood. Pt denies any abdominal pain, fever/chills, n/v/d, CP/SOB, dysuria, sick contacts, new foods, abnormal bowel movements. Pt was admitted for similar presentation in 03/2020 and was found to have a diverticular bleed which was clipped.

## 2022-04-23 NOTE — H&P ADULT - NSHPPHYSICALEXAM_GEN_ALL_CORE
VITALS:   T(C): 37 (04-23-22 @ 17:05), Max: 37.2 (04-23-22 @ 09:06)  HR: 90 (04-23-22 @ 17:05) (90 - 111)  BP: 123/70 (04-23-22 @ 17:05) (123/70 - 191/76)  RR: 18 (04-23-22 @ 17:05) (16 - 19)  SpO2: 95% (04-23-22 @ 17:05) (95% - 98%)    GENERAL: NAD, lying in bed comfortably  HEAD:  Atraumatic, Normocephalic  EYES: EOMI, PERRLA, conjunctiva and sclera clear  ENT: Moist mucous membranes  NECK: Supple, No JVD  CHEST/LUNG: Clear to auscultation bilaterally; No rales, rhonchi, wheezing, or rubs. Unlabored respirations  HEART: Regular rate and rhythm; No murmurs, rubs, or gallops  ABDOMEN: BSx4; Soft, nontender, nondistended  EXTREMITIES:  2+ Peripheral Pulses, brisk capillary refill. No clubbing, cyanosis, or edema  NERVOUS SYSTEM:  A&Ox3, no focal deficits   SKIN: No rashes or lesions  Psych: Normal speech, normal behavior, normal affect VITALS:   T(C): 37 (04-23-22 @ 17:05), Max: 37.2 (04-23-22 @ 09:06)  HR: 90 (04-23-22 @ 17:05) (90 - 111)  BP: 123/70 (04-23-22 @ 17:05) (123/70 - 191/76)  RR: 18 (04-23-22 @ 17:05) (16 - 19)  SpO2: 95% (04-23-22 @ 17:05) (95% - 98%)    GENERAL: NAD, lying in bed comfortably  HEAD:  Atraumatic, Normocephalic  EYES: EOMI, PERRLA, conjunctiva and sclera clear  ENT: Moist mucous membranes  NECK: Supple, No JVD  CHEST/LUNG: Clear to auscultation bilaterally; No rales, rhonchi, wheezing, or rubs. Unlabored respirations  HEART: Regular rate and rhythm; No murmurs, rubs, or gallops  ABDOMEN: BSx4; Soft, nondistended; mild LLQ tenderness to deep palpation  EXTREMITIES:  2+ Peripheral Pulses, brisk capillary refill. No clubbing, cyanosis, or edema  NERVOUS SYSTEM:  A&Ox3, no focal deficits   SKIN: No rashes or lesions  Psych: Normal speech, normal behavior, normal affect

## 2022-04-23 NOTE — H&P ADULT - PROBLEM SELECTOR PLAN 1
- Presenting sx's and hx 2 years ago of similar sx's and diverticular bleed tx with clipping; No active bleeding at this time, hemodynamically stable, Hgb 13.3  - CT-AP w/IV contrast: No definite site of active GI bleeding  - Pt on ASA - will continue; Plavix - will hold in anticipation of possible colonoscopy  - T&S, Transfuse Hgb <7  - Monitor stools  - FOBT  - C/w home protonix 40mg BID  - GI consulted

## 2022-04-24 LAB
A1C WITH ESTIMATED AVERAGE GLUCOSE RESULT: 7.9 % — HIGH (ref 4–5.6)
ALBUMIN SERPL ELPH-MCNC: 4.4 G/DL — SIGNIFICANT CHANGE UP (ref 3.3–5)
ALP SERPL-CCNC: 116 U/L — SIGNIFICANT CHANGE UP (ref 40–120)
ALT FLD-CCNC: 23 U/L — SIGNIFICANT CHANGE UP (ref 10–45)
ANION GAP SERPL CALC-SCNC: 14 MMOL/L — SIGNIFICANT CHANGE UP (ref 5–17)
AST SERPL-CCNC: 16 U/L — SIGNIFICANT CHANGE UP (ref 10–40)
BILIRUB SERPL-MCNC: 0.3 MG/DL — SIGNIFICANT CHANGE UP (ref 0.2–1.2)
BUN SERPL-MCNC: 22 MG/DL — SIGNIFICANT CHANGE UP (ref 7–23)
CALCIUM SERPL-MCNC: 9.7 MG/DL — SIGNIFICANT CHANGE UP (ref 8.4–10.5)
CHLORIDE SERPL-SCNC: 105 MMOL/L — SIGNIFICANT CHANGE UP (ref 96–108)
CHOLEST SERPL-MCNC: 197 MG/DL — SIGNIFICANT CHANGE UP
CO2 SERPL-SCNC: 24 MMOL/L — SIGNIFICANT CHANGE UP (ref 22–31)
CREAT SERPL-MCNC: 0.76 MG/DL — SIGNIFICANT CHANGE UP (ref 0.5–1.3)
CULTURE RESULTS: SIGNIFICANT CHANGE UP
EGFR: 81 ML/MIN/1.73M2 — SIGNIFICANT CHANGE UP
ESTIMATED AVERAGE GLUCOSE: 180 MG/DL — HIGH (ref 68–114)
GLUCOSE BLDC GLUCOMTR-MCNC: 173 MG/DL — HIGH (ref 70–99)
GLUCOSE BLDC GLUCOMTR-MCNC: 178 MG/DL — HIGH (ref 70–99)
GLUCOSE BLDC GLUCOMTR-MCNC: 198 MG/DL — HIGH (ref 70–99)
GLUCOSE BLDC GLUCOMTR-MCNC: 230 MG/DL — HIGH (ref 70–99)
GLUCOSE SERPL-MCNC: 124 MG/DL — HIGH (ref 70–99)
HCT VFR BLD CALC: 39.5 % — SIGNIFICANT CHANGE UP (ref 34.5–45)
HDLC SERPL-MCNC: 47 MG/DL — LOW
HGB BLD-MCNC: 12.6 G/DL — SIGNIFICANT CHANGE UP (ref 11.5–15.5)
LIPID PNL WITH DIRECT LDL SERPL: 130 MG/DL — HIGH
MAGNESIUM SERPL-MCNC: 2.2 MG/DL — SIGNIFICANT CHANGE UP (ref 1.6–2.6)
MCHC RBC-ENTMCNC: 26.4 PG — LOW (ref 27–34)
MCHC RBC-ENTMCNC: 31.9 GM/DL — LOW (ref 32–36)
MCV RBC AUTO: 82.6 FL — SIGNIFICANT CHANGE UP (ref 80–100)
NON HDL CHOLESTEROL: 150 MG/DL — HIGH
NRBC # BLD: 0 /100 WBCS — SIGNIFICANT CHANGE UP (ref 0–0)
PHOSPHATE SERPL-MCNC: 3.5 MG/DL — SIGNIFICANT CHANGE UP (ref 2.5–4.5)
PLATELET # BLD AUTO: 273 K/UL — SIGNIFICANT CHANGE UP (ref 150–400)
POTASSIUM SERPL-MCNC: 3.8 MMOL/L — SIGNIFICANT CHANGE UP (ref 3.5–5.3)
POTASSIUM SERPL-SCNC: 3.8 MMOL/L — SIGNIFICANT CHANGE UP (ref 3.5–5.3)
PROT SERPL-MCNC: 6.6 G/DL — SIGNIFICANT CHANGE UP (ref 6–8.3)
RBC # BLD: 4.78 M/UL — SIGNIFICANT CHANGE UP (ref 3.8–5.2)
RBC # FLD: 14.8 % — HIGH (ref 10.3–14.5)
SODIUM SERPL-SCNC: 143 MMOL/L — SIGNIFICANT CHANGE UP (ref 135–145)
SPECIMEN SOURCE: SIGNIFICANT CHANGE UP
TRIGL SERPL-MCNC: 100 MG/DL — SIGNIFICANT CHANGE UP
WBC # BLD: 6.03 K/UL — SIGNIFICANT CHANGE UP (ref 3.8–10.5)
WBC # FLD AUTO: 6.03 K/UL — SIGNIFICANT CHANGE UP (ref 3.8–10.5)

## 2022-04-24 PROCEDURE — 99233 SBSQ HOSP IP/OBS HIGH 50: CPT | Mod: GC

## 2022-04-24 PROCEDURE — 99223 1ST HOSP IP/OBS HIGH 75: CPT | Mod: GC

## 2022-04-24 PROCEDURE — 99232 SBSQ HOSP IP/OBS MODERATE 35: CPT | Mod: GC

## 2022-04-24 RX ORDER — INSULIN GLARGINE 100 [IU]/ML
15 INJECTION, SOLUTION SUBCUTANEOUS AT BEDTIME
Refills: 0 | Status: DISCONTINUED | OUTPATIENT
Start: 2022-04-24 | End: 2022-04-26

## 2022-04-24 RX ORDER — SOD SULF/SODIUM/NAHCO3/KCL/PEG
2000 SOLUTION, RECONSTITUTED, ORAL ORAL ONCE
Refills: 0 | Status: COMPLETED | OUTPATIENT
Start: 2022-04-24 | End: 2022-04-24

## 2022-04-24 RX ORDER — DIAZEPAM 5 MG
5 TABLET ORAL AT BEDTIME
Refills: 0 | Status: DISCONTINUED | OUTPATIENT
Start: 2022-04-24 | End: 2022-04-26

## 2022-04-24 RX ORDER — DIAZEPAM 5 MG
2.5 TABLET ORAL DAILY
Refills: 0 | Status: DISCONTINUED | OUTPATIENT
Start: 2022-04-24 | End: 2022-04-26

## 2022-04-24 RX ADMIN — Medication 3 MILLIGRAM(S): at 21:53

## 2022-04-24 RX ADMIN — INSULIN GLARGINE 15 UNIT(S): 100 INJECTION, SOLUTION SUBCUTANEOUS at 21:56

## 2022-04-24 RX ADMIN — Medication 1: at 13:26

## 2022-04-24 RX ADMIN — LOSARTAN POTASSIUM 100 MILLIGRAM(S): 100 TABLET, FILM COATED ORAL at 06:12

## 2022-04-24 RX ADMIN — Medication 81 MILLIGRAM(S): at 21:53

## 2022-04-24 RX ADMIN — Medication 2.5 MILLIGRAM(S): at 10:16

## 2022-04-24 RX ADMIN — MONTELUKAST 10 MILLIGRAM(S): 4 TABLET, CHEWABLE ORAL at 21:53

## 2022-04-24 RX ADMIN — Medication 2.5 MILLIGRAM(S): at 13:27

## 2022-04-24 RX ADMIN — Medication 100 MILLIGRAM(S): at 21:53

## 2022-04-24 RX ADMIN — Medication 2000 MILLILITER(S): at 18:58

## 2022-04-24 RX ADMIN — PANTOPRAZOLE SODIUM 40 MILLIGRAM(S): 20 TABLET, DELAYED RELEASE ORAL at 17:39

## 2022-04-24 RX ADMIN — Medication 2: at 17:38

## 2022-04-24 RX ADMIN — Medication 10 MILLIGRAM(S): at 21:54

## 2022-04-24 RX ADMIN — PANTOPRAZOLE SODIUM 40 MILLIGRAM(S): 20 TABLET, DELAYED RELEASE ORAL at 06:11

## 2022-04-24 RX ADMIN — AMLODIPINE BESYLATE 5 MILLIGRAM(S): 2.5 TABLET ORAL at 06:16

## 2022-04-24 RX ADMIN — Medication 1: at 08:50

## 2022-04-24 RX ADMIN — Medication 5 MILLIGRAM(S): at 21:54

## 2022-04-24 NOTE — CONSULT NOTE ADULT - ASSESSMENT
76 year old woman with PMH HTN, T2DM, colonic diverticulosis (s/p clipping 2 years ago) diverticulitis, asthma, cerebral aneurysm s/p stent/coiling, CVA, anxiety, hypothyroid, basal cell ca s/p excision, R breast ca s/p lumpectomy/chemo/RT, p/w rectal bleeding concerning for diverticular bleed. Oncology consulted for breast cancer management.    #Breast Cancer Stage I  -Onc hx as above  -HR+/ HER2 negative on exemestane  -Exemestane is an aromatase inhibitor and can cause menopause like symptoms, low suspicion for it to cause GIB.  -GI recs pending  -If no plan for scope, can re-start exemestane 25mg daily  -Outpatient follow up with Dr. Diop    Please page with questions or concerns. Will Follow with you.      Man Alcazar M.D.  Hematology/Oncology Fellow PGY4  Pager [phone]  After 5pm, please contact on-call team.   76 year old woman with PMH HTN, T2DM, colonic diverticulosis (s/p clipping 2 years ago) diverticulitis, asthma, cerebral aneurysm s/p stent/coiling, CVA, anxiety, hypothyroid, basal cell ca s/p excision, R breast ca s/p lumpectomy/chemo/RT, p/w rectal bleeding concerning for diverticular bleed. Oncology consulted for breast cancer management.    #Breast Cancer Stage I  -Onc hx as above  -HR+/ HER2 negative on exemestane  -Exemestane is an aromatase inhibitor and can cause menopause like symptoms, low suspicion for it to cause GIB.  -GI recs pending  -If no plan for scope and bleeding improves, can re-start exemestane 25mg daily  -Outpatient follow up with Dr. Diop    Please page with questions or concerns. Will Follow with you.      Man Alcazar M.D.  Hematology/Oncology Fellow PGY4  Pager [phone]  After 5pm, please contact on-call team.

## 2022-04-24 NOTE — PROVIDER CONTACT NOTE (MEDICATION) - ASSESSMENT
A&OX4. VSS. No signs of distress/ bleeding noted. Pt has not had BM thus far. Denies pain, resting comfortably in bed.

## 2022-04-24 NOTE — CONSULT NOTE ADULT - ASSESSMENT
# Hematochezia likely 2/2 diverticular bleeding vs hemorrhoidal bleeding vs angioectasias     Recommendations:  - Emergent endoscopic evaluation is not indicated but patient may benefit from endoscopic evaluation   - Will plan for moviprep per patient request; she is hesitant because she does not believe she can tolerate it  - Tentatively plan for Colonoscopy tomorrow if patient is able to tolerate prep; if not, will dc colonoscopy and monitor for bleeding as agreed with patient and discussed with primary  - Prep ordered by GI  - CLD today, NPO after midnight  - Daily CBC, CMP, coags  - Transfuse if Hgb < 7  - Stat CTA and IR consult if patient develops overt bleeding and becomes HD unstable  - Ensure adequate hyration  - Rest of car per primary    *Preliminary note until signed by Attending    Thank you for involving us in this patient's care.    Belia Almanza MD  Gastroenterology/Hepatology Fellow, PGY-V    NON-URGENT CONSULTS:  Please email [email or web address]  [email or web address]  AT NIGHT AND ON WEEKENDS:  Contact on-call GI fellow via answering service ([phone]) from 5pm-8am and on weekends/holidays  MONDAY-FRIDAY 8AM-5PM:  Pager# [phone] ([facility])  GI Phone# [phone] ([facility]) # Hematochezia likely 2/2 diverticular bleeding vs hemorrhoidal bleeding vs angioectasias     Recommendations:  - Emergent endoscopic evaluation is not indicated but patient may benefit from endoscopic evaluation   - Will plan for moviprep per patient request; she is hesitant because she does not believe she can tolerate it  - Tentatively plan for Colonoscopy tomorrow if patient is able to tolerate prep; if not, may defer colonoscopy and monitor for bleeding as agreed with patient and discussed with primary team  - Prep ordered by GI  - CLD today, NPO after midnight  - Daily CBC, CMP, coags  - Transfuse if Hgb < 7  - Stat CTA and IR consult if patient develops ongoing overt bleeding and/or becomes HD unstable  - Ensure adequate hydration  - Rest of car per primary    *Preliminary note until signed by Attending    Thank you for involving us in this patient's care.    Belia Almanza MD  Gastroenterology/Hepatology Fellow, PGY-V    NON-URGENT CONSULTS:  Please email [email or web address]  [email or web address]  AT NIGHT AND ON WEEKENDS:  Contact on-call GI fellow via answering service ([phone]) from 5pm-8am and on weekends/holidays  MONDAY-FRIDAY 8AM-5PM:  Pager# [phone] ([facility])  GI Phone# [phone] ([facility])

## 2022-04-24 NOTE — CONSULT NOTE ADULT - SUBJECTIVE AND OBJECTIVE BOX
Hematology/Oncology Consult Note    HPI:  76 F PMH HTN, T2DM, colonic diverticulosis (s/p clipping 2 years ago) diverticulitis, asthma, cerebral aneurysm s/p stent/coiling, CVA, anxiety, hypothyroid, basal cell ca s/p excision, R breast ca s/p lumpectomy/chemo/RT, p/w rectal bleeding. Pt states she woke up this morning at 4AM to urinate, and noticed blood in her BM which she describes as brown stool but with surrounding red blood and bloody water (pt on ASA and Plavix). Pt had several bowel movements afterwards, most recent of which was without any blood. Pt denies any abdominal pain, fever/chills, n/v/d, CP/SOB, dysuria, sick contacts, new foods, abnormal bowel movements. Pt was admitted for similar presentation in 2020 and was found to have a diverticular bleed which was clipped. (2022 17:36)    Onc hx:    Right breast cancer diagnosed at the age of 70  16 Right breast partial mastectomy with sentinel lymph node biopsy showed a 6 mm invasive ductal carcinoma, SBR , ER 90%, AL 0%, HER-2 negative, 2 negative SLN  16 Oncotype DX recurrence score of 37, which predicts a 10 year risk of distant recurrence on Tamoxifen alone of 25%  -  CMF given IV x 8 cycles   3/27/17 - 17 Radiation therapy with Dr. Agudelo (total of 5240 cGY in 25 fractions)  17 Anastrozole started, then tried letrozole due to arthralgias which was worse so then went on exemestane in .     Disease: Breast Cancer   Pathology: 6 mm IDC, SBR , ER 90%, AL 0%, HER-2 negative, Ki-67 15%, 2 negative SLN, Oncotype score = 37 (RR 25%)   TNM stage: T1b, N0, M0   AJCC Stage: I       Allergies    Augmentin (Rash)  Ceclor (Other)  codeine (Nausea)  Cytotec (Unknown)  Entex (Other)  Norflex (Other)  penicillin (Urticaria; Rash)  ramipril (Other)  shellfish (Rash)  strawberry (Rash)  sulfites - nausea/ gi upset (Other)  tetracycline (Other)    Intolerances        MEDICATIONS  (STANDING):  amLODIPine   Tablet 5 milliGRAM(s) Oral daily  aspirin enteric coated 81 milliGRAM(s) Oral three times a day  dextrose 5%. 1000 milliLiter(s) (50 mL/Hr) IV Continuous <Continuous>  dextrose 5%. 1000 milliLiter(s) (100 mL/Hr) IV Continuous <Continuous>  dextrose 50% Injectable 25 Gram(s) IV Push once  dextrose 50% Injectable 12.5 Gram(s) IV Push once  dextrose 50% Injectable 25 Gram(s) IV Push once  diazepam    Tablet 5 milliGRAM(s) Oral at bedtime  diazepam    Tablet 2.5 milliGRAM(s) Oral daily  diazepam    Tablet 2.5 milliGRAM(s) Oral daily  glucagon  Injectable 1 milliGRAM(s) IntraMuscular once  insulin glargine Injectable (LANTUS) 22 Unit(s) SubCutaneous at bedtime  insulin lispro (ADMELOG) corrective regimen sliding scale   SubCutaneous three times a day before meals  insulin lispro (ADMELOG) corrective regimen sliding scale   SubCutaneous at bedtime  loratadine 10 milliGRAM(s) Oral daily  losartan 100 milliGRAM(s) Oral daily  montelukast 10 milliGRAM(s) Oral at bedtime  pantoprazole    Tablet 40 milliGRAM(s) Oral two times a day  traZODone 100 milliGRAM(s) Oral at bedtime    MEDICATIONS  (PRN):  acetaminophen     Tablet .. 650 milliGRAM(s) Oral every 6 hours PRN Temp greater or equal to 38C (100.4F), Mild Pain (1 - 3)  dextrose Oral Gel 15 Gram(s) Oral once PRN Blood Glucose LESS THAN 70 milliGRAM(s)/deciliter  melatonin 3 milliGRAM(s) Oral at bedtime PRN Insomnia      PAST MEDICAL & SURGICAL HISTORY:  Breast CA  Aneurysm cerebral  Vertigo  Hearing loss  Basal cell carcinoma  Diverticulitis  CVA (cerebral vascular accident)   Shoulder disorder  right bone spur  Benign intracranial hypertension   r/t &quot;tetracyclines&quot;  Fibromyalgia  Chronic fatigue  Lumbar disc disease  Kidney stones  HTN (hypertension)  DM (diabetes mellitus) type 2  Anxiety/ Depression  Reflux esophagitis  Asthma  History of D&amp;C  H/O:   Cerebral aneurysm repair with coil and stent -   Basal cell carcinoma removed - left eyelid- 3/15  H/O shoulder surgerym right - 2001  H/O tubal ligation          FAMILY HISTORY:  Family history of CVA  mother, father        SOCIAL HISTORY: No EtOH, no tobacco    REVIEW OF SYSTEMS:    CONSTITUTIONAL: No weakness, fevers or chills  EYES/ENT: No visual changes;  No vertigo or throat pain   NECK: No pain or stiffness  RESPIRATORY: No cough, wheezing, hemoptysis; No shortness of breath  CARDIOVASCULAR: No chest pain or palpitations  GASTROINTESTINAL: No abdominal or epigastric pain. No nausea, vomiting, or hematemesis; No diarrhea or constipation. No melena + hematochezia.  GENITOURINARY: No dysuria, frequency or hematuria  NEUROLOGICAL: No numbness or weakness  SKIN: No itching, burning, rashes, or lesions   All other review of systems is negative unless indicated above.    Height (cm): 162.6 ( @ :48)  Weight (kg): 70.307 ( @ :48)  BMI (kg/m2): 26.6 ( @ :48)  BSA (m2): 1.76 ( @ :48)    T(F): 97.7 (22 @ 06:07), Max: 98.6 (22 @ 17:05)  HR: 83 (22 @ 06:07) (83 - 92)  BP: 136/76 (22 @ 06:07)  RR: 18 (22 @ 06:07)  SpO2: 96% (22 @ 06:07) (95% - 98%)    Physical Exam  GENERAL: NAD, well-developed  HEAD:  Atraumatic, Normocephalic  EYES: EOMI, PERRLA, conjunctiva and sclera clear  NECK: Supple, No JVD  CHEST/LUNG: Clear to auscultation bilaterally; No wheeze  HEART: Regular rate and rhythm; No murmurs, rubs, or gallops  ABDOMEN: Soft, Nontender, Nondistended; Bowel sounds present  EXTREMITIES:  2+ Peripheral Pulses, No clubbing, cyanosis, or edema  NEUROLOGY: non-focal  SKIN: No rashes or lesions                          12.6   6.03  )-----------( 273      ( 2022 06:38 )             39.5           143  |  105  |  22  ----------------------------<  124<H>  3.8   |  24  |  0.76    Ca    9.7      2022 06:33  Phos  3.5       Mg     2.2         TPro  6.6  /  Alb  4.4  /  TBili  0.3  /  DBili  x   /  AST  16  /  ALT  23  /  AlkPhos  116        Magnesium, Serum: 2.2 mg/dL ( @ 06:33)  Phosphorus Level, Serum: 3.5 mg/dL ( @ 06:33)          Imaging:  < from: CT Angio Abdomen and Pelvis w/ IV Cont (22 @ 10:29) >  IMPRESSION:  No definite site of active GI bleeding.

## 2022-04-24 NOTE — CONSULT NOTE ADULT - ATTENDING COMMENTS
#Breast Cancer Stage I  -Onc hx as above  -HR+/ HER2 negative on exemestane  -Exemestane is an aromatase inhibitor and can cause menopause like symptoms, low suspicion for it to cause GIB.  -GI recs pending  -If no plan for scope and bleeding improves, can re-start exemestane 25mg daily  -Outpatient follow up with Dr. Howell
#Hematochezia  #History of diverticular bleeding; s/p colonoscopy 2020 with diverticulum with high-risk stigmata s/p clipping  #DAPT    Suspect likely recurrent diverticular bleeding given patient’s history, but hemorrhoids, colitis, ectasias remain on differential, less likely malignancy given recent endoscopic evaluation. CTA without active bleeding. We discussed with patient and her  the nature of diverticular bleeding. Although colonoscopy may not be able to identify the culprit diverticulum and bleeding may stop without intervention, patient expressed interested repeat colonoscopy at this time with the hope that etiology can be found and treated (similar to her prior presentation in 2020). She expressed concern regarding the bowel prep as she previously had difficulty tolerating full inpatient prep, but will try to complete it this evening. Will continue to monitor stool output and Hgb. If HD instability, would recommend IR consultation for possible angiogram and embolization. OK to continue ASA. Would continue to hold Plavix if no absolute contraindication.

## 2022-04-24 NOTE — PROGRESS NOTE ADULT - PROBLEM SELECTOR PLAN 1
- Presenting sx's and hx 2 years ago of similar sx's and diverticular bleed tx with clipping; No active bleeding at this time, hemodynamically stable, Hgb 13.3  - CT-AP w/IV contrast: No definite site of active GI bleeding  - Pt on ASA - will continue; Plavix - will hold in anticipation of possible colonoscopy  - T&S, Transfuse Hgb <7  - Monitor stools  - FOBT  - C/w home protonix 40mg BID  - GI consulted - Presenting sx's and hx 2 years ago of similar sx's and diverticular bleed tx with clipping; No active bleeding at this time, hemodynamically stable, Hgb 13.3  - CT-AP w/IV contrast: No definite site of active GI bleeding  - Prior CVA with coiling: Pt on ASA - will continue; Plavix - will hold in anticipation of possible colonoscopy  - T&S, Transfuse Hgb <7  - Monitor stools  - FOBT  - C/w home protonix 40mg BID  - GI consulted >> f/u reccs

## 2022-04-24 NOTE — PROVIDER CONTACT NOTE (MEDICATION) - SITUATION
Pt admitted w/ GI Bleed, refusing to take Aspirin as per her primary doctor stated not to take after receiving Covid-19 Booster vaccine 2 days ago

## 2022-04-24 NOTE — CONSULT NOTE ADULT - SUBJECTIVE AND OBJECTIVE BOX
Chief Complaint:  Patient is a 76y old  Female who presents with a chief complaint of blood in stool (2022 11:00)      HPI:TORRIE SHETH is a 76 F PMH HTN, T2DM, diverticular bleeding  with visible vessel s/p clips in sigmoid colon with NB internal hemorrhoids at NS by Dr. Self, ?h/o diverticulitis, asthma, cerebral aneurysm s/p stent/coiling (ASA/plavix, last plavix was , on omeprazole 40 bid), CVA, anxiety, hypothyroid, basal cell ca s/p excision, R breast ca s/p lumpectomy/chemo/RT, p/w rectal bleeding. Pt states she woke up the morning of presentation around 4 AM, and noticed blood in her BM which she describes as brown stool but with surrounding red blood and bloody water. No abdominal pain/rectal pain, n/v/d or constipation, no black BMs, no heartburn, f/c. She has been HDS with hgb wnl with elevated BUN and Cr wnl upon admission. Patient now reporting that the bleeding has stopped. However, YUE revealed dark burgundy-maroon colored blood w/o stool. CTA  revealed diverticulosis with a periampullary duodenal diverticulum w/o active bleeding. Patient reporting h/o EGD many years ago for which she states was normal and does not recall the initial indication.     PMHX/PSHX:  Breast CA    Aneurysm    Vertigo    Hearing loss    Basal cell carcinoma    Diverticulitis    CVA (cerebral vascular accident)    Shoulder disorder    Benign intracranial hypertension    Fibromyalgia    Chronic fatigue    Lumbar disc disease    Kidney stones    HTN (hypertension)    DM (diabetes mellitus)    Anxiety    Depression    Reflux esophagitis    Asthma    History of D&amp;C    H/O:     Cerebral aneurysm    Basal cell carcinoma    H/O shoulder surgery    H/O tubal ligation    History of kidney stones      Allergies:  Augmentin (Rash)  Ceclor (Other)  codeine (Nausea)  Cytotec (Unknown)  Entex (Other)  Norflex (Other)  penicillin (Urticaria; Rash)  ramipril (Other)  shellfish (Rash)  strawberry (Rash)  sulfites - nausea/ gi upset (Other)  tetracycline (Other)      Home Medications: reviewed  Hospital Medications:  acetaminophen     Tablet .. 650 milliGRAM(s) Oral every 6 hours PRN  amLODIPine   Tablet 5 milliGRAM(s) Oral daily  aspirin enteric coated 81 milliGRAM(s) Oral three times a day  bisacodyl 10 milliGRAM(s) Oral at bedtime  dextrose 5%. 1000 milliLiter(s) IV Continuous <Continuous>  dextrose 5%. 1000 milliLiter(s) IV Continuous <Continuous>  dextrose 50% Injectable 25 Gram(s) IV Push once  dextrose 50% Injectable 12.5 Gram(s) IV Push once  dextrose 50% Injectable 25 Gram(s) IV Push once  dextrose Oral Gel 15 Gram(s) Oral once PRN  diazepam    Tablet 5 milliGRAM(s) Oral at bedtime  diazepam    Tablet 2.5 milliGRAM(s) Oral daily  diazepam    Tablet 2.5 milliGRAM(s) Oral daily  glucagon  Injectable 1 milliGRAM(s) IntraMuscular once  insulin glargine Injectable (LANTUS) 15 Unit(s) SubCutaneous at bedtime  insulin lispro (ADMELOG) corrective regimen sliding scale   SubCutaneous three times a day before meals  insulin lispro (ADMELOG) corrective regimen sliding scale   SubCutaneous at bedtime  loratadine 10 milliGRAM(s) Oral daily  losartan 100 milliGRAM(s) Oral daily  melatonin 3 milliGRAM(s) Oral at bedtime PRN  montelukast 10 milliGRAM(s) Oral at bedtime  pantoprazole    Tablet 40 milliGRAM(s) Oral two times a day  traZODone 100 milliGRAM(s) Oral at bedtime      Social History:   Tob: Denies  EtOH: Denies  Illicit Drugs: Denies    Family history:  No pertinent family history in first degree relatives    Family history of CVA      ROS:   Complete and normal except as mentioned above.    PHYSICAL EXAM:   Vital Signs:  Vital Signs Last 24 Hrs  T(C): 36.7 (2022 18:05), Max: 36.9 (2022 21:13)  T(F): 98.1 (2022 18:05), Max: 98.5 (2022 21:13)  HR: 82 (2022 18:05) (82 - 88)  BP: 129/74 (2022 18:05) (126/72 - 136/76)  BP(mean): --  RR: 18 (2022 18:05) (18 - 20)  SpO2: 97% (2022 18:05) (96% - 97%)  Daily     Daily     GENERAL: no acute distress  NEURO: alert  HEENT: anicteric sclera, no conjunctival pallor appreciated  CHEST: no respiratory distress, no accessory muscle use  CARDIAC: regular rate   ABDOMEN: soft, non-tender, non-distended, no rebound or guarding  YUE: dark burgundy-maroon colored blood w/o stool  EXTREMITIES: warm, well perfused   SKIN: no lesions noted    LABS: reviewed                        12.6   6.03  )-----------( 273      ( 2022 06:38 )             39.5     04-24    143  |  105  |  22  ----------------------------<  124<H>  3.8   |  24  |  0.76    Ca    9.7      2022 06:33  Phos  3.5     -  Mg     2.2     -24    TPro  6.6  /  Alb  4.4  /  TBili  0.3  /  DBili  x   /  AST  16  /  ALT  23  /  AlkPhos  116  04-24    LIVER FUNCTIONS - ( 2022 06:33 )  Alb: 4.4 g/dL / Pro: 6.6 g/dL / ALK PHOS: 116 U/L / ALT: 23 U/L / AST: 16 U/L / GGT: x             Culture - Urine (collected 2022 14:56)  Source: Clean Catch Clean Catch (Midstream)  Final Report (2022 16:54):    >=3 organisms. Probable collection contamination.        Diagnostic Studies: see sunrise for full report         HPI:TORRIE SHETH is a 76 F PMH HTN, T2DM, diverticular bleeding  with visible vessel s/p clips in sigmoid colon with NB internal hemorrhoids at NS by Dr. Self, ?h/o diverticulitis, asthma, cerebral aneurysm s/p stent/coiling (ASA/plavix, last plavix was , on omeprazole 40 bid), CVA, anxiety, hypothyroid, basal cell ca s/p excision, R breast ca s/p lumpectomy/chemo/RT, p/w rectal bleeding. Pt states she woke up the morning of presentation around 4 AM, and noticed blood in her BM which she describes as brown stool but with surrounding red blood and bloody water. No abdominal pain/rectal pain, n/v/d or constipation, no black BMs, no heartburn, f/c. She has been HDS with hgb wnl with elevated BUN and Cr wnl upon admission. Patient now reporting that the bleeding has slowed. However, YUE revealed dark burgundy-maroon colored blood w/o stool. CTA  revealed diverticulosis with a periampullary duodenal diverticulum w/o active bleeding. Patient reporting h/o EGD many years ago for which she states was normal and does not recall the initial indication.     PMHX/PSHX:  Breast CA    Aneurysm    Vertigo    Hearing loss    Basal cell carcinoma    Diverticulitis    CVA (cerebral vascular accident)    Shoulder disorder    Benign intracranial hypertension    Fibromyalgia    Chronic fatigue    Lumbar disc disease    Kidney stones    HTN (hypertension)    DM (diabetes mellitus)    Anxiety    Depression    Reflux esophagitis    Asthma    History of D&amp;C    H/O:     Cerebral aneurysm    Basal cell carcinoma    H/O shoulder surgery    H/O tubal ligation    History of kidney stones      Allergies:  Augmentin (Rash)  Ceclor (Other)  codeine (Nausea)  Cytotec (Unknown)  Entex (Other)  Norflex (Other)  penicillin (Urticaria; Rash)  ramipril (Other)  shellfish (Rash)  strawberry (Rash)  sulfites - nausea/ gi upset (Other)  tetracycline (Other)      Home Medications: reviewed  Hospital Medications:  acetaminophen     Tablet .. 650 milliGRAM(s) Oral every 6 hours PRN  amLODIPine   Tablet 5 milliGRAM(s) Oral daily  aspirin enteric coated 81 milliGRAM(s) Oral three times a day  bisacodyl 10 milliGRAM(s) Oral at bedtime  dextrose 5%. 1000 milliLiter(s) IV Continuous <Continuous>  dextrose 5%. 1000 milliLiter(s) IV Continuous <Continuous>  dextrose 50% Injectable 25 Gram(s) IV Push once  dextrose 50% Injectable 12.5 Gram(s) IV Push once  dextrose 50% Injectable 25 Gram(s) IV Push once  dextrose Oral Gel 15 Gram(s) Oral once PRN  diazepam    Tablet 5 milliGRAM(s) Oral at bedtime  diazepam    Tablet 2.5 milliGRAM(s) Oral daily  diazepam    Tablet 2.5 milliGRAM(s) Oral daily  glucagon  Injectable 1 milliGRAM(s) IntraMuscular once  insulin glargine Injectable (LANTUS) 15 Unit(s) SubCutaneous at bedtime  insulin lispro (ADMELOG) corrective regimen sliding scale   SubCutaneous three times a day before meals  insulin lispro (ADMELOG) corrective regimen sliding scale   SubCutaneous at bedtime  loratadine 10 milliGRAM(s) Oral daily  losartan 100 milliGRAM(s) Oral daily  melatonin 3 milliGRAM(s) Oral at bedtime PRN  montelukast 10 milliGRAM(s) Oral at bedtime  pantoprazole    Tablet 40 milliGRAM(s) Oral two times a day  traZODone 100 milliGRAM(s) Oral at bedtime      Social History:   Tob: Denies  EtOH: Denies  Illicit Drugs: Denies    Family history:  No pertinent family history in first degree relatives    Family history of CVA      ROS:   Complete and normal except as mentioned above.    PHYSICAL EXAM:   Vital Signs:  Vital Signs Last 24 Hrs  T(C): 36.7 (2022 18:05), Max: 36.9 (2022 21:13)  T(F): 98.1 (2022 18:05), Max: 98.5 (2022 21:13)  HR: 82 (2022 18:05) (82 - 88)  BP: 129/74 (2022 18:05) (126/72 - 136/76)  BP(mean): --  RR: 18 (2022 18:05) (18 - 20)  SpO2: 97% (2022 18:05) (96% - 97%)  Daily     Daily     GENERAL: no acute distress  NEURO: alert  HEENT: anicteric sclera, no conjunctival pallor appreciated  CHEST: no respiratory distress, no accessory muscle use  CARDIAC: regular rate   ABDOMEN: soft, non-tender, non-distended, no rebound or guarding  YUE: dark burgundy-maroon colored blood  EXTREMITIES: warm, well perfused   SKIN: no lesions noted    LABS: reviewed                        12.6   6.03  )-----------( 273      ( 2022 06:38 )             39.5     04-24    143  |  105  |  22  ----------------------------<  124<H>  3.8   |  24  |  0.76    Ca    9.7      2022 06:33  Phos  3.5     04-24  Mg     2.2     04-24    TPro  6.6  /  Alb  4.4  /  TBili  0.3  /  DBili  x   /  AST  16  /  ALT  23  /  AlkPhos  116  04-24    LIVER FUNCTIONS - ( 2022 06:33 )  Alb: 4.4 g/dL / Pro: 6.6 g/dL / ALK PHOS: 116 U/L / ALT: 23 U/L / AST: 16 U/L / GGT: x             Culture - Urine (collected 2022 14:56)  Source: Clean Catch Clean Catch (Midstream)  Final Report (2022 16:54):    >=3 organisms. Probable collection contamination.        Diagnostic Studies: see sunrise for full report

## 2022-04-24 NOTE — PROGRESS NOTE ADULT - PROBLEM SELECTOR PLAN 7
- DVT PPx: Lovenox  - Diet: CC for now >> transition to clears 4/24  - PT unnecessary, pt ambulatory > encourage to walk  - Full Code. - DVT PPx: SCDs  - Diet: CC for now >> transition to clears 4/24  - PT unnecessary, pt ambulatory > encourage to walk  - Full Code.

## 2022-04-25 ENCOUNTER — TRANSCRIPTION ENCOUNTER (OUTPATIENT)
Age: 77
End: 2022-04-25

## 2022-04-25 LAB
ALBUMIN SERPL ELPH-MCNC: 4.6 G/DL — SIGNIFICANT CHANGE UP (ref 3.3–5)
ALP BLD-CCNC: 135 IU/L
ALP BONE CFR SERPL: 33 %
ALP INTEST CFR SERPL: 2 %
ALP LIVER CFR SERPL: 65 %
ALP SERPL-CCNC: 127 U/L — HIGH (ref 40–120)
ALT FLD-CCNC: 28 U/L — SIGNIFICANT CHANGE UP (ref 10–45)
ANION GAP SERPL CALC-SCNC: 15 MMOL/L — SIGNIFICANT CHANGE UP (ref 5–17)
ANION GAP SERPL CALC-SCNC: 18 MMOL/L — HIGH (ref 5–17)
AST SERPL-CCNC: 17 U/L — SIGNIFICANT CHANGE UP (ref 10–40)
BILIRUB SERPL-MCNC: 0.3 MG/DL — SIGNIFICANT CHANGE UP (ref 0.2–1.2)
BUN SERPL-MCNC: 18 MG/DL — SIGNIFICANT CHANGE UP (ref 7–23)
BUN SERPL-MCNC: 19 MG/DL — SIGNIFICANT CHANGE UP (ref 7–23)
CALCIUM SERPL-MCNC: 10.1 MG/DL — SIGNIFICANT CHANGE UP (ref 8.4–10.5)
CALCIUM SERPL-MCNC: 9.9 MG/DL — SIGNIFICANT CHANGE UP (ref 8.4–10.5)
CHLORIDE SERPL-SCNC: 105 MMOL/L — SIGNIFICANT CHANGE UP (ref 96–108)
CHLORIDE SERPL-SCNC: 106 MMOL/L — SIGNIFICANT CHANGE UP (ref 96–108)
CO2 SERPL-SCNC: 22 MMOL/L — SIGNIFICANT CHANGE UP (ref 22–31)
CO2 SERPL-SCNC: 23 MMOL/L — SIGNIFICANT CHANGE UP (ref 22–31)
CREAT SERPL-MCNC: 0.76 MG/DL — SIGNIFICANT CHANGE UP (ref 0.5–1.3)
CREAT SERPL-MCNC: 0.8 MG/DL — SIGNIFICANT CHANGE UP (ref 0.5–1.3)
EGFR: 76 ML/MIN/1.73M2 — SIGNIFICANT CHANGE UP
EGFR: 81 ML/MIN/1.73M2 — SIGNIFICANT CHANGE UP
GLUCOSE BLDC GLUCOMTR-MCNC: 185 MG/DL — HIGH (ref 70–99)
GLUCOSE BLDC GLUCOMTR-MCNC: 189 MG/DL — HIGH (ref 70–99)
GLUCOSE BLDC GLUCOMTR-MCNC: 225 MG/DL — HIGH (ref 70–99)
GLUCOSE BLDC GLUCOMTR-MCNC: 228 MG/DL — HIGH (ref 70–99)
GLUCOSE SERPL-MCNC: 174 MG/DL — HIGH (ref 70–99)
GLUCOSE SERPL-MCNC: 207 MG/DL — HIGH (ref 70–99)
HCT VFR BLD CALC: 41.9 % — SIGNIFICANT CHANGE UP (ref 34.5–45)
HGB BLD-MCNC: 13.3 G/DL — SIGNIFICANT CHANGE UP (ref 11.5–15.5)
IRON SATN MFR SERPL: 10 %
IRON SERPL-MCNC: 40 UG/DL
MAGNESIUM SERPL-MCNC: 2 MG/DL — SIGNIFICANT CHANGE UP (ref 1.6–2.6)
MCHC RBC-ENTMCNC: 26.2 PG — LOW (ref 27–34)
MCHC RBC-ENTMCNC: 31.7 GM/DL — LOW (ref 32–36)
MCV RBC AUTO: 82.6 FL — SIGNIFICANT CHANGE UP (ref 80–100)
NRBC # BLD: 0 /100 WBCS — SIGNIFICANT CHANGE UP (ref 0–0)
PHOSPHATE SERPL-MCNC: 3.6 MG/DL — SIGNIFICANT CHANGE UP (ref 2.5–4.5)
PLATELET # BLD AUTO: 311 K/UL — SIGNIFICANT CHANGE UP (ref 150–400)
POTASSIUM SERPL-MCNC: 4.2 MMOL/L — SIGNIFICANT CHANGE UP (ref 3.5–5.3)
POTASSIUM SERPL-MCNC: 4.2 MMOL/L — SIGNIFICANT CHANGE UP (ref 3.5–5.3)
POTASSIUM SERPL-SCNC: 4.2 MMOL/L — SIGNIFICANT CHANGE UP (ref 3.5–5.3)
POTASSIUM SERPL-SCNC: 4.2 MMOL/L — SIGNIFICANT CHANGE UP (ref 3.5–5.3)
PROT SERPL-MCNC: 7 G/DL — SIGNIFICANT CHANGE UP (ref 6–8.3)
RBC # BLD: 5.07 M/UL — SIGNIFICANT CHANGE UP (ref 3.8–5.2)
RBC # FLD: 14.9 % — HIGH (ref 10.3–14.5)
SODIUM SERPL-SCNC: 144 MMOL/L — SIGNIFICANT CHANGE UP (ref 135–145)
SODIUM SERPL-SCNC: 145 MMOL/L — SIGNIFICANT CHANGE UP (ref 135–145)
TIBC SERPL-MCNC: 397 UG/DL
UIBC SERPL-MCNC: 357 UG/DL
WBC # BLD: 5.81 K/UL — SIGNIFICANT CHANGE UP (ref 3.8–10.5)
WBC # FLD AUTO: 5.81 K/UL — SIGNIFICANT CHANGE UP (ref 3.8–10.5)

## 2022-04-25 PROCEDURE — 45378 DIAGNOSTIC COLONOSCOPY: CPT | Mod: GC

## 2022-04-25 PROCEDURE — 99232 SBSQ HOSP IP/OBS MODERATE 35: CPT | Mod: GC

## 2022-04-25 DEVICE — NET RETRV ROT ROTH 2.5MMX230CM: Type: IMPLANTABLE DEVICE | Status: FUNCTIONAL

## 2022-04-25 RX ORDER — FAMOTIDINE 10 MG/ML
1 INJECTION INTRAVENOUS
Qty: 0 | Refills: 0 | DISCHARGE

## 2022-04-25 RX ORDER — CLOPIDOGREL BISULFATE 75 MG/1
75 TABLET, FILM COATED ORAL DAILY
Refills: 0 | Status: DISCONTINUED | OUTPATIENT
Start: 2022-04-25 | End: 2022-04-26

## 2022-04-25 RX ORDER — INSULIN DEGLUDEC 100 U/ML
26 INJECTION, SOLUTION SUBCUTANEOUS
Qty: 0 | Refills: 0 | DISCHARGE

## 2022-04-25 RX ORDER — SODIUM CHLORIDE 9 MG/ML
500 INJECTION INTRAMUSCULAR; INTRAVENOUS; SUBCUTANEOUS
Refills: 0 | Status: DISCONTINUED | OUTPATIENT
Start: 2022-04-25 | End: 2022-04-26

## 2022-04-25 RX ORDER — INSULIN DEGLUDEC INJECTION 100 U/ML
100 INJECTION, SOLUTION SUBCUTANEOUS
Qty: 20 | Refills: 0 | Status: COMPLETED | COMMUNITY
Start: 2021-03-18 | End: 2022-04-25

## 2022-04-25 RX ADMIN — Medication 1: at 18:28

## 2022-04-25 RX ADMIN — LOSARTAN POTASSIUM 100 MILLIGRAM(S): 100 TABLET, FILM COATED ORAL at 06:05

## 2022-04-25 RX ADMIN — Medication 5 MILLIGRAM(S): at 21:08

## 2022-04-25 RX ADMIN — Medication 2: at 08:17

## 2022-04-25 RX ADMIN — MONTELUKAST 10 MILLIGRAM(S): 4 TABLET, CHEWABLE ORAL at 21:08

## 2022-04-25 RX ADMIN — INSULIN GLARGINE 15 UNIT(S): 100 INJECTION, SOLUTION SUBCUTANEOUS at 21:31

## 2022-04-25 RX ADMIN — Medication 2.5 MILLIGRAM(S): at 08:17

## 2022-04-25 RX ADMIN — AMLODIPINE BESYLATE 5 MILLIGRAM(S): 2.5 TABLET ORAL at 06:05

## 2022-04-25 RX ADMIN — Medication 81 MILLIGRAM(S): at 06:05

## 2022-04-25 RX ADMIN — PANTOPRAZOLE SODIUM 40 MILLIGRAM(S): 20 TABLET, DELAYED RELEASE ORAL at 06:05

## 2022-04-25 RX ADMIN — CLOPIDOGREL BISULFATE 75 MILLIGRAM(S): 75 TABLET, FILM COATED ORAL at 18:28

## 2022-04-25 RX ADMIN — PANTOPRAZOLE SODIUM 40 MILLIGRAM(S): 20 TABLET, DELAYED RELEASE ORAL at 18:28

## 2022-04-25 RX ADMIN — Medication 1: at 11:49

## 2022-04-25 RX ADMIN — Medication 100 MILLIGRAM(S): at 21:08

## 2022-04-25 RX ADMIN — Medication 81 MILLIGRAM(S): at 21:08

## 2022-04-25 NOTE — DISCHARGE NOTE PROVIDER - CARE PROVIDER_API CALL
Vilma Reyes)  Geriatric Medicine; [facility]; [facility] Medicine  [address]  [place] [address]  Phone: [phone]  Fax: [phone]  Established Patient  Follow Up Time: 1-3 days

## 2022-04-25 NOTE — DISCHARGE NOTE PROVIDER - NSDCFUSCHEDAPPT_GEN_ALL_CORE_FT
TORRIE SHETH ; 06/09/2022 ; [facility]  TORRIE SHETH ; 06/16/2022 ; [facility]  [address]

## 2022-04-25 NOTE — DISCHARGE NOTE PROVIDER - NSDCCPCAREPLAN_GEN_ALL_CORE_FT
PRINCIPAL DISCHARGE DIAGNOSIS  Diagnosis: Colonic diverticular disease  Assessment and Plan of Treatment: You came to the hospital with a colonic bleed. You were hemodynamically stable during your admission. Your colonoscopy showed diverticuli in many parts of the colon and internal hemorrhoids. Please add high fiber to your diet and return to the hospital if you experience bleeding and feel dizzy or have low blood pressure. Please follow up with your gastroenterologist within 1 week of hospital discharge and your primary care physician within 1-3 days of hospital discharge. We are holding your plavix. Please ask your neurologist about restarting this when you leave the hospital.       PRINCIPAL DISCHARGE DIAGNOSIS  Diagnosis: Colonic diverticular disease  Assessment and Plan of Treatment: You came to the hospital with a colonic bleed. You were hemodynamically stable during your admission. Your colonoscopy showed diverticuli in many parts of the colon and internal hemorrhoids. Please add high fiber to your diet and return to the hospital if you experience bleeding. Please follow up with your gastroenterologist within 1 week of hospital discharge and your primary care physician within 1-3 days of hospital discharge.  A message was left with your neurologist about whether you need to be on both aspirin and Plavix. Please contact       PRINCIPAL DISCHARGE DIAGNOSIS  Diagnosis: Colonic diverticular disease  Assessment and Plan of Treatment: You came to the hospital with a colonic bleed. You were hemodynamically stable during your admission. Your colonoscopy showed diverticuli in many parts of the colon and internal hemorrhoids. Please add high fiber to your diet and return to the hospital if you experience bleeding. Please follow up with your gastroenterologist within 1 week of hospital discharge and your primary care physician within 1-3 days of hospital discharge.  A message was left with your neurologist about whether you need to be on both aspirin and Plavix. Please contact your neurologist tomorrow.      SECONDARY DISCHARGE DIAGNOSES  Diagnosis: DM (diabetes mellitus), type 2  Assessment and Plan of Treatment: please follow-up with your Endocrinologist within 1 week of discharge. check your finger sticks three times a day before meals and call your Endocrinologist for finger sticks less than 100 or greater than 180    Diagnosis: Lung nodule  Assessment and Plan of Treatment: please follow-up with your primary care doctor within 2 weeks of discharge

## 2022-04-25 NOTE — PROGRESS NOTE ADULT - SUBJECTIVE AND OBJECTIVE BOX
PROGRESS NOTE:     Patient is a 76y old  Female who presents with a chief complaint of blood in stool (2022 19:27)      SUBJECTIVE / OVERNIGHT EVENTS:    ADDITIONAL REVIEW OF SYSTEMS:    MEDICATIONS  (STANDING):  amLODIPine   Tablet 5 milliGRAM(s) Oral daily  aspirin enteric coated 81 milliGRAM(s) Oral three times a day  bisacodyl 10 milliGRAM(s) Oral at bedtime  dextrose 5%. 1000 milliLiter(s) (100 mL/Hr) IV Continuous <Continuous>  dextrose 5%. 1000 milliLiter(s) (50 mL/Hr) IV Continuous <Continuous>  dextrose 50% Injectable 25 Gram(s) IV Push once  dextrose 50% Injectable 12.5 Gram(s) IV Push once  dextrose 50% Injectable 25 Gram(s) IV Push once  diazepam    Tablet 5 milliGRAM(s) Oral at bedtime  diazepam    Tablet 2.5 milliGRAM(s) Oral daily  diazepam    Tablet 2.5 milliGRAM(s) Oral daily  glucagon  Injectable 1 milliGRAM(s) IntraMuscular once  insulin glargine Injectable (LANTUS) 15 Unit(s) SubCutaneous at bedtime  insulin lispro (ADMELOG) corrective regimen sliding scale   SubCutaneous three times a day before meals  insulin lispro (ADMELOG) corrective regimen sliding scale   SubCutaneous at bedtime  loratadine 10 milliGRAM(s) Oral daily  losartan 100 milliGRAM(s) Oral daily  montelukast 10 milliGRAM(s) Oral at bedtime  pantoprazole    Tablet 40 milliGRAM(s) Oral two times a day  traZODone 100 milliGRAM(s) Oral at bedtime    MEDICATIONS  (PRN):  acetaminophen     Tablet .. 650 milliGRAM(s) Oral every 6 hours PRN Temp greater or equal to 38C (100.4F), Mild Pain (1 - 3)  dextrose Oral Gel 15 Gram(s) Oral once PRN Blood Glucose LESS THAN 70 milliGRAM(s)/deciliter  melatonin 3 milliGRAM(s) Oral at bedtime PRN Insomnia      CAPILLARY BLOOD GLUCOSE      POCT Blood Glucose.: 198 mg/dL (2022 21:31)  POCT Blood Glucose.: 230 mg/dL (2022 17:36)  POCT Blood Glucose.: 178 mg/dL (2022 13:20)  POCT Blood Glucose.: 173 mg/dL (2022 08:04)    I&O's Summary    2022 07:01  -  2022 07:00  --------------------------------------------------------  IN: 600 mL / OUT: 0 mL / NET: 600 mL        PHYSICAL EXAM:  Vital Signs Last 24 Hrs  T(C): 36.8 (2022 05:59), Max: 36.8 (2022 05:59)  T(F): 98.2 (2022 05:59), Max: 98.2 (2022 05:59)  HR: 77 (2022 05:59) (77 - 83)  BP: 145/75 (2022 05:59) (120/73 - 147/70)  BP(mean): --  RR: 18 (2022 05:59) (18 - 20)  SpO2: 96% (2022 05:59) (96% - 98%)    CONSTITUTIONAL: NAD, well-developed  RESPIRATORY: Normal respiratory effort; lungs are clear to auscultation bilaterally  CARDIOVASCULAR: Regular rate and rhythm, normal S1 and S2, no murmur/rub/gallop; No lower extremity edema; Peripheral pulses are 2+ bilaterally  ABDOMEN: Nontender to palpation, normoactive bowel sounds, no rebound/guarding; No hepatosplenomegaly  MUSCULOSKELETAL: no clubbing or cyanosis of digits; no joint swelling or tenderness to palpation  PSYCH: A+O to person, place, and time; affect appropriate    LABS:                        12.6   6.03  )-----------( 273      ( 2022 06:38 )             39.5     04-24    143  |  105  |  22  ----------------------------<  124<H>  3.8   |  24  |  0.76    Ca    9.7      2022 06:33  Phos  3.5     04-24  Mg     2.2     04-24    TPro  6.6  /  Alb  4.4  /  TBili  0.3  /  DBili  x   /  AST  16  /  ALT  23  /  AlkPhos  116  04-24          Urinalysis Basic - ( 2022 11:37 )    Color: Colorless / Appearance: Clear / S.044 / pH: x  Gluc: x / Ketone: Negative  / Bili: Negative / Urobili: Negative   Blood: x / Protein: Negative / Nitrite: Negative   Leuk Esterase: Negative / RBC: 2 /hpf / WBC 0 /HPF   Sq Epi: x / Non Sq Epi: 1 /hpf / Bacteria: Negative        Culture - Urine (collected 2022 14:56)  Source: Clean Catch Clean Catch (Midstream)  Final Report (2022 16:54):    >=3 organisms. Probable collection contamination.        RADIOLOGY & ADDITIONAL TESTS:  Results Reviewed:   Imaging Personally Reviewed:  Electrocardiogram Personally Reviewed:    COORDINATION OF CARE:  Care Discussed with Consultants/Other Providers [Y/N]:  Prior or Outpatient Records Reviewed [Y/N]:      ******************************  Authored By: Kleber Sow MD PGY1  Internal Medicine  Pager: [phone]  MS Teams  ******************************   PROGRESS NOTE:     Patient is a 76y old  Female who presents with a chief complaint of blood in stool (2022 19:27)      SUBJECTIVE / OVERNIGHT EVENTS: No acute issues overnight per night team. Pt completed bowel prep overnight for colonoscopy today, says she tolerated it fine although challenging. Pt had reported a dark stool yesterday morning on my convesation with her, denies any dark/blood stools since - most recent one was clear. Pt denies any other sx's, will follow after procedure. Pt in agreement with plan, updated  at bedside.    ADDITIONAL REVIEW OF SYSTEMS:    MEDICATIONS  (STANDING):  amLODIPine   Tablet 5 milliGRAM(s) Oral daily  aspirin enteric coated 81 milliGRAM(s) Oral three times a day  bisacodyl 10 milliGRAM(s) Oral at bedtime  dextrose 5%. 1000 milliLiter(s) (100 mL/Hr) IV Continuous <Continuous>  dextrose 5%. 1000 milliLiter(s) (50 mL/Hr) IV Continuous <Continuous>  dextrose 50% Injectable 25 Gram(s) IV Push once  dextrose 50% Injectable 12.5 Gram(s) IV Push once  dextrose 50% Injectable 25 Gram(s) IV Push once  diazepam    Tablet 5 milliGRAM(s) Oral at bedtime  diazepam    Tablet 2.5 milliGRAM(s) Oral daily  diazepam    Tablet 2.5 milliGRAM(s) Oral daily  glucagon  Injectable 1 milliGRAM(s) IntraMuscular once  insulin glargine Injectable (LANTUS) 15 Unit(s) SubCutaneous at bedtime  insulin lispro (ADMELOG) corrective regimen sliding scale   SubCutaneous three times a day before meals  insulin lispro (ADMELOG) corrective regimen sliding scale   SubCutaneous at bedtime  loratadine 10 milliGRAM(s) Oral daily  losartan 100 milliGRAM(s) Oral daily  montelukast 10 milliGRAM(s) Oral at bedtime  pantoprazole    Tablet 40 milliGRAM(s) Oral two times a day  traZODone 100 milliGRAM(s) Oral at bedtime    MEDICATIONS  (PRN):  acetaminophen     Tablet .. 650 milliGRAM(s) Oral every 6 hours PRN Temp greater or equal to 38C (100.4F), Mild Pain (1 - 3)  dextrose Oral Gel 15 Gram(s) Oral once PRN Blood Glucose LESS THAN 70 milliGRAM(s)/deciliter  melatonin 3 milliGRAM(s) Oral at bedtime PRN Insomnia      CAPILLARY BLOOD GLUCOSE      POCT Blood Glucose.: 198 mg/dL (2022 21:31)  POCT Blood Glucose.: 230 mg/dL (2022 17:36)  POCT Blood Glucose.: 178 mg/dL (2022 13:20)  POCT Blood Glucose.: 173 mg/dL (2022 08:04)    I&O's Summary    2022 07:01  -  2022 07:00  --------------------------------------------------------  IN: 600 mL / OUT: 0 mL / NET: 600 mL        PHYSICAL EXAM:  Vital Signs Last 24 Hrs  T(C): 36.8 (2022 05:59), Max: 36.8 (2022 05:59)  T(F): 98.2 (2022 05:59), Max: 98.2 (2022 05:59)  HR: 77 (2022 05:59) (77 - 83)  BP: 145/75 (2022 05:59) (120/73 - 147/70)  BP(mean): --  RR: 18 (2022 05:59) (18 - 20)  SpO2: 96% (2022 05:59) (96% - 98%)    GENERAL: NAD, lying in bed comfortably  HEAD:  Atraumatic, Normocephalic  EYES: EOMI, PERRLA, conjunctiva and sclera clear  ENT: Moist mucous membranes  NECK: Supple, No JVD  CHEST/LUNG: Clear to auscultation bilaterally; No rales, rhonchi, wheezing, or rubs. Unlabored respirations  HEART: Regular rate and rhythm; No murmurs, rubs, or gallops  ABDOMEN: BSx4; Soft, nondistended, no ttp  EXTREMITIES:  2+ Peripheral Pulses, brisk capillary refill. No clubbing, cyanosis, or edema  NERVOUS SYSTEM:  A&Ox3, no focal deficits   SKIN: No rashes or lesions  Psych: Normal speech, normal behavior, normal affect    LABS:                        12.6   6.03  )-----------( 273      ( 2022 06:38 )             39.5     04-24    143  |  105  |  22  ----------------------------<  124<H>  3.8   |  24  |  0.76    Ca    9.7      2022 06:33  Phos  3.5     04-24  Mg     2.2     04-24    TPro  6.6  /  Alb  4.4  /  TBili  0.3  /  DBili  x   /  AST  16  /  ALT  23  /  AlkPhos  116  04-24          Urinalysis Basic - ( 2022 11:37 )    Color: Colorless / Appearance: Clear / S.044 / pH: x  Gluc: x / Ketone: Negative  / Bili: Negative / Urobili: Negative   Blood: x / Protein: Negative / Nitrite: Negative   Leuk Esterase: Negative / RBC: 2 /hpf / WBC 0 /HPF   Sq Epi: x / Non Sq Epi: 1 /hpf / Bacteria: Negative        Culture - Urine (collected 2022 14:56)  Source: Clean Catch Clean Catch (Midstream)  Final Report (2022 16:54):    >=3 organisms. Probable collection contamination.        RADIOLOGY & ADDITIONAL TESTS:  Results Reviewed:   Imaging Personally Reviewed:  Electrocardiogram Personally Reviewed:    COORDINATION OF CARE:  Care Discussed with Consultants/Other Providers [Y/N]:  Prior or Outpatient Records Reviewed [Y/N]:      ******************************  Authored By: Kleber Sow MD PGY1  Internal Medicine  Pager: [phone]  MS Teams  ******************************

## 2022-04-25 NOTE — PROGRESS NOTE ADULT - PROBLEM SELECTOR PLAN 2
- Pt home regimen: jardiance 10mg qd, metformin 500 BID, symlin 120mcg TID, tresiba 26-30 U/day, victoza 1.8mg qd  - UA +1000 glucose  - Will start pt on Lantus 24U (80% of 30U triseba) + ISS  - CC diet restart home regimen on discharge  - CC diet

## 2022-04-25 NOTE — DISCHARGE NOTE PROVIDER - NSDCMRMEDTOKEN_GEN_ALL_CORE_FT
amLODIPine 5 mg oral tablet: 1 tab(s) orally once a day in morning  Aspirin Low Dose 81 mg oral delayed release tablet: 1 tab(s) orally 3 times a day  Bentyl 20 mg oral tablet: 1 tab(s) orally 4 times a day, As Needed  clopidogrel 75 mg oral tablet: 1 tab(s) orally once a day with lunch  diazepam 5 mg oral tablet: 1 tab(s) orally 2 times a day  exemestane 25 mg oral tablet: 1 tab(s) orally once a day  Jardiance 10 mg oral tablet: 1 tab(s) orally once a day (in the morning)  losartan 100 mg oral tablet: 1 tab(s) orally once a day in morning  metFORMIN 500 mg oral tablet, extended release: 1 tab(s) orally 2 times a day  montelukast 10 mg oral tablet: 1 tab(s) orally once a day (at bedtime)  pantoprazole 40 mg oral delayed release tablet: 1 tab(s) orally 2 times a day  Pepcid 20 mg oral tablet: 1 tab(s) orally 2 times a day  SymlinPen 120 subcutaneous solution: 120 microgram(s) subcutaneous 3 times a day - 1/2/hour before meals  Tigan 300 mg oral capsule: 1 cap(s) orally every 6 hours  traZODone 100 mg oral tablet: 1 tab(s) orally once a day (at bedtime)  Tresiba 100 units/mL subcutaneous solution: 26 unit(s) subcutaneous once a day (at bedtime)  Victoza 18 mg/3 mL subcutaneous solution: 1.8 milligram(s) subcutaneous once a day- in morning before breakfast  Xyzal 5 mg oral tablet: 1 tab(s) orally once a day (in the evening)   amLODIPine 5 mg oral tablet: 1 tab(s) orally once a day in morning  Aspirin Low Dose 81 mg oral delayed release tablet: 1 tab(s) orally 3 times a day  Bentyl 20 mg oral tablet: 1 tab(s) orally 4 times a day, As Needed  diazepam 5 mg oral tablet: 1 tab(s) orally 2 times a day  exemestane 25 mg oral tablet: 1 tab(s) orally once a day  Jardiance 10 mg oral tablet: 1 tab(s) orally once a day (in the morning)  losartan 100 mg oral tablet: 1 tab(s) orally once a day in morning  metFORMIN 500 mg oral tablet, extended release: 1 tab(s) orally 2 times a day  montelukast 10 mg oral tablet: 1 tab(s) orally once a day (at bedtime)  pantoprazole 40 mg oral delayed release tablet: 1 tab(s) orally 2 times a day  Pepcid 20 mg oral tablet: 1 tab(s) orally 2 times a day  SymlinPen 120 subcutaneous solution: 120 microgram(s) subcutaneous 3 times a day - 1/2/hour before meals  Tigan 300 mg oral capsule: 1 cap(s) orally every 6 hours  traZODone 100 mg oral tablet: 1 tab(s) orally once a day (at bedtime)  Tresiba 100 units/mL subcutaneous solution: 26 unit(s) subcutaneous once a day (at bedtime)  Victoza 18 mg/3 mL subcutaneous solution: 1.8 milligram(s) subcutaneous once a day- in morning before breakfast  Xyzal 5 mg oral tablet: 1 tab(s) orally once a day (in the evening)   amLODIPine 5 mg oral tablet: 1 tab(s) orally once a day in morning  Aspirin Low Dose 81 mg oral delayed release tablet: 1 tab(s) orally 3 times a day  diazepam 5 mg oral tablet: 0.5 tab(s) orally once a day in the morning  diazePAM 5 mg oral tablet: 0.5 tab(s) orally once a day in the afternoon  diazePAM 5 mg oral tablet: 1 tab(s) orally once a day in the evening  exemestane 25 mg oral tablet: 1 tab(s) orally once a day  Jardiance 10 mg oral tablet: 1 tab(s) orally once a day (in the morning)  losartan 100 mg oral tablet: 1 tab(s) orally once a day in morning  metFORMIN 500 mg oral tablet, extended release: 1 tab(s) orally 2 times a day  montelukast 10 mg oral tablet: 1 tab(s) orally once a day (at bedtime)  pantoprazole 40 mg oral delayed release tablet: 1 tab(s) orally 2 times a day  Pepcid 20 mg oral tablet: 2 tab(s) orally once a day (at bedtime)  Plavix 75 mg oral tablet: 1 tab(s) orally once a day  SymlinPen 120 subcutaneous solution: 120 microgram(s) subcutaneous 3 times a day - 1/2/hour before meals  traZODone 100 mg oral tablet: 1 tab(s) orally once a day (at bedtime)  Tresiba 100 units/mL subcutaneous solution: 28 unit(s) subcutaneous once a day (at bedtime)  Victoza 18 mg/3 mL subcutaneous solution: 1.8 milligram(s) subcutaneous once a day- in morning before breakfast  Xyzal 5 mg oral tablet: 1 tab(s) orally once a day (in the evening)

## 2022-04-25 NOTE — PRE PROCEDURE NOTE - PRE PROCEDURE EVALUATION
Attending Physician:    Dr. Andrade                        Procedure:   Colonoscopy    Indication for Procedure:   Concern for Malignancy  ________________________________________________________  PAST MEDICAL & SURGICAL HISTORY:    Breast CA-right  Aneurysm-cerebral  Vertigo  Hearing loss-left  Basal cell carcinoma  Diverticulitis  CVA (cerebral vascular accident)   Shoulder disorder-right bone spur  Benign intracranial hypertension- r/t "tetracyclines"  Fibromyalgia  Chronic fatigue  Lumbar disc disease  Kidney stones  HTN (hypertension)  DM (diabetes mellitus)  Anxiety  Depression  Reflux esophagitis  Asthma    History of D&C  H/O:   Cerebral aneurysm repair with coil and stent -   Basal cell carcinoma-removed - left eyelid- 3/15  H/O shoulder surgery-right - 2001  H/O tubal ligation  History of kidney stones       ALLERGIES:  Augmentin (Rash)  Ceclor (Other)  codeine (Nausea)  Cytotec (Unknown)  Entex (Other)  Norflex (Other)  penicillin (Urticaria; Rash)  ramipril (Other)  shellfish (Rash)  strawberry (Rash)  sulfites - nausea/ gi upset (Other)  tetracycline (Other)    HOME MEDICATIONS:  amLODIPine 5 mg oral tablet: 1 tab(s) orally once a day in morning  Aspirin Low Dose 81 mg oral delayed release tablet: 1 tab(s) orally 3 times a day  Bentyl 20 mg oral tablet: 1 tab(s) orally 4 times a day, As Needed  clopidogrel 75 mg oral tablet: 1 tab(s) orally once a day with lunch  diazepam 5 mg oral tablet: 1 tab(s) orally 2 times a day  exemestane 25 mg oral tablet: 1 tab(s) orally once a day  Jardiance 10 mg oral tablet: 1 tab(s) orally once a day (in the morning)  losartan 100 mg oral tablet: 1 tab(s) orally once a day in morning  metFORMIN 500 mg oral tablet, extended release: 1 tab(s) orally 2 times a day  montelukast 10 mg oral tablet: 1 tab(s) orally once a day (at bedtime)  pantoprazole 40 mg oral delayed release tablet: 1 tab(s) orally 2 times a day  Pepcid 20 mg oral tablet: 1 tab(s) orally 2 times a day  SymlinPen 120 subcutaneous solution: 120 microgram(s) subcutaneous 3 times a day - 1/2/hour before meals  Tigan 300 mg oral capsule: 1 cap(s) orally every 6 hours  traZODone 100 mg oral tablet: 1 tab(s) orally once a day (at bedtime)  Tresiba 100 units/mL subcutaneous solution: 26 unit(s) subcutaneous once a day (at bedtime)  Victoza 18 mg/3 mL subcutaneous solution: 1.8 milligram(s) subcutaneous once a day- in morning before breakfast  Xyzal 5 mg oral tablet: 1 tab(s) orally once a day (in the evening)    AICD/PPM: [ ] yes   [X ] no    PERTINENT LAB DATA:                        13.3   5.81  )-----------( 311      ( 2022 07:29 )             41.9         145  |  105  |  18  ----------------------------<  207<H>  4.2   |  22  |  0.80    Ca    10.1      2022 07:29  Phos  3.6       Mg     2.0       TPro  7.0  /  Alb  4.6  /  TBili  0.3  /  DBili  x   /  AST  17  /  ALT  28  /  AlkPhos  127<H>  25    PHYSICAL EXAMINATION:    Height (cm): 162.6  Weight (kg): 70.3  BMI (kg/m2): 26.6  BSA (m2): 1.76T(C): 36.8  HR: 81  BP: 162/62  RR: 18  SpO2: 94%    Constitutional: NAD    Neck:  No JVD  Respiratory: CTAB/L  Cardiovascular: S1 and S2  Gastrointestinal: BS+, soft, NT/ND  Extremities: No peripheral edema  Neurological: A/O x 4      COMMENTS:    The patient is a suitable candidate for the planned procedure unless box checked [ ]  No, explain:

## 2022-04-25 NOTE — DISCHARGE NOTE PROVIDER - CARE PROVIDERS DIRECT ADDRESSES
[email or web address]

## 2022-04-25 NOTE — DISCHARGE NOTE PROVIDER - HOSPITAL COURSE
76 F PMH HTN, T2DM, colonic diverticulosis (s/p clipping 2 years ago) diverticulitis, asthma, cerebral aneurysm s/p stent/coiling, CVA, anxiety, hypothyroid, basal cell ca s/p excision, R breast ca s/p lumpectomy/chemo/RT, p/w rectal bleeding. Pt presented with blood in her BM (pt on ASA and Plavix). Pt was admitted for similar presentation in 03/2020 and was found to have a diverticular bleed which was clipped. 76 F PMH HTN, T2DM, colonic diverticulosis (s/p clipping 2 years ago) diverticulitis, asthma, cerebral aneurysm s/p stent/coiling, CVA, anxiety, hypothyroid, basal cell ca s/p excision, R breast ca s/p lumpectomy/chemo/RT, p/w rectal bleeding. Pt presented with blood in her BM (pt on ASA and Plavix). Pt was admitted for similar presentation in 03/2020 and was found to have a diverticular bleed which was clipped. Patient was HD stable, Hgb 13.3, CT-A/P w/IV contrast without any GI bleed source. Pt admitted for r/o diverticular/lower GI bleed. Plavix was held for colonoscopy. 76 F PMH HTN, T2DM, colonic diverticulosis (s/p clipping 2 years ago in sigmoid colon with NB internal hemorrhoids by Dr. Self) diverticulitis, asthma, cerebral aneurysm s/p stent/coiling (ASA continued, plavix discontinued 4/22, on omeprazole 40 BID), CVA, anxiety, hypothyroid, basal cell ca s/p excision, R breast ca s/p lumpectomy/chemo/RT, p/w rectal bleeding. Pt states she woke up the morning of presentation around 4 AM, and noticed blood in her BM which she describes as brown stool but with surrounding red blood and bloody water. No abdominal pain/rectal pain, n/v/d or constipation, no black BMs, no heartburn, f/c. She has been HDS with hgb wnl with elevated BUN and Cr wnl upon admission. Patient now reporting that the bleeding has slowed. However, YUE revealed dark burgundy-maroon colored blood w/o stool. CTA 4/23 revealed diverticulosis with a periampullary duodenal diverticulum w/o active bleeding. Patient reporting h/o EGD many years ago for which she states was normal and does not recall the initial indication.     Colonoscopy (4/25) revealed diverticulosis in sigmoid colon, transverse colon and ascending colon and non-bleeding internal hemorrhoids. No specimens were collected. GI recommended a high fiber diet and to get surveillance screening outpatient. Patient is HD stable and ready to be discharged home with no home needs.           76 F PMH HTN, T2DM, colonic diverticulosis (s/p clipping 2 years ago in sigmoid colon with NB internal hemorrhoids by Dr. Self) diverticulitis, asthma, cerebral aneurysm s/p stent/coiling, CVA, anxiety, hypothyroid, basal cell ca s/p excision, R breast ca s/p lumpectomy/chemo/RT, p/w rectal bleeding. CTA 4/23 revealed diverticulosis with a periampullary duodenal diverticulum w/o active bleeding. Patient reporting h/o EGD many years ago for which she states was normal and does not recall the initial indication.     Colonoscopy (4/25) revealed diverticulosis in sigmoid colon, transverse colon and ascending colon and non-bleeding internal hemorrhoids. No specimens were collected. GI recommended a high fiber diet and to get surveillance screening outpatient. Patient is HD stable and ready to be discharged home with no home needs.

## 2022-04-25 NOTE — PROGRESS NOTE ADULT - PROBLEM SELECTOR PLAN 1
- Presenting sx's and hx 2 years ago of similar sx's and diverticular bleed tx with clipping; No active bleeding at this time, hemodynamically stable, Hgb 13.3  - CT-AP w/IV contrast: No definite site of active GI bleeding  - Prior CVA with coiling: Pt on ASA - will continue; Plavix - will hold in anticipation of possible colonoscopy  - T&S, Transfuse Hgb <7  - Monitor stools  - FOBT  - C/w home protonix 40mg BID  - GI consulted >> f/u reccs - Presenting sx's and hx 2 years ago of similar sx's and diverticular bleed tx with clipping; No active bleeding at this time, hemodynamically stable, Hgb 13.3  - CT-AP w/IV contrast: No definite site of active GI bleeding  - Prior CVA with coiling: Pt on ASA - will continue; Plavix - will hold in anticipation of possible colonoscopy  - T&S, Transfuse Hgb <7  - Monitor stools  - FOBT  - C/w home protonix 40mg BID  - GI consulted    = Colonoscopy 4/25    = Diverticulosis in the sigmoid colon, in the transverse colon and in the ascending colon. Significant diverticulosis in sigmoid and tortuous colon. Non-bleeding internal hemorrhoids.    = High fiber diet  - Resume home ASA + Plavix (lifelong) - Presenting sx's and hx 2 years ago of similar sx's and diverticular bleed tx with clipping; No active bleeding at this time  - CT-AP w/IV contrast: No definite site of active GI bleeding  - GI consulted    = Colonoscopy 4/25    = Diverticulosis in the sigmoid colon, in the transverse colon and in the ascending colon. Significant diverticulosis in sigmoid and tortuous colon. Non-bleeding internal hemorrhoids.    = High fiber diet  - Resume home ASA + Plavix (lifelong)

## 2022-04-25 NOTE — PROGRESS NOTE ADULT - PROBLEM SELECTOR PLAN 7
- DVT PPx: SCDs  - Diet: CC for now >> transition to clears 4/24  - PT unnecessary, pt ambulatory > encourage to walk  - Full Code.

## 2022-04-25 NOTE — DISCHARGE NOTE PROVIDER - NSFOLLOWUPCLINICS_GEN_ALL_ED_FT
[facility]  Gastroenterology  [address] [place] [address]  Phone: [phone]  Fax:   Follow Up Time: 1 week

## 2022-04-25 NOTE — CHART NOTE - NSCHARTNOTEFT_GEN_A_CORE
Patient seen at bedside, known to our geriatrics service as her PCP is Dr. Vilma Reyes  Patient is s/p bowel prep overnight and awaiting colonoscopy, today.   also at bedside, all appreciative of visit, however, unclear when colonoscopy happening today and eager to get this done as patient states bleeding has stopped and she desires to go home.    Attempted outreach to endoscopy, per RN, plan is hopefully within next 2 hours.  Attempted to page GI, no answer.    Outreach made to primary team to also discuss ?plavix, given recurrent GI bleed.  May consider neuro involvement to assess risk/benefit given hx of coiling.    Remain available. [phone]  Please page if acute issues.
Reference #: [id number]    Others' Prescriptions  Patient Name: Isabelle Ahmadi Date: 1945  Address: [address] [place] [id number]: Female  Rx Written	Rx Dispensed	Drug	Quantity	Days Supply	Prescriber Name	Prescriber Korin #	Payment Method	Dispenser  01/03/2022	01/03/2022	diazepam 5 mg tablet	120	30	Kalyan Bingham MD	[id number]	[facility]  11/01/2021	11/02/2021	oxycodone hcl 5 mg tablet	15	15	Vilma Reyes MD	[id number]	[facility]  10/08/2021	10/08/2021	diazepam 5 mg tablet	120	30	Kalyan Bingham MD	[id number]	[facility]  08/04/2021	08/04/2021	diazepam 5 mg tablet	120	30	Kalyan Bingham MD	[id number]	[facility]      Starr Ramírez MD, PGY-3  Internal Medicine

## 2022-04-26 ENCOUNTER — TRANSCRIPTION ENCOUNTER (OUTPATIENT)
Age: 77
End: 2022-04-26

## 2022-04-26 VITALS
SYSTOLIC BLOOD PRESSURE: 130 MMHG | TEMPERATURE: 98 F | DIASTOLIC BLOOD PRESSURE: 60 MMHG | OXYGEN SATURATION: 97 % | HEART RATE: 72 BPM | RESPIRATION RATE: 20 BRPM

## 2022-04-26 LAB
ANION GAP SERPL CALC-SCNC: 13 MMOL/L — SIGNIFICANT CHANGE UP (ref 5–17)
ANION GAP SERPL CALC-SCNC: 16 MMOL/L — SIGNIFICANT CHANGE UP (ref 5–17)
BUN SERPL-MCNC: 22 MG/DL — SIGNIFICANT CHANGE UP (ref 7–23)
BUN SERPL-MCNC: 23 MG/DL — SIGNIFICANT CHANGE UP (ref 7–23)
CALCIUM SERPL-MCNC: 9.3 MG/DL — SIGNIFICANT CHANGE UP (ref 8.4–10.5)
CALCIUM SERPL-MCNC: 9.7 MG/DL — SIGNIFICANT CHANGE UP (ref 8.4–10.5)
CHLORIDE SERPL-SCNC: 103 MMOL/L — SIGNIFICANT CHANGE UP (ref 96–108)
CHLORIDE SERPL-SCNC: 109 MMOL/L — HIGH (ref 96–108)
CO2 SERPL-SCNC: 22 MMOL/L — SIGNIFICANT CHANGE UP (ref 22–31)
CO2 SERPL-SCNC: 22 MMOL/L — SIGNIFICANT CHANGE UP (ref 22–31)
CREAT SERPL-MCNC: 0.7 MG/DL — SIGNIFICANT CHANGE UP (ref 0.5–1.3)
CREAT SERPL-MCNC: 0.79 MG/DL — SIGNIFICANT CHANGE UP (ref 0.5–1.3)
EGFR: 77 ML/MIN/1.73M2 — SIGNIFICANT CHANGE UP
EGFR: 90 ML/MIN/1.73M2 — SIGNIFICANT CHANGE UP
GLUCOSE BLDC GLUCOMTR-MCNC: 183 MG/DL — HIGH (ref 70–99)
GLUCOSE SERPL-MCNC: 196 MG/DL — HIGH (ref 70–99)
GLUCOSE SERPL-MCNC: 379 MG/DL — HIGH (ref 70–99)
HCT VFR BLD CALC: 38.6 % — SIGNIFICANT CHANGE UP (ref 34.5–45)
HGB BLD-MCNC: 12.1 G/DL — SIGNIFICANT CHANGE UP (ref 11.5–15.5)
MAGNESIUM SERPL-MCNC: 1.9 MG/DL — SIGNIFICANT CHANGE UP (ref 1.6–2.6)
MCHC RBC-ENTMCNC: 26.2 PG — LOW (ref 27–34)
MCHC RBC-ENTMCNC: 31.3 GM/DL — LOW (ref 32–36)
MCV RBC AUTO: 83.5 FL — SIGNIFICANT CHANGE UP (ref 80–100)
NRBC # BLD: 0 /100 WBCS — SIGNIFICANT CHANGE UP (ref 0–0)
PHOSPHATE SERPL-MCNC: 3.3 MG/DL — SIGNIFICANT CHANGE UP (ref 2.5–4.5)
PLATELET # BLD AUTO: 284 K/UL — SIGNIFICANT CHANGE UP (ref 150–400)
POTASSIUM SERPL-MCNC: 4.1 MMOL/L — SIGNIFICANT CHANGE UP (ref 3.5–5.3)
POTASSIUM SERPL-MCNC: 4.8 MMOL/L — SIGNIFICANT CHANGE UP (ref 3.5–5.3)
POTASSIUM SERPL-SCNC: 4.1 MMOL/L — SIGNIFICANT CHANGE UP (ref 3.5–5.3)
POTASSIUM SERPL-SCNC: 4.8 MMOL/L — SIGNIFICANT CHANGE UP (ref 3.5–5.3)
RBC # BLD: 4.62 M/UL — SIGNIFICANT CHANGE UP (ref 3.8–5.2)
RBC # FLD: 14.8 % — HIGH (ref 10.3–14.5)
SODIUM SERPL-SCNC: 138 MMOL/L — SIGNIFICANT CHANGE UP (ref 135–145)
SODIUM SERPL-SCNC: 147 MMOL/L — HIGH (ref 135–145)
WBC # BLD: 6.39 K/UL — SIGNIFICANT CHANGE UP (ref 3.8–10.5)
WBC # FLD AUTO: 6.39 K/UL — SIGNIFICANT CHANGE UP (ref 3.8–10.5)

## 2022-04-26 PROCEDURE — 83605 ASSAY OF LACTIC ACID: CPT

## 2022-04-26 PROCEDURE — 99232 SBSQ HOSP IP/OBS MODERATE 35: CPT | Mod: GC

## 2022-04-26 PROCEDURE — 86850 RBC ANTIBODY SCREEN: CPT

## 2022-04-26 PROCEDURE — 82947 ASSAY GLUCOSE BLOOD QUANT: CPT

## 2022-04-26 PROCEDURE — 82565 ASSAY OF CREATININE: CPT

## 2022-04-26 PROCEDURE — 99285 EMERGENCY DEPT VISIT HI MDM: CPT | Mod: 25

## 2022-04-26 PROCEDURE — 86901 BLOOD TYPING SEROLOGIC RH(D): CPT

## 2022-04-26 PROCEDURE — 83690 ASSAY OF LIPASE: CPT

## 2022-04-26 PROCEDURE — 81001 URINALYSIS AUTO W/SCOPE: CPT

## 2022-04-26 PROCEDURE — 83735 ASSAY OF MAGNESIUM: CPT

## 2022-04-26 PROCEDURE — 80053 COMPREHEN METABOLIC PANEL: CPT

## 2022-04-26 PROCEDURE — 84100 ASSAY OF PHOSPHORUS: CPT

## 2022-04-26 PROCEDURE — U0003: CPT

## 2022-04-26 PROCEDURE — 85014 HEMATOCRIT: CPT

## 2022-04-26 PROCEDURE — 85027 COMPLETE CBC AUTOMATED: CPT

## 2022-04-26 PROCEDURE — 80048 BASIC METABOLIC PNL TOTAL CA: CPT

## 2022-04-26 PROCEDURE — 36415 COLL VENOUS BLD VENIPUNCTURE: CPT

## 2022-04-26 PROCEDURE — 80061 LIPID PANEL: CPT

## 2022-04-26 PROCEDURE — 86900 BLOOD TYPING SEROLOGIC ABO: CPT

## 2022-04-26 PROCEDURE — U0005: CPT

## 2022-04-26 PROCEDURE — 83036 HEMOGLOBIN GLYCOSYLATED A1C: CPT

## 2022-04-26 PROCEDURE — 85025 COMPLETE CBC W/AUTO DIFF WBC: CPT

## 2022-04-26 PROCEDURE — 99239 HOSP IP/OBS DSCHRG MGMT >30: CPT

## 2022-04-26 PROCEDURE — 87086 URINE CULTURE/COLONY COUNT: CPT

## 2022-04-26 PROCEDURE — 82435 ASSAY OF BLOOD CHLORIDE: CPT

## 2022-04-26 PROCEDURE — 82803 BLOOD GASES ANY COMBINATION: CPT

## 2022-04-26 PROCEDURE — 84295 ASSAY OF SERUM SODIUM: CPT

## 2022-04-26 PROCEDURE — 82330 ASSAY OF CALCIUM: CPT

## 2022-04-26 PROCEDURE — 84132 ASSAY OF SERUM POTASSIUM: CPT

## 2022-04-26 PROCEDURE — 82962 GLUCOSE BLOOD TEST: CPT

## 2022-04-26 PROCEDURE — 85018 HEMOGLOBIN: CPT

## 2022-04-26 PROCEDURE — 74174 CTA ABD&PLVS W/CONTRAST: CPT | Mod: MA

## 2022-04-26 RX ADMIN — AMLODIPINE BESYLATE 5 MILLIGRAM(S): 2.5 TABLET ORAL at 05:36

## 2022-04-26 RX ADMIN — Medication 2.5 MILLIGRAM(S): at 08:53

## 2022-04-26 RX ADMIN — Medication 1: at 08:53

## 2022-04-26 RX ADMIN — PANTOPRAZOLE SODIUM 40 MILLIGRAM(S): 20 TABLET, DELAYED RELEASE ORAL at 05:36

## 2022-04-26 RX ADMIN — Medication 81 MILLIGRAM(S): at 05:36

## 2022-04-26 RX ADMIN — LOSARTAN POTASSIUM 100 MILLIGRAM(S): 100 TABLET, FILM COATED ORAL at 05:36

## 2022-04-26 NOTE — DISCHARGE NOTE NURSING/CASE MANAGEMENT/SOCIAL WORK - PATIENT PORTAL LINK FT
You can access the FollowMyHealth Patient Portal offered by [facility] by registering at the following website: [email or web address]. By joining [facility]’s FollowMyHealth portal, you will also be able to view your health information using other applications (apps) compatible with our system.

## 2022-04-26 NOTE — PROGRESS NOTE ADULT - PROBLEM SELECTOR PLAN 7
- DVT PPx: SCDs  - Diet: CC for now >> transition to clears 4/24  - PT unnecessary, pt ambulatory > encourage to walk  - Full Code. - DVT PPx: SCDs  - Diet: CC for now >> transition to clears 4/24  - PT unnecessary, pt ambulatory > encourage to walk  - Full Code.    D/c today, 4/26.

## 2022-04-26 NOTE — PROGRESS NOTE ADULT - ATTENDING COMMENTS
Agree with above. No further hematochezia. Will need outpatient screening colonoscopy evaluation.
76 F PMH HTN, T2DM, colonic diverticulosis (s/p clipping 2 years ago) diverticulitis, asthma, cerebral aneurysm s/p stent/coiling, CVA, anxiety, hypothyroid, basal cell ca s/p excision, R breast ca s/p lumpectomy/chemo/RT, p/w rectal bleeding. No bleeding since hospital admission. denies any complaints. no bleeding since restarting plavix. PE: non-focal; euvolemic; abd benign    Lower GI bleed- outpt f/u with GI. diet per GI.     CVA- outpt neuro f/u tomorrow d/w pt/ regard asa/plavix. will cont for now as pt/ state that she was told that she needs to be on both, lifelong.    DM2- outpt f/u      discharge time 37 min
76 F PMH HTN, T2DM, colonic diverticulosis (s/p clipping 2 years ago) diverticulitis, asthma, cerebral aneurysm s/p stent/coiling, CVA, anxiety, hypothyroid, basal cell ca s/p excision, R breast ca s/p lumpectomy/chemo/RT, p/w rectal bleeding. No bleeding since hospital admission. denies any complaints. PE: non-focal; euvolemic; abd benign    Lower GI bleed- outpt f/u with GI. diet per GI. If pt tolerates diet and repeat BMP is wnl, pt can go home.    CVA- outpt neuro f/u tomorrow d/w pt/ regard asa/plavix. will cont for now as pt/ state that she was told that she needs to be on both, lifelong.    DM2- outpt f/u      discharge time 37 min
Bleeding appears to have stopped as per patient. Suspect likely diverticular bleed given history and story  H/H stable from yesterday afternoon to today AM  GI eval pending  Onc consulted. If no plan for scope and bleeding improves, can re-start exemestane 25mg daily  Should no c-scope be needed would advance diet    d/w  at bedside

## 2022-04-26 NOTE — PROGRESS NOTE ADULT - SUBJECTIVE AND OBJECTIVE BOX
Chief Complaint:  Patient is a 76y old  Female who presents with a chief complaint of blood in stool (2022 07:20)      Interval Events: No new events. No further hematochezia.    Allergies:  Augmentin (Rash)  Ceclor (Other)  codeine (Nausea)  Cytotec (Unknown)  Entex (Other)  Norflex (Other)  penicillin (Urticaria; Rash)  ramipril (Other)  shellfish (Rash)  strawberry (Rash)  sulfites - nausea/ gi upset (Other)  tetracycline (Other)      Hospital Medications:  acetaminophen     Tablet .. 650 milliGRAM(s) Oral every 6 hours PRN  amLODIPine   Tablet 5 milliGRAM(s) Oral daily  aspirin enteric coated 81 milliGRAM(s) Oral three times a day  bisacodyl 10 milliGRAM(s) Oral at bedtime  clopidogrel Tablet 75 milliGRAM(s) Oral daily  dextrose 5%. 1000 milliLiter(s) IV Continuous <Continuous>  dextrose 5%. 1000 milliLiter(s) IV Continuous <Continuous>  dextrose 50% Injectable 25 Gram(s) IV Push once  dextrose 50% Injectable 12.5 Gram(s) IV Push once  dextrose 50% Injectable 25 Gram(s) IV Push once  dextrose Oral Gel 15 Gram(s) Oral once PRN  diazepam    Tablet 5 milliGRAM(s) Oral at bedtime  diazepam    Tablet 2.5 milliGRAM(s) Oral daily  diazepam    Tablet 2.5 milliGRAM(s) Oral daily  glucagon  Injectable 1 milliGRAM(s) IntraMuscular once  insulin glargine Injectable (LANTUS) 15 Unit(s) SubCutaneous at bedtime  insulin lispro (ADMELOG) corrective regimen sliding scale   SubCutaneous three times a day before meals  insulin lispro (ADMELOG) corrective regimen sliding scale   SubCutaneous at bedtime  loratadine 10 milliGRAM(s) Oral daily  losartan 100 milliGRAM(s) Oral daily  melatonin 3 milliGRAM(s) Oral at bedtime PRN  montelukast 10 milliGRAM(s) Oral at bedtime  pantoprazole    Tablet 40 milliGRAM(s) Oral two times a day  traZODone 100 milliGRAM(s) Oral at bedtime      PMHX/PSHX:  Breast CA    Aneurysm    Vertigo    Hearing loss    Basal cell carcinoma    Diverticulitis    CVA (cerebral vascular accident)    Shoulder disorder    Benign intracranial hypertension    Fibromyalgia    Chronic fatigue    Lumbar disc disease    Kidney stones    HTN (hypertension)    DM (diabetes mellitus)    Anxiety    Depression    Reflux esophagitis    Asthma    History of D&amp;C    H/O:     Cerebral aneurysm    Basal cell carcinoma    H/O shoulder surgery    H/O tubal ligation    History of kidney stones        Family history:  No pertinent family history in first degree relatives    Family history of CVA        ROS: As per HPI, 14-point ROS negative otherwise.    General:  No wt loss, fevers, chills, night sweats, fatigue,   Eyes:  Good vision, no reported pain  ENT:  No sore throat, pain, runny nose, dysphagia  CV:  No pain, palpitations, hypo/hypertension  Resp:  No dyspnea, cough, tachypnea, wheezing  GI:  See HPI  :  No pain, bleeding, incontinence, nocturia  Muscle:  No pain, weakness  Neuro:  No weakness, tingling, memory problems  Psych:  No fatigue, insomnia, mood problems, depression  Endocrine:  No polyuria, polydipsia, cold/heat intolerance  Heme:  No petechiae, ecchymosis, easy bruisability  Skin:  No rash, edema      PHYSICAL EXAM:     Vital Signs:  Vital Signs Last 24 Hrs  T(C): 36.4 (2022 09:49), Max: 36.8 (2022 13:12)  T(F): 97.6 (2022 09:49), Max: 98.3 (2022 13:12)  HR: 77 (2022 09:49) (73 - 85)  BP: 133/69 (2022 09:49) (110/40 - 162/62)  BP(mean): --  RR: 18 (2022 09:49) (16 - 19)  SpO2: 99% (2022 09:49) (94% - 99%)  Daily Height in cm: 162.56 (2022 14:33)    Daily     GENERAL:  appears comfortable, no acute distress  HEENT:  NC/AT,  conjunctivae clear, sclera -anicteric  CHEST:  no increased effort  HEART:  Regular rate and rhythm  ABDOMEN:  Soft, non-tender, non-distended,  no masses ,no hepato-splenomegaly,   EXTREMITIES:  no cyanosis, clubbing or edema  SKIN:  No rash/erythema/ecchymoses/petechiae/wounds  NEURO:  Alert, oriented    LABS:                        12.1   6.39  )-----------( 284      ( 2022 07:19 )             38.6         138  |  103  |  22  ----------------------------<  379<H>  4.1   |  22  |  0.70    Ca    9.3      2022 09:42  Phos  3.3       Mg     1.9         TPro  7.0  /  Alb  4.6  /  TBili  0.3  /  DBili  x   /  AST  17  /  ALT  28  /  AlkPhos  127<H>      LIVER FUNCTIONS - ( 2022 07:29 )  Alb: 4.6 g/dL / Pro: 7.0 g/dL / ALK PHOS: 127 U/L / ALT: 28 U/L / AST: 17 U/L / GGT: x                   Imaging:  < from: Colonoscopy (22 @ 14:25) >  Impression:          - Preparation of the colon was fair.                       - Diverticulosis in the sigmoid colon, in the transverse colon and in the                        ascending colon. Significant diverticulosis in sigmoid and tortuous colon.                       - Non-bleeding internal hemorrhoids.                       - Stool in the sigmoid colon, in the transverse colon, in the ascending colon                        and in the cecum.                       - No specimens collected.  Recommendation:      - Return patient to hospital [facility] for ongoing care.                       - High fiber diet.      - Colonoscopy for screening/surviellance as an outpatient as previously                        scheduled given prep not adequate for screening purposes.             < end of copied text >

## 2022-04-26 NOTE — PROGRESS NOTE ADULT - PROBLEM SELECTOR PLAN 3
- c/w Amlodipine 5mg qd + Losartan 100 qd

## 2022-04-26 NOTE — PROGRESS NOTE ADULT - PROBLEM SELECTOR PLAN 4
- C/w montelukast 10 qhs + zyrtec 10 qhs

## 2022-04-26 NOTE — PROGRESS NOTE ADULT - SUBJECTIVE AND OBJECTIVE BOX
PROGRESS NOTE:     Patient is a 76y old  Female who presents with a chief complaint of blood in stool (25 Apr 2022 16:17)      SUBJECTIVE / OVERNIGHT EVENTS:    ADDITIONAL REVIEW OF SYSTEMS:    MEDICATIONS  (STANDING):  amLODIPine   Tablet 5 milliGRAM(s) Oral daily  aspirin enteric coated 81 milliGRAM(s) Oral three times a day  bisacodyl 10 milliGRAM(s) Oral at bedtime  clopidogrel Tablet 75 milliGRAM(s) Oral daily  dextrose 5%. 1000 milliLiter(s) (100 mL/Hr) IV Continuous <Continuous>  dextrose 5%. 1000 milliLiter(s) (50 mL/Hr) IV Continuous <Continuous>  dextrose 50% Injectable 25 Gram(s) IV Push once  dextrose 50% Injectable 12.5 Gram(s) IV Push once  dextrose 50% Injectable 25 Gram(s) IV Push once  diazepam    Tablet 5 milliGRAM(s) Oral at bedtime  diazepam    Tablet 2.5 milliGRAM(s) Oral daily  diazepam    Tablet 2.5 milliGRAM(s) Oral daily  glucagon  Injectable 1 milliGRAM(s) IntraMuscular once  insulin glargine Injectable (LANTUS) 15 Unit(s) SubCutaneous at bedtime  insulin lispro (ADMELOG) corrective regimen sliding scale   SubCutaneous three times a day before meals  insulin lispro (ADMELOG) corrective regimen sliding scale   SubCutaneous at bedtime  loratadine 10 milliGRAM(s) Oral daily  losartan 100 milliGRAM(s) Oral daily  montelukast 10 milliGRAM(s) Oral at bedtime  pantoprazole    Tablet 40 milliGRAM(s) Oral two times a day  sodium chloride 0.9%. 500 milliLiter(s) (75 mL/Hr) IV Continuous <Continuous>  traZODone 100 milliGRAM(s) Oral at bedtime    MEDICATIONS  (PRN):  acetaminophen     Tablet .. 650 milliGRAM(s) Oral every 6 hours PRN Temp greater or equal to 38C (100.4F), Mild Pain (1 - 3)  dextrose Oral Gel 15 Gram(s) Oral once PRN Blood Glucose LESS THAN 70 milliGRAM(s)/deciliter  melatonin 3 milliGRAM(s) Oral at bedtime PRN Insomnia      CAPILLARY BLOOD GLUCOSE      POCT Blood Glucose.: 228 mg/dL (25 Apr 2022 21:30)  POCT Blood Glucose.: 185 mg/dL (25 Apr 2022 18:20)  POCT Blood Glucose.: 189 mg/dL (25 Apr 2022 11:47)  POCT Blood Glucose.: 225 mg/dL (25 Apr 2022 08:15)    I&O's Summary    25 Apr 2022 07:01  -  26 Apr 2022 07:00  --------------------------------------------------------  IN: 480 mL / OUT: 0 mL / NET: 480 mL        PHYSICAL EXAM:  Vital Signs Last 24 Hrs  T(C): 36.6 (26 Apr 2022 05:34), Max: 36.8 (25 Apr 2022 13:12)  T(F): 97.8 (26 Apr 2022 05:34), Max: 98.3 (25 Apr 2022 13:12)  HR: 73 (26 Apr 2022 05:34) (73 - 85)  BP: 132/76 (26 Apr 2022 05:34) (110/40 - 162/62)  BP(mean): --  RR: 18 (26 Apr 2022 05:34) (16 - 19)  SpO2: 95% (26 Apr 2022 05:34) (94% - 98%)    CONSTITUTIONAL: NAD, well-developed  RESPIRATORY: Normal respiratory effort; lungs are clear to auscultation bilaterally  CARDIOVASCULAR: Regular rate and rhythm, normal S1 and S2, no murmur/rub/gallop; No lower extremity edema; Peripheral pulses are 2+ bilaterally  ABDOMEN: Nontender to palpation, normoactive bowel sounds, no rebound/guarding; No hepatosplenomegaly  MUSCULOSKELETAL: no clubbing or cyanosis of digits; no joint swelling or tenderness to palpation  PSYCH: A+O to person, place, and time; affect appropriate    LABS:                        13.3   5.81  )-----------( 311      ( 25 Apr 2022 07:29 )             41.9     04-25    144  |  106  |  19  ----------------------------<  174<H>  4.2   |  23  |  0.76    Ca    9.9      25 Apr 2022 18:22  Phos  3.6     04-25  Mg     2.0     04-25    TPro  7.0  /  Alb  4.6  /  TBili  0.3  /  DBili  x   /  AST  17  /  ALT  28  /  AlkPhos  127<H>  04-25              Culture - Urine (collected 23 Apr 2022 14:56)  Source: Clean Catch Clean Catch (Midstream)  Final Report (24 Apr 2022 16:54):    >=3 organisms. Probable collection contamination.        RADIOLOGY & ADDITIONAL TESTS:  Results Reviewed:   Imaging Personally Reviewed:  Electrocardiogram Personally Reviewed:    COORDINATION OF CARE:  Care Discussed with Consultants/Other Providers [Y/N]:  Prior or Outpatient Records Reviewed [Y/N]:      ******************************  Authored By: Kleber Sow MD PGY1  Internal Medicine  Pager: [phone]  MS Teams  ******************************   PROGRESS NOTE:     Patient is a 76y old  Female who presents with a chief complaint of blood in stool (25 Apr 2022 16:17)      SUBJECTIVE / OVERNIGHT EVENTS: No acute issues overnight, pt this morning feels improved but tired from colonoscopy yesterday.  at bedside, both understand results of colonoscopy - they have already spoken to their outpatient GI specialist and sent him the results. Pt denies any pain, d/c ready for this morning.    ADDITIONAL REVIEW OF SYSTEMS:    MEDICATIONS  (STANDING):  amLODIPine   Tablet 5 milliGRAM(s) Oral daily  aspirin enteric coated 81 milliGRAM(s) Oral three times a day  bisacodyl 10 milliGRAM(s) Oral at bedtime  clopidogrel Tablet 75 milliGRAM(s) Oral daily  dextrose 5%. 1000 milliLiter(s) (100 mL/Hr) IV Continuous <Continuous>  dextrose 5%. 1000 milliLiter(s) (50 mL/Hr) IV Continuous <Continuous>  dextrose 50% Injectable 25 Gram(s) IV Push once  dextrose 50% Injectable 12.5 Gram(s) IV Push once  dextrose 50% Injectable 25 Gram(s) IV Push once  diazepam    Tablet 5 milliGRAM(s) Oral at bedtime  diazepam    Tablet 2.5 milliGRAM(s) Oral daily  diazepam    Tablet 2.5 milliGRAM(s) Oral daily  glucagon  Injectable 1 milliGRAM(s) IntraMuscular once  insulin glargine Injectable (LANTUS) 15 Unit(s) SubCutaneous at bedtime  insulin lispro (ADMELOG) corrective regimen sliding scale   SubCutaneous three times a day before meals  insulin lispro (ADMELOG) corrective regimen sliding scale   SubCutaneous at bedtime  loratadine 10 milliGRAM(s) Oral daily  losartan 100 milliGRAM(s) Oral daily  montelukast 10 milliGRAM(s) Oral at bedtime  pantoprazole    Tablet 40 milliGRAM(s) Oral two times a day  sodium chloride 0.9%. 500 milliLiter(s) (75 mL/Hr) IV Continuous <Continuous>  traZODone 100 milliGRAM(s) Oral at bedtime    MEDICATIONS  (PRN):  acetaminophen     Tablet .. 650 milliGRAM(s) Oral every 6 hours PRN Temp greater or equal to 38C (100.4F), Mild Pain (1 - 3)  dextrose Oral Gel 15 Gram(s) Oral once PRN Blood Glucose LESS THAN 70 milliGRAM(s)/deciliter  melatonin 3 milliGRAM(s) Oral at bedtime PRN Insomnia      CAPILLARY BLOOD GLUCOSE      POCT Blood Glucose.: 228 mg/dL (25 Apr 2022 21:30)  POCT Blood Glucose.: 185 mg/dL (25 Apr 2022 18:20)  POCT Blood Glucose.: 189 mg/dL (25 Apr 2022 11:47)  POCT Blood Glucose.: 225 mg/dL (25 Apr 2022 08:15)    I&O's Summary    25 Apr 2022 07:01  -  26 Apr 2022 07:00  --------------------------------------------------------  IN: 480 mL / OUT: 0 mL / NET: 480 mL        PHYSICAL EXAM:  Vital Signs Last 24 Hrs  T(C): 36.6 (26 Apr 2022 05:34), Max: 36.8 (25 Apr 2022 13:12)  T(F): 97.8 (26 Apr 2022 05:34), Max: 98.3 (25 Apr 2022 13:12)  HR: 73 (26 Apr 2022 05:34) (73 - 85)  BP: 132/76 (26 Apr 2022 05:34) (110/40 - 162/62)  BP(mean): --  RR: 18 (26 Apr 2022 05:34) (16 - 19)  SpO2: 95% (26 Apr 2022 05:34) (94% - 98%)    GENERAL: NAD, lying in bed comfortably  HEAD:  Atraumatic, Normocephalic  EYES: EOMI, PERRLA, conjunctiva and sclera clear  ENT: Moist mucous membranes  NECK: Supple, No JVD  CHEST/LUNG: Clear to auscultation bilaterally; No rales, rhonchi, wheezing, or rubs. Unlabored respirations  HEART: Regular rate and rhythm; No murmurs, rubs, or gallops  ABDOMEN: BSx4; Soft, nondistended, no ttp  EXTREMITIES:  2+ Peripheral Pulses, brisk capillary refill. No clubbing, cyanosis, or edema  NERVOUS SYSTEM:  A&Ox3, no focal deficits   SKIN: No rashes or lesions  Psych: Normal speech, normal behavior, normal affect    LABS:                        13.3   5.81  )-----------( 311      ( 25 Apr 2022 07:29 )             41.9     04-25    144  |  106  |  19  ----------------------------<  174<H>  4.2   |  23  |  0.76    Ca    9.9      25 Apr 2022 18:22  Phos  3.6     04-25  Mg     2.0     04-25    TPro  7.0  /  Alb  4.6  /  TBili  0.3  /  DBili  x   /  AST  17  /  ALT  28  /  AlkPhos  127<H>  04-25              Culture - Urine (collected 23 Apr 2022 14:56)  Source: Clean Catch Clean Catch (Midstream)  Final Report (24 Apr 2022 16:54):    >=3 organisms. Probable collection contamination.        RADIOLOGY & ADDITIONAL TESTS:  Results Reviewed:   Imaging Personally Reviewed:  Electrocardiogram Personally Reviewed:    COORDINATION OF CARE:  Care Discussed with Consultants/Other Providers [Y/N]:  Prior or Outpatient Records Reviewed [Y/N]:      ******************************  Authored By: Kleber Sow MD PGY1  Internal Medicine  Pager: [phone]  MS Teams  ******************************

## 2022-04-26 NOTE — DISCHARGE NOTE NURSING/CASE MANAGEMENT/SOCIAL WORK - NSDCPEFALRISK_GEN_ALL_CORE
For information on Fall & Injury Prevention, visit: [email or web address] OR  [email or web address] OR  https://www.cdc.gov/steadi/patient.html

## 2022-04-26 NOTE — PROGRESS NOTE ADULT - PROBLEM SELECTOR PLAN 1
- Presenting sx's and hx 2 years ago of similar sx's and diverticular bleed tx with clipping; No active bleeding at this time  - CT-AP w/IV contrast: No definite site of active GI bleeding  - GI consulted    = Colonoscopy 4/25    = Diverticulosis in the sigmoid colon, in the transverse colon and in the ascending colon. Significant diverticulosis in sigmoid and tortuous colon. Non-bleeding internal hemorrhoids.    = High fiber diet  - Resume home ASA + Plavix (lifelong)

## 2022-04-26 NOTE — DISCHARGE NOTE NURSING/CASE MANAGEMENT/SOCIAL WORK - NSTRANSFERBELONGINGSDISPO_GEN_A_NUR
Medication is being filled for 1 time refill only due to:  patient has pending lab adam and pending provider apt. Meg Jimenez RN      
with patient

## 2022-04-28 ENCOUNTER — NON-APPOINTMENT (OUTPATIENT)
Age: 77
End: 2022-04-28

## 2022-04-29 LAB — ANDROST SERPL-MCNC: 44 NG/DL

## 2022-05-02 ENCOUNTER — NON-APPOINTMENT (OUTPATIENT)
Age: 77
End: 2022-05-02

## 2022-05-04 LAB
ANDROSTANOLONE SERPL-MCNC: 3.7 NG/DL
DHEA-SULFATE, SERUM: 36 UG/DL

## 2022-05-19 ENCOUNTER — APPOINTMENT (OUTPATIENT)
Dept: GERIATRICS | Facility: CLINIC | Age: 77
End: 2022-05-19
Payer: MEDICARE

## 2022-05-19 VITALS
OXYGEN SATURATION: 99 % | HEART RATE: 97 BPM | TEMPERATURE: 97.7 F | WEIGHT: 153.5 LBS | RESPIRATION RATE: 16 BRPM | BODY MASS INDEX: 28.25 KG/M2 | HEIGHT: 62 IN | SYSTOLIC BLOOD PRESSURE: 130 MMHG | DIASTOLIC BLOOD PRESSURE: 62 MMHG

## 2022-05-19 DIAGNOSIS — K62.5 HEMORRHAGE OF ANUS AND RECTUM: ICD-10-CM

## 2022-05-19 PROCEDURE — G0444 DEPRESSION SCREEN ANNUAL: CPT | Mod: 59

## 2022-05-19 PROCEDURE — 99215 OFFICE O/P EST HI 40 MIN: CPT

## 2022-05-20 PROBLEM — K62.5 RECTAL BLEEDING: Status: ACTIVE | Noted: 2020-11-12

## 2022-05-20 LAB
BASOPHILS # BLD AUTO: 0.08 K/UL
BASOPHILS NFR BLD AUTO: 0.8 %
EOSINOPHIL # BLD AUTO: 0.67 K/UL
EOSINOPHIL NFR BLD AUTO: 7 %
HCT VFR BLD CALC: 44 %
HGB BLD-MCNC: 13.6 G/DL
IMM GRANULOCYTES NFR BLD AUTO: 0.3 %
LYMPHOCYTES # BLD AUTO: 2.08 K/UL
LYMPHOCYTES NFR BLD AUTO: 21.8 %
MAN DIFF?: NORMAL
MCHC RBC-ENTMCNC: 26.1 PG
MCHC RBC-ENTMCNC: 30.9 GM/DL
MCV RBC AUTO: 84.5 FL
MONOCYTES # BLD AUTO: 0.65 K/UL
MONOCYTES NFR BLD AUTO: 6.8 %
NEUTROPHILS # BLD AUTO: 6.02 K/UL
NEUTROPHILS NFR BLD AUTO: 63.3 %
PLATELET # BLD AUTO: 300 K/UL
RBC # BLD: 5.21 M/UL
RBC # FLD: 14.6 %
WBC # FLD AUTO: 9.53 K/UL

## 2022-05-20 NOTE — HISTORY OF PRESENT ILLNESS
[No falls in past year] : Patient reported no falls in the past year [Completely Independent] : Completely independent. [0] : 2) Feeling down, depressed, or hopeless: Not at all (0) [Designated Healthcare Proxy] : Designated healthcare proxy [Name: ___] : Health Care Proxy's Name: [unfilled]  [Relationship: ___] : Relationship: [unfilled] [FreeTextEntry1] : 75 yo female admitted for a GI bleed to [facility] on 4/23 and discharged on 4/26/22. Pt here for followup.  No further bleeding.  Pt eating well. Sugars controlled. Pt is sleeping. Pt asking about medication renewal. Pt explained her hospitalization was difficult. She said it had never been like that before - NPO , delayed procedures, prep for colonoscopy difficult.\par \par Pt eaiting at home.  is having visual difficulty but managing

## 2022-05-20 NOTE — PHYSICAL EXAM
[Alert] : alert [Normal] : the sclera and conjunctiva were normal, extraocular movements were intact, pupils were equal in size, round, and reactive to light [Normal Outer Ear/Nose] : the ears and nose were normal in appearance [Normal Appearance] : the appearance of the neck was normal [Supple] : the neck was supple [No Respiratory Distress] : no respiratory distress [No Acc Muscle Use] : no accessory muscle use [Respiration, Rhythm And Depth] : normal respiratory rhythm and effort [Auscultation Breath Sounds / Voice Sounds] : lungs were clear to auscultation bilaterally [Heart Rate And Rhythm] : heart rate was normal and rhythm regular [Bowel Sounds] : normal bowel sounds [Abdomen Tenderness] : non-tender [Abdomen Soft] : soft [No Spinal Tenderness] : no spinal tenderness [Normal Color / Pigmentation] : normal skin color and pigmentation [Normal Turgor] : normal skin turgor [No Focal Deficits] : no focal deficits [Normal Affect] : the affect was normal [Normal Mood] : the mood was normal [de-identified] : tight muscles  [de-identified] : central obesity  [de-identified] : no aches or pains

## 2022-05-20 NOTE — ASSESSMENT
[FreeTextEntry1] : 75 yo female with divierticulosis who had a gi bleed. Hospitalized colonoscopy done.\par \par 1) Diverticulosis - recheck cbc, continue ferrous sulfate\par 2) DM - as per endocrine, controlled but very challenging condition for her - glumetz (metformin), Jardiance, tresiba, victoza symlin, \par 3) Fibormyalgia like sxs - valium taken for over 2 decades for muscle relaxation, have tried to decrease. Held long discussion about trying to use a shorter acting agent bc she is complaining of fatigue. Pt uses diazepam 5 mg tab - 1/2 tab in am, 1/2 tab in afternoon, and one tab at night for decades. COndsider changing ot ativan.  She has tried flexeril in past and gabapentin.  The benxos work best as muscle relaxant she says. Istop reviewed.\par Oxycodone in place and decreased to 5 mg tabs 1/2 tab twice a day prn\par 4) HTN - continue losartan,amlodipine\par 5) CVA - asa, plavix\par 6) HLD- colestipol, \par 7) Breast Cancer - exemestane\par 8) Osteo - Vit D and Clatrate\par 9) ALergies - xyzal and albuterol, singulair\par 10) Irritable bowel - dicyclomine\par 11) Reflux - omeprazole and pepcid - pt has tried to stop or decrease without success.\par 12) Insomnia - trazodone 100 mg a day\par 13) Fatigue - discussed decreasing trazodone, changing valium to shorter acting or decreasing, decreasing oxycodone or stopping. have discussed anti-depressant to help with pain as well but pt says bowels do not tolerate antidepressant.\par 14) Pt due for covid booster and Shingles shot .  Pt is addressing.

## 2022-06-02 ENCOUNTER — OUTPATIENT (OUTPATIENT)
Dept: OUTPATIENT SERVICES | Facility: HOSPITAL | Age: 77
LOS: 1 days | Discharge: HOME | End: 2022-06-02

## 2022-06-02 DIAGNOSIS — C44.91 BASAL CELL CARCINOMA OF SKIN, UNSPECIFIED: Chronic | ICD-10-CM

## 2022-06-02 DIAGNOSIS — Z98.89 OTHER SPECIFIED POSTPROCEDURAL STATES: Chronic | ICD-10-CM

## 2022-06-02 DIAGNOSIS — I67.1 CEREBRAL ANEURYSM, NONRUPTURED: Chronic | ICD-10-CM

## 2022-06-02 DIAGNOSIS — Z98.51 TUBAL LIGATION STATUS: Chronic | ICD-10-CM

## 2022-06-02 DIAGNOSIS — Z87.442 PERSONAL HISTORY OF URINARY CALCULI: Chronic | ICD-10-CM

## 2022-06-02 DIAGNOSIS — C50.311 MALIGNANT NEOPLASM OF LOWER-INNER QUADRANT OF RIGHT FEMALE BREAST: ICD-10-CM

## 2022-06-09 ENCOUNTER — APPOINTMENT (OUTPATIENT)
Dept: HEMATOLOGY ONCOLOGY | Facility: CLINIC | Age: 77
End: 2022-06-09
Payer: MEDICARE

## 2022-06-09 VITALS
HEIGHT: 62.01 IN | WEIGHT: 155.43 LBS | DIASTOLIC BLOOD PRESSURE: 77 MMHG | RESPIRATION RATE: 16 BRPM | OXYGEN SATURATION: 99 % | SYSTOLIC BLOOD PRESSURE: 143 MMHG | TEMPERATURE: 97.7 F | BODY MASS INDEX: 28.24 KG/M2 | HEART RATE: 98 BPM

## 2022-06-09 DIAGNOSIS — L65.9 NONSCARRING HAIR LOSS, UNSPECIFIED: ICD-10-CM

## 2022-06-09 PROCEDURE — 99214 OFFICE O/P EST MOD 30 MIN: CPT

## 2022-06-09 NOTE — REVIEW OF SYSTEMS
[Joint Pain] : joint pain [Joint Stiffness] : joint stiffness [Muscle Pain] : muscle pain [Negative] : Allergic/Immunologic [Fever] : no fever [Chills] : no chills [Night Sweats] : no night sweats [Fatigue] : no fatigue [Abdominal Pain] : no abdominal pain [Constipation] : no constipation [Diarrhea] : no diarrhea [Muscle Weakness] : no muscle weakness [Confused] : no confusion [Dizziness] : no dizziness [Fainting] : no fainting [Difficulty Walking] : no difficulty walking [de-identified] : Peripheral numbness and tingling

## 2022-06-09 NOTE — HISTORY OF PRESENT ILLNESS
[Disease: _____________________] : Disease: [unfilled] [T: ___] : T[unfilled] [N: ___] : N[unfilled] [M: ___] : M[unfilled] [AJCC Stage: ____] : AJCC Stage: [unfilled] [de-identified] : Right breast cancer diagnosed at the age of 70\par 7/19/16 Right breast partial mastectomy with sentinel lymph node biopsy showed  a 6 mm invasive ductal carcinoma, SBR 6/9, ER 90%, HI 0%, HER-2 negative, 2 negative SLN\par 8/1/16 Oncotype DX recurrence score of 37, which predicts a 10 year risk of distant recurrence on Tamoxifen alone of 25%\par 9/16 - 2/17 CMF given IV every 3 weeks x 8 cycles \par 3/27/17 - 4/21/17 Radiation therapy with 5240 cGy in 25 fractions with Dr. Agudelo \par 6/19/17 Anastrozole, d/c due to arthralgias, then tried letrozole and again d/c due to arthralgias\par 9/19 Exemestane started  [de-identified] : 6 mm IDC, SBR 6/9, ER 90%, MI 0%, HER-2 negative, Ki-67 15%, 2 negative SLN, Oncotype score = 37 (RR 25%) [de-identified] : Isabelle started anastrozole 6/17 but struggled with arthralgias so changed to letrozole which was worse so then started exemestane 9/19. \par She has noticed less pain on the exemestane and is now at what she would consider normal pain, but that is still considerable.\par This spring she is struggling with increased fatigue and pain. She is also upset that her hair has thinned a lot this last year. She is taking biotin.\par She was hospitalized in 4/22 with a GI bleed. 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 She finds it is worse in the morning and then improves as the day goes on.\par She is seeing a psychologist, Dr. Blackmon in [place], who has been helpful in managing all the stresses and anxieties.\par \par Routine Health Maintenance:\par PCP: Vilma Reyes MD with most recent labs 4/21/22\par Mammogram and breast ultrasound: 12/2/21 BI-RADS 2\par Breast MRI 4/15/21 with biopsy 4/20/21 which showed fat necrosis and fibrocystic changes\par Pap Smear: 9/20\par Bone density: 12/23/20 showed T scores of 1.1 in spine, -0.5 in total hip, -0.9 in femoral neck\par                        8/18 showed T scores of 0.3 in the spine, -0.3 in the total hip and -0.7 in the femoral neck\par Colonoscopy: 3/20 showed diverticuli\par \par \par \par \par

## 2022-06-16 ENCOUNTER — OUTPATIENT (OUTPATIENT)
Dept: OUTPATIENT SERVICES | Facility: HOSPITAL | Age: 77
LOS: 1 days | End: 2022-06-16
Payer: MEDICARE

## 2022-06-16 ENCOUNTER — APPOINTMENT (OUTPATIENT)
Dept: MRI IMAGING | Facility: CLINIC | Age: 77
End: 2022-06-16
Payer: MEDICARE

## 2022-06-16 DIAGNOSIS — C44.91 BASAL CELL CARCINOMA OF SKIN, UNSPECIFIED: Chronic | ICD-10-CM

## 2022-06-16 DIAGNOSIS — Z98.51 TUBAL LIGATION STATUS: Chronic | ICD-10-CM

## 2022-06-16 DIAGNOSIS — I67.1 CEREBRAL ANEURYSM, NONRUPTURED: Chronic | ICD-10-CM

## 2022-06-16 DIAGNOSIS — Z98.89 OTHER SPECIFIED POSTPROCEDURAL STATES: Chronic | ICD-10-CM

## 2022-06-16 DIAGNOSIS — Z87.442 PERSONAL HISTORY OF URINARY CALCULI: Chronic | ICD-10-CM

## 2022-06-16 DIAGNOSIS — Z00.8 ENCOUNTER FOR OTHER GENERAL EXAMINATION: ICD-10-CM

## 2022-06-16 PROCEDURE — C8937: CPT

## 2022-06-16 PROCEDURE — A9585: CPT

## 2022-06-16 PROCEDURE — 77049 MRI BREAST C-+ W/CAD BI: CPT | Mod: 26,MH

## 2022-06-16 PROCEDURE — C8908: CPT | Mod: MH

## 2022-06-17 ENCOUNTER — NON-APPOINTMENT (OUTPATIENT)
Age: 77
End: 2022-06-17

## 2022-06-17 ENCOUNTER — APPOINTMENT (OUTPATIENT)
Dept: DERMATOLOGY | Facility: CLINIC | Age: 77
End: 2022-06-17
Payer: COMMERCIAL

## 2022-06-17 DIAGNOSIS — L65.0 TELOGEN EFFLUVIUM: ICD-10-CM

## 2022-06-17 DIAGNOSIS — L64.9 ANDROGENIC ALOPECIA, UNSPECIFIED: ICD-10-CM

## 2022-06-17 DIAGNOSIS — L65.8 OTHER SPECIFIED NONSCARRING HAIR LOSS: ICD-10-CM

## 2022-06-17 PROCEDURE — 99203 OFFICE O/P NEW LOW 30 MIN: CPT

## 2022-06-21 ENCOUNTER — NON-APPOINTMENT (OUTPATIENT)
Age: 77
End: 2022-06-21

## 2022-06-27 NOTE — H&P ADULT - PROBLEM SELECTOR PLAN 2
altered mental status -pt on lantus 28-32 units qhs at home, + multiple oral hypoglycemics  -hold oral hypoglycemics  -d/w family option of giving half dose home Lantus given NPO.  At this time, family and pt prefer to hold standing Lantus and continue with only HISS for now.   -fsg qac/qhs

## 2022-08-09 ENCOUNTER — RESULT REVIEW (OUTPATIENT)
Age: 77
End: 2022-08-09

## 2022-08-16 ENCOUNTER — INPATIENT (INPATIENT)
Facility: HOSPITAL | Age: 77
LOS: 4 days | Discharge: HOME | DRG: 392 | End: 2022-08-21
Attending: STUDENT IN AN ORGANIZED HEALTH CARE EDUCATION/TRAINING PROGRAM | Admitting: STUDENT IN AN ORGANIZED HEALTH CARE EDUCATION/TRAINING PROGRAM
Payer: MEDICARE

## 2022-08-16 ENCOUNTER — OUTPATIENT (OUTPATIENT)
Dept: OUTPATIENT SERVICES | Facility: HOSPITAL | Age: 77
LOS: 1 days | End: 2022-08-16

## 2022-08-16 ENCOUNTER — APPOINTMENT (OUTPATIENT)
Dept: CT IMAGING | Facility: CLINIC | Age: 77
End: 2022-08-16

## 2022-08-16 VITALS
RESPIRATION RATE: 18 BRPM | HEART RATE: 98 BPM | DIASTOLIC BLOOD PRESSURE: 71 MMHG | WEIGHT: 145.06 LBS | TEMPERATURE: 98 F | HEIGHT: 64 IN | OXYGEN SATURATION: 96 % | SYSTOLIC BLOOD PRESSURE: 158 MMHG

## 2022-08-16 DIAGNOSIS — K21.9 GASTRO-ESOPHAGEAL REFLUX DISEASE WITHOUT ESOPHAGITIS: ICD-10-CM

## 2022-08-16 DIAGNOSIS — C44.91 BASAL CELL CARCINOMA OF SKIN, UNSPECIFIED: Chronic | ICD-10-CM

## 2022-08-16 DIAGNOSIS — Z98.89 OTHER SPECIFIED POSTPROCEDURAL STATES: Chronic | ICD-10-CM

## 2022-08-16 DIAGNOSIS — I67.1 CEREBRAL ANEURYSM, NONRUPTURED: Chronic | ICD-10-CM

## 2022-08-16 DIAGNOSIS — Z29.9 ENCOUNTER FOR PROPHYLACTIC MEASURES, UNSPECIFIED: ICD-10-CM

## 2022-08-16 DIAGNOSIS — Z98.51 TUBAL LIGATION STATUS: Chronic | ICD-10-CM

## 2022-08-16 DIAGNOSIS — Z87.442 PERSONAL HISTORY OF URINARY CALCULI: Chronic | ICD-10-CM

## 2022-08-16 DIAGNOSIS — E11.9 TYPE 2 DIABETES MELLITUS WITHOUT COMPLICATIONS: ICD-10-CM

## 2022-08-16 DIAGNOSIS — K57.20 DIVERTICULITIS OF LARGE INTESTINE WITH PERFORATION AND ABSCESS WITHOUT BLEEDING: ICD-10-CM

## 2022-08-16 DIAGNOSIS — E03.9 HYPOTHYROIDISM, UNSPECIFIED: ICD-10-CM

## 2022-08-16 DIAGNOSIS — I10 ESSENTIAL (PRIMARY) HYPERTENSION: ICD-10-CM

## 2022-08-16 DIAGNOSIS — R09.89 OTHER SPECIFIED SYMPTOMS AND SIGNS INVOLVING THE CIRCULATORY AND RESPIRATORY SYSTEMS: ICD-10-CM

## 2022-08-16 DIAGNOSIS — K57.92 DIVERTICULITIS OF INTESTINE, PART UNSPECIFIED, WITHOUT PERFORATION OR ABSCESS WITHOUT BLEEDING: ICD-10-CM

## 2022-08-16 DIAGNOSIS — Z86.79 PERSONAL HISTORY OF OTHER DISEASES OF THE CIRCULATORY SYSTEM: ICD-10-CM

## 2022-08-16 DIAGNOSIS — C50.919 MALIGNANT NEOPLASM OF UNSPECIFIED SITE OF UNSPECIFIED FEMALE BREAST: ICD-10-CM

## 2022-08-16 DIAGNOSIS — F41.9 ANXIETY DISORDER, UNSPECIFIED: ICD-10-CM

## 2022-08-16 DIAGNOSIS — R10.32 LEFT LOWER QUADRANT PAIN: ICD-10-CM

## 2022-08-16 LAB
ALBUMIN SERPL ELPH-MCNC: 4.2 G/DL — SIGNIFICANT CHANGE UP (ref 3.3–5)
ALP SERPL-CCNC: 110 U/L — SIGNIFICANT CHANGE UP (ref 40–120)
ALT FLD-CCNC: 18 U/L — SIGNIFICANT CHANGE UP (ref 10–45)
ANION GAP SERPL CALC-SCNC: 16 MMOL/L — SIGNIFICANT CHANGE UP (ref 5–17)
APPEARANCE UR: CLEAR — SIGNIFICANT CHANGE UP
APTT BLD: 29.9 SEC — SIGNIFICANT CHANGE UP (ref 27.5–35.5)
AST SERPL-CCNC: 17 U/L — SIGNIFICANT CHANGE UP (ref 10–40)
BACTERIA # UR AUTO: NEGATIVE — SIGNIFICANT CHANGE UP
BASOPHILS # BLD AUTO: 0.03 K/UL — SIGNIFICANT CHANGE UP (ref 0–0.2)
BASOPHILS NFR BLD AUTO: 0.3 % — SIGNIFICANT CHANGE UP (ref 0–2)
BILIRUB SERPL-MCNC: 0.3 MG/DL — SIGNIFICANT CHANGE UP (ref 0.2–1.2)
BILIRUB UR-MCNC: NEGATIVE — SIGNIFICANT CHANGE UP
BLD GP AB SCN SERPL QL: NEGATIVE — SIGNIFICANT CHANGE UP
BUN SERPL-MCNC: 15 MG/DL — SIGNIFICANT CHANGE UP (ref 7–23)
CALCIUM SERPL-MCNC: 9.7 MG/DL — SIGNIFICANT CHANGE UP (ref 8.4–10.5)
CHLORIDE SERPL-SCNC: 100 MMOL/L — SIGNIFICANT CHANGE UP (ref 96–108)
CO2 SERPL-SCNC: 22 MMOL/L — SIGNIFICANT CHANGE UP (ref 22–31)
COLOR SPEC: COLORLESS — SIGNIFICANT CHANGE UP
CREAT SERPL-MCNC: 0.62 MG/DL — SIGNIFICANT CHANGE UP (ref 0.5–1.3)
DIFF PNL FLD: ABNORMAL
EGFR: 92 ML/MIN/1.73M2 — SIGNIFICANT CHANGE UP
EOSINOPHIL # BLD AUTO: 0.21 K/UL — SIGNIFICANT CHANGE UP (ref 0–0.5)
EOSINOPHIL NFR BLD AUTO: 2.3 % — SIGNIFICANT CHANGE UP (ref 0–6)
EPI CELLS # UR: 1 /HPF — SIGNIFICANT CHANGE UP
FLUAV AG NPH QL: SIGNIFICANT CHANGE UP
FLUBV AG NPH QL: SIGNIFICANT CHANGE UP
GAS PNL BLDV: SIGNIFICANT CHANGE UP
GLUCOSE BLDC GLUCOMTR-MCNC: 72 MG/DL — SIGNIFICANT CHANGE UP (ref 70–99)
GLUCOSE SERPL-MCNC: 98 MG/DL — SIGNIFICANT CHANGE UP (ref 70–99)
GLUCOSE UR QL: ABNORMAL
HCT VFR BLD CALC: 39.6 % — SIGNIFICANT CHANGE UP (ref 34.5–45)
HGB BLD-MCNC: 12.9 G/DL — SIGNIFICANT CHANGE UP (ref 11.5–15.5)
HYALINE CASTS # UR AUTO: 1 /LPF — SIGNIFICANT CHANGE UP (ref 0–2)
IMM GRANULOCYTES NFR BLD AUTO: 0.3 % — SIGNIFICANT CHANGE UP (ref 0–1.5)
INR BLD: 1.13 RATIO — SIGNIFICANT CHANGE UP (ref 0.88–1.16)
KETONES UR-MCNC: ABNORMAL
LEUKOCYTE ESTERASE UR-ACNC: NEGATIVE — SIGNIFICANT CHANGE UP
LYMPHOCYTES # BLD AUTO: 1.05 K/UL — SIGNIFICANT CHANGE UP (ref 1–3.3)
LYMPHOCYTES # BLD AUTO: 11.7 % — LOW (ref 13–44)
MCHC RBC-ENTMCNC: 26.4 PG — LOW (ref 27–34)
MCHC RBC-ENTMCNC: 32.6 GM/DL — SIGNIFICANT CHANGE UP (ref 32–36)
MCV RBC AUTO: 81 FL — SIGNIFICANT CHANGE UP (ref 80–100)
MONOCYTES # BLD AUTO: 0.63 K/UL — SIGNIFICANT CHANGE UP (ref 0–0.9)
MONOCYTES NFR BLD AUTO: 7 % — SIGNIFICANT CHANGE UP (ref 2–14)
NEUTROPHILS # BLD AUTO: 7.03 K/UL — SIGNIFICANT CHANGE UP (ref 1.8–7.4)
NEUTROPHILS NFR BLD AUTO: 78.4 % — HIGH (ref 43–77)
NITRITE UR-MCNC: NEGATIVE — SIGNIFICANT CHANGE UP
NRBC # BLD: 0 /100 WBCS — SIGNIFICANT CHANGE UP (ref 0–0)
PH UR: 6 — SIGNIFICANT CHANGE UP (ref 5–8)
PLATELET # BLD AUTO: 384 K/UL — SIGNIFICANT CHANGE UP (ref 150–400)
POTASSIUM SERPL-MCNC: 3.9 MMOL/L — SIGNIFICANT CHANGE UP (ref 3.5–5.3)
POTASSIUM SERPL-SCNC: 3.9 MMOL/L — SIGNIFICANT CHANGE UP (ref 3.5–5.3)
PROT SERPL-MCNC: 7.3 G/DL — SIGNIFICANT CHANGE UP (ref 6–8.3)
PROT UR-MCNC: ABNORMAL
PROTHROM AB SERPL-ACNC: 13 SEC — SIGNIFICANT CHANGE UP (ref 10.5–13.4)
RBC # BLD: 4.89 M/UL — SIGNIFICANT CHANGE UP (ref 3.8–5.2)
RBC # FLD: 13.8 % — SIGNIFICANT CHANGE UP (ref 10.3–14.5)
RBC CASTS # UR COMP ASSIST: 1 /HPF — SIGNIFICANT CHANGE UP (ref 0–4)
RH IG SCN BLD-IMP: NEGATIVE — SIGNIFICANT CHANGE UP
RSV RNA NPH QL NAA+NON-PROBE: SIGNIFICANT CHANGE UP
SARS-COV-2 RNA SPEC QL NAA+PROBE: SIGNIFICANT CHANGE UP
SODIUM SERPL-SCNC: 138 MMOL/L — SIGNIFICANT CHANGE UP (ref 135–145)
SP GR SPEC: >1.05 (ref 1.01–1.02)
UROBILINOGEN FLD QL: NEGATIVE — SIGNIFICANT CHANGE UP
WBC # BLD: 8.98 K/UL — SIGNIFICANT CHANGE UP (ref 3.8–10.5)
WBC # FLD AUTO: 8.98 K/UL — SIGNIFICANT CHANGE UP (ref 3.8–10.5)
WBC UR QL: 3 /HPF — SIGNIFICANT CHANGE UP (ref 0–5)

## 2022-08-16 PROCEDURE — 99285 EMERGENCY DEPT VISIT HI MDM: CPT | Mod: GC

## 2022-08-16 PROCEDURE — 74177 CT ABD & PELVIS W/CONTRAST: CPT | Mod: 26,MH

## 2022-08-16 PROCEDURE — 71045 X-RAY EXAM CHEST 1 VIEW: CPT | Mod: 26

## 2022-08-16 PROCEDURE — 99223 1ST HOSP IP/OBS HIGH 75: CPT

## 2022-08-16 RX ORDER — METRONIDAZOLE 500 MG
500 TABLET ORAL EVERY 8 HOURS
Refills: 0 | Status: DISCONTINUED | OUTPATIENT
Start: 2022-08-17 | End: 2022-08-21

## 2022-08-16 RX ORDER — SODIUM CHLORIDE 9 MG/ML
1000 INJECTION, SOLUTION INTRAVENOUS
Refills: 0 | Status: DISCONTINUED | OUTPATIENT
Start: 2022-08-16 | End: 2022-08-17

## 2022-08-16 RX ORDER — MONTELUKAST 4 MG/1
10 TABLET, CHEWABLE ORAL AT BEDTIME
Refills: 0 | Status: DISCONTINUED | OUTPATIENT
Start: 2022-08-16 | End: 2022-08-21

## 2022-08-16 RX ORDER — DEXTROSE 50 % IN WATER 50 %
15 SYRINGE (ML) INTRAVENOUS ONCE
Refills: 0 | Status: DISCONTINUED | OUTPATIENT
Start: 2022-08-16 | End: 2022-08-21

## 2022-08-16 RX ORDER — DIAZEPAM 5 MG
2.5 TABLET ORAL
Refills: 0 | Status: DISCONTINUED | OUTPATIENT
Start: 2022-08-16 | End: 2022-08-21

## 2022-08-16 RX ORDER — DEXTROSE 50 % IN WATER 50 %
25 SYRINGE (ML) INTRAVENOUS ONCE
Refills: 0 | Status: DISCONTINUED | OUTPATIENT
Start: 2022-08-16 | End: 2022-08-21

## 2022-08-16 RX ORDER — SODIUM CHLORIDE 9 MG/ML
500 INJECTION INTRAMUSCULAR; INTRAVENOUS; SUBCUTANEOUS ONCE
Refills: 0 | Status: COMPLETED | OUTPATIENT
Start: 2022-08-16 | End: 2022-08-16

## 2022-08-16 RX ORDER — PANTOPRAZOLE SODIUM 20 MG/1
1 TABLET, DELAYED RELEASE ORAL
Qty: 0 | Refills: 0 | DISCHARGE

## 2022-08-16 RX ORDER — CEFTRIAXONE 500 MG/1
2000 INJECTION, POWDER, FOR SOLUTION INTRAMUSCULAR; INTRAVENOUS EVERY 24 HOURS
Refills: 0 | Status: DISCONTINUED | OUTPATIENT
Start: 2022-08-17 | End: 2022-08-20

## 2022-08-16 RX ORDER — DEXTROSE 50 % IN WATER 50 %
12.5 SYRINGE (ML) INTRAVENOUS ONCE
Refills: 0 | Status: DISCONTINUED | OUTPATIENT
Start: 2022-08-16 | End: 2022-08-21

## 2022-08-16 RX ORDER — ONDANSETRON 8 MG/1
4 TABLET, FILM COATED ORAL EVERY 8 HOURS
Refills: 0 | Status: DISCONTINUED | OUTPATIENT
Start: 2022-08-16 | End: 2022-08-21

## 2022-08-16 RX ORDER — ASPIRIN/CALCIUM CARB/MAGNESIUM 324 MG
81 TABLET ORAL THREE TIMES A DAY
Refills: 0 | Status: DISCONTINUED | OUTPATIENT
Start: 2022-08-16 | End: 2022-08-21

## 2022-08-16 RX ORDER — ACETAMINOPHEN 500 MG
650 TABLET ORAL EVERY 6 HOURS
Refills: 0 | Status: DISCONTINUED | OUTPATIENT
Start: 2022-08-16 | End: 2022-08-21

## 2022-08-16 RX ORDER — TRAZODONE HCL 50 MG
100 TABLET ORAL AT BEDTIME
Refills: 0 | Status: DISCONTINUED | OUTPATIENT
Start: 2022-08-17 | End: 2022-08-21

## 2022-08-16 RX ORDER — DIAZEPAM 5 MG
5 TABLET ORAL AT BEDTIME
Refills: 0 | Status: DISCONTINUED | OUTPATIENT
Start: 2022-08-16 | End: 2022-08-21

## 2022-08-16 RX ORDER — PANTOPRAZOLE SODIUM 20 MG/1
40 TABLET, DELAYED RELEASE ORAL DAILY
Refills: 0 | Status: DISCONTINUED | OUTPATIENT
Start: 2022-08-16 | End: 2022-08-21

## 2022-08-16 RX ORDER — CEFTRIAXONE 500 MG/1
2000 INJECTION, POWDER, FOR SOLUTION INTRAMUSCULAR; INTRAVENOUS ONCE
Refills: 0 | Status: COMPLETED | OUTPATIENT
Start: 2022-08-16 | End: 2022-08-16

## 2022-08-16 RX ORDER — CLOPIDOGREL BISULFATE 75 MG/1
75 TABLET, FILM COATED ORAL DAILY
Refills: 0 | Status: DISCONTINUED | OUTPATIENT
Start: 2022-08-16 | End: 2022-08-21

## 2022-08-16 RX ORDER — METRONIDAZOLE 500 MG
500 TABLET ORAL ONCE
Refills: 0 | Status: COMPLETED | OUTPATIENT
Start: 2022-08-16 | End: 2022-08-16

## 2022-08-16 RX ORDER — LANOLIN ALCOHOL/MO/W.PET/CERES
3 CREAM (GRAM) TOPICAL AT BEDTIME
Refills: 0 | Status: DISCONTINUED | OUTPATIENT
Start: 2022-08-16 | End: 2022-08-21

## 2022-08-16 RX ORDER — GLUCAGON INJECTION, SOLUTION 0.5 MG/.1ML
1 INJECTION, SOLUTION SUBCUTANEOUS ONCE
Refills: 0 | Status: DISCONTINUED | OUTPATIENT
Start: 2022-08-16 | End: 2022-08-21

## 2022-08-16 RX ORDER — SODIUM CHLORIDE 9 MG/ML
1000 INJECTION, SOLUTION INTRAVENOUS
Refills: 0 | Status: DISCONTINUED | OUTPATIENT
Start: 2022-08-16 | End: 2022-08-21

## 2022-08-16 RX ORDER — FAMOTIDINE 10 MG/ML
20 INJECTION INTRAVENOUS DAILY
Refills: 0 | Status: DISCONTINUED | OUTPATIENT
Start: 2022-08-16 | End: 2022-08-21

## 2022-08-16 RX ORDER — FAMOTIDINE 10 MG/ML
20 INJECTION INTRAVENOUS DAILY
Refills: 0 | Status: DISCONTINUED | OUTPATIENT
Start: 2022-08-16 | End: 2022-08-16

## 2022-08-16 RX ORDER — INSULIN LISPRO 100/ML
VIAL (ML) SUBCUTANEOUS EVERY 6 HOURS
Refills: 0 | Status: DISCONTINUED | OUTPATIENT
Start: 2022-08-16 | End: 2022-08-18

## 2022-08-16 RX ORDER — LATANOPROST 0.05 MG/ML
1 SOLUTION/ DROPS OPHTHALMIC; TOPICAL AT BEDTIME
Refills: 0 | Status: DISCONTINUED | OUTPATIENT
Start: 2022-08-16 | End: 2022-08-21

## 2022-08-16 RX ADMIN — Medication 100 MILLIGRAM(S): at 19:22

## 2022-08-16 RX ADMIN — SODIUM CHLORIDE 500 MILLILITER(S): 9 INJECTION INTRAMUSCULAR; INTRAVENOUS; SUBCUTANEOUS at 17:41

## 2022-08-16 RX ADMIN — CEFTRIAXONE 100 MILLIGRAM(S): 500 INJECTION, POWDER, FOR SOLUTION INTRAMUSCULAR; INTRAVENOUS at 17:41

## 2022-08-16 RX ADMIN — SODIUM CHLORIDE 500 MILLILITER(S): 9 INJECTION INTRAMUSCULAR; INTRAVENOUS; SUBCUTANEOUS at 19:22

## 2022-08-16 NOTE — CONSULT NOTE ADULT - SUBJECTIVE AND OBJECTIVE BOX
GENERAL SURGERY CONSULT  ========================    HPI: 76F PMH diverticulitis and diverticular bleeds treated w/clips, DM, HTN, breast cancer (s/p adjuvant chemo+rads; ), cerebral aneurysm repair w/embolization and stent () p/t ED after seeing GI doctor for diffuse abdominal pain (started Sat). Pt states pain has waxed and wained but overall improved since onset. Noted fever of 104F, which resolved. Pain is located mostly in LLQ w/o radiation. Last BM this AM, soft, formed, no blood. Currently denies fever, chills, night sweats, n/v, CP, SOB. +/+BM, +void.     PAST MEDICAL & SURGICAL HISTORY:  Breast CA  Aneurysm  Vertigo  Hearing loss  Basal cell carcinoma  Diverticulitis  CVA (cerebral vascular accident)  Shoulder disorder  Benign intracranial hypertension  Fibromyalgia  Chronic fatigue  Lumbar disc disease  Kidney stones  HTN (hypertension)  DM (diabetes mellitus)  Anxiety  Depression  Reflux esophagitis  Asthma    PSH:   History of D&C  H/O:   Cerebral aneurysm  repair with coil and stent -   Basal cell carcinoma  removed - left eyelid- 3/15  H/O shoulder surgery  right - 2001  H/O tubal ligation  History of kidney stones      MEDICATIONS  (STANDING):  metroNIDAZOLE  IVPB 500 milliGRAM(s) IV Intermittent once  sodium chloride 0.9% Bolus 500 milliLiter(s) IV Bolus once      ALLERGIES:    ___________________________________________  REVIEW OF SYSTEMS:  All ROS negative except as per HPI.    ___________________________________________  VITALS:  Vital Signs Last 24 Hrs  T(C): 37.2 (16 Aug 2022 17:31), Max: 37.2 (16 Aug 2022 17:31)  T(F): 98.9 (16 Aug 2022 17:31), Max: 98.9 (16 Aug 2022 17:31)  HR: 92 (16 Aug 2022 17:31) (92 - 98)  BP: 126/91 (16 Aug 2022 17:31) (126/91 - 158/71)  BP(mean): --  RR: 18 (16 Aug 2022 17:31) (18 - 18)  SpO2: 98% (16 Aug 2022 17:31) (96% - 98%)    Parameters below as of 16 Aug 2022 17:31  Patient On (Oxygen Delivery Method): room air     PHYSICAL EXAM:  GENERAL: NAD, lying in bed   NEURO: AOx3, awake alert appropriate  HEENT: NCAT, trachea midline  PULM: Respirations non-labored  ABD: Soft, +TTP LLQ, ecchymosis 2/2 DM injections, non-distended, no peritonitis/rebound tenderness  EXT: Warm, well perfused  ____________________________________________  LABS:  CBC Full  -  ( 16 Aug 2022 17:48 )  WBC Count : 8.98 K/uL  RBC Count : 4.89 M/uL  Hemoglobin : 12.9 g/dL  Hematocrit : 39.6 %  Platelet Count - Automated : 384 K/uL  Mean Cell Volume : 81.0 fl  Mean Cell Hemoglobin : 26.4 pg  Mean Cell Hemoglobin Concentration : 32.6 gm/dL  Auto Neutrophil # : 7.03 K/uL  Auto Lymphocyte # : 1.05 K/uL  Auto Monocyte # : 0.63 K/uL  Auto Eosinophil # : 0.21 K/uL  Auto Basophil # : 0.03 K/uL  Auto Neutrophil % : 78.4 %  Auto Lymphocyte % : 11.7 %  Auto Monocyte % : 7.0 %  Auto Eosinophil % : 2.3 %  Auto Basophil % : 0.3 %    08-16    138  |  100  |  15  ----------------------------<  98  3.9   |  22  |  0.62    Ca    9.7      16 Aug 2022 17:48  TPro  7.3  /  Alb  4.2  /  TBili  0.3  /  DBili  x   /  AST  17  /  ALT  18  /  AlkPhos  110  08-16    LIVER FUNCTIONS - ( 16 Aug 2022 17:48 )  Alb: 4.2 g/dL / Pro: 7.3 g/dL / ALK PHOS: 110 U/L / ALT: 18 U/L / AST: 17 U/L / GGT: x           PT/INR - ( 16 Aug 2022 17:48 )   PT: 13.0 sec;   INR: 1.13 ratio    PTT - ( 16 Aug 2022 17:48 )  PTT:29.9 sec    Urinalysis Basic - ( 16 Aug 2022 18:39 )    Color: Colorless / Appearance: Clear / SG: >1.050 / pH: x  Gluc: x / Ketone: Small  / Bili: Negative / Urobili: Negative   Blood: x / Protein: 30 mg/dL / Nitrite: Negative   Leuk Esterase: Negative / RBC: 1 /hpf / WBC 3 /HPF   Sq Epi: x / Non Sq Epi: 1 /hpf / Bacteria: Negative  ____________________________________________  RADIOLOGY & ADDITIONAL STUDIES:     EXAM: [id number] - CT ABDOMEN AND PELVIS OC IC - ORDERED BY: NATASHA EBRG  PROCEDURE DATE: 2022  INTERPRETATION: CLINICAL INFORMATION: Acute abdominal pain  COMPARISON: 3/20 exam    FINDINGS:  LOWER CHEST: Within normal limits.  LIVER: Within normal limits.  BILE DUCTS: Normal caliber.  GALLBLADDER: Within normal limits.  SPLEEN: Within normal limits.  PANCREAS: Within normal limits.  ADRENALS: Within normal limits.  KIDNEYS/URETERS: Within normal limits. Renal cysts  BLADDER: Within normal limits.  REPRODUCTIVE ORGANS: Myomatous uterus. No evidence of an adnexal mass  BOWEL: Diverticulosis is seen with inflammatory change involving the lower mid mesentery with a small pocket of fluid and air measuring up to 1.6 cm in size. Radiographic findings consistent with diverticulitis with mild reactive ileitis. The appendix is identified to be normal. Duodenal diverticulum identified  PERITONEUM: No ascites. Small reactive mesenteric lymph nodes  VESSELS: Within normal limits.  RETROPERITONEUM/LYMPH NODES: No lymphadenopathy.  ABDOMINAL WALL: Within normal limits.  BONES: Within normal limits.    IMPRESSION:  Radiographic findings of a small microperforation within the air collection measuring 1.6 cm in the lower mesentery likely secondary to diverticulitis with mild reactive ileitis.    --- End of Report ---    COLONOSCOPY   2022  Findings:       The perianal and digital rectal examinations were normal.       Many small and large-mouthed diverticula were found in the sigmoid colon, transverse colon        and ascending colon. There was severe diverticulosis in the sigmoid colon in particular with        tortuosity of the region.       Non-bleeding internal hemorrhoids were found during retroflexion.       A small amount of liquid semi-liquid brown stool was found in the sigmoid colon, in the        transverse colon, in the ascending colon and in the cecum, making visualization difficult.                                                                                                        Impression:          - Preparation of the colon was fair.                       - Diverticulosis in the sigmoid colon, in the transverse colon and in the                        ascending colon. Significant diverticulosis in sigmoid and tortuous colon.                       - Non-bleeding internal hemorrhoids.                       - Stool in the sigmoid colon, in the transverse colon, in the ascending colon                        and in the cecum.                       - No specimens collected.  Recommendation:      - Return patient to hospital [facility] for ongoing care.                       - High fiber diet.      - Colonoscopy for screening/surviellance as an outpatient as previously                        scheduled given prep not adequate for screening purposes.    COLONOSCOPY   3/11/2020  Findings:       The perianal exam findings include skin tags.       Hematin (altered blood/coffee-ground-like material) was found in the rectum, in the        recto-sigmoid colon, in the sigmoid colon, in the descending colon, at the splenic flexure        and in the transverse colon.       Multiple small and large-mouthed diverticula were found in the recto-sigmoid colon, in the        sigmoid colon, in the descending colon, at the splenic flexure, transverse colon and        ascending colon.       A cluster of 2 large-mouthed diverticula with a visible vessel in between them was found in        the sigmoid colon. There was evidence of recent bleeding from the diverticular opening with        notable visible vessel and surrounding old blood. For hemostasis and to prevent rebleeding,        two hemostatic clips were successfully placed (MR conditional). There was no bleeding at the   end of the procedure.       Non-bleeding internal hemorrhoids were found during retroflexion. The hemorrhoids were small.                                                                                                        Impression:          - Perianal skin tags found on perianal exam.                       - Diverticulosis in the recto-sigmoid colon, in the sigmoid colon, in the                        descending colon, at the splenic flexure, in the transverse colon and in       ascending colon.                       - Diverticulosis in the sigmoid colon. There was evidence of recent bleeding                        from the diverticular opening with a visible vessel. Clips (MR conditional)                        were placed.                       - Non-bleeding internal hemorrhoids.                       - No specimens collected.  Recommendation:      - Return patient to hospital [facility] for ongoing care.                       - Advance diet as tolerated to clears. If H/H stable, advance to regular.                       - Monitor CBC and bowel movements                       - If rebleeds, huy consider repeat colonoscopy vs IR intervention           GENERAL SURGERY CONSULT  ========================    HPI: 76F PMH diverticulitis and diverticular bleeds treated w/clips, DM, HTN, breast cancer (s/p adjuvant chemo+rads; ), cerebral aneurysm repair w/embolization and stent (on asprin + plavix; ) p/t ED after seeing GI doctor for diffuse abdominal pain (started Sat). Pt states pain has waxed and wained but overall improved since onset. Noted fever of 104F, which resolved. Pain is located mostly in LLQ w/o radiation. Last BM this AM, soft, formed, no blood. Currently denies fever, chills, night sweats, n/v, CP, SOB. +/+BM, +void.     PAST MEDICAL & SURGICAL HISTORY:  Breast CA  Aneurysm  Vertigo  Hearing loss  Basal cell carcinoma  Diverticulitis  CVA (cerebral vascular accident)  Shoulder disorder  Benign intracranial hypertension  Fibromyalgia  Chronic fatigue  Lumbar disc disease  Kidney stones  HTN (hypertension)  DM (diabetes mellitus)  Anxiety  Depression  Reflux esophagitis  Asthma    PSH:   History of D&C  H/O:   Cerebral aneurysm  repair with coil and stent -   Basal cell carcinoma  removed - left eyelid- 3/15  H/O shoulder surgery  right - 2001  H/O tubal ligation  History of kidney stones      MEDICATIONS  (STANDING):  metroNIDAZOLE  IVPB 500 milliGRAM(s) IV Intermittent once  sodium chloride 0.9% Bolus 500 milliLiter(s) IV Bolus once      ALLERGIES:    ___________________________________________  REVIEW OF SYSTEMS:  All ROS negative except as per HPI.    ___________________________________________  VITALS:  Vital Signs Last 24 Hrs  T(C): 37.2 (16 Aug 2022 17:31), Max: 37.2 (16 Aug 2022 17:31)  T(F): 98.9 (16 Aug 2022 17:31), Max: 98.9 (16 Aug 2022 17:31)  HR: 92 (16 Aug 2022 17:31) (92 - 98)  BP: 126/91 (16 Aug 2022 17:31) (126/91 - 158/71)  BP(mean): --  RR: 18 (16 Aug 2022 17:31) (18 - 18)  SpO2: 98% (16 Aug 2022 17:31) (96% - 98%)    Parameters below as of 16 Aug 2022 17:31  Patient On (Oxygen Delivery Method): room air     PHYSICAL EXAM:  GENERAL: NAD, lying in bed   NEURO: AOx3, awake alert appropriate  HEENT: NCAT, trachea midline  PULM: Respirations non-labored  ABD: Soft, +TTP LLQ, ecchymosis 2/2 DM injections, non-distended, no peritonitis/rebound tenderness  EXT: Warm, well perfused  ____________________________________________  LABS:  CBC Full  -  ( 16 Aug 2022 17:48 )  WBC Count : 8.98 K/uL  RBC Count : 4.89 M/uL  Hemoglobin : 12.9 g/dL  Hematocrit : 39.6 %  Platelet Count - Automated : 384 K/uL  Mean Cell Volume : 81.0 fl  Mean Cell Hemoglobin : 26.4 pg  Mean Cell Hemoglobin Concentration : 32.6 gm/dL  Auto Neutrophil # : 7.03 K/uL  Auto Lymphocyte # : 1.05 K/uL  Auto Monocyte # : 0.63 K/uL  Auto Eosinophil # : 0.21 K/uL  Auto Basophil # : 0.03 K/uL  Auto Neutrophil % : 78.4 %  Auto Lymphocyte % : 11.7 %  Auto Monocyte % : 7.0 %  Auto Eosinophil % : 2.3 %  Auto Basophil % : 0.3 %    08-16    138  |  100  |  15  ----------------------------<  98  3.9   |  22  |  0.62    Ca    9.7      16 Aug 2022 17:48  TPro  7.3  /  Alb  4.2  /  TBili  0.3  /  DBili  x   /  AST  17  /  ALT  18  /  AlkPhos  110  08-16    LIVER FUNCTIONS - ( 16 Aug 2022 17:48 )  Alb: 4.2 g/dL / Pro: 7.3 g/dL / ALK PHOS: 110 U/L / ALT: 18 U/L / AST: 17 U/L / GGT: x           PT/INR - ( 16 Aug 2022 17:48 )   PT: 13.0 sec;   INR: 1.13 ratio    PTT - ( 16 Aug 2022 17:48 )  PTT:29.9 sec    Urinalysis Basic - ( 16 Aug 2022 18:39 )    Color: Colorless / Appearance: Clear / SG: >1.050 / pH: x  Gluc: x / Ketone: Small  / Bili: Negative / Urobili: Negative   Blood: x / Protein: 30 mg/dL / Nitrite: Negative   Leuk Esterase: Negative / RBC: 1 /hpf / WBC 3 /HPF   Sq Epi: x / Non Sq Epi: 1 /hpf / Bacteria: Negative  ____________________________________________  RADIOLOGY & ADDITIONAL STUDIES:     EXAM: [id number] - CT ABDOMEN AND PELVIS OC IC - ORDERED BY: NATASHA BERG  PROCEDURE DATE: 2022  INTERPRETATION: CLINICAL INFORMATION: Acute abdominal pain  COMPARISON: 3/20 exam    FINDINGS:  LOWER CHEST: Within normal limits.  LIVER: Within normal limits.  BILE DUCTS: Normal caliber.  GALLBLADDER: Within normal limits.  SPLEEN: Within normal limits.  PANCREAS: Within normal limits.  ADRENALS: Within normal limits.  KIDNEYS/URETERS: Within normal limits. Renal cysts  BLADDER: Within normal limits.  REPRODUCTIVE ORGANS: Myomatous uterus. No evidence of an adnexal mass  BOWEL: Diverticulosis is seen with inflammatory change involving the lower mid mesentery with a small pocket of fluid and air measuring up to 1.6 cm in size. Radiographic findings consistent with diverticulitis with mild reactive ileitis. The appendix is identified to be normal. Duodenal diverticulum identified  PERITONEUM: No ascites. Small reactive mesenteric lymph nodes  VESSELS: Within normal limits.  RETROPERITONEUM/LYMPH NODES: No lymphadenopathy.  ABDOMINAL WALL: Within normal limits.  BONES: Within normal limits.    IMPRESSION:  Radiographic findings of a small microperforation within the air collection measuring 1.6 cm in the lower mesentery likely secondary to diverticulitis with mild reactive ileitis.    --- End of Report ---    COLONOSCOPY   2022  Findings:       The perianal and digital rectal examinations were normal.       Many small and large-mouthed diverticula were found in the sigmoid colon, transverse colon        and ascending colon. There was severe diverticulosis in the sigmoid colon in particular with        tortuosity of the region.       Non-bleeding internal hemorrhoids were found during retroflexion.       A small amount of liquid semi-liquid brown stool was found in the sigmoid colon, in the        transverse colon, in the ascending colon and in the cecum, making visualization difficult.                                                                                                        Impression:          - Preparation of the colon was fair.                       - Diverticulosis in the sigmoid colon, in the transverse colon and in the                        ascending colon. Significant diverticulosis in sigmoid and tortuous colon.                       - Non-bleeding internal hemorrhoids.                       - Stool in the sigmoid colon, in the transverse colon, in the ascending colon                        and in the cecum.                       - No specimens collected.  Recommendation:      - Return patient to hospital [facility] for ongoing care.                       - High fiber diet.      - Colonoscopy for screening/surviellance as an outpatient as previously                        scheduled given prep not adequate for screening purposes.    COLONOSCOPY   3/11/2020  Findings:       The perianal exam findings include skin tags.       Hematin (altered blood/coffee-ground-like material) was found in the rectum, in the        recto-sigmoid colon, in the sigmoid colon, in the descending colon, at the splenic flexure        and in the transverse colon.       Multiple small and large-mouthed diverticula were found in the recto-sigmoid colon, in the        sigmoid colon, in the descending colon, at the splenic flexure, transverse colon and        ascending colon.       A cluster of 2 large-mouthed diverticula with a visible vessel in between them was found in        the sigmoid colon. There was evidence of recent bleeding from the diverticular opening with        notable visible vessel and surrounding old blood. For hemostasis and to prevent rebleeding,        two hemostatic clips were successfully placed (MR conditional). There was no bleeding at the   end of the procedure.       Non-bleeding internal hemorrhoids were found during retroflexion. The hemorrhoids were small.                                                                                                        Impression:          - Perianal skin tags found on perianal exam.                       - Diverticulosis in the recto-sigmoid colon, in the sigmoid colon, in the                        descending colon, at the splenic flexure, in the transverse colon and in       ascending colon.                       - Diverticulosis in the sigmoid colon. There was evidence of recent bleeding                        from the diverticular opening with a visible vessel. Clips (MR conditional)                        were placed.                       - Non-bleeding internal hemorrhoids.                       - No specimens collected.  Recommendation:      - Return patient to hospital [facility] for ongoing care.                       - Advance diet as tolerated to clears. If H/H stable, advance to regular.                       - Monitor CBC and bowel movements                       - If rebleeds, huy consider repeat colonoscopy vs IR intervention

## 2022-08-16 NOTE — H&P ADULT - PROBLEM SELECTOR PLAN 7
-Cont IV PPI and H2 blocker for now given limited PO status ISTOP reviewed Reference #: [id number]  -Cont. Diazepam 2.5mg in AM and afternoon as tolerated  -Cont. Diazepam 5mg QHS as tolerated  -Will order Trazodone 100mg to resume tomorrow QHS

## 2022-08-16 NOTE — H&P ADULT - PROBLEM SELECTOR PLAN 6
ISTOP reviewed Reference #: [id number]  -Cont. Diazepam 2.5mg in AM and afternoon as tolerated  -Cont. Diazepam 5mg QHS as tolerated  -Will order Trazodone 100mg to resume tomorrow QHS -On armor thyroid in AM, will order for now

## 2022-08-16 NOTE — ED PROVIDER NOTE - RAPID ASSESSMENT
76y F presents to the ED c/o abd pain, low grade temp. Imaging was done including CT earlier today showing a perforation in the colon leading to ED visit. Pt is well appearing in triage.    Gloria CHAMBERLAIN) have documented this rapid assessment note under the dictation of Susan Ramirez) which has been reviewed and affirmed to be accurate. Patient was seen as a QPA patient. The patient will be seen and further worked up in the main emergency department and their care will be completed by the main emergency department team along with a thorough physical exam. Receiving team will follow up on labs, analgesia, any clinical imaging, reassess and disposition as clinically indicated, all decisions regarding the progression of care will be made at their discretion. 76y F w/ PMhx of DM, HTN, breast CA presents to the ED c/o abd pain, low grade temp. Imaging was done including CT earlier today showing a perforation in the colon leading to ED visit. Pt is well appearing in triage.  IMPRESSION:  Radiographic findings of a small microperforation within the air collection measuring 1.6 cm in the lower mesentery likely secondary to diverticulitis with mild reactive ileitis.    Gloria CHAMBERLAIN (Scribe) have documented this rapid assessment note under the dictation of Susan Ramirez) which has been reviewed and affirmed to be accurate. Patient was seen as a QPA patient. The patient will be seen and further worked up in the main emergency department and their care will be completed by the main emergency department team along with a thorough physical exam. Receiving team will follow up on labs, analgesia, any clinical imaging, reassess and disposition as clinically indicated, all decisions regarding the progression of care will be made at their discretion. 76y F w/ PMhx of DM, HTN, breast CA presents to the ED c/o abd pain, low grade temp. Imaging was done including CT earlier today showing a perforation in the colon leading to ED visit. Pt is well appearing in triage.  IMPRESSION:  Radiographic findings of a small microperforation within the air collection measuring 1.6 cm in the lower mesentery likely secondary to diverticulitis with mild reactive ileitis.    Gloria CHAMBERLAIN (Gerardo) have documented this rapid assessment note under the dictation of Susan Ramirez) which has been reviewed and affirmed to be accurate. Patient was seen as a QPA patient. The patient will be seen and further worked up in the main emergency department and their care will be completed by the main emergency department team along with a thorough physical exam. Receiving team will follow up on labs, analgesia, any clinical imaging, reassess and disposition as clinically indicated, all decisions regarding the progression of care will be made at their discretion.    Pt seen by me as virtual quick assessment MD in triage prior to full evaluation in main ER. Documentation above performed by gerardo.

## 2022-08-16 NOTE — H&P ADULT - PROBLEM SELECTOR PLAN 1
8/16 CT abd/pelvis: Radiographic findings of a small microperforation within the air collection measuring 1.6 cm in the lower mesentery likely secondary to diverticulitis with mild reactive ileitis. Appreciate surgery recommendations, currently no plans for surgery. Multiple diverticular bleeds in past 2 years.   -Cont. IV antibiotics with Ceftriaxone and flagyl for now  -Will order BCxs in AM  -IVF overnight  -NPO until cleared by surgery  -Serial abdominal exams  -F/u surgery recommendations  -Trend lactate  -Pt currently endorsing minimal pain and declines pain control given codeine allergy

## 2022-08-16 NOTE — ED PROVIDER NOTE - PHYSICAL EXAMINATION
General: non-toxic appearing, in no respiratory distress  HEENT: atraumatic, normocephalic; pupils are equal, round and react to light, extraocular movements intact bilaterally without deficits, no conjunctival pallor, mucous membranes moist  Neck: no jugular venous distension, full range of motion  Chest/Lung: clear to auscultation bilaterally, no wheezes/rhonchi/rales  Heart: regular rate and rhythm, no murmur/gallops/rubs  Abdomen: soft, diffuse tenderness all over 4 quadrants with no rebound or guarding   Extremities: no lower extremity edema, +2 radial pulses bilaterally, +2 dorsalis pedis pulses bilaterally  Musculoskeletal: full range of motion of all 4 extremities  Nervous System: alert and oriented, no motor deficits or sensory deficits; CNII-XII grossly intact; no focal neurologic deficits  Skin: no rashes/lacerations noted

## 2022-08-16 NOTE — ED ADULT NURSE REASSESSMENT NOTE - NS ED NURSE REASSESS COMMENT FT1
Handoff report received from SALOME Soriano. Pt resting comfortably in gold 9 with family at bedside. Pt A&O x 4, ambulatory, VSS. Pt denies any abdominal pain at this time. Pt denies any needs at this time. Pending surgery recs. Bed locked in lowest position, side rails up and call bell in reach.

## 2022-08-16 NOTE — H&P ADULT - HISTORY OF PRESENT ILLNESS
76F PMH diverticulitis and diverticular bleeds treated w/clips, DM, HTN, breast cancer (s/p adjuvant chemo+rads; 2016), cerebral aneurysm repair w/embolization and stent (on asprin + plavix; 2012) p/t ED after seeing GI doctor for diffuse abdominal pain starting 4 days ago on Saturday. Pt states pain has waxed and waned but overall improved since onset. Noted fever of 104F, which resolved. Pain is located mostly in LLQ w/o radiation. Last BM this AM, soft, formed, no blood. Pt was recommended to get CT abd/pelvis today by GI who then sent pt to ER when results returned. Currently denies fever, chills, night sweats, n/v, CP, SOB. +/+BM, +void    In ER: Given IV Ceftriaxone and IV flagyl, NS 500x2 76F PMH diverticulitis and diverticular bleeds treated w/clips, DM, HTN, breast cancer (s/p adjuvant chemo+rads; 2016), cerebral aneurysm repair w/embolization and stent (on aspirin + plavix; 2012) p/t ED after seeing GI doctor for diffuse abdominal pain starting 4 days ago on Saturday. Pt states pain has waxed and waned but overall improved since onset. Noted fever of 104F, which resolved. Pain is located mostly in LLQ w/o radiation. Last BM this AM, soft, formed, no blood. Pt was recommended to get CT abd/pelvis today by GI who then sent pt to ER when results returned. Currently denies fever, chills, night sweats, n/v, CP, SOB. +/+BM, +void    In ER: Given IV Ceftriaxone and IV flagyl, NS 500x2

## 2022-08-16 NOTE — H&P ADULT - PROBLEM SELECTOR PLAN 8
-SCDs for now as pt already on asa and plavix -Cont IV PPI and H2 blocker for now given limited PO status

## 2022-08-16 NOTE — H&P ADULT - NSHPLABSRESULTS_GEN_ALL_CORE
I have reviewed the labs, imaging and ekg. I have reviewed the labs, imaging and ekg. EKG with NSR HR 87 QTc 440 non-specific ST segment findings, CXR w/o focal consolidations

## 2022-08-16 NOTE — CONSULT NOTE ADULT - ASSESSMENT
76F PMH diverticulitis and diverticular bleeds treated w/clips, DM, HTN, breast cancer (s/p adjuvant chemo+rads; 2016), cerebral aneurysm repair w/embolization and stent (2012) p/t ED for abdominal pain; CT showed 1.6cm microperforation in lower mesentery 2/2 diverticulitis.     PLAN: To be discussed with attending   -   - admit to medicine  - c/w IV abx  - serial abdominal exam     76F PMH diverticulitis and diverticular bleeds treated w/clips, DM, HTN, breast cancer (s/p adjuvant chemo+rads; 2016), cerebral aneurysm repair w/embolization and stent (2012) p/t ED for abdominal pain; CT showed 1.6cm microperforation in lower mesentery 2/2 diverticulitis.     PLAN:   - No surgical interventions at this time  - admit to medicine  - c/w IV abx  - serial abdominal exam    Discussed with fellow on behalf of Dr. Funes     [facility]  [id number]

## 2022-08-16 NOTE — H&P ADULT - ASSESSMENT
76F PMH diverticulitis and diverticular bleeds treated w/clips, DM, HTN, breast cancer (s/p adjuvant chemo+rads; 2016), cerebral aneurysm repair w/embolization and stent (on asprin + plavix; 2012) p/w diverticulitis with perforation 76F PMH diverticulitis and diverticular bleeds treated w/clips, DM, HTN, breast cancer (s/p adjuvant chemo+rads; 2016), cerebral aneurysm repair w/embolization and stent (on aspirin + plavix; 2012) p/w diverticulitis with perforation

## 2022-08-16 NOTE — ED CLERICAL - NS ED CLERK NOTE PRE-ARRIVAL INFORMATION; ADDITIONAL PRE-ARRIVAL INFORMATION
CC/Reason For referral: Hx of multiple medical problems. Presented with fever and LLQ pain, ct reveals perforated diverticulitis. Needs surgical evaluation  Preferred Consultant(if applicable): surgery  Who admits for you (if needed): na  Do you have documents you would like to fax over? no  Would you still like to speak to an ED attending? please call referring md with disposition

## 2022-08-16 NOTE — ED PROVIDER NOTE - ATTENDING CONTRIBUTION TO CARE
I, Gene Tejada, performed a history and physical exam of the patient and discussed their management with the resident and /or advanced care provider. I reviewed the resident and /or ACP's note and agree with the documented findings and plan of care. I was present and available for all procedures.

## 2022-08-16 NOTE — ED PROVIDER NOTE - PROGRESS NOTE DETAILS
Cyril PGY2  Surgery consulted for possible perforated diverticulitis. Gene Tejada MD (Attending Physician):    76 year old female with multiple medical problems and surgeries include DM, HTN, breast cancer, bilateral tubal ligation presents for perforated diverticulitis on CT scan done today at GI office. Reports 2 day history of left lower quadrant abdominal pain only worse with palpation with low grade temp tmax 101F. No nausea, vomiting, diarrhea, chest pain, SOB. CT scan showing "Radiographic findings of a small microperforation within the air collection measuring 1.6 cm in the lower mesentery likely secondary to diverticulitis with mild reactive ileitis". Started on rocephin and flagyl, made NPO and surgery paged for admission. VSS. No abdominal tenderness on exam. Brittney Garay PA: patient signed out to me by previous team. surgery recommendations appreciated. patient to be admitted to medicine. discussed with ER attending

## 2022-08-16 NOTE — H&P ADULT - PROBLEM SELECTOR PLAN 2
On multiple PO meds and injectables at home. On Tresiba 28U in afternoon which she took yesterday but not today. Given current NPO status and low normal insulin will hold long acting for now  -Fingersticks and sliding scale Q6hrs while NPO  -F/u A1c

## 2022-08-16 NOTE — ED PROVIDER NOTE - OBJECTIVE STATEMENT
Patient is a 76 year-old-female with history of DM, HTN and breast cancer sent in by GI for CT showing colon perforation. Patient states that she had constipation on Saturday and had a few loose stools after taking laxatives. Then developed mild pain in the LLQ. Went to see her GI doctor today and was noted to have diffuse abdominal tenderness. CT done today showed a small microperforation within the air collection measuring 1.6cm in the lower mesentery likely 2/2 diverticulitis. Has had low grade fever (100.5F) at home intermittently in the last few days. Denies nausea, vomiting, dysuria, hematuria, bloody stools.

## 2022-08-16 NOTE — H&P ADULT - NSHPPHYSICALEXAM_GEN_ALL_CORE
Vital Signs Last 24 Hrs  T(C): 36.8 (08-16-22 @ 19:34), Max: 37.2 (08-16-22 @ 17:31)  T(F): 98.2 (08-16-22 @ 19:34), Max: 98.9 (08-16-22 @ 17:31)  HR: 87 (08-16-22 @ 19:34) (87 - 98)  BP: 155/72 (08-16-22 @ 19:34) (126/91 - 158/71)  BP(mean): --  RR: 16 (08-16-22 @ 19:34) (16 - 18)  SpO2: 97% (08-16-22 @ 19:34) (96% - 98%)

## 2022-08-16 NOTE — ED PROVIDER NOTE - CLINICAL SUMMARY MEDICAL DECISION MAKING FREE TEXT BOX
Patient is a 76 year-old-female with history of DM, HTN and breast cancer sent in by GI for CT showing colon perforation. Will obtain pre-op labs. Fluids and antibiotics. Surgery consult.

## 2022-08-16 NOTE — H&P ADULT - PROBLEM SELECTOR PLAN 5
-On armor thyroid in AM, will order for now -Trend BP  -Holding amlodipine and losartan for now in setting of diverticulitis. Resume if BP elevated

## 2022-08-16 NOTE — ED ADULT NURSE NOTE - OBJECTIVE STATEMENT
76 Y F presents to the ED with LLQ abdominal pain intermittently since Saturday. Reports that she was originally constipated but has had a couple of loose BM. Reports that she had blood work and a CT today that showed a perforated colon. On assessment, A&Ox4 and ambulatory. Denies lightheadedness, dizziness, headaches, numbness and tingling. Breathing spontaneously and unlabored on Room air. Denies cough, SOB and CP. No Peripheral edema. Peripheral pulses strong and equal bilaterally. Denies CP, SOB and palpitations. Abdomen soft, nondistended. Pt is continent. Denies n/v, dysuria, melena and hematuria. IV placed 20g in R forearm . Labs drawn and sent. Pt safety maintained. Call bell within reach. Side rails in upward position. Pt awaiting dispo.

## 2022-08-17 LAB
A1C WITH ESTIMATED AVERAGE GLUCOSE RESULT: 8 % — HIGH (ref 4–5.6)
ALBUMIN SERPL ELPH-MCNC: 3.8 G/DL — SIGNIFICANT CHANGE UP (ref 3.3–5)
ALP SERPL-CCNC: 96 U/L — SIGNIFICANT CHANGE UP (ref 40–120)
ALT FLD-CCNC: 17 U/L — SIGNIFICANT CHANGE UP (ref 10–45)
ANION GAP SERPL CALC-SCNC: 16 MMOL/L — SIGNIFICANT CHANGE UP (ref 5–17)
AST SERPL-CCNC: 15 U/L — SIGNIFICANT CHANGE UP (ref 10–40)
BASOPHILS # BLD AUTO: 0.02 K/UL — SIGNIFICANT CHANGE UP (ref 0–0.2)
BASOPHILS NFR BLD AUTO: 0.3 % — SIGNIFICANT CHANGE UP (ref 0–2)
BILIRUB SERPL-MCNC: 0.3 MG/DL — SIGNIFICANT CHANGE UP (ref 0.2–1.2)
BUN SERPL-MCNC: 12 MG/DL — SIGNIFICANT CHANGE UP (ref 7–23)
CALCIUM SERPL-MCNC: 9.3 MG/DL — SIGNIFICANT CHANGE UP (ref 8.4–10.5)
CHLORIDE SERPL-SCNC: 102 MMOL/L — SIGNIFICANT CHANGE UP (ref 96–108)
CO2 SERPL-SCNC: 22 MMOL/L — SIGNIFICANT CHANGE UP (ref 22–31)
CREAT SERPL-MCNC: 0.59 MG/DL — SIGNIFICANT CHANGE UP (ref 0.5–1.3)
CULTURE RESULTS: SIGNIFICANT CHANGE UP
EGFR: 93 ML/MIN/1.73M2 — SIGNIFICANT CHANGE UP
EOSINOPHIL # BLD AUTO: 0.26 K/UL — SIGNIFICANT CHANGE UP (ref 0–0.5)
EOSINOPHIL NFR BLD AUTO: 3.5 % — SIGNIFICANT CHANGE UP (ref 0–6)
ESTIMATED AVERAGE GLUCOSE: 183 MG/DL — HIGH (ref 68–114)
GLUCOSE BLDC GLUCOMTR-MCNC: 101 MG/DL — HIGH (ref 70–99)
GLUCOSE BLDC GLUCOMTR-MCNC: 72 MG/DL — SIGNIFICANT CHANGE UP (ref 70–99)
GLUCOSE BLDC GLUCOMTR-MCNC: 76 MG/DL — SIGNIFICANT CHANGE UP (ref 70–99)
GLUCOSE BLDC GLUCOMTR-MCNC: 91 MG/DL — SIGNIFICANT CHANGE UP (ref 70–99)
GLUCOSE SERPL-MCNC: 66 MG/DL — LOW (ref 70–99)
HCT VFR BLD CALC: 36.7 % — SIGNIFICANT CHANGE UP (ref 34.5–45)
HGB BLD-MCNC: 12 G/DL — SIGNIFICANT CHANGE UP (ref 11.5–15.5)
IMM GRANULOCYTES NFR BLD AUTO: 0.3 % — SIGNIFICANT CHANGE UP (ref 0–1.5)
LACTATE SERPL-SCNC: 0.6 MMOL/L — SIGNIFICANT CHANGE UP (ref 0.5–2)
LYMPHOCYTES # BLD AUTO: 1.06 K/UL — SIGNIFICANT CHANGE UP (ref 1–3.3)
LYMPHOCYTES # BLD AUTO: 14.2 % — SIGNIFICANT CHANGE UP (ref 13–44)
MAGNESIUM SERPL-MCNC: 1.9 MG/DL — SIGNIFICANT CHANGE UP (ref 1.6–2.6)
MCHC RBC-ENTMCNC: 26.4 PG — LOW (ref 27–34)
MCHC RBC-ENTMCNC: 32.7 GM/DL — SIGNIFICANT CHANGE UP (ref 32–36)
MCV RBC AUTO: 80.8 FL — SIGNIFICANT CHANGE UP (ref 80–100)
MONOCYTES # BLD AUTO: 0.47 K/UL — SIGNIFICANT CHANGE UP (ref 0–0.9)
MONOCYTES NFR BLD AUTO: 6.3 % — SIGNIFICANT CHANGE UP (ref 2–14)
NEUTROPHILS # BLD AUTO: 5.64 K/UL — SIGNIFICANT CHANGE UP (ref 1.8–7.4)
NEUTROPHILS NFR BLD AUTO: 75.4 % — SIGNIFICANT CHANGE UP (ref 43–77)
NRBC # BLD: 0 /100 WBCS — SIGNIFICANT CHANGE UP (ref 0–0)
PLATELET # BLD AUTO: 347 K/UL — SIGNIFICANT CHANGE UP (ref 150–400)
POTASSIUM SERPL-MCNC: 3.7 MMOL/L — SIGNIFICANT CHANGE UP (ref 3.5–5.3)
POTASSIUM SERPL-SCNC: 3.7 MMOL/L — SIGNIFICANT CHANGE UP (ref 3.5–5.3)
PROT SERPL-MCNC: 6.5 G/DL — SIGNIFICANT CHANGE UP (ref 6–8.3)
RBC # BLD: 4.54 M/UL — SIGNIFICANT CHANGE UP (ref 3.8–5.2)
RBC # FLD: 13.8 % — SIGNIFICANT CHANGE UP (ref 10.3–14.5)
SODIUM SERPL-SCNC: 140 MMOL/L — SIGNIFICANT CHANGE UP (ref 135–145)
SPECIMEN SOURCE: SIGNIFICANT CHANGE UP
WBC # BLD: 7.47 K/UL — SIGNIFICANT CHANGE UP (ref 3.8–10.5)
WBC # FLD AUTO: 7.47 K/UL — SIGNIFICANT CHANGE UP (ref 3.8–10.5)

## 2022-08-17 PROCEDURE — 99233 SBSQ HOSP IP/OBS HIGH 50: CPT

## 2022-08-17 RX ORDER — SODIUM CHLORIDE 9 MG/ML
1000 INJECTION, SOLUTION INTRAVENOUS
Refills: 0 | Status: DISCONTINUED | OUTPATIENT
Start: 2022-08-17 | End: 2022-08-19

## 2022-08-17 RX ORDER — SODIUM CHLORIDE 9 MG/ML
1000 INJECTION, SOLUTION INTRAVENOUS
Refills: 0 | Status: DISCONTINUED | OUTPATIENT
Start: 2022-08-17 | End: 2022-08-17

## 2022-08-17 RX ADMIN — Medication 81 MILLIGRAM(S): at 21:45

## 2022-08-17 RX ADMIN — SODIUM CHLORIDE 100 MILLILITER(S): 9 INJECTION, SOLUTION INTRAVENOUS at 02:22

## 2022-08-17 RX ADMIN — Medication 5 MILLIGRAM(S): at 00:59

## 2022-08-17 RX ADMIN — Medication 81 MILLIGRAM(S): at 05:35

## 2022-08-17 RX ADMIN — CEFTRIAXONE 100 MILLIGRAM(S): 500 INJECTION, POWDER, FOR SOLUTION INTRAMUSCULAR; INTRAVENOUS at 16:43

## 2022-08-17 RX ADMIN — CLOPIDOGREL BISULFATE 75 MILLIGRAM(S): 75 TABLET, FILM COATED ORAL at 11:40

## 2022-08-17 RX ADMIN — MONTELUKAST 10 MILLIGRAM(S): 4 TABLET, CHEWABLE ORAL at 21:45

## 2022-08-17 RX ADMIN — Medication 100 MILLIGRAM(S): at 09:24

## 2022-08-17 RX ADMIN — Medication 100 MILLIGRAM(S): at 16:44

## 2022-08-17 RX ADMIN — LATANOPROST 1 DROP(S): 0.05 SOLUTION/ DROPS OPHTHALMIC; TOPICAL at 21:45

## 2022-08-17 RX ADMIN — SODIUM CHLORIDE 50 MILLILITER(S): 9 INJECTION, SOLUTION INTRAVENOUS at 17:17

## 2022-08-17 RX ADMIN — Medication 100 MILLIGRAM(S): at 02:23

## 2022-08-17 RX ADMIN — SODIUM CHLORIDE 50 MILLILITER(S): 9 INJECTION, SOLUTION INTRAVENOUS at 07:02

## 2022-08-17 RX ADMIN — Medication 81 MILLIGRAM(S): at 16:43

## 2022-08-17 RX ADMIN — PANTOPRAZOLE SODIUM 40 MILLIGRAM(S): 20 TABLET, DELAYED RELEASE ORAL at 11:40

## 2022-08-17 RX ADMIN — FAMOTIDINE 20 MILLIGRAM(S): 10 INJECTION INTRAVENOUS at 11:42

## 2022-08-17 RX ADMIN — Medication 100 MILLIGRAM(S): at 21:45

## 2022-08-17 NOTE — PROGRESS NOTE ADULT - SUBJECTIVE AND OBJECTIVE BOX
SURGERY  Pager: [phone]    INTERVAL EVENTS/SUBJECTIVE: No acute events overnight, patient seen and examined on AM rounds. Reports passing gas, no recent bowel movements; pain improved from prior. No nausea or vomiting endorsed, no new complaints.     ______________________________________________  OBJECTIVE:   T(C): 36.4 (08-17-22 @ 04:02), Max: 37.2 (08-16-22 @ 17:31)  HR: 87 (08-17-22 @ 04:02) (87 - 98)  BP: 163/80 (08-17-22 @ 04:02) (126/91 - 163/80)  RR: 17 (08-17-22 @ 04:02) (14 - 18)  SpO2: 97% (08-17-22 @ 04:02) (96% - 98%)  Wt(kg): --  CAPILLARY BLOOD GLUCOSE      POCT Blood Glucose.: 72 mg/dL (17 Aug 2022 06:32)  POCT Blood Glucose.: 76 mg/dL (17 Aug 2022 01:58)  POCT Blood Glucose.: 72 mg/dL (16 Aug 2022 23:37)    I&O's Detail      Physical exam:  Gen: resting in bed comfortably in NAD  Chest: no increased WOB, regular inspiratory effort   Abdomen: Soft, minimally tender to deep palpation in LLQ; nondistended with no rebound tenderness or guarding.  Vascular: WWP, VERA x4  NEURO: awake, alert  ______________________________________________  LABS:  CBC Full  -  ( 17 Aug 2022 06:29 )  WBC Count : 7.47 K/uL  RBC Count : 4.54 M/uL  Hemoglobin : 12.0 g/dL  Hematocrit : 36.7 %  Platelet Count - Automated : 347 K/uL  Mean Cell Volume : 80.8 fl  Mean Cell Hemoglobin : 26.4 pg  Mean Cell Hemoglobin Concentration : 32.7 gm/dL  Auto Neutrophil # : 5.64 K/uL  Auto Lymphocyte # : 1.06 K/uL  Auto Monocyte # : 0.47 K/uL  Auto Eosinophil # : 0.26 K/uL  Auto Basophil # : 0.02 K/uL  Auto Neutrophil % : 75.4 %  Auto Lymphocyte % : 14.2 %  Auto Monocyte % : 6.3 %  Auto Eosinophil % : 3.5 %  Auto Basophil % : 0.3 %    08-17    140  |  102  |  12  ----------------------------<  66<L>  3.7   |  22  |  0.59    Ca    9.3      17 Aug 2022 06:28  Mg     1.9     08-17    TPro  6.5  /  Alb  3.8  /  TBili  0.3  /  DBili  x   /  AST  15  /  ALT  17  /  AlkPhos  96  08-17    _____________________________________________  RADIOLOGY:

## 2022-08-17 NOTE — PROGRESS NOTE ADULT - ASSESSMENT
76F PMH diverticulitis and diverticular bleeds treated w/clips, DM, HTN, breast cancer (s/p adjuvant chemo+rads; 2016), cerebral aneurysm repair w/embolization and stent (on aspirin + plavix; 2012) p/w diverticulitis with micro perforation

## 2022-08-17 NOTE — PROGRESS NOTE ADULT - ASSESSMENT
76F PMH diverticulitis and diverticular bleeds treated w/clips, DM, HTN, breast cancer (s/p adjuvant chemo+rads; 2016), cerebral aneurysm repair w/embolization and stent (2012) p/t ED for abdominal pain; CT showed 1.6cm microperforation in lower mesentery 2/2 diverticulitis.       - No acute surgical intervention at this time   - C/w IV abx  - Serial abdominal exams      [facility]  [id number]

## 2022-08-17 NOTE — PROGRESS NOTE ADULT - PROBLEM SELECTOR PLAN 1
8/16 CT abd/pelvis: Radiographic findings of a small microperforation within the air collection measuring 1.6 cm in the lower mesentery likely secondary to diverticulitis with mild reactive ileitis. currently no plans for surgery. Multiple diverticular bleeds in past 2 years.   -Cont. IV antibiotics with Ceftriaxone and flagyl for now  -f/u blood cultures   -start clears  -Serial abdominal exams  - surgery recs appreciated   -Trend lactate 8/16 CT abd/pelvis: Radiographic findings of a small microperforation within the air collection measuring 1.6 cm in the lower mesentery likely secondary to diverticulitis with mild reactive ileitis. currently no plans for surgery. Multiple diverticular bleeds in past 2 years.   -Cont. IV antibiotics with Ceftriaxone and flagyl for now  -f/u blood cultures   -spoke with surgery, continue NPO  -Serial abdominal exams  - surgery recs appreciated   -Trend lactate

## 2022-08-17 NOTE — PROVIDER CONTACT NOTE (HYPOGLYCEMIA EVENT) - NS PROVIDER CONTACT BACKGROUND-HYPO
Age: 76y    Gender: Female    POCT Blood Glucose:  72 mg/dL (08-17-22 @ 06:32)  76 mg/dL (08-17-22 @ 01:58)  72 mg/dL (08-16-22 @ 23:37)      eMAR:

## 2022-08-17 NOTE — PATIENT PROFILE ADULT - FALL HARM RISK - UNIVERSAL INTERVENTIONS
Bed in lowest position, wheels locked, appropriate side rails in place/Call bell, personal items and telephone in reach/Instruct patient to call for assistance before getting out of bed or chair/Non-slip footwear when patient is out of bed/[place] to call system/Physically safe environment - no spills, clutter or unnecessary equipment/Purposeful Proactive Rounding/Room/bathroom lighting operational, light cord in reach

## 2022-08-17 NOTE — PROGRESS NOTE ADULT - PROBLEM SELECTOR PLAN 2
On multiple PO meds and injectables at home. On Tresiba 28U in afternoon which she took yesterday but not today. -likely will start lantus 20U if tolerating clears  -SSI  -F/u A1c On multiple PO meds and injectables at home. On Tresiba 28U in afternoon which she took yesterday but not today.  -SSI  -F/u A1c

## 2022-08-17 NOTE — PROGRESS NOTE ADULT - SUBJECTIVE AND OBJECTIVE BOX
Patient is a 76y old  Female who presents with a chief complaint of Diverticulitis and microperforation (17 Aug 2022 10:30)        SUBJECTIVE / OVERNIGHT EVENTS: Patient had no acute events overnight. Patient seen and examined at bedside this morning. LLQ pain unchanged but patient reports does not require pain medications.     ROS:  [ - ] Nausea/Vomiting [ - ] Chest Pain [ - ] Shortness of breath     MEDICATIONS  (STANDING):  aspirin  chewable 81 milliGRAM(s) Oral three times a day  cefTRIAXone   IVPB 2000 milliGRAM(s) IV Intermittent every 24 hours  clopidogrel Tablet 75 milliGRAM(s) Oral daily  dextrose 5% + sodium chloride 0.9%. 1000 milliLiter(s) (50 mL/Hr) IV Continuous <Continuous>  dextrose 5%. 1000 milliLiter(s) (100 mL/Hr) IV Continuous <Continuous>  dextrose 5%. 1000 milliLiter(s) (50 mL/Hr) IV Continuous <Continuous>  dextrose 50% Injectable 25 Gram(s) IV Push once  dextrose 50% Injectable 12.5 Gram(s) IV Push once  dextrose 50% Injectable 25 Gram(s) IV Push once  famotidine Injectable 20 milliGRAM(s) IV Push daily  glucagon  Injectable 1 milliGRAM(s) IntraMuscular once  insulin lispro (ADMELOG) corrective regimen sliding scale   SubCutaneous every 6 hours  latanoprost 0.005% Ophthalmic Solution 1 Drop(s) Both EYES at bedtime  metroNIDAZOLE  IVPB 500 milliGRAM(s) IV Intermittent every 8 hours  montelukast 10 milliGRAM(s) Oral at bedtime  pantoprazole  Injectable 40 milliGRAM(s) IV Push daily  traZODone 100 milliGRAM(s) Oral at bedtime    MEDICATIONS  (PRN):  acetaminophen     Tablet .. 650 milliGRAM(s) Oral every 6 hours PRN Temp greater or equal to 38C (100.4F), Mild Pain (1 - 3)  dextrose Oral Gel 15 Gram(s) Oral once PRN Blood Glucose LESS THAN 70 milliGRAM(s)/deciliter  diazepam    Tablet 5 milliGRAM(s) Oral at bedtime PRN anxiety  diazepam    Tablet 2.5 milliGRAM(s) Oral two times a day PRN anxiety  melatonin 3 milliGRAM(s) Oral at bedtime PRN Insomnia  ondansetron Injectable 4 milliGRAM(s) IV Push every 8 hours PRN Nausea and/or Vomiting      Vital Signs Last 24 Hrs  T(C): 36.8 (17 Aug 2022 10:50), Max: 37.2 (16 Aug 2022 17:31)  T(F): 98.2 (17 Aug 2022 10:50), Max: 98.9 (16 Aug 2022 17:31)  HR: 69 (17 Aug 2022 10:50) (69 - 98)  BP: 146/70 (17 Aug 2022 10:50) (126/91 - 163/80)  BP(mean): --  RR: 18 (17 Aug 2022 10:50) (14 - 18)  SpO2: 96% (17 Aug 2022 10:50) (96% - 98%)    Parameters below as of 17 Aug 2022 10:50  Patient On (Oxygen Delivery Method): room air      CAPILLARY BLOOD GLUCOSE      POCT Blood Glucose.: 91 mg/dL (17 Aug 2022 12:19)  POCT Blood Glucose.: 72 mg/dL (17 Aug 2022 06:32)  POCT Blood Glucose.: 76 mg/dL (17 Aug 2022 01:58)  POCT Blood Glucose.: 72 mg/dL (16 Aug 2022 23:37)    I&O's Summary      PHYSICAL EXAM  GENERAL: NAD, lying comfortably in bed   HEENT:  Atraumatic, Normocephalic, EOMI, conjunctiva and sclera clear, no LAD  CHEST/LUNG: Clear to auscultation bilaterally; No wheeze  HEART: RRR, S1 and S2 No murmurs, rubs, or gallops  ABDOMEN: Soft, LLQ tenderness with no rebound; Bowel sounds present  EXTREMITIES:  2+ Peripheral Pulses, No clubbing, cyanosis, or edema  NEURO: AAOx3, non-focal  SKIN: No rashes or lesions. bruises lower abdominal from prior subq insulin injections    LABS:                        12.0   7.47  )-----------( 347      ( 17 Aug 2022 06:29 )             36.7     08-17    140  |  102  |  12  ----------------------------<  66<L>  3.7   |  22  |  0.59    Ca    9.3      17 Aug 2022 06:28  Mg     1.9     08-17    TPro  6.5  /  Alb  3.8  /  TBili  0.3  /  DBili  x   /  AST  15  /  ALT  17  /  AlkPhos  96  08-17    PT/INR - ( 16 Aug 2022 17:48 )   PT: 13.0 sec;   INR: 1.13 ratio         PTT - ( 16 Aug 2022 17:48 )  PTT:29.9 sec      Urinalysis Basic - ( 16 Aug 2022 18:39 )    Color: Colorless / Appearance: Clear / SG: >1.050 / pH: x  Gluc: x / Ketone: Small  / Bili: Negative / Urobili: Negative   Blood: x / Protein: 30 mg/dL / Nitrite: Negative   Leuk Esterase: Negative / RBC: 1 /hpf / WBC 3 /HPF   Sq Epi: x / Non Sq Epi: 1 /hpf / Bacteria: Negative          RADIOLOGY & ADDITIONAL TESTS:      Labs Personally Reviewed  Imaging Personally Reviewed  Consultant(s) Notes Reviewed

## 2022-08-17 NOTE — PROGRESS NOTE ADULT - NSPROGADDITIONALINFOA_GEN_ALL_CORE
d/w ACP    Start on clears and advance as tolerated. Continue to CTX and flagyl for diverticulitis. No surgical intervention. F/u blood cultures     Jennifer Smith MD  Division of Hospital Medicine  Available on [employer] Teams d/w ACP    NPO and reassess tomorrow to start clears. Continue to CTX and flagyl for diverticulitis. No surgical intervention. F/u blood cultures     Jennifer Smith MD  Division of Hospital Medicine  Available on [employer] Teams

## 2022-08-18 LAB
GLUCOSE BLDC GLUCOMTR-MCNC: 106 MG/DL — HIGH (ref 70–99)
GLUCOSE BLDC GLUCOMTR-MCNC: 127 MG/DL — HIGH (ref 70–99)
GLUCOSE BLDC GLUCOMTR-MCNC: 133 MG/DL — HIGH (ref 70–99)
GLUCOSE BLDC GLUCOMTR-MCNC: 80 MG/DL — SIGNIFICANT CHANGE UP (ref 70–99)
GLUCOSE BLDC GLUCOMTR-MCNC: 93 MG/DL — SIGNIFICANT CHANGE UP (ref 70–99)
LACTATE SERPL-SCNC: 0.6 MMOL/L — SIGNIFICANT CHANGE UP (ref 0.5–2)

## 2022-08-18 PROCEDURE — 99232 SBSQ HOSP IP/OBS MODERATE 35: CPT

## 2022-08-18 RX ORDER — EXEMESTANE 25 MG/1
25 TABLET, SUGAR COATED ORAL DAILY
Refills: 0 | Status: DISCONTINUED | OUTPATIENT
Start: 2022-08-18 | End: 2022-08-21

## 2022-08-18 RX ORDER — INSULIN LISPRO 100/ML
VIAL (ML) SUBCUTANEOUS
Refills: 0 | Status: DISCONTINUED | OUTPATIENT
Start: 2022-08-18 | End: 2022-08-21

## 2022-08-18 RX ORDER — INSULIN LISPRO 100/ML
VIAL (ML) SUBCUTANEOUS AT BEDTIME
Refills: 0 | Status: DISCONTINUED | OUTPATIENT
Start: 2022-08-18 | End: 2022-08-21

## 2022-08-18 RX ORDER — ENOXAPARIN SODIUM 100 MG/ML
40 INJECTION SUBCUTANEOUS EVERY 24 HOURS
Refills: 0 | Status: DISCONTINUED | OUTPATIENT
Start: 2022-08-18 | End: 2022-08-21

## 2022-08-18 RX ADMIN — Medication 100 MILLIGRAM(S): at 00:20

## 2022-08-18 RX ADMIN — Medication 81 MILLIGRAM(S): at 05:44

## 2022-08-18 RX ADMIN — MONTELUKAST 10 MILLIGRAM(S): 4 TABLET, CHEWABLE ORAL at 21:41

## 2022-08-18 RX ADMIN — PANTOPRAZOLE SODIUM 40 MILLIGRAM(S): 20 TABLET, DELAYED RELEASE ORAL at 12:05

## 2022-08-18 RX ADMIN — FAMOTIDINE 20 MILLIGRAM(S): 10 INJECTION INTRAVENOUS at 12:08

## 2022-08-18 RX ADMIN — Medication 81 MILLIGRAM(S): at 21:41

## 2022-08-18 RX ADMIN — Medication 100 MILLIGRAM(S): at 17:35

## 2022-08-18 RX ADMIN — EXEMESTANE 25 MILLIGRAM(S): 25 TABLET, SUGAR COATED ORAL at 17:43

## 2022-08-18 RX ADMIN — Medication 100 MILLIGRAM(S): at 21:41

## 2022-08-18 RX ADMIN — CLOPIDOGREL BISULFATE 75 MILLIGRAM(S): 75 TABLET, FILM COATED ORAL at 12:05

## 2022-08-18 RX ADMIN — Medication 100 MILLIGRAM(S): at 11:53

## 2022-08-18 RX ADMIN — ENOXAPARIN SODIUM 40 MILLIGRAM(S): 100 INJECTION SUBCUTANEOUS at 13:55

## 2022-08-18 RX ADMIN — CEFTRIAXONE 100 MILLIGRAM(S): 500 INJECTION, POWDER, FOR SOLUTION INTRAMUSCULAR; INTRAVENOUS at 17:35

## 2022-08-18 RX ADMIN — Medication 81 MILLIGRAM(S): at 13:55

## 2022-08-18 RX ADMIN — LATANOPROST 1 DROP(S): 0.05 SOLUTION/ DROPS OPHTHALMIC; TOPICAL at 21:40

## 2022-08-18 NOTE — PROGRESS NOTE ADULT - PROBLEM SELECTOR PLAN 2
On multiple PO meds and injectables at home. On Tresiba 28U in afternoon which she took yesterday but not today.  -SSI  -A1c 8

## 2022-08-18 NOTE — PROGRESS NOTE ADULT - SUBJECTIVE AND OBJECTIVE BOX
PROGRESS NOTE:     Patient is a 76y old  Female who presents with a chief complaint of Diverticulitis and microperforation (18 Aug 2022 08:48)      SUBJECTIVE / OVERNIGHT EVENTS:  No acute events overnight. Patient seen and evaluated at bedside. Reports stable left lower quadrant abdominal pain 4 out of 10 in severity, unchanged from before, have not tried diet yet; reports 1 soft BM today; otherwise denies chest pain/SOB/chills.     ADDITIONAL REVIEW OF SYSTEMS:    MEDICATIONS  (STANDING):  aspirin  chewable 81 milliGRAM(s) Oral three times a day  cefTRIAXone   IVPB 2000 milliGRAM(s) IV Intermittent every 24 hours  clopidogrel Tablet 75 milliGRAM(s) Oral daily  dextrose 5% + sodium chloride 0.9%. 1000 milliLiter(s) (50 mL/Hr) IV Continuous <Continuous>  dextrose 5%. 1000 milliLiter(s) (100 mL/Hr) IV Continuous <Continuous>  dextrose 5%. 1000 milliLiter(s) (50 mL/Hr) IV Continuous <Continuous>  dextrose 50% Injectable 25 Gram(s) IV Push once  dextrose 50% Injectable 12.5 Gram(s) IV Push once  dextrose 50% Injectable 25 Gram(s) IV Push once  enoxaparin Injectable 40 milliGRAM(s) SubCutaneous every 24 hours  exemestane 25 milliGRAM(s) Oral daily  famotidine Injectable 20 milliGRAM(s) IV Push daily  glucagon  Injectable 1 milliGRAM(s) IntraMuscular once  insulin lispro (ADMELOG) corrective regimen sliding scale   SubCutaneous three times a day before meals  insulin lispro (ADMELOG) corrective regimen sliding scale   SubCutaneous at bedtime  latanoprost 0.005% Ophthalmic Solution 1 Drop(s) Both EYES at bedtime  metroNIDAZOLE  IVPB 500 milliGRAM(s) IV Intermittent every 8 hours  montelukast 10 milliGRAM(s) Oral at bedtime  pantoprazole  Injectable 40 milliGRAM(s) IV Push daily  traZODone 100 milliGRAM(s) Oral at bedtime    MEDICATIONS  (PRN):  acetaminophen     Tablet .. 650 milliGRAM(s) Oral every 6 hours PRN Temp greater or equal to 38C (100.4F), Mild Pain (1 - 3)  dextrose Oral Gel 15 Gram(s) Oral once PRN Blood Glucose LESS THAN 70 milliGRAM(s)/deciliter  diazepam    Tablet 5 milliGRAM(s) Oral at bedtime PRN anxiety  diazepam    Tablet 2.5 milliGRAM(s) Oral two times a day PRN anxiety  melatonin 3 milliGRAM(s) Oral at bedtime PRN Insomnia  ondansetron Injectable 4 milliGRAM(s) IV Push every 8 hours PRN Nausea and/or Vomiting      CAPILLARY BLOOD GLUCOSE      POCT Blood Glucose.: 93 mg/dL (18 Aug 2022 11:42)  POCT Blood Glucose.: 80 mg/dL (18 Aug 2022 05:31)  POCT Blood Glucose.: 101 mg/dL (17 Aug 2022 23:54)    I&O's Summary    17 Aug 2022 07:01  -  18 Aug 2022 07:00  --------------------------------------------------------  IN: 250 mL / OUT: 650 mL / NET: -400 mL    18 Aug 2022 07:01  -  18 Aug 2022 13:49  --------------------------------------------------------  IN: 520 mL / OUT: 525 mL / NET: -5 mL        PHYSICAL EXAM:  Vital Signs Last 24 Hrs  T(C): 37.3 (18 Aug 2022 13:00), Max: 37.3 (18 Aug 2022 08:35)  T(F): 99.1 (18 Aug 2022 13:00), Max: 99.2 (18 Aug 2022 08:35)  HR: 77 (18 Aug 2022 13:00) (70 - 82)  BP: 145/67 (18 Aug 2022 13:00) (127/56 - 155/77)  BP(mean): --  RR: 18 (18 Aug 2022 13:00) (14 - 18)  SpO2: 98% (18 Aug 2022 13:00) (96% - 98%)    Parameters below as of 18 Aug 2022 13:00  Patient On (Oxygen Delivery Method): room air        CONSTITUTIONAL: NAD, well-developed  RESPIRATORY: Normal respiratory effort; lungs are clear to auscultation bilaterally  CARDIOVASCULAR: Regular rate and rhythm, normal S1 and S2, no murmur/rub/gallop; No lower extremity edema.  ABDOMEN: Nondistended   MUSCLOSKELETAL: no clubbing or cyanosis of digits; no joint swelling or tenderness to palpation  PSYCH: A+O to person, place, and time; affect appropriate    LABS:                        12.0   7.47  )-----------( 347      ( 17 Aug 2022 06:29 )             36.7     08-17    140  |  102  |  12  ----------------------------<  66<L>  3.7   |  22  |  0.59    Ca    9.3      17 Aug 2022 06:28  Mg     1.9     08-17    TPro  6.5  /  Alb  3.8  /  TBili  0.3  /  DBili  x   /  AST  15  /  ALT  17  /  AlkPhos  96  08-17    PT/INR - ( 16 Aug 2022 17:48 )   PT: 13.0 sec;   INR: 1.13 ratio         PTT - ( 16 Aug 2022 17:48 )  PTT:29.9 sec      Urinalysis Basic - ( 16 Aug 2022 18:39 )    Color: Colorless / Appearance: Clear / SG: >1.050 / pH: x  Gluc: x / Ketone: Small  / Bili: Negative / Urobili: Negative   Blood: x / Protein: 30 mg/dL / Nitrite: Negative   Leuk Esterase: Negative / RBC: 1 /hpf / WBC 3 /HPF   Sq Epi: x / Non Sq Epi: 1 /hpf / Bacteria: Negative        Culture - Blood (collected 17 Aug 2022 05:28)  Source: .Blood Blood-Venous  Preliminary Report (18 Aug 2022 12:02):    No growth to date.    Culture - Blood (collected 17 Aug 2022 05:20)  Source: .Blood Blood-Venous  Preliminary Report (18 Aug 2022 12:02):    No growth to date.    Culture - Urine (collected 16 Aug 2022 18:39)  Source: Clean Catch Clean Catch (Midstream)  Final Report (17 Aug 2022 20:52):    <10,000 CFU/mL Normal Urogenital Isabela        RADIOLOGY & ADDITIONAL TESTS:  Results Reviewed:   Imaging Personally Reviewed:  Electrocardiogram Personally Reviewed:    COORDINATION OF CARE:  Care Discussed with Consultants/Other Providers [Y]: Oncology fellow lEiud   Prior or Outpatient Records Reviewed [Y/N]:

## 2022-08-18 NOTE — PROGRESS NOTE ADULT - ASSESSMENT
76F PMH diverticulitis and diverticular bleeds treated w/clips, DM, HTN, breast cancer (s/p adjuvant chemo+rads; 2016), cerebral aneurysm repair w/embolization and stent (2012) p/t ED for abdominal pain; CT showed 1.6cm microperforation in lower mesentery 2/2 diverticulitis.       - No acute surgical intervention at this time   - Agree with plan to advance to clears today; will continue to monitor abdominal exam   - C/w IV abx      [facility]  [id number]

## 2022-08-18 NOTE — CHART NOTE - NSCHARTNOTEFT_GEN_A_CORE
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 Howell q6 months at [facility] [place]. Most recent MRI of breasts was negative for malignancy. She has been on adjuvant exemestane 25mg PO daily since 2019. She can resume this medication as long as she is tolerating PO. Would recommend addition of standard anticoagulation with Lovenox for DVT ppx. Please feel free to reach out with questions or concerns.       Eliud Jimenez MD, PGY-4  Hematology/Medical Oncology Fellow  Pager: [phone]  Available on Microsoft Teams  After 5pm or on weekends please contact  to page on-call fellow

## 2022-08-18 NOTE — PROGRESS NOTE ADULT - PROBLEM SELECTOR PLAN 1
8/16 CT abd/pelvis: Radiographic findings of a small microperforation within the air collection measuring 1.6 cm in the lower mesentery likely secondary to diverticulitis with mild reactive ileitis. currently no plans for surgery. Multiple diverticular bleeds in past 2 years.   -Cont. IV antibiotics with Ceftriaxone and flagyl for now  -f/u blood cultures   -spoke with surgery, advance to clear liquid diet   -Serial abdominal exams  - surgery recs appreciated   -Trend lactate

## 2022-08-18 NOTE — PROGRESS NOTE ADULT - SUBJECTIVE AND OBJECTIVE BOX
SURGERY  Pager: [phone]    INTERVAL EVENTS/SUBJECTIVE: No acute events overnight. Patient seen and examined at bedside, pain well controlled, continues to pass gas. Subjective tenderness to palpation in LLQ.     ______________________________________________  OBJECTIVE:   T(C): 37.3 (08-18-22 @ 08:35), Max: 37.3 (08-18-22 @ 08:35)  HR: 82 (08-18-22 @ 08:35) (69 - 82)  BP: 138/68 (08-18-22 @ 08:35) (127/56 - 155/77)  RR: 18 (08-18-22 @ 08:35) (14 - 18)  SpO2: 96% (08-18-22 @ 08:35) (96% - 97%)  Wt(kg): --  CAPILLARY BLOOD GLUCOSE      POCT Blood Glucose.: 80 mg/dL (18 Aug 2022 05:31)  POCT Blood Glucose.: 101 mg/dL (17 Aug 2022 23:54)  POCT Blood Glucose.: 91 mg/dL (17 Aug 2022 12:19)    I&O's Detail    17 Aug 2022 07:01  -  18 Aug 2022 07:00  --------------------------------------------------------  IN:    dextrose 5% + sodium chloride 0.9%: 100 mL    IV PiggyBack: 150 mL  Total IN: 250 mL    OUT:    Voided (mL): 650 mL  Total OUT: 650 mL    Total NET: -400 mL      18 Aug 2022 07:01  -  18 Aug 2022 08:48  --------------------------------------------------------  IN:  Total IN: 0 mL    OUT:    Voided (mL): 200 mL  Total OUT: 200 mL    Total NET: -200 mL          Physical exam:  Gen: resting in bed comfortably in NAD  Chest: no increased WOB, regular inspiratory effort   Abdomen: Soft, mild TTP in LLQ, nondistended with no rebound tenderness or guarding.   Vascular: WWP, VERA x4  NEURO: awake, alert  ______________________________________________  LABS:  CBC Full  -  ( 17 Aug 2022 06:29 )  WBC Count : 7.47 K/uL  RBC Count : 4.54 M/uL  Hemoglobin : 12.0 g/dL  Hematocrit : 36.7 %  Platelet Count - Automated : 347 K/uL  Mean Cell Volume : 80.8 fl  Mean Cell Hemoglobin : 26.4 pg  Mean Cell Hemoglobin Concentration : 32.7 gm/dL  Auto Neutrophil # : 5.64 K/uL  Auto Lymphocyte # : 1.06 K/uL  Auto Monocyte # : 0.47 K/uL  Auto Eosinophil # : 0.26 K/uL  Auto Basophil # : 0.02 K/uL  Auto Neutrophil % : 75.4 %  Auto Lymphocyte % : 14.2 %  Auto Monocyte % : 6.3 %  Auto Eosinophil % : 3.5 %  Auto Basophil % : 0.3 %    08-17    140  |  102  |  12  ----------------------------<  66<L>  3.7   |  22  |  0.59    Ca    9.3      17 Aug 2022 06:28  Mg     1.9     08-17    TPro  6.5  /  Alb  3.8  /  TBili  0.3  /  DBili  x   /  AST  15  /  ALT  17  /  AlkPhos  96  08-17    _____________________________________________  RADIOLOGY:

## 2022-08-19 LAB
ANION GAP SERPL CALC-SCNC: 19 MMOL/L — HIGH (ref 5–17)
BUN SERPL-MCNC: 13 MG/DL — SIGNIFICANT CHANGE UP (ref 7–23)
CALCIUM SERPL-MCNC: 9.7 MG/DL — SIGNIFICANT CHANGE UP (ref 8.4–10.5)
CHLORIDE SERPL-SCNC: 103 MMOL/L — SIGNIFICANT CHANGE UP (ref 96–108)
CO2 SERPL-SCNC: 18 MMOL/L — LOW (ref 22–31)
CREAT SERPL-MCNC: 0.65 MG/DL — SIGNIFICANT CHANGE UP (ref 0.5–1.3)
EGFR: 91 ML/MIN/1.73M2 — SIGNIFICANT CHANGE UP
GLUCOSE BLDC GLUCOMTR-MCNC: 119 MG/DL — HIGH (ref 70–99)
GLUCOSE BLDC GLUCOMTR-MCNC: 134 MG/DL — HIGH (ref 70–99)
GLUCOSE BLDC GLUCOMTR-MCNC: 232 MG/DL — HIGH (ref 70–99)
GLUCOSE BLDC GLUCOMTR-MCNC: 248 MG/DL — HIGH (ref 70–99)
GLUCOSE SERPL-MCNC: 222 MG/DL — HIGH (ref 70–99)
HCT VFR BLD CALC: 36.5 % — SIGNIFICANT CHANGE UP (ref 34.5–45)
HGB BLD-MCNC: 12.1 G/DL — SIGNIFICANT CHANGE UP (ref 11.5–15.5)
LACTATE SERPL-SCNC: 0.6 MMOL/L — SIGNIFICANT CHANGE UP (ref 0.5–2)
MAGNESIUM SERPL-MCNC: 1.8 MG/DL — SIGNIFICANT CHANGE UP (ref 1.6–2.6)
MCHC RBC-ENTMCNC: 26.7 PG — LOW (ref 27–34)
MCHC RBC-ENTMCNC: 33.2 GM/DL — SIGNIFICANT CHANGE UP (ref 32–36)
MCV RBC AUTO: 80.6 FL — SIGNIFICANT CHANGE UP (ref 80–100)
NRBC # BLD: 0 /100 WBCS — SIGNIFICANT CHANGE UP (ref 0–0)
PHOSPHATE SERPL-MCNC: 2.2 MG/DL — LOW (ref 2.5–4.5)
PLATELET # BLD AUTO: 389 K/UL — SIGNIFICANT CHANGE UP (ref 150–400)
POTASSIUM SERPL-MCNC: 3.9 MMOL/L — SIGNIFICANT CHANGE UP (ref 3.5–5.3)
POTASSIUM SERPL-SCNC: 3.9 MMOL/L — SIGNIFICANT CHANGE UP (ref 3.5–5.3)
RBC # BLD: 4.53 M/UL — SIGNIFICANT CHANGE UP (ref 3.8–5.2)
RBC # FLD: 13.6 % — SIGNIFICANT CHANGE UP (ref 10.3–14.5)
SODIUM SERPL-SCNC: 140 MMOL/L — SIGNIFICANT CHANGE UP (ref 135–145)
WBC # BLD: 10.83 K/UL — HIGH (ref 3.8–10.5)
WBC # FLD AUTO: 10.83 K/UL — HIGH (ref 3.8–10.5)

## 2022-08-19 PROCEDURE — 99232 SBSQ HOSP IP/OBS MODERATE 35: CPT

## 2022-08-19 RX ADMIN — Medication 100 MILLIGRAM(S): at 17:10

## 2022-08-19 RX ADMIN — CEFTRIAXONE 100 MILLIGRAM(S): 500 INJECTION, POWDER, FOR SOLUTION INTRAMUSCULAR; INTRAVENOUS at 17:08

## 2022-08-19 RX ADMIN — Medication 100 MILLIGRAM(S): at 09:13

## 2022-08-19 RX ADMIN — EXEMESTANE 25 MILLIGRAM(S): 25 TABLET, SUGAR COATED ORAL at 13:14

## 2022-08-19 RX ADMIN — Medication 81 MILLIGRAM(S): at 22:08

## 2022-08-19 RX ADMIN — Medication 81 MILLIGRAM(S): at 13:14

## 2022-08-19 RX ADMIN — MONTELUKAST 10 MILLIGRAM(S): 4 TABLET, CHEWABLE ORAL at 22:08

## 2022-08-19 RX ADMIN — FAMOTIDINE 20 MILLIGRAM(S): 10 INJECTION INTRAVENOUS at 13:15

## 2022-08-19 RX ADMIN — CLOPIDOGREL BISULFATE 75 MILLIGRAM(S): 75 TABLET, FILM COATED ORAL at 13:14

## 2022-08-19 RX ADMIN — Medication 100 MILLIGRAM(S): at 22:09

## 2022-08-19 RX ADMIN — LATANOPROST 1 DROP(S): 0.05 SOLUTION/ DROPS OPHTHALMIC; TOPICAL at 22:08

## 2022-08-19 RX ADMIN — Medication 2: at 18:22

## 2022-08-19 RX ADMIN — Medication 81 MILLIGRAM(S): at 05:26

## 2022-08-19 RX ADMIN — PANTOPRAZOLE SODIUM 40 MILLIGRAM(S): 20 TABLET, DELAYED RELEASE ORAL at 13:14

## 2022-08-19 NOTE — DIETITIAN INITIAL EVALUATION ADULT - ORAL INTAKE PTA/DIET HISTORY
pt seen with spouse at bedside. Pt reports fair to good intake PTA up until consider of symptoms x 4 days. Consumes regular diet at home but does try to limit intake of concentrated sweets and portions of carbohydrates to due history of diabetes. Confirms allergy to strawberry and shellfish. Pt denies chewing/swallowing difficulty, nausea, vomiting, diarrhea, constipation.

## 2022-08-19 NOTE — DIETITIAN INITIAL EVALUATION ADULT - ADD RECOMMEND
1) Medical team to advance diet when medically feasible via tolerated route. Consider advancing to carbohydrate consistent low fiber diet as tolerated. 2) Diet education provided, reinforce as needed. 3) RD to remain available and follow-up as medically appropriate.

## 2022-08-19 NOTE — DIETITIAN INITIAL EVALUATION ADULT - PERTINENT MEDS FT
MEDICATIONS  (STANDING):  aspirin  chewable 81 milliGRAM(s) Oral three times a day  cefTRIAXone   IVPB 2000 milliGRAM(s) IV Intermittent every 24 hours  clopidogrel Tablet 75 milliGRAM(s) Oral daily  dextrose 5% + sodium chloride 0.9%. 1000 milliLiter(s) (50 mL/Hr) IV Continuous <Continuous>  dextrose 5%. 1000 milliLiter(s) (100 mL/Hr) IV Continuous <Continuous>  dextrose 5%. 1000 milliLiter(s) (50 mL/Hr) IV Continuous <Continuous>  dextrose 50% Injectable 25 Gram(s) IV Push once  dextrose 50% Injectable 12.5 Gram(s) IV Push once  dextrose 50% Injectable 25 Gram(s) IV Push once  enoxaparin Injectable 40 milliGRAM(s) SubCutaneous every 24 hours  exemestane 25 milliGRAM(s) Oral daily  famotidine Injectable 20 milliGRAM(s) IV Push daily  glucagon  Injectable 1 milliGRAM(s) IntraMuscular once  insulin lispro (ADMELOG) corrective regimen sliding scale   SubCutaneous three times a day before meals  insulin lispro (ADMELOG) corrective regimen sliding scale   SubCutaneous at bedtime  latanoprost 0.005% Ophthalmic Solution 1 Drop(s) Both EYES at bedtime  metroNIDAZOLE  IVPB 500 milliGRAM(s) IV Intermittent every 8 hours  montelukast 10 milliGRAM(s) Oral at bedtime  pantoprazole  Injectable 40 milliGRAM(s) IV Push daily  traZODone 100 milliGRAM(s) Oral at bedtime    MEDICATIONS  (PRN):  acetaminophen     Tablet .. 650 milliGRAM(s) Oral every 6 hours PRN Temp greater or equal to 38C (100.4F), Mild Pain (1 - 3)  dextrose Oral Gel 15 Gram(s) Oral once PRN Blood Glucose LESS THAN 70 milliGRAM(s)/deciliter  diazepam    Tablet 5 milliGRAM(s) Oral at bedtime PRN anxiety  diazepam    Tablet 2.5 milliGRAM(s) Oral two times a day PRN anxiety  melatonin 3 milliGRAM(s) Oral at bedtime PRN Insomnia  ondansetron Injectable 4 milliGRAM(s) IV Push every 8 hours PRN Nausea and/or Vomiting

## 2022-08-19 NOTE — DIETITIAN INITIAL EVALUATION ADULT - NSFNSADHERENCEPTAFT_GEN_A_CORE
Pt with T2DM, takes combination of oral medication and insulin: per H&P: SymlinPen 120 subcutaneous solution 3x daily, Victoza 18 mg/3 mL subcutaneous solution in the morning, Jardiance, Metformin. Checks BG daily. HbA1c: 8% (8/17), poor control.

## 2022-08-19 NOTE — PROGRESS NOTE ADULT - SUBJECTIVE AND OBJECTIVE BOX
SURGERY  Pager: [phone]    INTERVAL EVENTS/SUBJECTIVE: No acute events overnight.     ______________________________________________  OBJECTIVE:   T(C): 36.6 (08-19-22 @ 04:45), Max: 37.3 (08-18-22 @ 08:35)  HR: 84 (08-19-22 @ 04:45) (69 - 84)  BP: 141/68 (08-19-22 @ 04:45) (134/72 - 149/64)  RR: 18 (08-19-22 @ 04:45) (18 - 18)  SpO2: 97% (08-19-22 @ 04:45) (96% - 98%)  Wt(kg): --  CAPILLARY BLOOD GLUCOSE      POCT Blood Glucose.: 119 mg/dL (19 Aug 2022 08:01)  POCT Blood Glucose.: 127 mg/dL (18 Aug 2022 21:39)  POCT Blood Glucose.: 106 mg/dL (18 Aug 2022 17:37)  POCT Blood Glucose.: 133 mg/dL (18 Aug 2022 14:15)  POCT Blood Glucose.: 93 mg/dL (18 Aug 2022 11:42)    I&O's Detail    18 Aug 2022 07:01  -  19 Aug 2022 07:00  --------------------------------------------------------  IN:    IV PiggyBack: 100 mL    Oral Fluid: 1020 mL  Total IN: 1120 mL    OUT:    Voided (mL): 1075 mL  Total OUT: 1075 mL    Total NET: 45 mL          Physical exam:  Gen: resting in bed comfortably in NAD  Chest: no increased WOB, regular inspiratory effort   Abdomen: Soft, nontender, nondistended with no rebound tenderness or guarding.   Vascular: WWP, VERA x4  NEURO: awake, alert  ______________________________________________  LABS:          _____________________________________________  RADIOLOGY:

## 2022-08-19 NOTE — DIETITIAN INITIAL EVALUATION ADULT - ENERGY INTAKE
Pt currently on clear liquid diet, reports good tolerance. Denies any nausea, emesis. + BM 8/18. Discussed typical diet advancement, pt and spouse receptive to diet education in anticipation of diet advancement to low fiber. Provided low-fiber nutrition therapy including importance of avoiding  fiber rich foods, fresh fruits/vegetables, whole grains, and added fiber in processed foods. Discussed chewing foods well and adequate hydration and protein intake. Discussed gradual reintroduction of fiber back into diet once cleared by MD. Pt verbalized understanding and accepted written handout. Patient with no nutrition-related questions at this time. Made aware RD remains available as needed.  Poor (<50%)

## 2022-08-19 NOTE — DIETITIAN INITIAL EVALUATION ADULT - ETIOLOGY
related to inability to consume adequate nutrition in the setting of altered GI function (Diverticulitis and microperforation)

## 2022-08-19 NOTE — PROGRESS NOTE ADULT - PROBLEM SELECTOR PLAN 2
On multiple PO meds and injectables at home. On Tresiba 28U in afternoon which she took day before admission.   -SSI  -A1c 8

## 2022-08-19 NOTE — PROGRESS NOTE ADULT - PROBLEM SELECTOR PLAN 1
8/16 CT abd/pelvis: Radiographic findings of a small microperforation within the air collection measuring 1.6 cm in the lower mesentery likely secondary to diverticulitis with mild reactive ileitis. currently no plans for surgery. Multiple diverticular bleeds in past 2 years.   -Cont. IV antibiotics with Ceftriaxone and flagyl for now  -f/u blood cultures, NGTD   -spoke with surgery, advance to low residue diet   -Serial abdominal exams  - surgery recs appreciated   -Trend lactate

## 2022-08-19 NOTE — PROGRESS NOTE ADULT - ASSESSMENT
76F PMH diverticulitis and diverticular bleeds treated w/clips, DM, HTN, breast cancer (s/p adjuvant chemo+rads; 2016), cerebral aneurysm repair w/embolization and stent (2012) p/t ED for abdominal pain; CT showed 1.6cm microperforation in lower mesentery 2/2 diverticulitis.     - Patient tolerating CLD well, with bowel function, benign abdominal exam  - Recommend advance to low residue diet today   - C/w IV abx      Red Surgery  [id number]

## 2022-08-19 NOTE — DIETITIAN INITIAL EVALUATION ADULT - PERTINENT LABORATORY DATA
POCT Blood Glucose.: 119 mg/dL (08-19-22 @ 08:01)  A1C with Estimated Average Glucose Result: 8.0 % (08-17-22 @ 06:29)  A1C with Estimated Average Glucose Result: 7.9 % (04-24-22 @ 08:45)

## 2022-08-19 NOTE — PROGRESS NOTE ADULT - SUBJECTIVE AND OBJECTIVE BOX
PROGRESS NOTE:     Patient is a 76y old  Female who presents with a chief complaint of Diverticulitis of intestine without perforation or abscess without bleeding    Chart reviewed, events noted. This is a "76F PMH diverticulitis and diverticular bleeds treated w/clips, DM, HTN, breast cancer (s/p adjuvant chemo+rads; 2016), cerebral aneurysm repair w/embolization and stent (2012) p/t ED for abdominal pain; CT showed 1.6cm microperforation in lower mesentery 2/2 diverticulitis." (19 Aug 2022 10:58)      SUBJECTIVE / OVERNIGHT EVENTS:  No acute events overnight. Patient seen and evaluated at bedside. No fever/chills.  Denies SOB at rest, chest pain, palpitations,  nausea/vomiting, tolerates clear liquid diet well, reports stable abdominal pain 4 out of 10.     ADDITIONAL REVIEW OF SYSTEMS:    MEDICATIONS  (STANDING):  aspirin  chewable 81 milliGRAM(s) Oral three times a day  cefTRIAXone   IVPB 2000 milliGRAM(s) IV Intermittent every 24 hours  clopidogrel Tablet 75 milliGRAM(s) Oral daily  dextrose 5%. 1000 milliLiter(s) (100 mL/Hr) IV Continuous <Continuous>  dextrose 5%. 1000 milliLiter(s) (50 mL/Hr) IV Continuous <Continuous>  dextrose 50% Injectable 25 Gram(s) IV Push once  dextrose 50% Injectable 12.5 Gram(s) IV Push once  dextrose 50% Injectable 25 Gram(s) IV Push once  enoxaparin Injectable 40 milliGRAM(s) SubCutaneous every 24 hours  exemestane 25 milliGRAM(s) Oral daily  famotidine Injectable 20 milliGRAM(s) IV Push daily  glucagon  Injectable 1 milliGRAM(s) IntraMuscular once  insulin lispro (ADMELOG) corrective regimen sliding scale   SubCutaneous three times a day before meals  insulin lispro (ADMELOG) corrective regimen sliding scale   SubCutaneous at bedtime  latanoprost 0.005% Ophthalmic Solution 1 Drop(s) Both EYES at bedtime  metroNIDAZOLE  IVPB 500 milliGRAM(s) IV Intermittent every 8 hours  montelukast 10 milliGRAM(s) Oral at bedtime  pantoprazole  Injectable 40 milliGRAM(s) IV Push daily  traZODone 100 milliGRAM(s) Oral at bedtime    MEDICATIONS  (PRN):  acetaminophen     Tablet .. 650 milliGRAM(s) Oral every 6 hours PRN Temp greater or equal to 38C (100.4F), Mild Pain (1 - 3)  dextrose Oral Gel 15 Gram(s) Oral once PRN Blood Glucose LESS THAN 70 milliGRAM(s)/deciliter  diazepam    Tablet 5 milliGRAM(s) Oral at bedtime PRN anxiety  diazepam    Tablet 2.5 milliGRAM(s) Oral two times a day PRN anxiety  melatonin 3 milliGRAM(s) Oral at bedtime PRN Insomnia  ondansetron Injectable 4 milliGRAM(s) IV Push every 8 hours PRN Nausea and/or Vomiting      CAPILLARY BLOOD GLUCOSE      POCT Blood Glucose.: 134 mg/dL (19 Aug 2022 13:46)  POCT Blood Glucose.: 119 mg/dL (19 Aug 2022 08:01)  POCT Blood Glucose.: 127 mg/dL (18 Aug 2022 21:39)    I&O's Summary    18 Aug 2022 07:01  -  19 Aug 2022 07:00  --------------------------------------------------------  IN: 1120 mL / OUT: 1075 mL / NET: 45 mL    19 Aug 2022 07:01  -  19 Aug 2022 18:11  --------------------------------------------------------  IN: 740 mL / OUT: 0 mL / NET: 740 mL        PHYSICAL EXAM:  Vital Signs Last 24 Hrs  T(C): 37.3 (19 Aug 2022 14:05), Max: 37.3 (19 Aug 2022 14:05)  T(F): 99.2 (19 Aug 2022 14:05), Max: 99.2 (19 Aug 2022 14:05)  HR: 89 (19 Aug 2022 14:05) (69 - 89)  BP: 141/61 (19 Aug 2022 14:05) (134/72 - 149/64)  BP(mean): --  RR: 18 (19 Aug 2022 14:05) (18 - 18)  SpO2: 97% (19 Aug 2022 14:05) (95% - 97%)    Parameters below as of 19 Aug 2022 14:05  Patient On (Oxygen Delivery Method): room air        CONSTITUTIONAL: NAD, well-developed  RESPIRATORY: Normal respiratory effort; lungs are clear to auscultation bilaterally  CARDIOVASCULAR: Regular rate and rhythm, normal S1 and S2, no murmur/rub/gallop; No lower extremity edema.  ABDOMEN: Nondistended, mildly tender to palpation (L worse than R) s, normoactive bowel sounds, no rebound/guarding.  MUSCLOSKELETAL: no clubbing or cyanosis of digits; no joint swelling or tenderness to palpation  PSYCH: A+O to person, place, and time; affect appropriate    LABS:                        12.1   10.83 )-----------( 389      ( 19 Aug 2022 16:53 )             36.5     08-19    140  |  103  |  13  ----------------------------<  222<H>  3.9   |  18<L>  |  0.65    Ca    9.7      19 Aug 2022 16:53  Phos  2.2     08-19  Mg     1.8     08-19                Culture - Blood (collected 17 Aug 2022 05:28)  Source: .Blood Blood-Venous  Preliminary Report (18 Aug 2022 12:02):    No growth to date.    Culture - Blood (collected 17 Aug 2022 05:20)  Source: .Blood Blood-Venous  Preliminary Report (18 Aug 2022 12:02):    No growth to date.    Culture - Urine (collected 16 Aug 2022 18:39)  Source: Clean Catch Clean Catch (Midstream)  Final Report (17 Aug 2022 20:52):    <10,000 CFU/mL Normal Urogenital Isabela        RADIOLOGY & ADDITIONAL TESTS:  Results Reviewed:   Imaging Personally Reviewed:  Electrocardiogram Personally Reviewed:    COORDINATION OF CARE:  Care Discussed with Consultants/Other Providers [Y/N]:  Prior or Outpatient Records Reviewed [Y/N]:

## 2022-08-19 NOTE — DIETITIAN INITIAL EVALUATION ADULT - REASON FOR ADMISSION
Diverticulitis of intestine without perforation or abscess without bleeding    Chart reviewed, events noted. This is a "76F PMH diverticulitis and diverticular bleeds treated w/clips, DM, HTN, breast cancer (s/p adjuvant chemo+rads; 2016), cerebral aneurysm repair w/embolization and stent (2012) p/t ED for abdominal pain; CT showed 1.6cm microperforation in lower mesentery 2/2 diverticulitis."

## 2022-08-19 NOTE — DIETITIAN INITIAL EVALUATION ADULT - OTHER INFO
Weight: Pt reports intentional weight loss PTA prior to onset of symptoms. Weight per chart noted as 155lbs (6/25/22), current dosing weight is 145lbs (8/16/22).

## 2022-08-20 LAB
GLUCOSE BLDC GLUCOMTR-MCNC: 175 MG/DL — HIGH (ref 70–99)
GLUCOSE BLDC GLUCOMTR-MCNC: 241 MG/DL — HIGH (ref 70–99)
GLUCOSE BLDC GLUCOMTR-MCNC: 263 MG/DL — HIGH (ref 70–99)
GLUCOSE BLDC GLUCOMTR-MCNC: 281 MG/DL — HIGH (ref 70–99)
HCT VFR BLD CALC: 35.7 % — SIGNIFICANT CHANGE UP (ref 34.5–45)
HGB BLD-MCNC: 11.8 G/DL — SIGNIFICANT CHANGE UP (ref 11.5–15.5)
LACTATE BLDV-MCNC: 1.1 MMOL/L — SIGNIFICANT CHANGE UP (ref 0.5–2)
MCHC RBC-ENTMCNC: 26.6 PG — LOW (ref 27–34)
MCHC RBC-ENTMCNC: 33.1 GM/DL — SIGNIFICANT CHANGE UP (ref 32–36)
MCV RBC AUTO: 80.6 FL — SIGNIFICANT CHANGE UP (ref 80–100)
NRBC # BLD: 0 /100 WBCS — SIGNIFICANT CHANGE UP (ref 0–0)
PLATELET # BLD AUTO: 372 K/UL — SIGNIFICANT CHANGE UP (ref 150–400)
RBC # BLD: 4.43 M/UL — SIGNIFICANT CHANGE UP (ref 3.8–5.2)
RBC # FLD: 13.6 % — SIGNIFICANT CHANGE UP (ref 10.3–14.5)
WBC # BLD: 7.78 K/UL — SIGNIFICANT CHANGE UP (ref 3.8–10.5)
WBC # FLD AUTO: 7.78 K/UL — SIGNIFICANT CHANGE UP (ref 3.8–10.5)

## 2022-08-20 PROCEDURE — 99232 SBSQ HOSP IP/OBS MODERATE 35: CPT

## 2022-08-20 RX ORDER — CIPROFLOXACIN LACTATE 400MG/40ML
500 VIAL (ML) INTRAVENOUS EVERY 12 HOURS
Refills: 0 | Status: DISCONTINUED | OUTPATIENT
Start: 2022-08-20 | End: 2022-08-21

## 2022-08-20 RX ADMIN — CLOPIDOGREL BISULFATE 75 MILLIGRAM(S): 75 TABLET, FILM COATED ORAL at 13:06

## 2022-08-20 RX ADMIN — Medication 100 MILLIGRAM(S): at 00:15

## 2022-08-20 RX ADMIN — Medication 100 MILLIGRAM(S): at 08:21

## 2022-08-20 RX ADMIN — Medication 1: at 08:20

## 2022-08-20 RX ADMIN — Medication 1: at 21:44

## 2022-08-20 RX ADMIN — Medication 500 MILLIGRAM(S): at 17:40

## 2022-08-20 RX ADMIN — PANTOPRAZOLE SODIUM 40 MILLIGRAM(S): 20 TABLET, DELAYED RELEASE ORAL at 13:06

## 2022-08-20 RX ADMIN — EXEMESTANE 25 MILLIGRAM(S): 25 TABLET, SUGAR COATED ORAL at 13:09

## 2022-08-20 RX ADMIN — Medication 100 MILLIGRAM(S): at 17:43

## 2022-08-20 RX ADMIN — Medication 100 MILLIGRAM(S): at 21:43

## 2022-08-20 RX ADMIN — MONTELUKAST 10 MILLIGRAM(S): 4 TABLET, CHEWABLE ORAL at 21:43

## 2022-08-20 RX ADMIN — LATANOPROST 1 DROP(S): 0.05 SOLUTION/ DROPS OPHTHALMIC; TOPICAL at 21:43

## 2022-08-20 RX ADMIN — Medication 2: at 17:41

## 2022-08-20 RX ADMIN — Medication 81 MILLIGRAM(S): at 13:06

## 2022-08-20 RX ADMIN — Medication 81 MILLIGRAM(S): at 21:42

## 2022-08-20 RX ADMIN — Medication 81 MILLIGRAM(S): at 05:13

## 2022-08-20 RX ADMIN — FAMOTIDINE 20 MILLIGRAM(S): 10 INJECTION INTRAVENOUS at 13:10

## 2022-08-20 RX ADMIN — Medication 3: at 13:07

## 2022-08-20 NOTE — PROGRESS NOTE ADULT - ASSESSMENT
76F PMH diverticulitis and diverticular bleeds treated w/clips, DM, HTN, breast cancer (s/p adjuvant chemo+rads; 2016), cerebral aneurysm repair w/embolization and stent (2012) p/t ED for abdominal pain; CT showed 1.6cm microperforation in lower mesentery 2/2 diverticulitis.     - Patient tolerating low residue diet well, with bowel function, benign abdominal exam  - Care per primary team    Red Surgery  [id number]   76F PMH diverticulitis and diverticular bleeds treated w/clips, DM, HTN, breast cancer (s/p adjuvant chemo+rads; 2016), cerebral aneurysm repair w/embolization and stent (2012) p/t ED for abdominal pain; CT showed 1.6cm microperforation in lower mesentery 2/2 diverticulitis.     - Patient tolerating low residue diet well, with bowel function, benign abdominal exam  - Would d/c with PO abx, cipro/flagyl   - Care per primary team    Red Surgery  [id number]

## 2022-08-20 NOTE — PROGRESS NOTE ADULT - PROBLEM SELECTOR PLAN 1
complicated diverticulitis seen by surgery, no acute intervention   -transition to po Abx, will do po cipro today and flagyl tomorrow. If tolerates can be discharged 8/21.  -will need outpatient follow up with repeat imaging. Per surgery conversation, outpatient follow up in 2 weeks with repeat imaging.

## 2022-08-20 NOTE — PROGRESS NOTE ADULT - SUBJECTIVE AND OBJECTIVE BOX
Authored by Dr. Sidney Galeana    PROGRESS NOTE:   No acute events overnight. Still 4/10 LLQ pain. Tolerating diet. Wants to transtion to po, but only one medicaiton at a time given prior GI upset .    ADDITIONAL REVIEW OF SYSTEMS:    MEDICATIONS  (STANDING):  aspirin  chewable 81 milliGRAM(s) Oral three times a day  cefTRIAXone   IVPB 2000 milliGRAM(s) IV Intermittent every 24 hours  clopidogrel Tablet 75 milliGRAM(s) Oral daily  dextrose 5%. 1000 milliLiter(s) (100 mL/Hr) IV Continuous <Continuous>  dextrose 5%. 1000 milliLiter(s) (50 mL/Hr) IV Continuous <Continuous>  dextrose 50% Injectable 25 Gram(s) IV Push once  dextrose 50% Injectable 12.5 Gram(s) IV Push once  dextrose 50% Injectable 25 Gram(s) IV Push once  enoxaparin Injectable 40 milliGRAM(s) SubCutaneous every 24 hours  exemestane 25 milliGRAM(s) Oral daily  famotidine Injectable 20 milliGRAM(s) IV Push daily  glucagon  Injectable 1 milliGRAM(s) IntraMuscular once  insulin lispro (ADMELOG) corrective regimen sliding scale   SubCutaneous three times a day before meals  insulin lispro (ADMELOG) corrective regimen sliding scale   SubCutaneous at bedtime  latanoprost 0.005% Ophthalmic Solution 1 Drop(s) Both EYES at bedtime  metroNIDAZOLE  IVPB 500 milliGRAM(s) IV Intermittent every 8 hours  montelukast 10 milliGRAM(s) Oral at bedtime  pantoprazole  Injectable 40 milliGRAM(s) IV Push daily  traZODone 100 milliGRAM(s) Oral at bedtime    MEDICATIONS  (PRN):  acetaminophen     Tablet .. 650 milliGRAM(s) Oral every 6 hours PRN Temp greater or equal to 38C (100.4F), Mild Pain (1 - 3)  dextrose Oral Gel 15 Gram(s) Oral once PRN Blood Glucose LESS THAN 70 milliGRAM(s)/deciliter  diazepam    Tablet 5 milliGRAM(s) Oral at bedtime PRN anxiety  diazepam    Tablet 2.5 milliGRAM(s) Oral two times a day PRN anxiety  melatonin 3 milliGRAM(s) Oral at bedtime PRN Insomnia  ondansetron Injectable 4 milliGRAM(s) IV Push every 8 hours PRN Nausea and/or Vomiting      CAPILLARY BLOOD GLUCOSE      POCT Blood Glucose.: 134 mg/dL (19 Aug 2022 13:46)  POCT Blood Glucose.: 119 mg/dL (19 Aug 2022 08:01)  POCT Blood Glucose.: 127 mg/dL (18 Aug 2022 21:39)    I&O's Summary    18 Aug 2022 07:01  -  19 Aug 2022 07:00  --------------------------------------------------------  IN: 1120 mL / OUT: 1075 mL / NET: 45 mL    19 Aug 2022 07:01  -  19 Aug 2022 18:11  --------------------------------------------------------  IN: 740 mL / OUT: 0 mL / NET: 740 mL        PHYSICAL EXAM:  Vital Signs Last 24 Hrs  T(C): 37.3 (20 Aug 2022 12:34), Max: 37.7 (19 Aug 2022 21:22)  T(F): 99.1 (20 Aug 2022 12:34), Max: 99.8 (19 Aug 2022 21:22)  HR: 81 (20 Aug 2022 12:34) (68 - 94)  BP: 168/72 (20 Aug 2022 12:34) (141/61 - 168/72)  BP(mean): --  RR: 18 (20 Aug 2022 12:34) (18 - 18)  SpO2: 97% (20 Aug 2022 12:34) (95% - 97%)    Parameters below as of 20 Aug 2022 12:34  Patient On (Oxygen Delivery Method): room air        Parameters below as of 19 Aug 2022 14:05  Patient On (Oxygen Delivery Method): room air        CONSTITUTIONAL: NAD, well-developed  RESPIRATORY: Normal respiratory effort; lungs are clear to auscultation bilaterally  CARDIOVASCULAR: Regular rate and rhythm, normal S1 and S2, no murmur/rub/gallop; No lower extremity edema.  ABDOMEN: +LLQ TTP without guarding or rigidity.   MUSCLOSKELETAL: no clubbing or cyanosis of digits; no joint swelling or tenderness to palpation  PSYCH: A+O to person, place, and time; affect appropriate    LABS:                                   11.8   7.78  )-----------( 372      ( 20 Aug 2022 07:18 )             35.7   08-19    140  |  103  |  13  ----------------------------<  222<H>  3.9   |  18<L>  |  0.65    Ca    9.7      19 Aug 2022 16:53  Phos  2.2     08-19  Mg     1.8     08-19                  Culture - Blood (collected 17 Aug 2022 05:28)  Source: .Blood Blood-Venous  Preliminary Report (18 Aug 2022 12:02):    No growth to date.    Culture - Blood (collected 17 Aug 2022 05:20)  Source: .Blood Blood-Venous  Preliminary Report (18 Aug 2022 12:02):    No growth to date.    Culture - Urine (collected 16 Aug 2022 18:39)  Source: Clean Catch Clean Catch (Midstream)  Final Report (17 Aug 2022 20:52):    <10,000 CFU/mL Normal Urogenital Isabela        RADIOLOGY & ADDITIONAL TESTS:  Results Reviewed:   Imaging Personally Reviewed:  Electrocardiogram Personally Reviewed:    COORDINATION OF CARE:  Care Discussed with Consultants/Other Providers [Y/N]: Surgery re: follow up.   Prior or Outpatient Records Reviewed [Y/N]:

## 2022-08-20 NOTE — PROGRESS NOTE ADULT - SUBJECTIVE AND OBJECTIVE BOX
SURGERY  Pager: [phone]       INTERVAL EVENTS/SUBJECTIVE: No acute events overnight. Patient seen and examined on AM rounds, tolerated low residue diet well, no nausea. Continues to pass gas and have bowel movements, however reporting pain a consistent 4/10.     ______________________________________________  OBJECTIVE:   T(C): 36.7 (08-20-22 @ 06:18), Max: 37.7 (08-19-22 @ 21:22)  HR: 68 (08-20-22 @ 06:18) (68 - 94)  BP: 143/67 (08-20-22 @ 06:18) (141/61 - 166/77)  RR: 18 (08-20-22 @ 06:18) (18 - 18)  SpO2: 97% (08-20-22 @ 06:18) (95% - 97%)  Wt(kg): --  CAPILLARY BLOOD GLUCOSE      POCT Blood Glucose.: 175 mg/dL (20 Aug 2022 07:53)  POCT Blood Glucose.: 248 mg/dL (19 Aug 2022 21:52)  POCT Blood Glucose.: 232 mg/dL (19 Aug 2022 18:20)  POCT Blood Glucose.: 134 mg/dL (19 Aug 2022 13:46)    I&O's Detail    19 Aug 2022 07:01  -  20 Aug 2022 07:00  --------------------------------------------------------  IN:    IV PiggyBack: 50 mL    IV PiggyBack: 300 mL    Oral Fluid: 1000 mL  Total IN: 1350 mL    OUT:    Voided (mL): 0 mL  Total OUT: 0 mL    Total NET: 1350 mL        Physical exam:  Gen: resting in bed comfortably in NAD  Chest: no increased WOB, regular inspiratory effort   Abdomen: Soft, TTP in LLQ, nondistended with no rebound tenderness or guarding.   Vascular: WWP, VERA x4  NEURO: awake, alert  ______________________________________________  LABS:  CBC Full  -  ( 20 Aug 2022 07:18 )  WBC Count : 7.78 K/uL  RBC Count : 4.43 M/uL  Hemoglobin : 11.8 g/dL  Hematocrit : 35.7 %  Platelet Count - Automated : 372 K/uL  Mean Cell Volume : 80.6 fl  Mean Cell Hemoglobin : 26.6 pg  Mean Cell Hemoglobin Concentration : 33.1 gm/dL  Auto Neutrophil # : x  Auto Lymphocyte # : x  Auto Monocyte # : x  Auto Eosinophil # : x  Auto Basophil # : x  Auto Neutrophil % : x  Auto Lymphocyte % : x  Auto Monocyte % : x  Auto Eosinophil % : x  Auto Basophil % : x    08-19    140  |  103  |  13  ----------------------------<  222<H>  3.9   |  18<L>  |  0.65    Ca    9.7      19 Aug 2022 16:53  Phos  2.2     08-19  Mg     1.8     08-19      _____________________________________________  RADIOLOGY:

## 2022-08-21 ENCOUNTER — TRANSCRIPTION ENCOUNTER (OUTPATIENT)
Age: 77
End: 2022-08-21

## 2022-08-21 VITALS
DIASTOLIC BLOOD PRESSURE: 88 MMHG | RESPIRATION RATE: 18 BRPM | HEART RATE: 94 BPM | TEMPERATURE: 98 F | OXYGEN SATURATION: 96 % | SYSTOLIC BLOOD PRESSURE: 142 MMHG

## 2022-08-21 LAB
GLUCOSE BLDC GLUCOMTR-MCNC: 186 MG/DL — HIGH (ref 70–99)
GLUCOSE BLDC GLUCOMTR-MCNC: 238 MG/DL — HIGH (ref 70–99)

## 2022-08-21 PROCEDURE — 82803 BLOOD GASES ANY COMBINATION: CPT

## 2022-08-21 PROCEDURE — 80048 BASIC METABOLIC PNL TOTAL CA: CPT

## 2022-08-21 PROCEDURE — 81001 URINALYSIS AUTO W/SCOPE: CPT

## 2022-08-21 PROCEDURE — 85025 COMPLETE CBC W/AUTO DIFF WBC: CPT

## 2022-08-21 PROCEDURE — 85027 COMPLETE CBC AUTOMATED: CPT

## 2022-08-21 PROCEDURE — 74177 CT ABD & PELVIS W/CONTRAST: CPT | Mod: MH

## 2022-08-21 PROCEDURE — 82435 ASSAY OF BLOOD CHLORIDE: CPT

## 2022-08-21 PROCEDURE — 86901 BLOOD TYPING SEROLOGIC RH(D): CPT

## 2022-08-21 PROCEDURE — 83605 ASSAY OF LACTIC ACID: CPT

## 2022-08-21 PROCEDURE — 96375 TX/PRO/DX INJ NEW DRUG ADDON: CPT

## 2022-08-21 PROCEDURE — 82330 ASSAY OF CALCIUM: CPT

## 2022-08-21 PROCEDURE — 84132 ASSAY OF SERUM POTASSIUM: CPT

## 2022-08-21 PROCEDURE — 87040 BLOOD CULTURE FOR BACTERIA: CPT

## 2022-08-21 PROCEDURE — 85610 PROTHROMBIN TIME: CPT

## 2022-08-21 PROCEDURE — 85014 HEMATOCRIT: CPT

## 2022-08-21 PROCEDURE — 87086 URINE CULTURE/COLONY COUNT: CPT

## 2022-08-21 PROCEDURE — 86900 BLOOD TYPING SEROLOGIC ABO: CPT

## 2022-08-21 PROCEDURE — 80053 COMPREHEN METABOLIC PANEL: CPT

## 2022-08-21 PROCEDURE — 87637 SARSCOV2&INF A&B&RSV AMP PRB: CPT

## 2022-08-21 PROCEDURE — 99239 HOSP IP/OBS DSCHRG MGMT >30: CPT

## 2022-08-21 PROCEDURE — 84295 ASSAY OF SERUM SODIUM: CPT

## 2022-08-21 PROCEDURE — 71045 X-RAY EXAM CHEST 1 VIEW: CPT

## 2022-08-21 PROCEDURE — 99285 EMERGENCY DEPT VISIT HI MDM: CPT

## 2022-08-21 PROCEDURE — 36415 COLL VENOUS BLD VENIPUNCTURE: CPT

## 2022-08-21 PROCEDURE — 83036 HEMOGLOBIN GLYCOSYLATED A1C: CPT

## 2022-08-21 PROCEDURE — 82947 ASSAY GLUCOSE BLOOD QUANT: CPT

## 2022-08-21 PROCEDURE — 85018 HEMOGLOBIN: CPT

## 2022-08-21 PROCEDURE — 83735 ASSAY OF MAGNESIUM: CPT

## 2022-08-21 PROCEDURE — 96374 THER/PROPH/DIAG INJ IV PUSH: CPT

## 2022-08-21 PROCEDURE — 85730 THROMBOPLASTIN TIME PARTIAL: CPT

## 2022-08-21 PROCEDURE — 82962 GLUCOSE BLOOD TEST: CPT

## 2022-08-21 PROCEDURE — 86850 RBC ANTIBODY SCREEN: CPT

## 2022-08-21 PROCEDURE — 84100 ASSAY OF PHOSPHORUS: CPT

## 2022-08-21 RX ORDER — ACETAMINOPHEN 500 MG
2 TABLET ORAL
Qty: 0 | Refills: 0 | DISCHARGE
Start: 2022-08-21

## 2022-08-21 RX ORDER — METRONIDAZOLE 500 MG
1 TABLET ORAL
Qty: 30 | Refills: 0
Start: 2022-08-21 | End: 2022-08-30

## 2022-08-21 RX ORDER — METRONIDAZOLE 500 MG
500 TABLET ORAL EVERY 8 HOURS
Refills: 0 | Status: DISCONTINUED | OUTPATIENT
Start: 2022-08-21 | End: 2022-08-21

## 2022-08-21 RX ORDER — CIPROFLOXACIN LACTATE 400MG/40ML
1 VIAL (ML) INTRAVENOUS
Qty: 20 | Refills: 0
Start: 2022-08-21 | End: 2022-08-30

## 2022-08-21 RX ADMIN — Medication 81 MILLIGRAM(S): at 13:14

## 2022-08-21 RX ADMIN — FAMOTIDINE 20 MILLIGRAM(S): 10 INJECTION INTRAVENOUS at 13:15

## 2022-08-21 RX ADMIN — CLOPIDOGREL BISULFATE 75 MILLIGRAM(S): 75 TABLET, FILM COATED ORAL at 13:14

## 2022-08-21 RX ADMIN — Medication 2: at 13:15

## 2022-08-21 RX ADMIN — PANTOPRAZOLE SODIUM 40 MILLIGRAM(S): 20 TABLET, DELAYED RELEASE ORAL at 13:15

## 2022-08-21 RX ADMIN — Medication 81 MILLIGRAM(S): at 08:04

## 2022-08-21 RX ADMIN — Medication 1: at 08:04

## 2022-08-21 RX ADMIN — Medication 100 MILLIGRAM(S): at 08:04

## 2022-08-21 RX ADMIN — EXEMESTANE 25 MILLIGRAM(S): 25 TABLET, SUGAR COATED ORAL at 13:16

## 2022-08-21 RX ADMIN — Medication 500 MILLIGRAM(S): at 13:19

## 2022-08-21 RX ADMIN — Medication 500 MILLIGRAM(S): at 08:04

## 2022-08-21 RX ADMIN — Medication 100 MILLIGRAM(S): at 00:37

## 2022-08-21 NOTE — PROGRESS NOTE ADULT - ASSESSMENT
76F PMH diverticulitis and diverticular bleeds treated w/clips, DM, HTN, breast cancer (s/p adjuvant chemo+rads; 2016), cerebral aneurysm repair w/embolization and stent (2012) p/t ED for abdominal pain; CT showed 1.6cm microperforation in lower mesentery 2/2 diverticulitis.     - Patient tolerating low residue diet well, with bowel function, benign abdominal exam  - Would d/c with PO abx, cipro/flagyl   - Care per primary team    Red Surgery  [id number]

## 2022-08-21 NOTE — PROGRESS NOTE PEDS - SUBJECTIVE AND OBJECTIVE BOX
SURGERY  Pager: [phone]      INTERVAL EVENTS/SUBJECTIVE: No acute events overnight. Patient seen and examined on AM rounds, tolerated low residue diet well, no nausea. Continues to pass gas and have bowel movements, however reporting pain a consistent 4/10. Patient feels like she can tolerate her pain and ready to go home.     ______________________________________________  OBJECTIVE:   T(C): 36.6 (08-21-22 @ 04:10), Max: 37.3 (08-20-22 @ 12:34)  HR: 66 (08-21-22 @ 04:10) (66 - 81)  BP: 127/77 (08-21-22 @ 04:10) (127/77 - 168/72)  RR: 18 (08-21-22 @ 04:10) (18 - 18)  SpO2: 97% (08-21-22 @ 04:10) (96% - 97%)  Wt(kg): --  CAPILLARY BLOOD GLUCOSE      POCT Blood Glucose.: 186 mg/dL (21 Aug 2022 07:46)  POCT Blood Glucose.: 263 mg/dL (20 Aug 2022 21:41)  POCT Blood Glucose.: 241 mg/dL (20 Aug 2022 17:35)  POCT Blood Glucose.: 281 mg/dL (20 Aug 2022 12:32)    I&O's Detail    20 Aug 2022 07:01  -  21 Aug 2022 07:00  --------------------------------------------------------  IN:    IV PiggyBack: 300 mL    Oral Fluid: 925 mL  Total IN: 1225 mL    OUT:    Voided (mL): 0 mL  Total OUT: 0 mL    Total NET: 1225 mL            Physical exam:  Gen: resting in bed comfortably in NAD  Chest: no increased WOB, regular inspiratory effort   Abdomen: Soft, TTP in LLQ, nondistended with no rebound tenderness or guarding.   Vascular: WWP, VERA x4  NEURO: awake, alert  ______________________________________________  LABS:  CBC Full  -  ( 20 Aug 2022 07:18 )  WBC Count : 7.78 K/uL  RBC Count : 4.43 M/uL  Hemoglobin : 11.8 g/dL  Hematocrit : 35.7 %  Platelet Count - Automated : 372 K/uL  Mean Cell Volume : 80.6 fl  Mean Cell Hemoglobin : 26.6 pg  Mean Cell Hemoglobin Concentration : 33.1 gm/dL  Auto Neutrophil # : x  Auto Lymphocyte # : x  Auto Monocyte # : x  Auto Eosinophil # : x  Auto Basophil # : x  Auto Neutrophil % : x  Auto Lymphocyte % : x  Auto Monocyte % : x  Auto Eosinophil % : x  Auto Basophil % : x    08-19    140  |  103  |  13  ----------------------------<  222<H>  3.9   |  18<L>  |  0.65    Ca    9.7      19 Aug 2022 16:53  Phos  2.2     08-19  Mg     1.8     08-19      _____________________________________________  RADIOLOGY:

## 2022-08-21 NOTE — PROGRESS NOTE ADULT - PROBLEM SELECTOR PLAN 6
-takes amour thyroid at home  -outpatient f/u
-check tsh   -takes amour thyroid at home
-check tsh   -takes amour thyroid at home

## 2022-08-21 NOTE — PROGRESS NOTE ADULT - PROBLEM SELECTOR PLAN 8
-Cont IV PPI and H2 blocker for now given limited PO status

## 2022-08-21 NOTE — PROGRESS NOTE ADULT - PROBLEM SELECTOR PROBLEM 1
Diverticulitis of colon with perforation

## 2022-08-21 NOTE — DISCHARGE NOTE PROVIDER - NSDCCPCAREPLAN_GEN_ALL_CORE_FT
PRINCIPAL DISCHARGE DIAGNOSIS  Diagnosis: Diverticulitis  Assessment and Plan of Treatment: Continue antibiotics as ordered      SECONDARY DISCHARGE DIAGNOSES  Diagnosis: Diabetes mellitus  Assessment and Plan of Treatment: Your next appointment with endocrinologist ([facility] endocrine ph: [phone])/PMD is -   HgA1C this admission -  Make sure you get your HgA1c checked every three months.  If you take oral diabetes medications, check your blood glucose two times a day.  If you take insulin, check your blood glucose before meals and at bedtime.  It's important not to skip any meals.  Keep a log of your blood glucose results and always take it with you to your doctor appointments.  Keep a list of your current medications including injectables and over the counter medications and bring this medication list with you to all your doctor appointments.  If you have not seen your ophthalmologist this year call for appointment.  Check your feet daily for redness, sores, or openings. Do not self treat. If no improvement in two days call your primary care physician for an appointment.  Low blood sugar (hypoglycemia) is a blood sugar below 70mg/dl. Check your blood sugar if you feel signs/symptoms of hypoglycemia. If your blood sugar is below 70 take 15 grams of carbohydrates (ex 4 oz of apple juice, 3-4 glucose tablets, or 4-6 oz of regular soda) wait 15 minutes and repeat blood sugar to make sure it comes up above 70.  If your blood sugar is above 70 and you are due for a meal, have a meal.  If you are not due for a meal have a snack.  This snack helps keeps your blood sugar at a safe range.      Diagnosis: GERD (gastroesophageal reflux disease)  Assessment and Plan of Treatment: Clnt home meds    Diagnosis: History of cerebral aneurysm  Assessment and Plan of Treatment: Routine follow up outpatient    Diagnosis: Hypothyroid  Assessment and Plan of Treatment: Continue home meds    Diagnosis: Essential hypertension  Assessment and Plan of Treatment:     Diagnosis: Breast cancer  Assessment and Plan of Treatment:

## 2022-08-21 NOTE — DISCHARGE NOTE NURSING/CASE MANAGEMENT/SOCIAL WORK - PATIENT PORTAL LINK FT
You can access the FollowMyHealth Patient Portal offered by [facility] by registering at the following website: [email or web address]. By joining [facility] FollowMyHealth portal, you will also be able to view your health information using other applications (apps) compatible with our system.

## 2022-08-21 NOTE — PROGRESS NOTE ADULT - SUBJECTIVE AND OBJECTIVE BOX
Andre Reyes, M.D.  Pager: [phone]  Office: [phone]    Patient is a 76y old  Female who presents with a chief complaint of Diverticulitis and microperforation (21 Aug 2022 09:19)          SUBJECTIVE / OVERNIGHT EVENTS:    No acute overnight events.    ROS: ( - ) Fever, ( - )Chills,  ( - )Nausea/Vomiting, ( - ) Cough, ( - )Shortness of breath, ( - )Chest Pain    MEDICATIONS  (STANDING):  aspirin  chewable 81 milliGRAM(s) Oral three times a day  ciprofloxacin     Tablet 500 milliGRAM(s) Oral every 12 hours  clopidogrel Tablet 75 milliGRAM(s) Oral daily  dextrose 5%. 1000 milliLiter(s) (100 mL/Hr) IV Continuous <Continuous>  dextrose 5%. 1000 milliLiter(s) (50 mL/Hr) IV Continuous <Continuous>  dextrose 50% Injectable 25 Gram(s) IV Push once  dextrose 50% Injectable 12.5 Gram(s) IV Push once  dextrose 50% Injectable 25 Gram(s) IV Push once  enoxaparin Injectable 40 milliGRAM(s) SubCutaneous every 24 hours  exemestane 25 milliGRAM(s) Oral daily  famotidine Injectable 20 milliGRAM(s) IV Push daily  glucagon  Injectable 1 milliGRAM(s) IntraMuscular once  insulin lispro (ADMELOG) corrective regimen sliding scale   SubCutaneous three times a day before meals  insulin lispro (ADMELOG) corrective regimen sliding scale   SubCutaneous at bedtime  latanoprost 0.005% Ophthalmic Solution 1 Drop(s) Both EYES at bedtime  metroNIDAZOLE  IVPB 500 milliGRAM(s) IV Intermittent every 8 hours  montelukast 10 milliGRAM(s) Oral at bedtime  pantoprazole  Injectable 40 milliGRAM(s) IV Push daily  traZODone 100 milliGRAM(s) Oral at bedtime    MEDICATIONS  (PRN):  acetaminophen     Tablet .. 650 milliGRAM(s) Oral every 6 hours PRN Temp greater or equal to 38C (100.4F), Mild Pain (1 - 3)  dextrose Oral Gel 15 Gram(s) Oral once PRN Blood Glucose LESS THAN 70 milliGRAM(s)/deciliter  diazepam    Tablet 5 milliGRAM(s) Oral at bedtime PRN anxiety  diazepam    Tablet 2.5 milliGRAM(s) Oral two times a day PRN anxiety  melatonin 3 milliGRAM(s) Oral at bedtime PRN Insomnia  ondansetron Injectable 4 milliGRAM(s) IV Push every 8 hours PRN Nausea and/or Vomiting          T(C): 36.6 (08-21 @ 04:10), Max: 37.3 (08-20 @ 12:34)   HR: 66   BP: 127/77   RR: 18   SpO2: 97%    PHYSICAL EXAM:    CONSTITUTIONAL: NAD, well-developed, well-groomed  EYES: PERRLA; conjunctiva and sclera clear  ENMT: Moist oral mucosa, no pharyngeal injection or exudates; normal dentition  NECK: Supple, no palpable masses; no thyromegaly  RESPIRATORY: Normal respiratory effort; lungs are clear to auscultation bilaterally  CARDIOVASCULAR: Regular rate and rhythm, normal S1 and S2, no murmur/rub/gallop; No lower extremity edema; Peripheral pulses are 2+ bilaterally  ABDOMEN: Nontender to palpation, normoactive bowel sounds, no rebound/guarding; No hepatosplenomegaly  MUSCULOSKELETAL:  Normal gait; no clubbing or cyanosis of digits; no joint swelling or tenderness to palpation  PSYCH: A+O to person, place, and time; affect appropriate  NEUROLOGY: CN 2-12 are intact and symmetric; no gross sensory deficits   SKIN: No rashes; no palpable lesions      LABS:                        11.8   7.78  )-----------( 372      ( 20 Aug 2022 07:18 )             35.7      08-19    140  |  103  |  13  ----------------------------<  222<H>  3.9   |  18<L>  |  0.65    Ca    9.7      19 Aug 2022 16:53  Phos  2.2     08-19  Mg     1.8     08-19         CAPILLARY BLOOD GLUCOSE      POCT Blood Glucose.: 186 mg/dL (21 Aug 2022 07:46)  POCT Blood Glucose.: 263 mg/dL (20 Aug 2022 21:41)  POCT Blood Glucose.: 241 mg/dL (20 Aug 2022 17:35)  POCT Blood Glucose.: 281 mg/dL (20 Aug 2022 12:32)      RADIOLOGY & ADDITIONAL TESTS:    Imaging Personally Reviewed:  Consultant(s) Notes Reviewed:    Care Discussed with Consultants/Other Providers:   Andre Reyes, M.D.  Pager: [phone]  Office: [phone]    Patient is a 76y old  Female who presents with a chief complaint of Diverticulitis and microperforation (21 Aug 2022 09:19)          SUBJECTIVE / OVERNIGHT EVENTS:    No acute overnight events.  tolerating diet    ROS: ( - ) Fever, ( - )Chills,  ( - )Nausea/Vomiting, ( - ) Cough, ( - )Shortness of breath, ( - )Chest Pain    MEDICATIONS  (STANDING):  aspirin  chewable 81 milliGRAM(s) Oral three times a day  ciprofloxacin     Tablet 500 milliGRAM(s) Oral every 12 hours  clopidogrel Tablet 75 milliGRAM(s) Oral daily  dextrose 5%. 1000 milliLiter(s) (100 mL/Hr) IV Continuous <Continuous>  dextrose 5%. 1000 milliLiter(s) (50 mL/Hr) IV Continuous <Continuous>  dextrose 50% Injectable 25 Gram(s) IV Push once  dextrose 50% Injectable 12.5 Gram(s) IV Push once  dextrose 50% Injectable 25 Gram(s) IV Push once  enoxaparin Injectable 40 milliGRAM(s) SubCutaneous every 24 hours  exemestane 25 milliGRAM(s) Oral daily  famotidine Injectable 20 milliGRAM(s) IV Push daily  glucagon  Injectable 1 milliGRAM(s) IntraMuscular once  insulin lispro (ADMELOG) corrective regimen sliding scale   SubCutaneous three times a day before meals  insulin lispro (ADMELOG) corrective regimen sliding scale   SubCutaneous at bedtime  latanoprost 0.005% Ophthalmic Solution 1 Drop(s) Both EYES at bedtime  metroNIDAZOLE  IVPB 500 milliGRAM(s) IV Intermittent every 8 hours  montelukast 10 milliGRAM(s) Oral at bedtime  pantoprazole  Injectable 40 milliGRAM(s) IV Push daily  traZODone 100 milliGRAM(s) Oral at bedtime    MEDICATIONS  (PRN):  acetaminophen     Tablet .. 650 milliGRAM(s) Oral every 6 hours PRN Temp greater or equal to 38C (100.4F), Mild Pain (1 - 3)  dextrose Oral Gel 15 Gram(s) Oral once PRN Blood Glucose LESS THAN 70 milliGRAM(s)/deciliter  diazepam    Tablet 5 milliGRAM(s) Oral at bedtime PRN anxiety  diazepam    Tablet 2.5 milliGRAM(s) Oral two times a day PRN anxiety  melatonin 3 milliGRAM(s) Oral at bedtime PRN Insomnia  ondansetron Injectable 4 milliGRAM(s) IV Push every 8 hours PRN Nausea and/or Vomiting          T(C): 36.6 (08-21 @ 04:10), Max: 37.3 (08-20 @ 12:34)   HR: 66   BP: 127/77   RR: 18   SpO2: 97%    PHYSICAL EXAM:    CONSTITUTIONAL: NAD, well-developed, well-groomed  EYES: PERRLA; conjunctiva and sclera clear  ENMT: Moist oral mucosa, no pharyngeal injection or exudates; normal dentition  NECK: Supple, no palpable masses; no thyromegaly  RESPIRATORY: Normal respiratory effort; lungs are clear to auscultation bilaterally  CARDIOVASCULAR: Regular rate and rhythm, normal S1 and S2, no murmur/rub/gallop; No lower extremity edema; Peripheral pulses are 2+ bilaterally  ABDOMEN: mild tenderness to deep palpation  MUSCULOSKELETAL:  Normal gait; no clubbing or cyanosis of digits; no joint swelling or tenderness to palpation  PSYCH: A+O to person, place, and time; affect appropriate  NEUROLOGY: CN 2-12 are intact and symmetric; no gross sensory deficits   SKIN: No rashes; no palpable lesions      LABS:                        11.8   7.78  )-----------( 372      ( 20 Aug 2022 07:18 )             35.7      08-19    140  |  103  |  13  ----------------------------<  222<H>  3.9   |  18<L>  |  0.65    Ca    9.7      19 Aug 2022 16:53  Phos  2.2     08-19  Mg     1.8     08-19         CAPILLARY BLOOD GLUCOSE      POCT Blood Glucose.: 186 mg/dL (21 Aug 2022 07:46)  POCT Blood Glucose.: 263 mg/dL (20 Aug 2022 21:41)  POCT Blood Glucose.: 241 mg/dL (20 Aug 2022 17:35)  POCT Blood Glucose.: 281 mg/dL (20 Aug 2022 12:32)      RADIOLOGY & ADDITIONAL TESTS:    Imaging Personally Reviewed:  Consultant(s) Notes Reviewed:    Care Discussed with Consultants/Other Providers:

## 2022-08-21 NOTE — DISCHARGE NOTE PROVIDER - HOSPITAL COURSE
76F PMH diverticulitis and diverticular bleeds treated w/clips, DM, HTN, breast cancer (s/p adjuvant chemo+rads; 2016), cerebral aneurysm repair w/embolization and stent (on aspirin + plavix; 2012) p/w diverticulitis with micro perforation    ·  Problem: Diverticulitis of colon with perforation.   ·  Plan: complicated diverticulitis seen by surgery, no acute intervention   -transition to po Abx,  po cipro today and flagyl   -will need outpatient follow up with repeat imaging. Per surgery conversation, outpatient follow up in 2 weeks with repeat imaging.    ·  Problem: Diabetes mellitus.   ·  Plan: On multiple PO meds and injectables at home. On Tresiba 28U in afternoon which she took day before admission.   -A1c 8.    ·  Problem: History of cerebral aneurysm.   ·  Plan: w/embolization and stent (on aspirin + plavix; 2012)  -Cont. asa TID and plavix daily.    ·  Problem: Breast cancer.   ·  Plan: s/p adjuvant chemo+rads; 2016  - discussed with oncology fellow Dr. Eliud Jimenez, will resume  exemestane 25mg PO daily (aromatase inhibitor),     ·  Problem: Essential hypertension.   ·  Plan: -Trend BP  -Continue  amlodipine and losartan as ordered    ·  Problem: Hypothyroid.   ·  Plan: -takes amour thyroid at home  -outpatient f/u.    ·  Problem: Anxiety.   ·  Plan: ISTOP reviewed Reference #: [id number]  -Cont. Diazepam 2.5mg in AM and afternoon as tolerated  -Cont. Diazepam 5mg QHS as tolerated  - c/w Trazodone 100mg QHS.      ·  Problem: GERD (gastroesophageal reflux disease).   ·  Plan: -Cont H2 blocker for now given limited PO status.    Cleared for discharge home per Dr Reyes

## 2022-08-21 NOTE — DISCHARGE NOTE PROVIDER - NSDCFUADDAPPT_GEN_ALL_CORE_FT
Follow up with Dr Funes within 7 days of discharge.  Follow up with PMD within 3 days of discharge.   Take antibiotics as ordered

## 2022-08-21 NOTE — PROGRESS NOTE ADULT - PROBLEM SELECTOR PLAN 1
complicated diverticulitis seen by surgery, no acute intervention   -transition to po Abx, huy change flagyl to PO  -will need outpatient follow up with repeat imaging. Per surgery conversation, outpatient follow up in 2 weeks with repeat imaging.

## 2022-08-21 NOTE — PROGRESS NOTE ADULT - REASON FOR ADMISSION
Diverticulitis and microperforation

## 2022-08-21 NOTE — PROGRESS NOTE ADULT - PROBLEM SELECTOR PLAN 3
w/embolization and stent (on aspirin + plavix; 2012)  -Cont. asa TID and plavix daily

## 2022-08-21 NOTE — PROGRESS NOTE ADULT - PROBLEM SELECTOR PLAN 7
ISTOP reviewed Reference #: [id number]  -Cont. Diazepam 2.5mg in AM and afternoon as tolerated  -Cont. Diazepam 5mg QHS as tolerated  - c/w Trazodone 100mg QHS
ISTOP reviewed Reference #: [id number]  -Cont. Diazepam 2.5mg in AM and afternoon as tolerated  -Cont. Diazepam 5mg QHS as tolerated  - c/w Trazodone 100mg QHS
ISTOP reviewed Reference #: [id number]  -Cont. Diazepam 2.5mg in AM and afternoon as tolerated  -Cont. Diazepam 5mg QHS as tolerated  -Will order Trazodone 100mg to resume tomorrow QHS
ISTOP reviewed Reference #: [id number]  -Cont. Diazepam 2.5mg in AM and afternoon as tolerated  -Cont. Diazepam 5mg QHS as tolerated  - c/w Trazodone 100mg QHS
ISTOP reviewed Reference #: [id number]  -Cont. Diazepam 2.5mg in AM and afternoon as tolerated  -Cont. Diazepam 5mg QHS as tolerated  - c/w Trazodone 100mg QHS

## 2022-08-21 NOTE — PROGRESS NOTE ADULT - PROVIDER SPECIALTY LIST ADULT
Surgery
Surgery
Hospitalist
Hospitalist
Surgery
Hospitalist
Hospitalist
Internal Medicine
Hospitalist

## 2022-08-21 NOTE — PROGRESS NOTE PEDS - ASSESSMENT
76F PMH diverticulitis and diverticular bleeds treated w/clips, DM, HTN, breast cancer (s/p adjuvant chemo+rads; 2016), cerebral aneurysm repair w/embolization and stent (2012) p/t ED for abdominal pain; CT showed 1.6cm microperforation in lower mesentery 2/2 diverticulitis.     - Patient tolerating low residue diet well, with bowel function, benign abdominal exam  - Would d/c with PO abx, cipro/flagyl   - Care per primary team    Red Surgery  [id number]

## 2022-08-21 NOTE — DISCHARGE NOTE NURSING/CASE MANAGEMENT/SOCIAL WORK - NSDCPEFALRISK_GEN_ALL_CORE
For information on Fall & Injury Prevention, visit: [email or web address] OR  [email or web address] OR  https://www.cdc.gov/steadi/patient.html

## 2022-08-21 NOTE — PROGRESS NOTE ADULT - PROBLEM SELECTOR PLAN 4
s/p adjuvant chemo+rads; 2016  - discussed with oncology fellow Dr. Eliud Jimenez, will resume  exemestane 25mg PO daily (aromatase inhibitor), also recommend lovenox 40 subQ daily for DVT prophylaxis
s/p adjuvant chemo+rads; 2016  -Holding Exemestane inpatient
s/p adjuvant chemo+rads; 2016  - discussed with oncology fellow Dr. Eliud Jimenez, will resume  exemestane 25mg PO daily (aromatase inhibitor), also recommend lovenox 40 subQ daily for DVT prophylaxis

## 2022-08-21 NOTE — PROGRESS NOTE ADULT - SUBJECTIVE AND OBJECTIVE BOX
Andre Reyes, M.D.  Pager: [phone]  Office: [phone]    Patient is a 76y old  Female who presents with a chief complaint of Diverticulitis and microperforation (21 Aug 2022 08:37)          SUBJECTIVE / OVERNIGHT EVENTS:    No acute overnight events.    ROS: ( - ) Fever, ( - )Chills,  ( - )Nausea/Vomiting, ( - ) Cough, ( - )Shortness of breath, ( - )Chest Pain    MEDICATIONS  (STANDING):  aspirin  chewable 81 milliGRAM(s) Oral three times a day  ciprofloxacin     Tablet 500 milliGRAM(s) Oral every 12 hours  clopidogrel Tablet 75 milliGRAM(s) Oral daily  dextrose 5%. 1000 milliLiter(s) (100 mL/Hr) IV Continuous <Continuous>  dextrose 5%. 1000 milliLiter(s) (50 mL/Hr) IV Continuous <Continuous>  dextrose 50% Injectable 25 Gram(s) IV Push once  dextrose 50% Injectable 12.5 Gram(s) IV Push once  dextrose 50% Injectable 25 Gram(s) IV Push once  enoxaparin Injectable 40 milliGRAM(s) SubCutaneous every 24 hours  exemestane 25 milliGRAM(s) Oral daily  famotidine Injectable 20 milliGRAM(s) IV Push daily  glucagon  Injectable 1 milliGRAM(s) IntraMuscular once  insulin lispro (ADMELOG) corrective regimen sliding scale   SubCutaneous three times a day before meals  insulin lispro (ADMELOG) corrective regimen sliding scale   SubCutaneous at bedtime  latanoprost 0.005% Ophthalmic Solution 1 Drop(s) Both EYES at bedtime  metroNIDAZOLE  IVPB 500 milliGRAM(s) IV Intermittent every 8 hours  montelukast 10 milliGRAM(s) Oral at bedtime  pantoprazole  Injectable 40 milliGRAM(s) IV Push daily  traZODone 100 milliGRAM(s) Oral at bedtime    MEDICATIONS  (PRN):  acetaminophen     Tablet .. 650 milliGRAM(s) Oral every 6 hours PRN Temp greater or equal to 38C (100.4F), Mild Pain (1 - 3)  dextrose Oral Gel 15 Gram(s) Oral once PRN Blood Glucose LESS THAN 70 milliGRAM(s)/deciliter  diazepam    Tablet 5 milliGRAM(s) Oral at bedtime PRN anxiety  diazepam    Tablet 2.5 milliGRAM(s) Oral two times a day PRN anxiety  melatonin 3 milliGRAM(s) Oral at bedtime PRN Insomnia  ondansetron Injectable 4 milliGRAM(s) IV Push every 8 hours PRN Nausea and/or Vomiting          T(C): 36.6 (08-21 @ 04:10), Max: 37.3 (08-20 @ 12:34)   HR: 66   BP: 127/77   RR: 18   SpO2: 97%    PHYSICAL EXAM:    CONSTITUTIONAL: NAD, well-developed, well-groomed  EYES: PERRLA; conjunctiva and sclera clear  ENMT: Moist oral mucosa, no pharyngeal injection or exudates; normal dentition  NECK: Supple, no palpable masses; no thyromegaly  RESPIRATORY: Normal respiratory effort; lungs are clear to auscultation bilaterally  CARDIOVASCULAR: Regular rate and rhythm, normal S1 and S2, no murmur/rub/gallop; No lower extremity edema; Peripheral pulses are 2+ bilaterally  ABDOMEN: Nontender to palpation, normoactive bowel sounds, no rebound/guarding; No hepatosplenomegaly  MUSCULOSKELETAL:  Normal gait; no clubbing or cyanosis of digits; no joint swelling or tenderness to palpation  PSYCH: A+O to person, place, and time; affect appropriate  NEUROLOGY: CN 2-12 are intact and symmetric; no gross sensory deficits   SKIN: No rashes; no palpable lesions      LABS:                        11.8   7.78  )-----------( 372      ( 20 Aug 2022 07:18 )             35.7      08-19    140  |  103  |  13  ----------------------------<  222<H>  3.9   |  18<L>  |  0.65    Ca    9.7      19 Aug 2022 16:53  Phos  2.2     08-19  Mg     1.8     08-19         CAPILLARY BLOOD GLUCOSE      POCT Blood Glucose.: 186 mg/dL (21 Aug 2022 07:46)  POCT Blood Glucose.: 263 mg/dL (20 Aug 2022 21:41)  POCT Blood Glucose.: 241 mg/dL (20 Aug 2022 17:35)  POCT Blood Glucose.: 281 mg/dL (20 Aug 2022 12:32)      RADIOLOGY & ADDITIONAL TESTS:    Imaging Personally Reviewed:  Consultant(s) Notes Reviewed:    Care Discussed with Consultants/Other Providers:

## 2022-08-21 NOTE — PROGRESS NOTE ADULT - PROBLEM SELECTOR PLAN 5
-Trend BP  -Holding amlodipine and losartan for now in setting of diverticulitis. Resume if BP elevated

## 2022-08-21 NOTE — PROGRESS NOTE ADULT - PROBLEM SELECTOR PROBLEM 3
History of cerebral aneurysm

## 2022-08-21 NOTE — PROGRESS NOTE ADULT - SUBJECTIVE AND OBJECTIVE BOX
SURGERY  Pager: [phone]       INTERVAL EVENTS/SUBJECTIVE: No acute events overnight. Patient seen and examined on AM rounds, tolerated low residue diet well, no nausea. Continues to pass gas and have bowel movements, however reporting pain a consistent 4/10. Patient feels like she can tolerate her pain and ready to go home.       ______________________________________________  OBJECTIVE:   T(C): 36.6 (08-21-22 @ 04:10), Max: 37.3 (08-20-22 @ 12:34)  HR: 66 (08-21-22 @ 04:10) (66 - 81)  BP: 127/77 (08-21-22 @ 04:10) (127/77 - 168/72)  RR: 18 (08-21-22 @ 04:10) (18 - 18)  SpO2: 97% (08-21-22 @ 04:10) (96% - 97%)  Wt(kg): --  CAPILLARY BLOOD GLUCOSE      POCT Blood Glucose.: 186 mg/dL (21 Aug 2022 07:46)  POCT Blood Glucose.: 263 mg/dL (20 Aug 2022 21:41)  POCT Blood Glucose.: 241 mg/dL (20 Aug 2022 17:35)  POCT Blood Glucose.: 281 mg/dL (20 Aug 2022 12:32)    I&O's Detail    20 Aug 2022 07:01  -  21 Aug 2022 07:00  --------------------------------------------------------  IN:    IV PiggyBack: 300 mL    Oral Fluid: 925 mL  Total IN: 1225 mL    OUT:    Voided (mL): 0 mL  Total OUT: 0 mL    Total NET: 1225 mL          Physical exam:  Gen: resting in bed comfortably in NAD  Chest: no increased WOB, regular inspiratory effort   Abdomen: Soft, TTP in LLQ, nondistended with no rebound tenderness or guarding.   Vascular: WWP, VERA x4  NEURO: awake, alert  ______________________________________________  LABS:  CBC Full  -  ( 20 Aug 2022 07:18 )  WBC Count : 7.78 K/uL  RBC Count : 4.43 M/uL  Hemoglobin : 11.8 g/dL  Hematocrit : 35.7 %  Platelet Count - Automated : 372 K/uL  Mean Cell Volume : 80.6 fl  Mean Cell Hemoglobin : 26.6 pg  Mean Cell Hemoglobin Concentration : 33.1 gm/dL  Auto Neutrophil # : x  Auto Lymphocyte # : x  Auto Monocyte # : x  Auto Eosinophil # : x  Auto Basophil # : x  Auto Neutrophil % : x  Auto Lymphocyte % : x  Auto Monocyte % : x  Auto Eosinophil % : x  Auto Basophil % : x    08-19    140  |  103  |  13  ----------------------------<  222<H>  3.9   |  18<L>  |  0.65    Ca    9.7      19 Aug 2022 16:53  Phos  2.2     08-19  Mg     1.8     08-19      _____________________________________________  RADIOLOGY:

## 2022-08-21 NOTE — DISCHARGE NOTE PROVIDER - NSDCMRMEDTOKEN_GEN_ALL_CORE_FT
acetaminophen 325 mg oral tablet: 2 tab(s) orally every 6 hours, As needed, Temp greater or equal to 38C (100.4F), Mild Pain (1 - 3)  amLODIPine 5 mg oral tablet: 1 tab(s) orally once a day in morning  [place] Thyroid 60 mg oral tablet: 1 tab(s) orally once a day  Aspirin Low Dose 81 mg oral delayed release tablet: 1 tab(s) orally 3 times a day  ciprofloxacin 500 mg oral tablet: 1 tab(s) orally every 12 hours  colestipol 1 g oral tablet: 1 tab(s) orally once a day  diazepam 5 mg oral tablet: 0.5 tab(s) orally once a day in the morning  diazePAM 5 mg oral tablet: 0.5 tab(s) orally once a day in the afternoon  diazePAM 5 mg oral tablet: 1 tab(s) orally once a day in the evening  dicyclomine 20 mg oral tablet: 1 tab(s) orally 4 times a day, As Needed  exemestane 25 mg oral tablet: 1 tab(s) orally once a day  Jardiance 10 mg oral tablet: 1 tab(s) orally once a day (in the morning)  latanoprost 0.005% ophthalmic solution: 1 drop(s) to each affected eye once a day (in the evening)  losartan 100 mg oral tablet: 1 tab(s) orally once a day in morning  metFORMIN 500 mg oral tablet, extended release: 1 tab(s) orally 2 times a day  metroNIDAZOLE 500 mg oral tablet: 1 tab(s) orally every 8 hours  montelukast 10 mg oral tablet: 1 tab(s) orally once a day (at bedtime)  omeprazole 20 mg oral tablet, disintegrating, delayed release: 1 tab(s) orally 2 times a day  Pepcid 20 mg oral tablet: 2 tab(s) orally once a day (at bedtime)  Plavix 75 mg oral tablet: 1 tab(s) orally once a day  SymlinPen 120 subcutaneous solution: 120 microgram(s) subcutaneous 3 times a day - 1/2/hour before meals  traZODone 100 mg oral tablet: 1 tab(s) orally once a day (at bedtime)  Tresiba 100 units/mL subcutaneous solution: 28 unit(s) subcutaneous once a day (at bedtime)  Victoza 18 mg/3 mL subcutaneous solution: 1.8 milligram(s) subcutaneous once a day- in morning before breakfast  Xyzal 5 mg oral tablet: 1 tab(s) orally once a day (in the evening)

## 2022-08-21 NOTE — DISCHARGE NOTE PROVIDER - CARE PROVIDER_API CALL
Juan M Funes)  ColonRectal Surgery; Surgery  [facility], [address] [place] [address]  Phone: [phone]  Fax: [phone]  Follow Up Time:

## 2022-08-21 NOTE — PROGRESS NOTE ADULT - PROBLEM SELECTOR PLAN 9
-per onc rec:lovenox 40 subQ daily for DVT prophylaxis
-SCDs for now as pt already on asa and plavix
-per onc rec:lovenox 40 subQ daily for DVT prophylaxis

## 2022-08-24 ENCOUNTER — NON-APPOINTMENT (OUTPATIENT)
Age: 77
End: 2022-08-24

## 2022-08-24 DIAGNOSIS — R11.0 NAUSEA: ICD-10-CM

## 2022-08-31 ENCOUNTER — APPOINTMENT (OUTPATIENT)
Dept: GERIATRICS | Facility: CLINIC | Age: 77
End: 2022-08-31

## 2022-08-31 PROCEDURE — 99495 TRANSJ CARE MGMT MOD F2F 14D: CPT

## 2022-08-31 PROCEDURE — G0444 DEPRESSION SCREEN ANNUAL: CPT

## 2022-09-03 NOTE — ED ADULT NURSE NOTE - ISOLATION TYPE:
[facility] Progress Note    Patient: Jorge Franklin Date: 9/3/2022   : 1941 Attending: Lexii Muir DO   Admit Date: 2022 Room/Bed: [address] [place] [address]   80 year old female      Subjective: No acute events overnight. Pt states she has not slept well for the past week or so. She endorses low back stiffness more than pain. States pain sometimes travels down to buttock area but not down the legs. She denies difficulty voiding, saddle anesthesia, new numbness/tingling, or weakness.      Vital Today Admitted   Weight 54 kg (119 lb) (22 025) Weight: 54 kg (119 lb) (22)   Height N/A Height: 4' 9\" (144.8 cm) (22)   BMI N/A BMI (Calculated): 25.75 (22)     Medications/Infusions:  Scheduled:   Current Facility-Administered Medications   Medication Dose Route Frequency Provider Last Rate Last Admin   • acetaminophen (TYLENOL) tablet 1,000 mg  1,000 mg Oral Q8H Lexii Muir, DO   1,000 mg at 22 1339   • lidocaine (LIDOCARE) 4 % patch 1 patch  1 patch Transdermal Daily Lexii Muir DO   1 patch at 22 0830   • enoxaparin (LOVENOX) injection 40 mg  40 mg Subcutaneous Q24H Lexii Muir DO   40 mg at 22 2141   • sodium chloride (PF) 0.9 % injection 2 mL  2 mL Intracatheter 2 times per day Chelsea Robertson DO   2 mL at 22 0833   • Magnesium Standard Replacement Protocol   Does not apply See Admin Instructions Chelsea Robertson DO       • Potassium Standard Replacement Protocol (Levels 3.5 and lower)   Does not apply See Admin Instructions Chelsea Robertson DO       • losartan (COZAAR) tablet 100 mg  100 mg Oral Daily Lexii Muir DO   100 mg at 22 0830       Continuous Infusions:   Current Facility-Administered Medications   Medication Dose Route Frequency Provider Last Rate Last Admin       Last Nursing Mobility/Ambulation Documented:  Activity: Resting in bed  Ambulated: In room  Distance Ambulated (ft): 5 ft      Weight Bearing Status: Weight bearing as  tolerated  Mobility Assistive Device: Gait belt, Walker  Level of Assistance: Minimal assist    Physical Exam:   General: alert, cooperative, in no apparent distress.  Neuro: GCS 15, oriented, speech fluent   CN II-XII grossly intact  Strength bilateral LE 5/5  Calves NTTP  No clonus noted  Proprioception bilateral 1st toe intact      Laboratory Results:    Lab Results   Component Value Date    SODIUM 133 (L) 08/31/2022    POTASSIUM 4.1 08/31/2022    GLUCOSE 115 (H) 08/31/2022    BUN 8 08/31/2022    CREATININE 0.42 (L) 08/31/2022    MG 2.2 08/31/2022    AST 23 08/31/2022    GPT 41 08/31/2022    PT 10.4 08/31/2022    INR 1.0 08/31/2022    PTT 28 08/31/2022    WBC 9.8 08/31/2022    HGB 11.4 (L) 08/31/2022    HCT 33.2 (L) 08/31/2022     08/31/2022         Impression/Diagnoses:  79 y/o female here 1 week after ground level fall with persistent low back pain without warning signs/symptoms. Pt found to have severe L3 compression fx with approx 75% height loss and retropulsion into spinal canal.    PMH: HTN, goiter, osteoporosis, s/p cataract repair    Plans/Recommendations:  Previous spinal imaging reviewed  Awaiting LSO brace arrival; LSO brace to be worn when OOB/with activity  --Brace fitted 9/01/22  Baseline upright XRs with brace ordered to evaluate alignment  --If alignment remains stable; will plan to treat conservatively in brace for 8-12 weeks  --If there is increased kyphosis and pain while in brace, will re-evaluate and discuss possible Neurosurgical intervention to stabilize spine above and below L3 fracture (e.g., percutaneous fixation); plan has been discussed with patient, spouse and sister (Di) yesterday  Pain control per primary team - may benefit from low-dose muscle relaxant  Neuro checks q4H  PT/OT once brace arrives  Encourage use of incentive spirometer  SCDs for DVT prophylaxis, on Lovenox ppx    Discussed on rounds with attending Dr. Medina, who agrees with the plan as  outlined    Appreciate medical management  Dispo: 8W      Please [facility] \"[facility]\" with questions/concerns      Tammy Woodward PA-C  Neurosurgery  9/3/2022         ADDENDUM approx. 5:30pm:  Upright XR reviewed, stable alignment  Discussed plan of care with pt's son Dr. Lemuel Franklin  Pt cleared PT  Discussed with attending Dr. Medina  OK to discharge from NS perspective once medically cleared  Pt to follow up in clinic in 2 weeks with repeat upright XRs, earlier if she experiences concerning symptoms such as worsening/intractable low back pain, bowel or bladder dysfunction, or leg weakness     None

## 2022-09-05 VITALS
DIASTOLIC BLOOD PRESSURE: 60 MMHG | RESPIRATION RATE: 12 BRPM | HEART RATE: 64 BPM | SYSTOLIC BLOOD PRESSURE: 110 MMHG | OXYGEN SATURATION: 98 %

## 2022-09-07 RX ORDER — OXYCODONE 5 MG/1
5 TABLET ORAL
Qty: 15 | Refills: 0 | Status: COMPLETED | COMMUNITY
Start: 2020-04-14 | End: 2022-09-07

## 2022-09-07 NOTE — PHYSICAL EXAM
30y now        PPD#2  .   Patient doing well. Breastfeeding without difficulty. OOB ad cezar. Voiding without difficulty. Tolerating regular diet. States cramping is controlled with tylenol and motrin. States bleeding is light.     RH Status: +  Rubella Status:  immune  Breasts soft and non tender  Abdomen: soft and non tender  Fundus: firm 1FM below umb  Lower extremities: non tneder    Vital Signs Last 24 Hrs  T(C): 36.7 (2019 08:18), Max: 36.8 (2019 16:00)  T(F): 98 (2019 08:18), Max: 98.2 (2019 16:00)  HR: 70 (2019 08:18) (70 - 78)  BP: 124/74 (2019 08:18) (116/53 - 124/74)  BP(mean): 81 (2019 00:15) (81 - 81)  RR: 16 (2019 08:18) (16 - 16)  SpO2: 99% (2019 08:18) (98% - 100%)                          10.1   x     )-----------( x        ( 2019 08:25 )             30.1     A/P:   Stable        s/p  Day #2  will discharge today with instructions  routine post partum care until discharge    Lauri Holden CNM [Alert] : alert [Normal] : the sclera and conjunctiva were normal, extraocular movements were intact, pupils were equal in size, round, and reactive to light [Normal Outer Ear/Nose] : the ears and nose were normal in appearance [Normal Appearance] : the appearance of the neck was normal [Supple] : the neck was supple [No Respiratory Distress] : no respiratory distress [No Acc Muscle Use] : no accessory muscle use [Respiration, Rhythm And Depth] : normal respiratory rhythm and effort [Auscultation Breath Sounds / Voice Sounds] : lungs were clear to auscultation bilaterally [Heart Rate And Rhythm] : heart rate was normal and rhythm regular [Bowel Sounds] : normal bowel sounds [Abdomen Tenderness] : non-tender [Abdomen Soft] : soft [No Spinal Tenderness] : no spinal tenderness [No Focal Deficits] : no focal deficits [Normal Affect] : the affect was normal [Normal Mood] : the mood was normal [de-identified] : tight muscles  [de-identified] : central obesity  [de-identified] : no aches or pains [de-identified] : candidal rash below stomack fold extending towards perinieum

## 2022-09-07 NOTE — HISTORY OF PRESENT ILLNESS
[1] : 2) Feeling down, depressed, or hopeless for several days (1) [FreeTextEntry1] : Pt is a 77 yo female with diabetes who was discharged from hospital on August 21.  Pt was admitted for  diverticultis and diverticular perforation. Pt contacted post discharge and now pt seen at home for transiiton of care.   Pt now complaining of loose stool and skin candidal infection around lower pelvic area.  Went to see pt at home. Pt is anxious.\par \par Meds reviewed: Pt not taking losartan now, is taking amlodipine. Not taking glumteza now, eating less, \par \par \par Pt has stopped all her supplements and does not know what to restart\par \par Diet reviewed\par \par \par \par Skin rash is not worse, but not resolving rapidly\par \par Also stool is not diaarhea, is soft and seems to be improving.\par \par Pt is anxious about her situation health wise.

## 2022-09-07 NOTE — ASSESSMENT
[FreeTextEntry1] : 75 yo female with diabetes, candidal infection on skin, and improving bowel infection. Had diverticulitis and diverticular perforation. Pt with loose stool, weak and low BP off losartan.\par \par 1) Diverticulitis - complete abx, observe for c diff. If bowels don’t improve then will send c diff test. labs reviewed. WBC = 7\par 2) DM - glucose controlled. stay off glumetza until eating more. Dr Schwarz following. Tresiban, jardiance, simlin, Victoza\par 3) candida infection on skin - nystatin cream and powder.  May need diflucan to treat. Likely due to jardiance and dm\par 4) HTn - /60 off losartan.  prefer for her to be on losartan than amlodipine but will start losartan when able. If unable to start in near future I will replace amlodipine for losartan -renally protective.\par 5) Thryoid - stable\par 6) GERD - controlled \par 7) INsmnia nad depression - Trazodone. May consider lexapro in future\par 8) CVA - Asa and colpidogrel\par 9) Breast ca - exemestane\par 10) Allergies - singulair - may consider stopping in future\par 11) Fibromyalgia and muscle aches in neck - valium\par 12) HM - recommend COVID vaccine\par \par No use of supplements at this time.\par Hold BP med - amlodipine do not take. Take lostartan. May need to decrease it .

## 2022-09-08 ENCOUNTER — APPOINTMENT (OUTPATIENT)
Dept: GERIATRICS | Facility: CLINIC | Age: 77
End: 2022-09-08

## 2022-09-18 DIAGNOSIS — J32.9 CHRONIC SINUSITIS, UNSPECIFIED: ICD-10-CM

## 2022-09-22 ENCOUNTER — APPOINTMENT (OUTPATIENT)
Dept: GERIATRICS | Facility: CLINIC | Age: 77
End: 2022-09-22

## 2022-09-22 ENCOUNTER — NON-APPOINTMENT (OUTPATIENT)
Age: 77
End: 2022-09-22

## 2022-09-22 PROCEDURE — G0008: CPT

## 2022-09-22 PROCEDURE — 90662 IIV NO PRSV INCREASED AG IM: CPT

## 2022-09-26 LAB
ALBUMIN SERPL ELPH-MCNC: 5 G/DL
ALP BLD-CCNC: 118 U/L
ALT SERPL-CCNC: 29 U/L
ANION GAP SERPL CALC-SCNC: 13 MMOL/L
AST SERPL-CCNC: 25 U/L
BASOPHILS # BLD AUTO: 0.05 K/UL
BASOPHILS NFR BLD AUTO: 0.7 %
BILIRUB SERPL-MCNC: 0.2 MG/DL
BUN SERPL-MCNC: 20 MG/DL
CALCIUM SERPL-MCNC: 10.1 MG/DL
CHLORIDE SERPL-SCNC: 103 MMOL/L
CO2 SERPL-SCNC: 26 MMOL/L
CREAT SERPL-MCNC: 0.6 MG/DL
EGFR: 93 ML/MIN/1.73M2
EOSINOPHIL # BLD AUTO: 0.41 K/UL
EOSINOPHIL NFR BLD AUTO: 6.1 %
ESTIMATED AVERAGE GLUCOSE: 157 MG/DL
GLUCOSE SERPL-MCNC: 127 MG/DL
HBA1C MFR BLD HPLC: 7.1 %
HCT VFR BLD CALC: 43.7 %
HGB BLD-MCNC: 13.9 G/DL
IMM GRANULOCYTES NFR BLD AUTO: 0.3 %
LYMPHOCYTES # BLD AUTO: 1.89 K/UL
LYMPHOCYTES NFR BLD AUTO: 27.9 %
MAN DIFF?: NORMAL
MCHC RBC-ENTMCNC: 26.8 PG
MCHC RBC-ENTMCNC: 31.8 GM/DL
MCV RBC AUTO: 84.2 FL
MONOCYTES # BLD AUTO: 0.5 K/UL
MONOCYTES NFR BLD AUTO: 7.4 %
NEUTROPHILS # BLD AUTO: 3.9 K/UL
NEUTROPHILS NFR BLD AUTO: 57.6 %
PLATELET # BLD AUTO: 301 K/UL
POTASSIUM SERPL-SCNC: 4.6 MMOL/L
PROT SERPL-MCNC: 6.8 G/DL
RBC # BLD: 5.19 M/UL
RBC # FLD: 15.1 %
SODIUM SERPL-SCNC: 142 MMOL/L
WBC # FLD AUTO: 6.77 K/UL

## 2022-10-12 ENCOUNTER — RESULT REVIEW (OUTPATIENT)
Age: 77
End: 2022-10-12

## 2022-10-27 ENCOUNTER — APPOINTMENT (OUTPATIENT)
Dept: GERIATRICS | Facility: CLINIC | Age: 77
End: 2022-10-27

## 2022-10-27 VITALS
DIASTOLIC BLOOD PRESSURE: 62 MMHG | HEIGHT: 62 IN | SYSTOLIC BLOOD PRESSURE: 134 MMHG | WEIGHT: 144.25 LBS | TEMPERATURE: 97.2 F | BODY MASS INDEX: 26.54 KG/M2 | HEART RATE: 98 BPM | OXYGEN SATURATION: 98 % | RESPIRATION RATE: 16 BRPM

## 2022-10-27 DIAGNOSIS — Q38.3 OTHER CONGENITAL MALFORMATIONS OF TONGUE: ICD-10-CM

## 2022-10-27 PROCEDURE — G0444 DEPRESSION SCREEN ANNUAL: CPT

## 2022-10-27 PROCEDURE — 99215 OFFICE O/P EST HI 40 MIN: CPT | Mod: 25

## 2022-10-27 RX ORDER — LEVOCETIRIZINE DIHYDROCHLORIDE 5 MG/1
5 TABLET ORAL DAILY
Qty: 30 | Refills: 0 | Status: COMPLETED | COMMUNITY
Start: 2020-11-19 | End: 2022-10-27

## 2022-10-27 RX ORDER — TRIMETHOBENZAMIDE HYDROCHLORIDE 300 MG/1
300 CAPSULE ORAL
Qty: 90 | Refills: 2 | Status: COMPLETED | COMMUNITY
Start: 2022-08-24 | End: 2022-10-27

## 2022-10-27 RX ORDER — FERROUS SULFATE TAB EC 324 MG (65 MG FE EQUIVALENT) 324 (65 FE) MG
324 (65 FE) TABLET DELAYED RESPONSE ORAL
Qty: 30 | Refills: 1 | Status: COMPLETED | COMMUNITY
Start: 2021-06-28 | End: 2022-10-27

## 2022-10-28 LAB
ALBUMIN SERPL ELPH-MCNC: 4.7 G/DL
ALP BLD-CCNC: 132 U/L
ALT SERPL-CCNC: 21 U/L
ANION GAP SERPL CALC-SCNC: 14 MMOL/L
AST SERPL-CCNC: 23 U/L
BASOPHILS # BLD AUTO: 0.06 K/UL
BASOPHILS NFR BLD AUTO: 0.6 %
BILIRUB SERPL-MCNC: 0.2 MG/DL
BUN SERPL-MCNC: 16 MG/DL
CALCIUM SERPL-MCNC: 10.3 MG/DL
CHLORIDE SERPL-SCNC: 101 MMOL/L
CO2 SERPL-SCNC: 26 MMOL/L
CREAT SERPL-MCNC: 0.71 MG/DL
EGFR: 88 ML/MIN/1.73M2
EOSINOPHIL # BLD AUTO: 0.6 K/UL
EOSINOPHIL NFR BLD AUTO: 6.5 %
ESTIMATED AVERAGE GLUCOSE: 148 MG/DL
GLUCOSE SERPL-MCNC: 228 MG/DL
HBA1C MFR BLD HPLC: 6.8 %
HCT VFR BLD CALC: 45.8 %
HGB BLD-MCNC: 14.4 G/DL
IMM GRANULOCYTES NFR BLD AUTO: 0.2 %
LYMPHOCYTES # BLD AUTO: 1.79 K/UL
LYMPHOCYTES NFR BLD AUTO: 19.3 %
MAN DIFF?: NORMAL
MCHC RBC-ENTMCNC: 26.8 PG
MCHC RBC-ENTMCNC: 31.4 GM/DL
MCV RBC AUTO: 85.1 FL
MONOCYTES # BLD AUTO: 0.54 K/UL
MONOCYTES NFR BLD AUTO: 5.8 %
NEUTROPHILS # BLD AUTO: 6.27 K/UL
NEUTROPHILS NFR BLD AUTO: 67.6 %
PLATELET # BLD AUTO: 381 K/UL
POTASSIUM SERPL-SCNC: 4.3 MMOL/L
PROT SERPL-MCNC: 7.1 G/DL
RBC # BLD: 5.38 M/UL
RBC # FLD: 14.3 %
SODIUM SERPL-SCNC: 140 MMOL/L
WBC # FLD AUTO: 9.28 K/UL

## 2022-10-29 PROBLEM — Q38.3 TONGUE ABNORMALITY: Status: ACTIVE | Noted: 2022-10-14

## 2022-10-29 NOTE — PHYSICAL EXAM
[Alert] : alert [Normal] : the sclera and conjunctiva were normal, extraocular movements were intact, pupils were equal in size, round, and reactive to light [Normal Outer Ear/Nose] : the ears and nose were normal in appearance [Normal Appearance] : the appearance of the neck was normal [Supple] : the neck was supple [No Respiratory Distress] : no respiratory distress [No Acc Muscle Use] : no accessory muscle use [Respiration, Rhythm And Depth] : normal respiratory rhythm and effort [Heart Rate And Rhythm] : heart rate was normal and rhythm regular [Auscultation Breath Sounds / Voice Sounds] : lungs were clear to auscultation bilaterally [Bowel Sounds] : normal bowel sounds [Abdomen Tenderness] : non-tender [Abdomen Soft] : soft [No Spinal Tenderness] : no spinal tenderness [No Focal Deficits] : no focal deficits [Normal Affect] : the affect was normal [Normal Mood] : the mood was normal [de-identified] : tight muscles  [de-identified] : central obesity  [de-identified] : no aches or pains [de-identified] : candidal rash below stomack fold extending towards perinieum

## 2022-10-29 NOTE — HISTORY OF PRESENT ILLNESS
[No falls in past year] : Patient reported no falls in the past year [Completely Independent] : Completely independent. [1] : 2) Feeling down, depressed, or hopeless for several days (1) [FreeTextEntry1] : 75 yo female with diabetes, breast cancer history, diverticulosis and recent bout of diverticulitis with perforation and then recently had tongue lesion (unkown pathology) and has had to change diet bc of diverticualr and mouth issues. Pt with significant weight loss.  Sugars lower. Metformin held bc of diarrhea but on several injectable diabetic meds. Has not returned to Dr michael.  Pt was on a low fiber\par smooth / pureed diaetic diet for lasered post biopsy and significant pain.  \par \par \par Pt now feeling better. NO FS below 100.  Pt limitin some meds and some OTC meds. \par \par No falls.\par Has help in home but not for personal issues.\par \par Pt received flu shot already\par \par Has some anxiety and fear, but managing with therapist

## 2022-10-29 NOTE — ASSESSMENT
[FreeTextEntry1] : 75 yo female with diabetes, htn, Diverticultis, fibromyalgia like symptoms, anxiety/depression, CVA in past,HLD, Hypothyroid\par \par 1) HTN - with weight loss needing less meds. Favor her nadia on losartan thatn amlodipine but does not need both with weight loss. Has felt light headed.  Start losartan 50 mg a day, stop amlodipine. Repeat labs \par Return in 2-3 weeks for BP eval\par \par 2) HLD -colestipol\par 3) Allergies/asthma - singulair, \par 4) tongue lesion - pathology needed.\par 5) CVA - asa 81 mg tid (didn’t tolerate high dose) and plavix - neuro decided this regimin (dual antiplatelet therapy)\par 6)HLD -  colestid\par 7) Exemestane - breast ca treatment, hormonal.  TOlerating, Hair thinning.\par 8) DM - Dr. [email or web address] \par metformin (being held), jardiance 10 mg a day, symlin one or one-half dose tid, tresiba, 22 U (long acting insulin) , Victoza 1.8 q am  check A1C\par 9) Candidal rashes - nystatin if needed.\par 10 )GI - pepcid, omeprazole\par \par follow up with Dr Hollins. I will send him an email.

## 2022-10-30 ENCOUNTER — NON-APPOINTMENT (OUTPATIENT)
Age: 77
End: 2022-10-30

## 2022-11-17 ENCOUNTER — EMERGENCY (EMERGENCY)
Facility: HOSPITAL | Age: 77
LOS: 1 days | Discharge: HOME | End: 2022-11-17
Attending: EMERGENCY MEDICINE
Payer: MEDICARE

## 2022-11-17 ENCOUNTER — APPOINTMENT (OUTPATIENT)
Dept: GERIATRICS | Facility: CLINIC | Age: 77
End: 2022-11-17

## 2022-11-17 VITALS
OXYGEN SATURATION: 97 % | SYSTOLIC BLOOD PRESSURE: 148 MMHG | HEART RATE: 100 BPM | RESPIRATION RATE: 18 BRPM | DIASTOLIC BLOOD PRESSURE: 62 MMHG

## 2022-11-17 VITALS
OXYGEN SATURATION: 99 % | RESPIRATION RATE: 16 BRPM | HEIGHT: 62 IN | WEIGHT: 154.98 LBS | SYSTOLIC BLOOD PRESSURE: 155 MMHG | HEART RATE: 110 BPM | DIASTOLIC BLOOD PRESSURE: 65 MMHG | TEMPERATURE: 100 F

## 2022-11-17 VITALS
SYSTOLIC BLOOD PRESSURE: 138 MMHG | BODY MASS INDEX: 27.26 KG/M2 | WEIGHT: 148.13 LBS | DIASTOLIC BLOOD PRESSURE: 72 MMHG | TEMPERATURE: 97.2 F | OXYGEN SATURATION: 98 % | HEART RATE: 109 BPM | HEIGHT: 62 IN | RESPIRATION RATE: 16 BRPM

## 2022-11-17 DIAGNOSIS — Z98.89 OTHER SPECIFIED POSTPROCEDURAL STATES: Chronic | ICD-10-CM

## 2022-11-17 DIAGNOSIS — Z87.442 PERSONAL HISTORY OF URINARY CALCULI: Chronic | ICD-10-CM

## 2022-11-17 DIAGNOSIS — Z98.51 TUBAL LIGATION STATUS: Chronic | ICD-10-CM

## 2022-11-17 DIAGNOSIS — I67.1 CEREBRAL ANEURYSM, NONRUPTURED: Chronic | ICD-10-CM

## 2022-11-17 DIAGNOSIS — C44.91 BASAL CELL CARCINOMA OF SKIN, UNSPECIFIED: Chronic | ICD-10-CM

## 2022-11-17 LAB
ALBUMIN SERPL ELPH-MCNC: 4.4 G/DL — SIGNIFICANT CHANGE UP (ref 3.3–5)
ALP SERPL-CCNC: 113 U/L — SIGNIFICANT CHANGE UP (ref 40–120)
ALT FLD-CCNC: 19 U/L — SIGNIFICANT CHANGE UP (ref 10–45)
ANION GAP SERPL CALC-SCNC: 18 MMOL/L — HIGH (ref 5–17)
APPEARANCE UR: CLEAR — SIGNIFICANT CHANGE UP
AST SERPL-CCNC: 25 U/L — SIGNIFICANT CHANGE UP (ref 10–40)
BACTERIA # UR AUTO: NEGATIVE — SIGNIFICANT CHANGE UP
BASE EXCESS BLDV CALC-SCNC: 4.7 MMOL/L — HIGH (ref -2–3)
BASOPHILS # BLD AUTO: 0.03 K/UL — SIGNIFICANT CHANGE UP (ref 0–0.2)
BASOPHILS NFR BLD AUTO: 0.2 % — SIGNIFICANT CHANGE UP (ref 0–2)
BILIRUB SERPL-MCNC: 0.5 MG/DL — SIGNIFICANT CHANGE UP (ref 0.2–1.2)
BILIRUB UR-MCNC: NEGATIVE — SIGNIFICANT CHANGE UP
BLOOD GAS VENOUS - CREATININE: SIGNIFICANT CHANGE UP MG/DL (ref 0.5–1.3)
BUN SERPL-MCNC: 21 MG/DL — SIGNIFICANT CHANGE UP (ref 7–23)
CA-I SERPL-SCNC: 1.15 MMOL/L — SIGNIFICANT CHANGE UP (ref 1.15–1.33)
CALCIUM SERPL-MCNC: 10.1 MG/DL — SIGNIFICANT CHANGE UP (ref 8.4–10.5)
CHLORIDE BLDV-SCNC: 99 MMOL/L — SIGNIFICANT CHANGE UP (ref 96–108)
CHLORIDE SERPL-SCNC: 101 MMOL/L — SIGNIFICANT CHANGE UP (ref 96–108)
CO2 BLDV-SCNC: 30 MMOL/L — HIGH (ref 22–26)
CO2 SERPL-SCNC: 20 MMOL/L — LOW (ref 22–31)
COLOR SPEC: COLORLESS — SIGNIFICANT CHANGE UP
CREAT SERPL-MCNC: 0.75 MG/DL — SIGNIFICANT CHANGE UP (ref 0.5–1.3)
DIFF PNL FLD: NEGATIVE — SIGNIFICANT CHANGE UP
EGFR: 82 ML/MIN/1.73M2 — SIGNIFICANT CHANGE UP
EOSINOPHIL # BLD AUTO: 0.01 K/UL — SIGNIFICANT CHANGE UP (ref 0–0.5)
EOSINOPHIL NFR BLD AUTO: 0.1 % — SIGNIFICANT CHANGE UP (ref 0–6)
EPI CELLS # UR: 1 /HPF — SIGNIFICANT CHANGE UP
GAS PNL BLDV: 132 MMOL/L — LOW (ref 136–145)
GAS PNL BLDV: SIGNIFICANT CHANGE UP
GAS PNL BLDV: SIGNIFICANT CHANGE UP
GLUCOSE BLDV-MCNC: 125 MG/DL — HIGH (ref 70–99)
GLUCOSE SERPL-MCNC: 129 MG/DL — HIGH (ref 70–99)
GLUCOSE UR QL: ABNORMAL
HCO3 BLDV-SCNC: 29 MMOL/L — SIGNIFICANT CHANGE UP (ref 22–29)
HCT VFR BLD CALC: 40.8 % — SIGNIFICANT CHANGE UP (ref 34.5–45)
HCT VFR BLDA CALC: 39 % — SIGNIFICANT CHANGE UP (ref 34.5–46.5)
HGB BLD CALC-MCNC: 13 G/DL — SIGNIFICANT CHANGE UP (ref 11.7–16.1)
HGB BLD-MCNC: 12.8 G/DL — SIGNIFICANT CHANGE UP (ref 11.5–15.5)
HYALINE CASTS # UR AUTO: 0 /LPF — SIGNIFICANT CHANGE UP (ref 0–2)
IMM GRANULOCYTES NFR BLD AUTO: 0.4 % — SIGNIFICANT CHANGE UP (ref 0–0.9)
KETONES UR-MCNC: NEGATIVE — SIGNIFICANT CHANGE UP
LACTATE BLDV-MCNC: 1.4 MMOL/L — SIGNIFICANT CHANGE UP (ref 0.5–2)
LEUKOCYTE ESTERASE UR-ACNC: ABNORMAL
LIDOCAIN IGE QN: 13 U/L — SIGNIFICANT CHANGE UP (ref 7–60)
LYMPHOCYTES # BLD AUTO: 0.96 K/UL — LOW (ref 1–3.3)
LYMPHOCYTES # BLD AUTO: 7 % — LOW (ref 13–44)
MCHC RBC-ENTMCNC: 25.7 PG — LOW (ref 27–34)
MCHC RBC-ENTMCNC: 31.4 GM/DL — LOW (ref 32–36)
MCV RBC AUTO: 81.9 FL — SIGNIFICANT CHANGE UP (ref 80–100)
MONOCYTES # BLD AUTO: 0.49 K/UL — SIGNIFICANT CHANGE UP (ref 0–0.9)
MONOCYTES NFR BLD AUTO: 3.6 % — SIGNIFICANT CHANGE UP (ref 2–14)
NEUTROPHILS # BLD AUTO: 12.23 K/UL — HIGH (ref 1.8–7.4)
NEUTROPHILS NFR BLD AUTO: 88.7 % — HIGH (ref 43–77)
NITRITE UR-MCNC: NEGATIVE — SIGNIFICANT CHANGE UP
NRBC # BLD: 0 /100 WBCS — SIGNIFICANT CHANGE UP (ref 0–0)
PCO2 BLDV: 42 MMHG — SIGNIFICANT CHANGE UP (ref 39–42)
PH BLDV: 7.45 — HIGH (ref 7.32–7.43)
PH UR: 7.5 — SIGNIFICANT CHANGE UP (ref 5–8)
PLATELET # BLD AUTO: 235 K/UL — SIGNIFICANT CHANGE UP (ref 150–400)
PO2 BLDV: 36 MMHG — SIGNIFICANT CHANGE UP (ref 25–45)
POTASSIUM BLDV-SCNC: 3.9 MMOL/L — SIGNIFICANT CHANGE UP (ref 3.5–5.1)
POTASSIUM SERPL-MCNC: 4 MMOL/L — SIGNIFICANT CHANGE UP (ref 3.5–5.3)
POTASSIUM SERPL-SCNC: 4 MMOL/L — SIGNIFICANT CHANGE UP (ref 3.5–5.3)
PROT SERPL-MCNC: 7.1 G/DL — SIGNIFICANT CHANGE UP (ref 6–8.3)
PROT UR-MCNC: NEGATIVE — SIGNIFICANT CHANGE UP
RBC # BLD: 4.98 M/UL — SIGNIFICANT CHANGE UP (ref 3.8–5.2)
RBC # FLD: 14.5 % — SIGNIFICANT CHANGE UP (ref 10.3–14.5)
RBC CASTS # UR COMP ASSIST: 2 /HPF — SIGNIFICANT CHANGE UP (ref 0–4)
SAO2 % BLDV: 61.7 % — LOW (ref 67–88)
SARS-COV-2 RNA SPEC QL NAA+PROBE: SIGNIFICANT CHANGE UP
SODIUM SERPL-SCNC: 139 MMOL/L — SIGNIFICANT CHANGE UP (ref 135–145)
SP GR SPEC: 1.01 — SIGNIFICANT CHANGE UP (ref 1.01–1.02)
TROPONIN T, HIGH SENSITIVITY RESULT: 9 NG/L — SIGNIFICANT CHANGE UP (ref 0–51)
UROBILINOGEN FLD QL: NEGATIVE — SIGNIFICANT CHANGE UP
WBC # BLD: 13.77 K/UL — HIGH (ref 3.8–10.5)
WBC # FLD AUTO: 13.77 K/UL — HIGH (ref 3.8–10.5)
WBC UR QL: 3 /HPF — SIGNIFICANT CHANGE UP (ref 0–5)

## 2022-11-17 PROCEDURE — 83605 ASSAY OF LACTIC ACID: CPT

## 2022-11-17 PROCEDURE — 99215 OFFICE O/P EST HI 40 MIN: CPT

## 2022-11-17 PROCEDURE — 74177 CT ABD & PELVIS W/CONTRAST: CPT | Mod: 26,MA

## 2022-11-17 PROCEDURE — 84132 ASSAY OF SERUM POTASSIUM: CPT

## 2022-11-17 PROCEDURE — 85014 HEMATOCRIT: CPT

## 2022-11-17 PROCEDURE — 85018 HEMOGLOBIN: CPT

## 2022-11-17 PROCEDURE — 80053 COMPREHEN METABOLIC PANEL: CPT

## 2022-11-17 PROCEDURE — 85025 COMPLETE CBC W/AUTO DIFF WBC: CPT

## 2022-11-17 PROCEDURE — 82803 BLOOD GASES ANY COMBINATION: CPT

## 2022-11-17 PROCEDURE — 82947 ASSAY GLUCOSE BLOOD QUANT: CPT

## 2022-11-17 PROCEDURE — U0005: CPT

## 2022-11-17 PROCEDURE — 82435 ASSAY OF BLOOD CHLORIDE: CPT

## 2022-11-17 PROCEDURE — 74177 CT ABD & PELVIS W/CONTRAST: CPT | Mod: MA

## 2022-11-17 PROCEDURE — 99285 EMERGENCY DEPT VISIT HI MDM: CPT | Mod: GC

## 2022-11-17 PROCEDURE — 82565 ASSAY OF CREATININE: CPT

## 2022-11-17 PROCEDURE — 93010 ELECTROCARDIOGRAM REPORT: CPT

## 2022-11-17 PROCEDURE — 83690 ASSAY OF LIPASE: CPT

## 2022-11-17 PROCEDURE — 81001 URINALYSIS AUTO W/SCOPE: CPT

## 2022-11-17 PROCEDURE — 71045 X-RAY EXAM CHEST 1 VIEW: CPT

## 2022-11-17 PROCEDURE — 84484 ASSAY OF TROPONIN QUANT: CPT

## 2022-11-17 PROCEDURE — 82330 ASSAY OF CALCIUM: CPT

## 2022-11-17 PROCEDURE — 99285 EMERGENCY DEPT VISIT HI MDM: CPT | Mod: 25

## 2022-11-17 PROCEDURE — U0003: CPT

## 2022-11-17 PROCEDURE — 71045 X-RAY EXAM CHEST 1 VIEW: CPT | Mod: 26

## 2022-11-17 PROCEDURE — 87086 URINE CULTURE/COLONY COUNT: CPT

## 2022-11-17 PROCEDURE — 36415 COLL VENOUS BLD VENIPUNCTURE: CPT

## 2022-11-17 PROCEDURE — 84295 ASSAY OF SERUM SODIUM: CPT

## 2022-11-17 RX ORDER — METRONIDAZOLE 500 MG
1 TABLET ORAL
Qty: 30 | Refills: 0
Start: 2022-11-17 | End: 2022-11-26

## 2022-11-17 RX ORDER — METRONIDAZOLE 500 MG
500 TABLET ORAL ONCE
Refills: 0 | Status: COMPLETED | OUTPATIENT
Start: 2022-11-17 | End: 2022-11-17

## 2022-11-17 RX ORDER — CIPROFLOXACIN LACTATE 400MG/40ML
500 VIAL (ML) INTRAVENOUS ONCE
Refills: 0 | Status: COMPLETED | OUTPATIENT
Start: 2022-11-17 | End: 2022-11-17

## 2022-11-17 RX ORDER — SODIUM CHLORIDE 9 MG/ML
1000 INJECTION, SOLUTION INTRAVENOUS ONCE
Refills: 0 | Status: COMPLETED | OUTPATIENT
Start: 2022-11-17 | End: 2022-11-17

## 2022-11-17 RX ORDER — CIPROFLOXACIN LACTATE 400MG/40ML
1 VIAL (ML) INTRAVENOUS
Qty: 20 | Refills: 0
Start: 2022-11-17 | End: 2022-11-26

## 2022-11-17 RX ORDER — ACETAMINOPHEN 500 MG
975 TABLET ORAL ONCE
Refills: 0 | Status: COMPLETED | OUTPATIENT
Start: 2022-11-17 | End: 2022-11-17

## 2022-11-17 RX ADMIN — SODIUM CHLORIDE 1000 MILLILITER(S): 9 INJECTION, SOLUTION INTRAVENOUS at 19:57

## 2022-11-17 RX ADMIN — Medication 975 MILLIGRAM(S): at 21:05

## 2022-11-17 RX ADMIN — Medication 500 MILLIGRAM(S): at 20:46

## 2022-11-17 NOTE — ED PROVIDER NOTE - PROGRESS NOTE DETAILS
Meryl, PGY2: Patient signed out to my care.  Found with uncomplicated diverticulitis.  Will reassess and most likely discharge with p.o. antibiotics. Will give Tylenol for pain management.

## 2022-11-17 NOTE — ED ADULT NURSE NOTE - OBJECTIVE STATEMENT
77y f pt with hx of diverticulitis, DM, HTN, breast CA, cerebral aneurysm c/o abd pain x 12 hours; pt describes as dull and non radiating; bilat lower abd pain; pt states does not feel like divertic attacks; aox3; no resp distress; no chest pain; no n/v/d; denies fever/chills; normal bm this am; no dysuria/hematuria; no vag discharge; iv placed; labs drawn/sent; pt swabbed for covid; pt tolerating po;  at bedside; call bell in hand

## 2022-11-17 NOTE — ASSESSMENT
[FreeTextEntry1] : patient advised to follow up after evaluation at [facility]; called ER to give report\par \par JASEN Cobos-BC

## 2022-11-17 NOTE — ED PROVIDER NOTE - NS ED ROS FT
General: no fever, chills  HENT: no nasal congestion, no sore throat  Eyes: no visual changes, no blurred vision  Neck: no neck pain  CV: denies chest pain, no palpitations  Resp: no difficulty breathing, no cough  Abdominal: no nausea, no vomiting, no diarrhea, +abdominal pain  MSK: no muscle aches  Neuro: no headaches  Skin: no rashes

## 2022-11-17 NOTE — ED PROVIDER NOTE - NSFOLLOWUPINSTRUCTIONS_ED_ALL_ED_FT
Diverticulitis    You were seen in the emergency department for abdominal pain.  You were found with acute uncomplicated diverticulitis.  Please find your results attached to this document.  It is important for you to follow-up with your primary care doctor.  We have sent antibiotics to your pharmacy please take them as instructed and finish the complete course.    May take Tylenol for pain control.    Please return to the emergency department if you have worsening pain, vomiting, diarrhea, fevers, or poor oral intake.    Diverticulitis is inflammation or infection of small pouches in your colon that form when you have a condition called diverticulosis. This condition can range from mild to severe potentially leading to perforation or obstructions of your colon. Symptoms include abdominal pain, fever/chills, nausea, vomiting, diarrhea, constipation, or blood in your stool. If you were prescribed an antibiotic medicine, take it as told by your health care provider. Do not stop taking the antibiotic even if you start to feel better.    SEEK IMMEDIATE MEDICAL CARE IF YOU HAVE THE FOLLOWING SYMPTOMS: worsening abdominal pain, high fever, inability to hold down liquids or medication, black or bloody stools, inability to pass gas, lightheadedness/dizziness, or a change in mental status.

## 2022-11-17 NOTE — REVIEW OF SYSTEMS
[Feeling Poorly] : feeling poorly [Abdominal Pain] : abdominal pain [Feeling Tired] : not feeling tired [Discharge From Eyes] : no purulent discharge from the eyes [Dry Eyes] : no dryness of the eyes [Nasal Discharge] : no nasal discharge [Sore Throat] : no sore throat [Chest Pain] : no chest pain [Palpitations] : no palpitations [Shortness Of Breath] : no shortness of breath [Wheezing] : no wheezing [Cough] : no cough [SOB on Exertion] : no shortness of breath during exertion [Vomiting] : no vomiting [Constipation] : no constipation [Diarrhea] : no diarrhea [Dysuria] : no dysuria [Limb Pain] : no limb pain

## 2022-11-17 NOTE — ED PROVIDER NOTE - CLINICAL SUMMARY MEDICAL DECISION MAKING FREE TEXT BOX
76 y/o F w/ mhx of diverticulitis and diverticular bleeds treated w/clips, DM, HTN, breast cancer (s/p adjuvant chemo+rads; 2016), cerebral aneurysm repair w/embolization and stent (on aspirin + plavix; 2012) p/w diverticulitis with micro perforation c/f recurrent divertic vs appendicitis vs rob vs intrabd path. Labs, CT, pain control. 78 y/o F w/ mhx of diverticulitis and diverticular bleeds treated w/clips, DM, HTN, breast cancer (s/p adjuvant chemo+ rads; 2016), cerebral aneurysm repair w/embolization and stent (on aspirin + plavix; 2012) p/w diverticulitis with micro perforation c/f recurrent diverticula vs appendicitis vs rob vs intrabd path. Labs, CT, pain control. reassess ZR

## 2022-11-17 NOTE — ED PROVIDER NOTE - OBJECTIVE STATEMENT
76 y/o M  diverticulitis and diverticular bleeds treated w/clips, DM, HTN, breast cancer (s/p adjuvant chemo+rads; 2016), cerebral aneurysm repair w/embolization and stent (on aspirin + plavix; 2012), diverticulitis with micro perforation p/w lower abd pain x 12 hours. Pain started this am, dull, non radiating, b/l lower abd. Normal BMs, last this am, no dysuria, vaginal bleeding/discharge. Tolerating PO w/o difficulty. States pain is different from prior diverticulitis. No sick contacts, recent travel.

## 2022-11-17 NOTE — ED ADULT TRIAGE NOTE - CHIEF COMPLAINT QUOTE
sudden onset lower bilateral abd since this morning. no nausea, vomiting, urinary symptoms, fever, chills.

## 2022-11-17 NOTE — ED PROVIDER NOTE - PHYSICAL EXAMINATION
General: well appearing   HEENT: neck supple, anicteric sclera  Cardiovascular: Normal s1, s2, RRR  Respiratory: CTA b/l   Abdominal: Soft, diffuse ttp, R>L  Extremities: No swelling in LEs  Neurologic: Non focal  Psych: Awake, alert answering questions appropriately

## 2022-11-17 NOTE — PHYSICAL EXAM
[Alert] : alert [Sclera] : the sclera and conjunctiva were normal [EOMI] : extraocular movements were intact [PERRL] : pupils were equal in size, round, and reactive to light [Normal Oral Mucosa] : normal oral mucosa [Normal Outer Ear/Nose] : the ears and nose were normal in appearance [Normal Appearance] : the appearance of the neck was normal [Supple] : the neck was supple [No Respiratory Distress] : no respiratory distress [No Acc Muscle Use] : no accessory muscle use [Respiration, Rhythm And Depth] : normal respiratory rhythm and effort [Auscultation Breath Sounds / Voice Sounds] : lungs were clear to auscultation bilaterally [Normal S1, S2] : normal S1 and S2 [Heart Rate And Rhythm] : heart rate was normal and rhythm regular [Edema] : edema was not present [Pedal Pulses Normal] : the pedal pulses are present [No CVA Tenderness] : no CVA  tenderness [No Spinal Tenderness] : no spinal tenderness [Normal Gait] : normal gait [Cervical Lymph Nodes Enlarged Posterior Bilaterally] : posterior cervical [Cervical Lymph Nodes Enlarged Anterior Bilaterally] : anterior cervical, supraclavicular [Normal Color / Pigmentation] : normal skin color and pigmentation [No Focal Deficits] : no focal deficits [Oriented To Time, Place, And Person] : oriented to person, place, and time [Normal Affect] : the affect was normal [Normal Insight/Judgment] : insight and judgment were intact [Normal Mood] : the mood was normal [de-identified] : diffuse abdominal pain; significant tenderness of RUQ, RLQ and LLQ with guarding upon gentle palpation

## 2022-11-17 NOTE — ED PROVIDER NOTE - NSICDXPASTMEDICALHX_GEN_ALL_CORE_FT
"Metoprolol refill request  Last OV: 1/3/20 Khris Brian PA-C for his hypertension; sees derm regularly  Requested Prescriptions   Pending Prescriptions Disp Refills     metoprolol succinate ER (TOPROL-XL) 50 MG 24 hr tablet [Pharmacy Med Name: METOPROLOL SUCC ER 50 MG TAB] 90 tablet 3     Sig: TAKE 1 TABLET BY MOUTH EVERY DAY       Beta-Blockers Protocol Failed - 7/1/2020 11:07 AM        Failed - Blood pressure under 140/90 in past 12 months     BP Readings from Last 3 Encounters:   06/15/20 (!) 158/99   01/03/20 122/86   10/18/19 (!) 144/90                 Passed - Patient is age 6 or older        Passed - Recent (12 mo) or future (30 days) visit within the authorizing provider's specialty     Patient has had an office visit with the authorizing provider or a provider within the authorizing providers department within the previous 12 mos or has a future within next 30 days. See \"Patient Info\" tab in inbasket, or \"Choose Columns\" in Meds & Orders section of the refill encounter.              Passed - Medication is active on med list             " PAST MEDICAL HISTORY:  Aneurysm cerebral    Anxiety     Asthma     Basal cell carcinoma     Benign intracranial hypertension 1980 r/t "tetracyclines"    Breast CA right    Chronic fatigue     CVA (cerebral vascular accident) 2012    Depression     Diverticulitis     DM (diabetes mellitus)     Fibromyalgia     Hearing loss left    HTN (hypertension)     Kidney stones hx of    Lumbar disc disease     Reflux esophagitis     Shoulder disorder right bone spur    Vertigo

## 2022-11-17 NOTE — ED PROVIDER NOTE - PATIENT PORTAL LINK FT
You can access the FollowMyHealth Patient Portal offered by [facility] by registering at the following website: [email or web address]. By joining [employer]’s FollowMyHealth portal, you will also be able to view your health information using other applications (apps) compatible with our system.

## 2022-11-17 NOTE — HISTORY OF PRESENT ILLNESS
[Completely Independent] : Completely independent. [0] : 2) Feeling down, depressed, or hopeless: Not at all (0) [PHQ-2 Negative - No further assessment needed] : PHQ-2 Negative - No further assessment needed [FreeTextEntry1] : TORRIE SHETH is a 76 yo woman with PMH of diabetes, HTN,  hx of breast CA, diverticulosis with hx of diverticulitis with perforation, and tongue lesion  who presents today for evaluation of BP after changing BP meds. Patient also presents to visit with acute abdominal pain x 1 day. \par \par Abdominal pain:\par -started this morning\par -describes as a sharp pain across lower abdomen \par -has has limited appetite today; was able to tolerate toast with jam and some water today \par -has not had any episodes of vomiting, diarrhea or constipation \par -denies fevers or chills\par \par HTN:\par -recent HTN medication adjustments with Dr. Reyes \par -currently only on losartan 50 mg qd \par -has been checking BP periodically at home and states that it is typically 130s/80s\par \par  [Driving Concerns] : not driving or driving without noted concerns [de-identified] : 6 [de-identified] : 8 [[id number]] : 0

## 2022-11-17 NOTE — ED CLERICAL - NS ED CLERK NOTE PRE-ARRIVAL INFORMATION; ADDITIONAL PRE-ARRIVAL INFORMATION
CC/Reason For referral: seen in office, noted to have acute abd pain . please obtain abd ct.   VSS  Preferred Consultant(if applicable): na  Who admits for you (if needed): na  Do you have documents you would like to fax over? no  Would you still like to speak to an ED attending? yes

## 2022-11-18 LAB
CULTURE RESULTS: SIGNIFICANT CHANGE UP
SPECIMEN SOURCE: SIGNIFICANT CHANGE UP

## 2022-11-21 ENCOUNTER — INPATIENT (INPATIENT)
Facility: HOSPITAL | Age: 77
LOS: 6 days | Discharge: HOME HEALTH | DRG: 391 | End: 2022-11-28
Attending: STUDENT IN AN ORGANIZED HEALTH CARE EDUCATION/TRAINING PROGRAM | Admitting: STUDENT IN AN ORGANIZED HEALTH CARE EDUCATION/TRAINING PROGRAM
Payer: MEDICARE

## 2022-11-21 VITALS
OXYGEN SATURATION: 98 % | TEMPERATURE: 98 F | WEIGHT: 139.99 LBS | HEART RATE: 153 BPM | SYSTOLIC BLOOD PRESSURE: 107 MMHG | RESPIRATION RATE: 22 BRPM | DIASTOLIC BLOOD PRESSURE: 75 MMHG | HEIGHT: 62 IN

## 2022-11-21 DIAGNOSIS — Z98.89 OTHER SPECIFIED POSTPROCEDURAL STATES: Chronic | ICD-10-CM

## 2022-11-21 DIAGNOSIS — C44.91 BASAL CELL CARCINOMA OF SKIN, UNSPECIFIED: Chronic | ICD-10-CM

## 2022-11-21 DIAGNOSIS — Z87.442 PERSONAL HISTORY OF URINARY CALCULI: Chronic | ICD-10-CM

## 2022-11-21 DIAGNOSIS — K57.80 DIVERTICULITIS OF INTESTINE, PART UNSPECIFIED, WITH PERFORATION AND ABSCESS WITHOUT BLEEDING: ICD-10-CM

## 2022-11-21 DIAGNOSIS — I67.1 CEREBRAL ANEURYSM, NONRUPTURED: Chronic | ICD-10-CM

## 2022-11-21 DIAGNOSIS — Z98.51 TUBAL LIGATION STATUS: Chronic | ICD-10-CM

## 2022-11-21 LAB
ALBUMIN SERPL ELPH-MCNC: 3.6 G/DL — SIGNIFICANT CHANGE UP (ref 3.3–5)
ALP SERPL-CCNC: 92 U/L — SIGNIFICANT CHANGE UP (ref 40–120)
ALT FLD-CCNC: 16 U/L — SIGNIFICANT CHANGE UP (ref 10–45)
ANION GAP SERPL CALC-SCNC: 17 MMOL/L — SIGNIFICANT CHANGE UP (ref 5–17)
APTT BLD: 30.4 SEC — SIGNIFICANT CHANGE UP (ref 27.5–35.5)
AST SERPL-CCNC: 22 U/L — SIGNIFICANT CHANGE UP (ref 10–40)
BASE EXCESS BLDV CALC-SCNC: -1.3 MMOL/L — SIGNIFICANT CHANGE UP (ref -2–3)
BASOPHILS # BLD AUTO: 0.03 K/UL — SIGNIFICANT CHANGE UP (ref 0–0.2)
BASOPHILS NFR BLD AUTO: 0.3 % — SIGNIFICANT CHANGE UP (ref 0–2)
BILIRUB SERPL-MCNC: 0.4 MG/DL — SIGNIFICANT CHANGE UP (ref 0.2–1.2)
BLD GP AB SCN SERPL QL: NEGATIVE — SIGNIFICANT CHANGE UP
BUN SERPL-MCNC: 16 MG/DL — SIGNIFICANT CHANGE UP (ref 7–23)
CA-I SERPL-SCNC: 1.22 MMOL/L — SIGNIFICANT CHANGE UP (ref 1.15–1.33)
CALCIUM SERPL-MCNC: 9.5 MG/DL — SIGNIFICANT CHANGE UP (ref 8.4–10.5)
CHLORIDE BLDV-SCNC: 100 MMOL/L — SIGNIFICANT CHANGE UP (ref 96–108)
CHLORIDE SERPL-SCNC: 99 MMOL/L — SIGNIFICANT CHANGE UP (ref 96–108)
CO2 BLDV-SCNC: 24 MMOL/L — SIGNIFICANT CHANGE UP (ref 22–26)
CO2 SERPL-SCNC: 21 MMOL/L — LOW (ref 22–31)
CREAT SERPL-MCNC: 0.66 MG/DL — SIGNIFICANT CHANGE UP (ref 0.5–1.3)
EGFR: 90 ML/MIN/1.73M2 — SIGNIFICANT CHANGE UP
EOSINOPHIL # BLD AUTO: 0.16 K/UL — SIGNIFICANT CHANGE UP (ref 0–0.5)
EOSINOPHIL NFR BLD AUTO: 1.5 % — SIGNIFICANT CHANGE UP (ref 0–6)
GAS PNL BLDV: 133 MMOL/L — LOW (ref 136–145)
GAS PNL BLDV: SIGNIFICANT CHANGE UP
GLUCOSE BLDC GLUCOMTR-MCNC: 166 MG/DL — HIGH (ref 70–99)
GLUCOSE BLDV-MCNC: 336 MG/DL — HIGH (ref 70–99)
GLUCOSE SERPL-MCNC: 320 MG/DL — HIGH (ref 70–99)
HCO3 BLDV-SCNC: 23 MMOL/L — SIGNIFICANT CHANGE UP (ref 22–29)
HCT VFR BLD CALC: 40 % — SIGNIFICANT CHANGE UP (ref 34.5–45)
HCT VFR BLDA CALC: 39 % — SIGNIFICANT CHANGE UP (ref 34.5–46.5)
HGB BLD CALC-MCNC: 13.1 G/DL — SIGNIFICANT CHANGE UP (ref 11.7–16.1)
HGB BLD-MCNC: 12.9 G/DL — SIGNIFICANT CHANGE UP (ref 11.5–15.5)
IMM GRANULOCYTES NFR BLD AUTO: 0.5 % — SIGNIFICANT CHANGE UP (ref 0–0.9)
INR BLD: 1.26 RATIO — HIGH (ref 0.88–1.16)
LACTATE BLDV-MCNC: 1.5 MMOL/L — SIGNIFICANT CHANGE UP (ref 0.5–2)
LYMPHOCYTES # BLD AUTO: 0.69 K/UL — LOW (ref 1–3.3)
LYMPHOCYTES # BLD AUTO: 6.4 % — LOW (ref 13–44)
MAGNESIUM SERPL-MCNC: 1.8 MG/DL — SIGNIFICANT CHANGE UP (ref 1.6–2.6)
MCHC RBC-ENTMCNC: 26.1 PG — LOW (ref 27–34)
MCHC RBC-ENTMCNC: 32.3 GM/DL — SIGNIFICANT CHANGE UP (ref 32–36)
MCV RBC AUTO: 81 FL — SIGNIFICANT CHANGE UP (ref 80–100)
MONOCYTES # BLD AUTO: 0.73 K/UL — SIGNIFICANT CHANGE UP (ref 0–0.9)
MONOCYTES NFR BLD AUTO: 6.8 % — SIGNIFICANT CHANGE UP (ref 2–14)
NEUTROPHILS # BLD AUTO: 9.13 K/UL — HIGH (ref 1.8–7.4)
NEUTROPHILS NFR BLD AUTO: 84.5 % — HIGH (ref 43–77)
NRBC # BLD: 0 /100 WBCS — SIGNIFICANT CHANGE UP (ref 0–0)
PCO2 BLDV: 35 MMHG — LOW (ref 39–42)
PH BLDV: 7.42 — SIGNIFICANT CHANGE UP (ref 7.32–7.43)
PHOSPHATE SERPL-MCNC: 2.4 MG/DL — LOW (ref 2.5–4.5)
PLATELET # BLD AUTO: 330 K/UL — SIGNIFICANT CHANGE UP (ref 150–400)
PO2 BLDV: 65 MMHG — HIGH (ref 25–45)
POTASSIUM BLDV-SCNC: 3.8 MMOL/L — SIGNIFICANT CHANGE UP (ref 3.5–5.1)
POTASSIUM SERPL-MCNC: 3.6 MMOL/L — SIGNIFICANT CHANGE UP (ref 3.5–5.3)
POTASSIUM SERPL-SCNC: 3.6 MMOL/L — SIGNIFICANT CHANGE UP (ref 3.5–5.3)
PROT SERPL-MCNC: 6.7 G/DL — SIGNIFICANT CHANGE UP (ref 6–8.3)
PROTHROM AB SERPL-ACNC: 14.7 SEC — HIGH (ref 10.5–13.4)
RBC # BLD: 4.94 M/UL — SIGNIFICANT CHANGE UP (ref 3.8–5.2)
RBC # FLD: 14.6 % — HIGH (ref 10.3–14.5)
RH IG SCN BLD-IMP: NEGATIVE — SIGNIFICANT CHANGE UP
SAO2 % BLDV: 92.7 % — HIGH (ref 67–88)
SARS-COV-2 RNA SPEC QL NAA+PROBE: SIGNIFICANT CHANGE UP
SODIUM SERPL-SCNC: 137 MMOL/L — SIGNIFICANT CHANGE UP (ref 135–145)
WBC # BLD: 10.79 K/UL — HIGH (ref 3.8–10.5)
WBC # FLD AUTO: 10.79 K/UL — HIGH (ref 3.8–10.5)

## 2022-11-21 PROCEDURE — 93010 ELECTROCARDIOGRAM REPORT: CPT

## 2022-11-21 PROCEDURE — 99285 EMERGENCY DEPT VISIT HI MDM: CPT | Mod: GC

## 2022-11-21 PROCEDURE — 74177 CT ABD & PELVIS W/CONTRAST: CPT | Mod: 26,MA

## 2022-11-21 PROCEDURE — 71275 CT ANGIOGRAPHY CHEST: CPT | Mod: 26,MA

## 2022-11-21 RX ORDER — CIPROFLOXACIN LACTATE 400MG/40ML
400 VIAL (ML) INTRAVENOUS ONCE
Refills: 0 | Status: COMPLETED | OUTPATIENT
Start: 2022-11-21 | End: 2022-11-21

## 2022-11-21 RX ORDER — SODIUM CHLORIDE 9 MG/ML
1000 INJECTION, SOLUTION INTRAVENOUS ONCE
Refills: 0 | Status: COMPLETED | OUTPATIENT
Start: 2022-11-21 | End: 2022-11-21

## 2022-11-21 RX ORDER — INSULIN LISPRO 100/ML
VIAL (ML) SUBCUTANEOUS
Refills: 0 | Status: DISCONTINUED | OUTPATIENT
Start: 2022-11-21 | End: 2022-11-22

## 2022-11-21 RX ORDER — DIAZEPAM 5 MG
2.5 TABLET ORAL ONCE
Refills: 0 | Status: DISCONTINUED | OUTPATIENT
Start: 2022-11-21 | End: 2022-11-21

## 2022-11-21 RX ORDER — METRONIDAZOLE 500 MG
500 TABLET ORAL ONCE
Refills: 0 | Status: COMPLETED | OUTPATIENT
Start: 2022-11-21 | End: 2022-11-21

## 2022-11-21 RX ORDER — SODIUM CHLORIDE 9 MG/ML
1000 INJECTION, SOLUTION INTRAVENOUS
Refills: 0 | Status: DISCONTINUED | OUTPATIENT
Start: 2022-11-21 | End: 2022-11-28

## 2022-11-21 RX ORDER — PANTOPRAZOLE SODIUM 20 MG/1
40 TABLET, DELAYED RELEASE ORAL EVERY 24 HOURS
Refills: 0 | Status: DISCONTINUED | OUTPATIENT
Start: 2022-11-21 | End: 2022-11-28

## 2022-11-21 RX ORDER — THYROID 120 MG
1 TABLET ORAL
Qty: 0 | Refills: 0 | DISCHARGE

## 2022-11-21 RX ORDER — MEROPENEM 1 G/30ML
INJECTION INTRAVENOUS
Refills: 0 | Status: DISCONTINUED | OUTPATIENT
Start: 2022-11-21 | End: 2022-11-23

## 2022-11-21 RX ORDER — DEXTROSE 50 % IN WATER 50 %
15 SYRINGE (ML) INTRAVENOUS ONCE
Refills: 0 | Status: DISCONTINUED | OUTPATIENT
Start: 2022-11-21 | End: 2022-11-28

## 2022-11-21 RX ORDER — INSULIN LISPRO 100/ML
VIAL (ML) SUBCUTANEOUS AT BEDTIME
Refills: 0 | Status: DISCONTINUED | OUTPATIENT
Start: 2022-11-21 | End: 2022-11-22

## 2022-11-21 RX ORDER — DEXTROSE 50 % IN WATER 50 %
25 SYRINGE (ML) INTRAVENOUS ONCE
Refills: 0 | Status: DISCONTINUED | OUTPATIENT
Start: 2022-11-21 | End: 2022-11-28

## 2022-11-21 RX ORDER — SODIUM,POTASSIUM PHOSPHATES 278-250MG
1 POWDER IN PACKET (EA) ORAL ONCE
Refills: 0 | Status: COMPLETED | OUTPATIENT
Start: 2022-11-21 | End: 2022-11-21

## 2022-11-21 RX ORDER — MEROPENEM 1 G/30ML
1000 INJECTION INTRAVENOUS EVERY 8 HOURS
Refills: 0 | Status: DISCONTINUED | OUTPATIENT
Start: 2022-11-21 | End: 2022-11-23

## 2022-11-21 RX ORDER — COLESTIPOL HCL 5 G
1 GRANULES (GRAM) ORAL
Qty: 0 | Refills: 0 | DISCHARGE

## 2022-11-21 RX ORDER — ACETAMINOPHEN 500 MG
1000 TABLET ORAL ONCE
Refills: 0 | Status: COMPLETED | OUTPATIENT
Start: 2022-11-21 | End: 2022-11-21

## 2022-11-21 RX ORDER — GLUCAGON INJECTION, SOLUTION 0.5 MG/.1ML
1 INJECTION, SOLUTION SUBCUTANEOUS ONCE
Refills: 0 | Status: DISCONTINUED | OUTPATIENT
Start: 2022-11-21 | End: 2022-11-28

## 2022-11-21 RX ORDER — DEXTROSE 50 % IN WATER 50 %
12.5 SYRINGE (ML) INTRAVENOUS ONCE
Refills: 0 | Status: DISCONTINUED | OUTPATIENT
Start: 2022-11-21 | End: 2022-11-28

## 2022-11-21 RX ORDER — LATANOPROST 0.05 MG/ML
1 SOLUTION/ DROPS OPHTHALMIC; TOPICAL
Qty: 0 | Refills: 0 | DISCHARGE

## 2022-11-21 RX ORDER — MEROPENEM 1 G/30ML
1000 INJECTION INTRAVENOUS ONCE
Refills: 0 | Status: COMPLETED | OUTPATIENT
Start: 2022-11-21 | End: 2022-11-21

## 2022-11-21 RX ORDER — HEPARIN SODIUM 5000 [USP'U]/ML
5000 INJECTION INTRAVENOUS; SUBCUTANEOUS EVERY 8 HOURS
Refills: 0 | Status: DISCONTINUED | OUTPATIENT
Start: 2022-11-21 | End: 2022-11-24

## 2022-11-21 RX ORDER — OMEPRAZOLE 10 MG/1
1 CAPSULE, DELAYED RELEASE ORAL
Qty: 0 | Refills: 0 | DISCHARGE

## 2022-11-21 RX ORDER — SODIUM CHLORIDE 9 MG/ML
1000 INJECTION, SOLUTION INTRAVENOUS
Refills: 0 | Status: DISCONTINUED | OUTPATIENT
Start: 2022-11-21 | End: 2022-11-22

## 2022-11-21 RX ADMIN — HEPARIN SODIUM 5000 UNIT(S): 5000 INJECTION INTRAVENOUS; SUBCUTANEOUS at 18:00

## 2022-11-21 RX ADMIN — Medication 500 MILLIGRAM(S): at 12:43

## 2022-11-21 RX ADMIN — Medication 400 MILLIGRAM(S): at 11:53

## 2022-11-21 RX ADMIN — Medication 400 MILLIGRAM(S): at 12:43

## 2022-11-21 RX ADMIN — SODIUM CHLORIDE 1000 MILLILITER(S): 9 INJECTION, SOLUTION INTRAVENOUS at 11:53

## 2022-11-21 RX ADMIN — Medication 1000 MILLIGRAM(S): at 12:43

## 2022-11-21 RX ADMIN — MEROPENEM 100 MILLIGRAM(S): 1 INJECTION INTRAVENOUS at 17:59

## 2022-11-21 RX ADMIN — Medication 1 TABLET(S): at 12:59

## 2022-11-21 RX ADMIN — MEROPENEM 100 MILLIGRAM(S): 1 INJECTION INTRAVENOUS at 21:22

## 2022-11-21 RX ADMIN — Medication 2.5 MILLIGRAM(S): at 12:12

## 2022-11-21 RX ADMIN — SODIUM CHLORIDE 100 MILLILITER(S): 9 INJECTION, SOLUTION INTRAVENOUS at 17:59

## 2022-11-21 RX ADMIN — Medication 100 MILLIGRAM(S): at 11:53

## 2022-11-21 RX ADMIN — HEPARIN SODIUM 5000 UNIT(S): 5000 INJECTION INTRAVENOUS; SUBCUTANEOUS at 21:23

## 2022-11-21 RX ADMIN — Medication 200 MILLIGRAM(S): at 12:03

## 2022-11-21 NOTE — ED PROVIDER NOTE - OBJECTIVE STATEMENT
77Y F PMH diverticulitis w h/o perforation, HTN, DM, breast cancer, cerebral aneurysm s/p coiling presenting with 5 days of worsening abdominal pain. Patient was seen in this ED on 11/17 and dx with diverticulitis, started on PO flagyl & cipro at that time. Patient states progressively worsening LLQ abdominal pain despite PO antibiotics. Has been maintaining clear liquid diet per GI recs, continues to have persistent worsening pain and diarrhea, low grade fevers. Has been taking tylenol for fevers with some improvement. No nausea or vomiting, no blood in stool.

## 2022-11-21 NOTE — ED ADULT NURSE NOTE - OBJECTIVE STATEMENT
Received a 77Y F PMH AAOX4 ambulatory with h/o  diverticulitis w/  perforation, HTN, DM, breast cancer, cerebral aneurysm s/p coiling presenting with 5 days of worsening abdominal pain. Patient was seen in this ED on 11/17 and dx with diverticulitis, started on PO flagyl & cipro at that time. Patient was DC from the hospital but reports she's was not feeling well when she was dc hence came back here because the PO meds are not working and that she has pain in the LLQ of her abdomen. Patient tachycardic in the ED up to 160's initially. Patient reports she has been on liquid diet since she was admitted and dc from the hospital. Left arm 18g IV access inserted, labs sent pending results. Due meds given as ordered and tolerated well.

## 2022-11-21 NOTE — ED PROVIDER NOTE - CLINICAL SUMMARY MEDICAL DECISION MAKING FREE TEXT BOX
77Y F PMH diverticulitis w perforation, here with 5 days of progressively worsening lower abdominal pain and loose stools despite outpatient antibiotic regimen. Noted to be in SVT to 170s on triage EKG. Patient placed on cardiac monitor and IV fluid bolus started. Will obtain basic labs and start IV cipro/flagyl as patient appears to be failing outpatient antibiotic regimen. Rectal temp 99.5F, IV ofirmev given for pain.

## 2022-11-21 NOTE — ED PROVIDER NOTE - PROGRESS NOTE DETAILS
Malina Cummings MD PGY1: HR improving with fluids, PO Valium. Patient reports long term use of Valium PO 5mg for muscle spasm, she d/c'd this abruptly 2 days ago. CT abd with evidence of perforated diverticulitis. Surgery paged.

## 2022-11-21 NOTE — H&P ADULT - ASSESSMENT
full note to follow    Patient admitted with Hinchey 1b perforated diverticulitis, non peritoneal exam    - NPO/IVF  - IV Abx: Meropenem given PCN allergy  - Serial abdominal exams  - Exam before pain meds      Patient discussed with attending, Dr. Funes.    Yani Arnold MD  PGY3 Consult Resident  [facility]  [id number]       ASSESSMENT/PLAN: 77y F PMH perforated diverticulitis, HTN, DM, breast cancer, cerebral aneurysm s/p coiling presenting with 5 days of worsening abdominal pain, treated as outpatient for uncomplicated diverticulitis, now presenting with Hinchey 1b perforated diverticulitis. CT with evidence of small intra-abdominal free air, tracking interloop abscess. Patient nonperitoneal on physical exam. Patient hemodynamically stable. Tachycardia on presentation to ED now resolved, believed to be related to anxiety vs. benzo withdrawal.     - NPO/IVF  - IV Abx: Meropenem given PCN allergy  - CIWA  - Sliding scale insulin  - Serial abdominal exams  - Exam before pain meds    Patient discussed with attending, Dr. Funes.    Yani Arnold MD  PGY3 Consult Resident  [facility]  [id number]

## 2022-11-21 NOTE — H&P ADULT - NSHPLABSRESULTS_GEN_ALL_CORE
LABS:             12.9   10.79 )-----------( 330      ( 21 Nov 2022 12:02 )             40.0     137  |  99  |  16  ----------------------------<  320<H>  3.6   |  21<L>  |  0.66    Ca    9.5      21 Nov 2022 12:02  Phos  2.4     11-21  Mg     1.8     11-21    TPro  6.7  /  Alb  3.6  /  TBili  0.4  /  DBili  x   /  AST  22  /  ALT  16  /  AlkPhos  92  11-21    PT/INR - (21 Nov 2022 12:02)  PT: 14.7 sec; INR: 1.26 ratio;  PTT:30.4 sec    LIVER FUNCTIONS - ( 21 Nov 2022 12:02 )  Alb: 3.6 g/dL / Pro: 6.7 g/dL / ALK PHOS: 92 U/L / ALT: 16 U/L / AST: 22 U/L / GGT: x           RADIOLOGY & ADDITIONAL STUDIES:  < from: CT Abdomen and Pelvis w/ IV Cont (11.21.22 @ 13:29) >  FINDINGS:  CHEST:  LUNGS AND LARGE AIRWAYS: Patent central airways. Trace cystic changes   throughout the lungs. No pulmonary nodules.  PLEURA: No pleural effusion. No pneumothorax or pneumomediastinum.  VESSELS: There is no evidence of filling defect in the first, second, or   third order branches of the pulmonary arteries. The pulmonary trunk and   main pulmonary arteries are unremarkable.  HEART: Heart size is normal. No pericardial effusion.  MEDIASTINUM AND WALTER: No lymphadenopathy.  CHEST WALL AND LOWER NECK: Within normal limits.    ABDOMEN AND PELVIS:  LIVER: Within normal limits.  BILE DUCTS: Normal caliber.  GALLBLADDER: Within normal limits.  SPLEEN: Within normal limits.  PANCREAS: Within normal limits.  ADRENALS: Within normal limits.  KIDNEYS/URETERS: Small bilateral renal cysts. There is mild right   hydroureteronephrosis extending to the right pelvis without stone.   Otherwise, the kidneys are symmetric in appearance and enhancement with   no evidence of mass or stone.    BLADDER: Within normal limits.  REPRODUCTIVE ORGANS: Multi fibroid uterus. The adnexa are unremarkable by   CT criteria.    BOWEL: There is diverticulosis of the sigmoid colon. Again is noted mild   concentric wall thickening of the mid sigmoid colon. There is a tracking   peripherally enhancing fluid collection between the sigmoid colon and   adjacent distal small bowel loop on image 87, containing bubbles of   extraluminal gas, compatible with interloop abscess. There is moderate   concentric wall thickening of the adjacent distal small bowel loop,   likely reactive. There is small nondependent gas under the hemidiaphragms   and anterior peritoneal reflection, compatible with free intraperitoneal   gas. There is moderate stranding of the right lower quadrant mesentery.   No drainable collection is identified.    There is no bowel obstruction. Dense pill in the stomach. Mid   diverticulum of the second portion of the duodenum. The retrocecal   appendix is unremarkable.  PERITONEUM: No ascites.  VESSELS: Within normal limits.  RETROPERITONEUM/LYMPH NODES: No lymphadenopathy.  ABDOMINAL WALL: Within normal limits.  BONES: Within normal limits.    IMPRESSION:  1. Perforated sigmoid diverticulitis with tracking interloop abscess and   reactive small bowel thickening. Mild free intraperitoneal gas. Mild   right hydroureteronephrosis extending to the right pelvic inflammation   and tracking collection.  2. No evidence of pulmonary embolism.  3. Results discussed with Dr. Ramirez at time of interpretation.  < end of copied text >    < from: Colonoscopy (03.11.20 @ 10:20) >                                                                                                Impression:          - Perianal skin tags found on perianal exam.                       - Diverticulosis in the recto-sigmoid colon, in the sigmoid colon, in the                        descending colon, at the splenic flexure, in the transverse colon and in       ascending colon.                       - Diverticulosis in the sigmoid colon. There was evidence of recent bleeding                        from the diverticular opening with a visible vessel. Clips (MR conditional)                        were placed.                       - Non-bleeding internal hemorrhoids.  < end of copied text >

## 2022-11-21 NOTE — ED PROVIDER NOTE - PHYSICAL EXAMINATION
GENERAL: Awake, alert, appears uncomfortable  HEAD: NC/AT, neck supple, moist mucous membranes  EYES: PERRL, EOM grossly intact, sclera anicteric  LUNGS: CTAB, no wheezes or crackles   CARDIAC: tachycardic to 140s on monitor, no m/r/g  ABDOMEN: Soft, non tender, non distended, no rebound, no guarding  BACK: No midline spinal tenderness, no CVA tenderness  EXT: No edema, no calf tenderness, no deformities.  NEURO: A&Ox3. Moving all extremities.  SKIN: Warm and dry. No rash.  PSYCH: Normal affect. GENERAL: Awake, alert, appears uncomfortable  HEAD: NC/AT, neck supple, moist mucous membranes  EYES: PERRL, EOM grossly intact, sclera anicteric  LUNGS: CTAB, no wheezes or crackles   CARDIAC: tachycardic to 140s on monitor, no m/r/g  ABDOMEN: Soft, tender to palpation in RLQ and LLQ, non distended, no rebound, no guarding  EXT: No edema, no calf tenderness, no deformities.  NEURO: A&Ox3. Moving all extremities.  SKIN: Warm and dry. No rash.  PSYCH: Normal affect.

## 2022-11-21 NOTE — ED PROVIDER NOTE - ATTENDING CONTRIBUTION TO CARE
attending Ashley: 77yF h/o diverticulitis w/microperforation (treated nonoperatively), HTN, DM, breast cancer, cerebral aneurysm s/p coiling p/w worsening abdominal pain after being diagnosed with uncomplicated diverticulitis on 11/17. Has been on ABX since, worsening pain and low grade fevers. Exam with guarding and diffuse abdominal tenderness but no rebound or rigidity. Will review prior ED visit records, labs, CT A/P eval for complication given worsening symptoms on 5 days of abx, reassess

## 2022-11-21 NOTE — ED ADULT TRIAGE NOTE - NSTRIAGECARE_GEN_A_ER
Subjective   Denise Staley is a 75 y.o. female. Patient is here today for follow-up on her history of paroxysmal atrial fibrillation, hypertension, hyperlipidemia, chronic kidney disease stage III, arthritis, especially the knees and TSH elevation in the past.  Patient's been stable and is getting knee injections.  Her only new complaint is of a persistent frequent headache located in the posterior side of her head on the left behind the ear, and the area of insertion of the sternocleidomastoid.  This is persistent and is pretty significant at times.  She is noted no other neurologic symptoms  Chief Complaint   Patient presents with   • Hyperlipidemia     HTN- FOLLOW UP LABS          Vitals:    10/24/19 1308   BP: 110/68   Pulse: 54   Resp: 16   Temp: 97.8 °F (36.6 °C)   SpO2: 98%     The following portions of the patient's history were reviewed and updated as appropriate: allergies, current medications, past family history, past medical history, past social history, past surgical history and problem list.    Past Medical History:   Diagnosis Date   • Arthritis    • Atrial fibrillation (CMS/HCC)    • Fatigue    • Health care maintenance    • Hyperlipidemia    • Hypertension    • Lower back pain    • Obesity    • Pelvic pain in female    • SOB (shortness of breath) on exertion    • TSH elevation    • Ventricular hypertrophy       No Known Allergies   Social History     Socioeconomic History   • Marital status:      Spouse name: Not on file   • Number of children: Not on file   • Years of education: Not on file   • Highest education level: Not on file   Tobacco Use   • Smoking status: Former Smoker   • Smokeless tobacco: Former User   • Tobacco comment: no caffeine use   Substance and Sexual Activity   • Alcohol use: No   • Drug use: Defer   • Sexual activity: Defer        Current Outpatient Medications:   •  Acetaminophen (TYLENOL ARTHRITIS PAIN PO), Take 2 tablets by mouth As Needed., Disp: , Rfl:   •   amiodarone (PACERONE) 200 MG tablet, Take 1 tablet by mouth Daily., Disp: 90 tablet, Rfl: 3  •  amLODIPine (NORVASC) 10 MG tablet, TAKE 1 TABLET EVERY DAY, Disp: 90 tablet, Rfl: 3  •  atorvastatin (LIPITOR) 20 MG tablet, Take 1 tablet by mouth Daily., Disp: 90 tablet, Rfl: 3  •  benazepril (LOTENSIN) 20 MG tablet, TAKE 1 TABLET EVERY DAY, Disp: 90 tablet, Rfl: 3  •  Calcium Carbonate-Vitamin D (CALCIUM + D PO), Take 1 tablet by mouth Daily., Disp: , Rfl:   •  Cholecalciferol (VITAMIN D) 1000 UNITS tablet, Take 1,000 Units by mouth Daily., Disp: , Rfl:   •  diclofenac (VOLTAREN) 75 MG EC tablet, TAKE 1 TABLET EVERY DAY, Disp: 90 tablet, Rfl: 3  •  fluticasone (FLONASE) 50 MCG/ACT nasal spray, 2 sprays into each nostril Daily., Disp: , Rfl:   •  Loratadine 10 MG capsule, Take 1 tablet by mouth Daily., Disp: , Rfl:   •  rivaroxaban (XARELTO) 20 MG tablet, Take 1 tablet by mouth Daily., Disp: 90 tablet, Rfl: 2     Objective     History of Present Illness     Review of Systems   Constitutional: Negative.    Respiratory: Negative.    Cardiovascular: Negative.    Gastrointestinal: Negative.    Genitourinary: Negative.    Musculoskeletal: Negative.    Skin: Negative.    Neurological: Positive for headaches.   Psychiatric/Behavioral: Negative.        Physical Exam   Constitutional: She is oriented to person, place, and time. She appears well-developed and well-nourished.   Pleasant, cooperative no acute distress, blood pressure 110/70   HENT:   Head: Normocephalic and atraumatic.   Eyes: Conjunctivae are normal. Pupils are equal, round, and reactive to light. No scleral icterus.   Neck: Normal range of motion. Neck supple.   Cardiovascular: Normal rate, regular rhythm and normal heart sounds.   Pulmonary/Chest: Effort normal and breath sounds normal. No respiratory distress. She has no wheezes. She has no rales.   Musculoskeletal: Normal range of motion. She exhibits no edema.   Neurological: She is alert and oriented to  person, place, and time.   Skin: Skin is warm and dry.   Psychiatric: She has a normal mood and affect. Her behavior is normal.   Nursing note and vitals reviewed.      ASSESSMENT CBC is normal.  CMP is stable with a creatinine of 1.4, sodium 145 and other values essentially normal.  Lipid panel has total cholesterol 171 HDL 69 and LDL 89.  TSH and free T4 were both normal.  #1-atrial fibrillation, controlled  #2-hyperlipidemia, controlled  #3-hypertension, controlled on medication  #4-chronic kidney disease stage III, asymptomatic and stable  #5-history of TSH elevation, normal thyroid studies today  #6-chronic left parieto-occipital headache     Problem List Items Addressed This Visit        Cardiovascular and Mediastinum    Atrial fibrillation (CMS/HCC)    Hyperlipidemia    Hypertension - Primary       Nervous and Auditory    Headache in back of head    Relevant Orders    CT Head Without Contrast       Genitourinary    Stage 3 chronic kidney disease (CMS/HCC)       Other    Dependent edema    TSH elevation          PLAN I have ordered a CT scan without contrast because of the kidney problems of the brain because of the persistent left parieto-occipital headache.  The patient will continue current medicines as now and I would like to recheck her in 6 months with a CBC, CMP, lipid panel, TSH    There are no Patient Instructions on file for this visit.  Return in about 6 months (around 4/24/2020) for with labs.   Face Mask

## 2022-11-21 NOTE — H&P ADULT - NSHPPHYSICALEXAM_GEN_ALL_CORE
Physical Exam:  General: NAD, resting comfortably  HEENT: NC/AT, EOMI, normal hearing, no oral lesions, no LAD, neck supple  Pulmonary: normal resp effort on RA  Cardiovascular: RRR  Abdominal: soft, nondistended, mild diffuse abdominal tenderness. no rebound tenderness or guarding  Rectal: Deferred  Extremities: WWP, normal strength, no clubbing/cyanosis/edema  Neuro: A/O x 3, CNs II-XII grossly intact, normal sensation, no focal deficits  Pulses: palpable distal pulses    Vital Signs Last 24 Hrs  T(C): 36.8 (21 Nov 2022 18:24), Max: 37.5 (21 Nov 2022 11:26)  T(F): 98.2 (21 Nov 2022 18:24), Max: 99.5 (21 Nov 2022 11:26)  HR: 90 (21 Nov 2022 18:24) (90 - 173)  BP: 145/56 (21 Nov 2022 18:24) (107/75 - 145/56)  BP(mean): 85 (21 Nov 2022 12:41) (85 - 94)  RR: 18 (21 Nov 2022 18:24) (16 - 22)  SpO2: 97% (21 Nov 2022 18:24) (96% - 98%)    Parameters below as of 21 Nov 2022 18:24  Patient On (Oxygen Delivery Method): room air

## 2022-11-21 NOTE — H&P ADULT - HISTORY OF PRESENT ILLNESS
77y F PMH perforated diverticulitis, HTN, DM, breast cancer, cerebral aneurysm s/p coiling presenting with 5 days of worsening abdominal pain. Patient was seen in this ED on 11/17 and diagnosed with acute uncomplicated diverticulitis, sent home with PO flagyl & cipro at that time. Patient states progressively worsening LLQ abdominal pain despite PO antibiotics. Has been maintaining clear liquid diet per GI recs, continues to have persistent worsening pain and diarrhea, low grade fevers (up to 100.7F at home. Has been taking tylenol for fevers with some improvement. No nausea or vomiting, no blood in stool. Last complete colonoscopy 03/2020 with pancolonic diverticulosis, visible vessel in sigmoid diverticulum clipped. Repeat colonoscopy 04/22 without adequate prep.    Pt reports she normally takes Xanax daily, however has not taken it for the past 5 days while on CLD.

## 2022-11-22 LAB
A1C WITH ESTIMATED AVERAGE GLUCOSE RESULT: 7 % — HIGH (ref 4–5.6)
ANION GAP SERPL CALC-SCNC: 12 MMOL/L — SIGNIFICANT CHANGE UP (ref 5–17)
BUN SERPL-MCNC: 13 MG/DL — SIGNIFICANT CHANGE UP (ref 7–23)
CALCIUM SERPL-MCNC: 9 MG/DL — SIGNIFICANT CHANGE UP (ref 8.4–10.5)
CHLORIDE SERPL-SCNC: 103 MMOL/L — SIGNIFICANT CHANGE UP (ref 96–108)
CO2 SERPL-SCNC: 25 MMOL/L — SIGNIFICANT CHANGE UP (ref 22–31)
CREAT SERPL-MCNC: 0.63 MG/DL — SIGNIFICANT CHANGE UP (ref 0.5–1.3)
EGFR: 91 ML/MIN/1.73M2 — SIGNIFICANT CHANGE UP
ESTIMATED AVERAGE GLUCOSE: 154 MG/DL — HIGH (ref 68–114)
GLUCOSE BLDC GLUCOMTR-MCNC: 169 MG/DL — HIGH (ref 70–99)
GLUCOSE BLDC GLUCOMTR-MCNC: 175 MG/DL — HIGH (ref 70–99)
GLUCOSE BLDC GLUCOMTR-MCNC: 180 MG/DL — HIGH (ref 70–99)
GLUCOSE SERPL-MCNC: 176 MG/DL — HIGH (ref 70–99)
HCT VFR BLD CALC: 35.8 % — SIGNIFICANT CHANGE UP (ref 34.5–45)
HGB BLD-MCNC: 11.1 G/DL — LOW (ref 11.5–15.5)
MAGNESIUM SERPL-MCNC: 1.8 MG/DL — SIGNIFICANT CHANGE UP (ref 1.6–2.6)
MCHC RBC-ENTMCNC: 25.8 PG — LOW (ref 27–34)
MCHC RBC-ENTMCNC: 31 GM/DL — LOW (ref 32–36)
MCV RBC AUTO: 83.3 FL — SIGNIFICANT CHANGE UP (ref 80–100)
NRBC # BLD: 0 /100 WBCS — SIGNIFICANT CHANGE UP (ref 0–0)
PHOSPHATE SERPL-MCNC: 2.2 MG/DL — LOW (ref 2.5–4.5)
PLATELET # BLD AUTO: 296 K/UL — SIGNIFICANT CHANGE UP (ref 150–400)
POTASSIUM SERPL-MCNC: 3.9 MMOL/L — SIGNIFICANT CHANGE UP (ref 3.5–5.3)
POTASSIUM SERPL-SCNC: 3.9 MMOL/L — SIGNIFICANT CHANGE UP (ref 3.5–5.3)
RBC # BLD: 4.3 M/UL — SIGNIFICANT CHANGE UP (ref 3.8–5.2)
RBC # FLD: 14.7 % — HIGH (ref 10.3–14.5)
SODIUM SERPL-SCNC: 140 MMOL/L — SIGNIFICANT CHANGE UP (ref 135–145)
WBC # BLD: 8.57 K/UL — SIGNIFICANT CHANGE UP (ref 3.8–10.5)
WBC # FLD AUTO: 8.57 K/UL — SIGNIFICANT CHANGE UP (ref 3.8–10.5)

## 2022-11-22 RX ORDER — POTASSIUM PHOSPHATE, MONOBASIC POTASSIUM PHOSPHATE, DIBASIC 236; 224 MG/ML; MG/ML
15 INJECTION, SOLUTION INTRAVENOUS ONCE
Refills: 0 | Status: COMPLETED | OUTPATIENT
Start: 2022-11-22 | End: 2022-11-22

## 2022-11-22 RX ORDER — ONDANSETRON 8 MG/1
4 TABLET, FILM COATED ORAL ONCE
Refills: 0 | Status: COMPLETED | OUTPATIENT
Start: 2022-11-22 | End: 2022-11-22

## 2022-11-22 RX ORDER — DEXTROSE MONOHYDRATE, SODIUM CHLORIDE, AND POTASSIUM CHLORIDE 50; .745; 4.5 G/1000ML; G/1000ML; G/1000ML
1000 INJECTION, SOLUTION INTRAVENOUS
Refills: 0 | Status: DISCONTINUED | OUTPATIENT
Start: 2022-11-22 | End: 2022-11-25

## 2022-11-22 RX ORDER — LOSARTAN POTASSIUM 100 MG/1
50 TABLET, FILM COATED ORAL DAILY
Refills: 0 | Status: DISCONTINUED | OUTPATIENT
Start: 2022-11-22 | End: 2022-11-28

## 2022-11-22 RX ORDER — DIAZEPAM 5 MG
2.5 TABLET ORAL
Refills: 0 | Status: DISCONTINUED | OUTPATIENT
Start: 2022-11-22 | End: 2022-11-28

## 2022-11-22 RX ORDER — TRAZODONE HCL 50 MG
50 TABLET ORAL AT BEDTIME
Refills: 0 | Status: DISCONTINUED | OUTPATIENT
Start: 2022-11-22 | End: 2022-11-23

## 2022-11-22 RX ORDER — INSULIN LISPRO 100/ML
VIAL (ML) SUBCUTANEOUS EVERY 6 HOURS
Refills: 0 | Status: DISCONTINUED | OUTPATIENT
Start: 2022-11-22 | End: 2022-11-23

## 2022-11-22 RX ORDER — ASPIRIN/CALCIUM CARB/MAGNESIUM 324 MG
81 TABLET ORAL THREE TIMES A DAY
Refills: 0 | Status: DISCONTINUED | OUTPATIENT
Start: 2022-11-22 | End: 2022-11-22

## 2022-11-22 RX ORDER — ACETAMINOPHEN 500 MG
1000 TABLET ORAL ONCE
Refills: 0 | Status: COMPLETED | OUTPATIENT
Start: 2022-11-22 | End: 2022-11-22

## 2022-11-22 RX ORDER — MONTELUKAST 4 MG/1
10 TABLET, CHEWABLE ORAL DAILY
Refills: 0 | Status: DISCONTINUED | OUTPATIENT
Start: 2022-11-22 | End: 2022-11-28

## 2022-11-22 RX ADMIN — DEXTROSE MONOHYDRATE, SODIUM CHLORIDE, AND POTASSIUM CHLORIDE 125 MILLILITER(S): 50; .745; 4.5 INJECTION, SOLUTION INTRAVENOUS at 15:02

## 2022-11-22 RX ADMIN — HEPARIN SODIUM 5000 UNIT(S): 5000 INJECTION INTRAVENOUS; SUBCUTANEOUS at 15:02

## 2022-11-22 RX ADMIN — HEPARIN SODIUM 5000 UNIT(S): 5000 INJECTION INTRAVENOUS; SUBCUTANEOUS at 21:01

## 2022-11-22 RX ADMIN — PANTOPRAZOLE SODIUM 40 MILLIGRAM(S): 20 TABLET, DELAYED RELEASE ORAL at 05:03

## 2022-11-22 RX ADMIN — Medication 1000 MILLIGRAM(S): at 11:00

## 2022-11-22 RX ADMIN — DEXTROSE MONOHYDRATE, SODIUM CHLORIDE, AND POTASSIUM CHLORIDE 125 MILLILITER(S): 50; .745; 4.5 INJECTION, SOLUTION INTRAVENOUS at 23:16

## 2022-11-22 RX ADMIN — MEROPENEM 100 MILLIGRAM(S): 1 INJECTION INTRAVENOUS at 05:02

## 2022-11-22 RX ADMIN — MEROPENEM 100 MILLIGRAM(S): 1 INJECTION INTRAVENOUS at 14:58

## 2022-11-22 RX ADMIN — MEROPENEM 100 MILLIGRAM(S): 1 INJECTION INTRAVENOUS at 21:01

## 2022-11-22 RX ADMIN — Medication 2.5 MILLIGRAM(S): at 17:20

## 2022-11-22 RX ADMIN — ONDANSETRON 4 MILLIGRAM(S): 8 TABLET, FILM COATED ORAL at 02:48

## 2022-11-22 RX ADMIN — Medication 400 MILLIGRAM(S): at 10:02

## 2022-11-22 RX ADMIN — Medication 2: at 12:52

## 2022-11-22 RX ADMIN — Medication 2: at 18:35

## 2022-11-22 RX ADMIN — POTASSIUM PHOSPHATE, MONOBASIC POTASSIUM PHOSPHATE, DIBASIC 62.5 MILLIMOLE(S): 236; 224 INJECTION, SOLUTION INTRAVENOUS at 15:04

## 2022-11-22 RX ADMIN — Medication 50 MILLIGRAM(S): at 21:01

## 2022-11-22 RX ADMIN — HEPARIN SODIUM 5000 UNIT(S): 5000 INJECTION INTRAVENOUS; SUBCUTANEOUS at 05:03

## 2022-11-22 NOTE — PROGRESS NOTE ADULT - ASSESSMENT
ASSESSMENT:  77y F PMH perforated diverticulitis, HTN, DM, breast cancer, cerebral aneurysm s/p coiling presenting with 5 days of worsening abdominal pain, treated as outpatient for uncomplicated diverticulitis, now presenting with Hinchey 1b perforated diverticulitis. CT with evidence of small intra-abdominal free air, tracking interloop abscess    PLAN:  - Keep NPO/IVF  - Keep IV Meropenem given PCN allergy  - Keep Hep SQ for DVT ppx  - Serial abdominal exams  - Surgical plan pending any patient changes    Red Surgery  p [id number] ASSESSMENT:  77y F PMH perforated diverticulitis, HTN, DM, breast cancer, cerebral aneurysm s/p coiling presenting with 5 days of worsening abdominal pain, treated as outpatient for uncomplicated diverticulitis, now presenting with Hinchey 1b perforated diverticulitis. CT with evidence of small intra-abdominal free air, tracking interloop abscess      PLAN:  - Keep NPO/IVF  - IV Meropenem given PCN allergy  - Serial abdominal exams  - Surgical plan pending any patient changes  - Hep SQ for DVT ppx      Red Surgery  p [id number]

## 2022-11-22 NOTE — PROGRESS NOTE ADULT - SUBJECTIVE AND OBJECTIVE BOX
SURGERY DAILY PROGRESS NOTE:       SUBJECTIVE/ROS: Patient seen and evaluated on AM rounds.   Pt with mild TTP in bilateral lower quadrants.   Pt with 1 small bowel movement, and is passing gas  Pt with nausea overnight, which improved with IV Zofran   Denies vomiting, chest pain, shortness of breath       OBJECTIVE:    Vital Signs Last 24 Hrs  T(C): 37 (22 Nov 2022 06:38), Max: 37.5 (21 Nov 2022 11:26)  T(F): 98.6 (22 Nov 2022 06:38), Max: 99.5 (21 Nov 2022 11:26)  HR: 83 (22 Nov 2022 06:38) (83 - 173)  BP: 160/78 (22 Nov 2022 06:38) (107/75 - 160/78)  BP(mean): 85 (21 Nov 2022 12:41) (85 - 94)  RR: 17 (22 Nov 2022 06:38) (16 - 22)  SpO2: 95% (22 Nov 2022 06:38) (95% - 98%)    Parameters below as of 22 Nov 2022 06:38  Patient On (Oxygen Delivery Method): room air      I&O's Detail    21 Nov 2022 07:01  -  22 Nov 2022 07:00  --------------------------------------------------------  IN:    IV PiggyBack: 100 mL    Lactated Ringers: 1200 mL  Total IN: 1300 mL    OUT:    Oral Fluid: 0 mL  Total OUT: 0 mL    Total NET: 1300 mL        Daily Height in cm: 157.48 (21 Nov 2022 11:01)    Daily   MEDICATIONS  (STANDING):  dextrose 5%. 1000 milliLiter(s) (50 mL/Hr) IV Continuous <Continuous>  dextrose 5%. 1000 milliLiter(s) (100 mL/Hr) IV Continuous <Continuous>  dextrose 50% Injectable 25 Gram(s) IV Push once  dextrose 50% Injectable 12.5 Gram(s) IV Push once  dextrose 50% Injectable 25 Gram(s) IV Push once  glucagon  Injectable 1 milliGRAM(s) IntraMuscular once  heparin   Injectable 5000 Unit(s) SubCutaneous every 8 hours  insulin lispro (ADMELOG) corrective regimen sliding scale   SubCutaneous three times a day before meals  insulin lispro (ADMELOG) corrective regimen sliding scale   SubCutaneous at bedtime  lactated ringers. 1000 milliLiter(s) (100 mL/Hr) IV Continuous <Continuous>  meropenem  IVPB      meropenem  IVPB 1000 milliGRAM(s) IV Intermittent every 8 hours  pantoprazole  Injectable 40 milliGRAM(s) IV Push every 24 hours    MEDICATIONS  (PRN):  dextrose Oral Gel 15 Gram(s) Oral once PRN Blood Glucose LESS THAN 70 milliGRAM(s)/deciliter      LABS:                        12.9   10.79 )-----------( 330      ( 21 Nov 2022 12:02 )             40.0     11-21    137  |  99  |  16  ----------------------------<  320<H>  3.6   |  21<L>  |  0.66    Ca    9.5      21 Nov 2022 12:02  Phos  2.4     11-21  Mg     1.8     11-21    TPro  6.7  /  Alb  3.6  /  TBili  0.4  /  DBili  x   /  AST  22  /  ALT  16  /  AlkPhos  92  11-21    PT/INR - ( 21 Nov 2022 12:02 )   PT: 14.7 sec;   INR: 1.26 ratio       PTT - ( 21 Nov 2022 12:02 )  PTT:30.4 sec      PHYSICAL EXAM:  Constitutional: well developed, well nourished, NAD  Gastrointestinal: abdomen soft, nontender, nondistended. No obvious masses. No peritonitis  Extremities: FROM, warm  Neurological: intact, non-focal  Psychiatric: oriented x 3; appropriate SURGERY DAILY PROGRESS NOTE:       SUBJECTIVE/ROS:   Patient seen and evaluated on AM rounds.   Pt with mild TTP in bilateral lower quadrants, which improves with IV Tylenol  Pt reports 1 small bowel movement, and is passing gas. Pt also reports nausea overnight, which improved with IV Zofran   Denies vomiting, chest pain, shortness of breath       OBJECTIVE:  Vital Signs Last 24 Hrs  T(C): 37 (22 Nov 2022 06:38), Max: 37.5 (21 Nov 2022 11:26)  T(F): 98.6 (22 Nov 2022 06:38), Max: 99.5 (21 Nov 2022 11:26)  HR: 83 (22 Nov 2022 06:38) (83 - 173)  BP: 160/78 (22 Nov 2022 06:38) (107/75 - 160/78)  BP(mean): 85 (21 Nov 2022 12:41) (85 - 94)  RR: 17 (22 Nov 2022 06:38) (16 - 22)  SpO2: 95% (22 Nov 2022 06:38) (95% - 98%)    Parameters below as of 22 Nov 2022 06:38  Patient On (Oxygen Delivery Method): room air      I&O's Detail    21 Nov 2022 07:01  -  22 Nov 2022 07:00  --------------------------------------------------------  IN:    IV PiggyBack: 100 mL    Lactated Ringers: 1200 mL  Total IN: 1300 mL    OUT:    Oral Fluid: 0 mL  Total OUT: 0 mL    Total NET: 1300 mL      LABS:                        11.1   8.57  )-----------( 296      ( 22 Nov 2022 07:35 )             35.8     11-22    140  |  103  |  13  ----------------------------<  176<H>  3.9   |  25  |  0.63    Ca    9.0      22 Nov 2022 07:34  Phos  2.2     11-22  Mg     1.8     11-22    TPro  6.7  /  Alb  3.6  /  TBili  0.4  /  DBili  x   /  AST  22  /  ALT  16  /  AlkPhos  92  11-21    PT/INR - ( 21 Nov 2022 12:02 )   PT: 14.7 sec;   INR: 1.26 ratio    PTT - ( 21 Nov 2022 12:02 )  PTT:30.4 sec      PHYSICAL EXAM:  Constitutional: well developed, well nourished, NAD  Cardiopulmonary:   Gastrointestinal: abdomen soft, nondistended, mild TTP. No obvious masses. No peritonitis  Extremities: FROM, warm  Neurological: intact, non-focal

## 2022-11-23 DIAGNOSIS — Z87.898 PERSONAL HISTORY OF OTHER SPECIFIED CONDITIONS: ICD-10-CM

## 2022-11-23 DIAGNOSIS — E11.9 TYPE 2 DIABETES MELLITUS WITHOUT COMPLICATIONS: ICD-10-CM

## 2022-11-23 DIAGNOSIS — I10 ESSENTIAL (PRIMARY) HYPERTENSION: ICD-10-CM

## 2022-11-23 DIAGNOSIS — Z85.3 PERSONAL HISTORY OF MALIGNANT NEOPLASM OF BREAST: ICD-10-CM

## 2022-11-23 DIAGNOSIS — K57.92 DIVERTICULITIS OF INTESTINE, PART UNSPECIFIED, WITHOUT PERFORATION OR ABSCESS WITHOUT BLEEDING: ICD-10-CM

## 2022-11-23 DIAGNOSIS — K65.1 PERITONEAL ABSCESS: ICD-10-CM

## 2022-11-23 DIAGNOSIS — F41.9 ANXIETY DISORDER, UNSPECIFIED: ICD-10-CM

## 2022-11-23 DIAGNOSIS — Z29.9 ENCOUNTER FOR PROPHYLACTIC MEASURES, UNSPECIFIED: ICD-10-CM

## 2022-11-23 LAB
ALBUMIN SERPL ELPH-MCNC: 3.4 G/DL — SIGNIFICANT CHANGE UP (ref 3.3–5)
ALP SERPL-CCNC: 76 U/L — SIGNIFICANT CHANGE UP (ref 40–120)
ALT FLD-CCNC: 17 U/L — SIGNIFICANT CHANGE UP (ref 10–45)
ANION GAP SERPL CALC-SCNC: 10 MMOL/L — SIGNIFICANT CHANGE UP (ref 5–17)
AST SERPL-CCNC: 19 U/L — SIGNIFICANT CHANGE UP (ref 10–40)
BILIRUB SERPL-MCNC: 0.2 MG/DL — SIGNIFICANT CHANGE UP (ref 0.2–1.2)
BUN SERPL-MCNC: 7 MG/DL — SIGNIFICANT CHANGE UP (ref 7–23)
CALCIUM SERPL-MCNC: 9.3 MG/DL — SIGNIFICANT CHANGE UP (ref 8.4–10.5)
CHLORIDE SERPL-SCNC: 101 MMOL/L — SIGNIFICANT CHANGE UP (ref 96–108)
CO2 SERPL-SCNC: 27 MMOL/L — SIGNIFICANT CHANGE UP (ref 22–31)
CREAT SERPL-MCNC: 0.61 MG/DL — SIGNIFICANT CHANGE UP (ref 0.5–1.3)
EGFR: 92 ML/MIN/1.73M2 — SIGNIFICANT CHANGE UP
GLUCOSE BLDC GLUCOMTR-MCNC: 141 MG/DL — HIGH (ref 70–99)
GLUCOSE BLDC GLUCOMTR-MCNC: 165 MG/DL — HIGH (ref 70–99)
GLUCOSE BLDC GLUCOMTR-MCNC: 215 MG/DL — HIGH (ref 70–99)
GLUCOSE BLDC GLUCOMTR-MCNC: 234 MG/DL — HIGH (ref 70–99)
GLUCOSE BLDC GLUCOMTR-MCNC: 236 MG/DL — HIGH (ref 70–99)
GLUCOSE SERPL-MCNC: 233 MG/DL — HIGH (ref 70–99)
HCT VFR BLD CALC: 36.8 % — SIGNIFICANT CHANGE UP (ref 34.5–45)
HGB BLD-MCNC: 11.8 G/DL — SIGNIFICANT CHANGE UP (ref 11.5–15.5)
MAGNESIUM SERPL-MCNC: 1.6 MG/DL — SIGNIFICANT CHANGE UP (ref 1.6–2.6)
MCHC RBC-ENTMCNC: 26.5 PG — LOW (ref 27–34)
MCHC RBC-ENTMCNC: 32.1 GM/DL — SIGNIFICANT CHANGE UP (ref 32–36)
MCV RBC AUTO: 82.7 FL — SIGNIFICANT CHANGE UP (ref 80–100)
NRBC # BLD: 0 /100 WBCS — SIGNIFICANT CHANGE UP (ref 0–0)
PHOSPHATE SERPL-MCNC: 2.1 MG/DL — LOW (ref 2.5–4.5)
PLATELET # BLD AUTO: 348 K/UL — SIGNIFICANT CHANGE UP (ref 150–400)
POTASSIUM SERPL-MCNC: 4.4 MMOL/L — SIGNIFICANT CHANGE UP (ref 3.5–5.3)
POTASSIUM SERPL-SCNC: 4.4 MMOL/L — SIGNIFICANT CHANGE UP (ref 3.5–5.3)
PROT SERPL-MCNC: 6 G/DL — SIGNIFICANT CHANGE UP (ref 6–8.3)
RBC # BLD: 4.45 M/UL — SIGNIFICANT CHANGE UP (ref 3.8–5.2)
RBC # FLD: 14.5 % — SIGNIFICANT CHANGE UP (ref 10.3–14.5)
SODIUM SERPL-SCNC: 138 MMOL/L — SIGNIFICANT CHANGE UP (ref 135–145)
WBC # BLD: 11.14 K/UL — HIGH (ref 3.8–10.5)
WBC # FLD AUTO: 11.14 K/UL — HIGH (ref 3.8–10.5)

## 2022-11-23 PROCEDURE — 99222 1ST HOSP IP/OBS MODERATE 55: CPT

## 2022-11-23 PROCEDURE — 99223 1ST HOSP IP/OBS HIGH 75: CPT

## 2022-11-23 RX ORDER — TRAZODONE HCL 50 MG
100 TABLET ORAL AT BEDTIME
Refills: 0 | Status: DISCONTINUED | OUTPATIENT
Start: 2022-11-23 | End: 2022-11-28

## 2022-11-23 RX ORDER — INSULIN LISPRO 100/ML
VIAL (ML) SUBCUTANEOUS
Refills: 0 | Status: DISCONTINUED | OUTPATIENT
Start: 2022-11-23 | End: 2022-11-25

## 2022-11-23 RX ORDER — METRONIDAZOLE 500 MG
500 TABLET ORAL EVERY 8 HOURS
Refills: 0 | Status: DISCONTINUED | OUTPATIENT
Start: 2022-11-23 | End: 2022-11-24

## 2022-11-23 RX ORDER — CIPROFLOXACIN LACTATE 400MG/40ML
400 VIAL (ML) INTRAVENOUS EVERY 12 HOURS
Refills: 0 | Status: DISCONTINUED | OUTPATIENT
Start: 2022-11-23 | End: 2022-11-24

## 2022-11-23 RX ORDER — INSULIN LISPRO 100/ML
VIAL (ML) SUBCUTANEOUS AT BEDTIME
Refills: 0 | Status: DISCONTINUED | OUTPATIENT
Start: 2022-11-23 | End: 2022-11-25

## 2022-11-23 RX ORDER — MAGNESIUM SULFATE 500 MG/ML
2 VIAL (ML) INJECTION ONCE
Refills: 0 | Status: COMPLETED | OUTPATIENT
Start: 2022-11-23 | End: 2022-11-23

## 2022-11-23 RX ORDER — ASPIRIN/CALCIUM CARB/MAGNESIUM 324 MG
81 TABLET ORAL THREE TIMES A DAY
Refills: 0 | Status: DISCONTINUED | OUTPATIENT
Start: 2022-11-23 | End: 2022-11-28

## 2022-11-23 RX ORDER — SODIUM,POTASSIUM PHOSPHATES 278-250MG
1 POWDER IN PACKET (EA) ORAL ONCE
Refills: 0 | Status: COMPLETED | OUTPATIENT
Start: 2022-11-23 | End: 2022-11-23

## 2022-11-23 RX ADMIN — HEPARIN SODIUM 5000 UNIT(S): 5000 INJECTION INTRAVENOUS; SUBCUTANEOUS at 14:30

## 2022-11-23 RX ADMIN — Medication 2: at 18:43

## 2022-11-23 RX ADMIN — Medication 4: at 06:00

## 2022-11-23 RX ADMIN — Medication 2.5 MILLIGRAM(S): at 17:56

## 2022-11-23 RX ADMIN — Medication 100 MILLIGRAM(S): at 21:36

## 2022-11-23 RX ADMIN — Medication 25 GRAM(S): at 09:53

## 2022-11-23 RX ADMIN — LOSARTAN POTASSIUM 50 MILLIGRAM(S): 100 TABLET, FILM COATED ORAL at 05:41

## 2022-11-23 RX ADMIN — HEPARIN SODIUM 5000 UNIT(S): 5000 INJECTION INTRAVENOUS; SUBCUTANEOUS at 21:36

## 2022-11-23 RX ADMIN — MEROPENEM 100 MILLIGRAM(S): 1 INJECTION INTRAVENOUS at 14:31

## 2022-11-23 RX ADMIN — PANTOPRAZOLE SODIUM 40 MILLIGRAM(S): 20 TABLET, DELAYED RELEASE ORAL at 05:41

## 2022-11-23 RX ADMIN — HEPARIN SODIUM 5000 UNIT(S): 5000 INJECTION INTRAVENOUS; SUBCUTANEOUS at 05:41

## 2022-11-23 RX ADMIN — Medication 4: at 11:42

## 2022-11-23 RX ADMIN — MEROPENEM 100 MILLIGRAM(S): 1 INJECTION INTRAVENOUS at 05:42

## 2022-11-23 RX ADMIN — Medication 81 MILLIGRAM(S): at 21:36

## 2022-11-23 RX ADMIN — Medication 200 MILLIGRAM(S): at 17:55

## 2022-11-23 RX ADMIN — MONTELUKAST 10 MILLIGRAM(S): 4 TABLET, CHEWABLE ORAL at 21:36

## 2022-11-23 RX ADMIN — Medication 1 PACKET(S): at 17:56

## 2022-11-23 RX ADMIN — Medication 2.5 MILLIGRAM(S): at 05:41

## 2022-11-23 NOTE — PROGRESS NOTE ADULT - SUBJECTIVE AND OBJECTIVE BOX
no events, improved pain; +BM(small)/flatus    abd soft RLQ tenderness to palpation        Objective:    MEDICATIONS  (STANDING):  ciprofloxacin   IVPB 400 milliGRAM(s) IV Intermittent every 12 hours  dextrose 5% + sodium chloride 0.45% with potassium chloride 20 mEq/L 1000 milliLiter(s) (50 mL/Hr) IV Continuous <Continuous>  dextrose 5%. 1000 milliLiter(s) (100 mL/Hr) IV Continuous <Continuous>  dextrose 5%. 1000 milliLiter(s) (50 mL/Hr) IV Continuous <Continuous>  dextrose 50% Injectable 25 Gram(s) IV Push once  dextrose 50% Injectable 12.5 Gram(s) IV Push once  dextrose 50% Injectable 25 Gram(s) IV Push once  diazepam    Tablet 2.5 milliGRAM(s) Oral two times a day  glucagon  Injectable 1 milliGRAM(s) IntraMuscular once  heparin   Injectable 5000 Unit(s) SubCutaneous every 8 hours  insulin lispro (ADMELOG) corrective regimen sliding scale   SubCutaneous every 6 hours  losartan 50 milliGRAM(s) Oral daily  metroNIDAZOLE  IVPB 500 milliGRAM(s) IV Intermittent every 8 hours  montelukast 10 milliGRAM(s) Oral daily  pantoprazole  Injectable 40 milliGRAM(s) IV Push every 24 hours  potassium phosphate / sodium phosphate Powder (PHOS-NaK) 1 Packet(s) Oral once  traZODone 50 milliGRAM(s) Oral at bedtime    MEDICATIONS  (PRN):  dextrose Oral Gel 15 Gram(s) Oral once PRN Blood Glucose LESS THAN 70 milliGRAM(s)/deciliter      Vital Signs Last 24 Hrs  T(C): 37 (23 Nov 2022 14:22), Max: 37.1 (23 Nov 2022 01:00)  T(F): 98.6 (23 Nov 2022 14:22), Max: 98.7 (23 Nov 2022 01:00)  HR: 75 (23 Nov 2022 14:22) (69 - 80)  BP: 164/69 (23 Nov 2022 14:22) (142/78 - 164/69)  BP(mean): --  RR: 18 (23 Nov 2022 14:22) (18 - 18)  SpO2: 98% (23 Nov 2022 14:22) (94% - 98%)    Parameters below as of 23 Nov 2022 14:22  Patient On (Oxygen Delivery Method): room air        I&O's Detail    22 Nov 2022 07:01  -  23 Nov 2022 07:00  --------------------------------------------------------  IN:    dextrose 5% + sodium chloride 0.45% w/ Additives: 2125 mL    Lactated Ringers: 700 mL  Total IN: 2825 mL    OUT:    Oral Fluid: 0 mL  Total OUT: 0 mL    Total NET: 2825 mL      23 Nov 2022 07:01  -  23 Nov 2022 15:16  --------------------------------------------------------  IN:    Oral Fluid: 360 mL  Total IN: 360 mL    OUT:  Total OUT: 0 mL    Total NET: 360 mL          Daily     Daily     LABS:                        11.8   11.14 )-----------( 348      ( 23 Nov 2022 07:24 )             36.8     11-23    138  |  101  |  7   ----------------------------<  233<H>  4.4   |  27  |  0.61    Ca    9.3      23 Nov 2022 07:24  Phos  2.1     11-23  Mg     1.6     11-23    TPro  6.0  /  Alb  3.4  /  TBili  0.2  /  DBili  x   /  AST  19  /  ALT  17  /  AlkPhos  76  11-23          RADIOLOGY & ADDITIONAL STUDIES:

## 2022-11-23 NOTE — PROGRESS NOTE ADULT - SUBJECTIVE AND OBJECTIVE BOX
INTERVAL HPI/OVERNIGHT EVENTS:  Patient is a 77yFemale  The patient reports that her pain is improved. She still has mild pain but it is improved from admission. She reports some discomfort mostly when she has to urinate. She denies air or stool in her urine    Vital Signs Last 24 Hrs  T(C): 36.9 (23 Nov 2022 05:52), Max: 37.1 (23 Nov 2022 01:00)  T(F): 98.4 (23 Nov 2022 05:52), Max: 98.7 (23 Nov 2022 01:00)  HR: 80 (23 Nov 2022 05:52) (69 - 85)  BP: 150/76 (23 Nov 2022 05:52) (142/78 - 160/72)  BP(mean): --  RR: 18 (23 Nov 2022 05:52) (18 - 18)  SpO2: 96% (23 Nov 2022 05:52) (94% - 96%)    Parameters below as of 23 Nov 2022 05:52  Patient On (Oxygen Delivery Method): room air      11-22 @ 07:01  -  11-23 @ 07:00  --------------------------------------------------------  IN: 2825 mL / OUT: 0 mL / NET: 2825 mL        PHYSICAL EXAM:  Constitutional: well developed, well nourished, NAD  Eyes: anicteric  ENMT: normal facies, symmetric  Neck: supple  Respiratory: CTA bilat  Cardiovascular: RRR  Gastrointestinal: abdomen soft, min tender BLQ, nondistended. No obvious masses. No peritonitis  Extremities: FROM, warm  Neurological: intact, non-focal  Skin: no gross lesions  Lymph Nodes: no gross adenopathy  Musculoskeletal: equal strength bilateral upper and lower extremities  Psychiatric: oriented x 3; appropriate          LABS:                        11.8   11.14 )-----------( 348      ( 23 Nov 2022 07:24 )             36.8     11-23    138  |  101  |  7   ----------------------------<  233<H>  4.4   |  27  |  0.61    Ca    9.3      23 Nov 2022 07:24  Phos  2.1     11-23  Mg     1.6     11-23    TPro  6.0  /  Alb  3.4  /  TBili  0.2  /  DBili  x   /  AST  19  /  ALT  17  /  AlkPhos  76  11-23    PT/INR - ( 21 Nov 2022 12:02 )   PT: 14.7 sec;   INR: 1.26 ratio         PTT - ( 21 Nov 2022 12:02 )  PTT:30.4 sec    Magnesium, Serum: 1.6 mg/dL (11-23 @ 07:24)  Phosphorus Level, Serum: 2.1 mg/dL (11-23 @ 07:24)

## 2022-11-23 NOTE — CONSULT NOTE ADULT - PROBLEM SELECTOR RECOMMENDATION 5
c/w losartan    h/o cerebral aneurysm coiling and stent 2012 on asa (recommended for full dose asa by Nsx which has been adjusted to 81mg tid) and plavix at home - to hold further would discuss with NSx - she likely needs at least continue one antiplatlet

## 2022-11-23 NOTE — CONSULT NOTE ADULT - PROBLEM SELECTOR RECOMMENDATION 3
Fs uncontrolled though just started clear liquids and tolerating   continue moderate dose ISS for now  would add lantus 8 units qhs for now and can increase pending further advancing of diet

## 2022-11-23 NOTE — CONSULT NOTE ADULT - PROBLEM SELECTOR RECOMMENDATION 9
associated with diverticular perforation   management per surgery   patient with allergy to pcn and cephalosporins (though cannot recall allergy to cephalosporin) - consider change to ertapenem as she does not need coverage for pseudomonas at this time   if febrile, would send blood cultures (not sent in ED)  incentive spirometer

## 2022-11-24 LAB
A1C WITH ESTIMATED AVERAGE GLUCOSE RESULT: 7.2 % — HIGH (ref 4–5.6)
ALBUMIN SERPL ELPH-MCNC: 3.2 G/DL — LOW (ref 3.3–5)
ALP SERPL-CCNC: 79 U/L — SIGNIFICANT CHANGE UP (ref 40–120)
ALT FLD-CCNC: 17 U/L — SIGNIFICANT CHANGE UP (ref 10–45)
ANION GAP SERPL CALC-SCNC: 11 MMOL/L — SIGNIFICANT CHANGE UP (ref 5–17)
AST SERPL-CCNC: 22 U/L — SIGNIFICANT CHANGE UP (ref 10–40)
BILIRUB SERPL-MCNC: 0.2 MG/DL — SIGNIFICANT CHANGE UP (ref 0.2–1.2)
BUN SERPL-MCNC: 6 MG/DL — LOW (ref 7–23)
CALCIUM SERPL-MCNC: 9.4 MG/DL — SIGNIFICANT CHANGE UP (ref 8.4–10.5)
CHLORIDE SERPL-SCNC: 101 MMOL/L — SIGNIFICANT CHANGE UP (ref 96–108)
CO2 SERPL-SCNC: 27 MMOL/L — SIGNIFICANT CHANGE UP (ref 22–31)
CREAT SERPL-MCNC: 0.64 MG/DL — SIGNIFICANT CHANGE UP (ref 0.5–1.3)
EGFR: 91 ML/MIN/1.73M2 — SIGNIFICANT CHANGE UP
ESTIMATED AVERAGE GLUCOSE: 160 MG/DL — HIGH (ref 68–114)
GLUCOSE BLDC GLUCOMTR-MCNC: 223 MG/DL — HIGH (ref 70–99)
GLUCOSE BLDC GLUCOMTR-MCNC: 224 MG/DL — HIGH (ref 70–99)
GLUCOSE BLDC GLUCOMTR-MCNC: 236 MG/DL — HIGH (ref 70–99)
GLUCOSE BLDC GLUCOMTR-MCNC: 248 MG/DL — HIGH (ref 70–99)
GLUCOSE SERPL-MCNC: 210 MG/DL — HIGH (ref 70–99)
HCT VFR BLD CALC: 37.8 % — SIGNIFICANT CHANGE UP (ref 34.5–45)
HGB BLD-MCNC: 11.8 G/DL — SIGNIFICANT CHANGE UP (ref 11.5–15.5)
MAGNESIUM SERPL-MCNC: 1.9 MG/DL — SIGNIFICANT CHANGE UP (ref 1.6–2.6)
MCHC RBC-ENTMCNC: 26 PG — LOW (ref 27–34)
MCHC RBC-ENTMCNC: 31.2 GM/DL — LOW (ref 32–36)
MCV RBC AUTO: 83.3 FL — SIGNIFICANT CHANGE UP (ref 80–100)
NRBC # BLD: 0 /100 WBCS — SIGNIFICANT CHANGE UP (ref 0–0)
PHOSPHATE SERPL-MCNC: 2.5 MG/DL — SIGNIFICANT CHANGE UP (ref 2.5–4.5)
PLATELET # BLD AUTO: 337 K/UL — SIGNIFICANT CHANGE UP (ref 150–400)
POTASSIUM SERPL-MCNC: 4.5 MMOL/L — SIGNIFICANT CHANGE UP (ref 3.5–5.3)
POTASSIUM SERPL-SCNC: 4.5 MMOL/L — SIGNIFICANT CHANGE UP (ref 3.5–5.3)
PROT SERPL-MCNC: 6.1 G/DL — SIGNIFICANT CHANGE UP (ref 6–8.3)
RBC # BLD: 4.54 M/UL — SIGNIFICANT CHANGE UP (ref 3.8–5.2)
RBC # FLD: 14.4 % — SIGNIFICANT CHANGE UP (ref 10.3–14.5)
SODIUM SERPL-SCNC: 139 MMOL/L — SIGNIFICANT CHANGE UP (ref 135–145)
WBC # BLD: 8.42 K/UL — SIGNIFICANT CHANGE UP (ref 3.8–10.5)
WBC # FLD AUTO: 8.42 K/UL — SIGNIFICANT CHANGE UP (ref 3.8–10.5)

## 2022-11-24 PROCEDURE — 99231 SBSQ HOSP IP/OBS SF/LOW 25: CPT | Mod: GC

## 2022-11-24 RX ORDER — ENOXAPARIN SODIUM 100 MG/ML
40 INJECTION SUBCUTANEOUS EVERY 24 HOURS
Refills: 0 | Status: DISCONTINUED | OUTPATIENT
Start: 2022-11-24 | End: 2022-11-28

## 2022-11-24 RX ORDER — MEROPENEM 1 G/30ML
1000 INJECTION INTRAVENOUS EVERY 8 HOURS
Refills: 0 | Status: COMPLETED | OUTPATIENT
Start: 2022-11-24 | End: 2022-11-25

## 2022-11-24 RX ADMIN — DEXTROSE MONOHYDRATE, SODIUM CHLORIDE, AND POTASSIUM CHLORIDE 50 MILLILITER(S): 50; .745; 4.5 INJECTION, SOLUTION INTRAVENOUS at 02:01

## 2022-11-24 RX ADMIN — Medication 2: at 13:34

## 2022-11-24 RX ADMIN — MONTELUKAST 10 MILLIGRAM(S): 4 TABLET, CHEWABLE ORAL at 21:18

## 2022-11-24 RX ADMIN — PANTOPRAZOLE SODIUM 40 MILLIGRAM(S): 20 TABLET, DELAYED RELEASE ORAL at 05:22

## 2022-11-24 RX ADMIN — Medication 2: at 18:02

## 2022-11-24 RX ADMIN — Medication 2: at 08:04

## 2022-11-24 RX ADMIN — MEROPENEM 100 MILLIGRAM(S): 1 INJECTION INTRAVENOUS at 13:34

## 2022-11-24 RX ADMIN — MEROPENEM 100 MILLIGRAM(S): 1 INJECTION INTRAVENOUS at 21:19

## 2022-11-24 RX ADMIN — Medication 81 MILLIGRAM(S): at 13:34

## 2022-11-24 RX ADMIN — Medication 81 MILLIGRAM(S): at 21:18

## 2022-11-24 RX ADMIN — HEPARIN SODIUM 5000 UNIT(S): 5000 INJECTION INTRAVENOUS; SUBCUTANEOUS at 13:34

## 2022-11-24 RX ADMIN — Medication 100 MILLIGRAM(S): at 05:23

## 2022-11-24 RX ADMIN — Medication 81 MILLIGRAM(S): at 05:22

## 2022-11-24 RX ADMIN — ENOXAPARIN SODIUM 40 MILLIGRAM(S): 100 INJECTION SUBCUTANEOUS at 21:19

## 2022-11-24 RX ADMIN — LOSARTAN POTASSIUM 50 MILLIGRAM(S): 100 TABLET, FILM COATED ORAL at 05:22

## 2022-11-24 RX ADMIN — Medication 200 MILLIGRAM(S): at 05:23

## 2022-11-24 RX ADMIN — Medication 2.5 MILLIGRAM(S): at 05:23

## 2022-11-24 RX ADMIN — HEPARIN SODIUM 5000 UNIT(S): 5000 INJECTION INTRAVENOUS; SUBCUTANEOUS at 05:22

## 2022-11-24 RX ADMIN — Medication 100 MILLIGRAM(S): at 21:18

## 2022-11-24 NOTE — PROGRESS NOTE ADULT - ASSESSMENT
ASSESSMENT:  77y F PMH perforated diverticulitis, HTN, DM, breast cancer, cerebral aneurysm s/p coiling presenting with 5 days of worsening abdominal pain, treated as outpatient for uncomplicated diverticulitis, now presenting with Hinchey 1b perforated diverticulitis. CT with evidence of small intra-abdominal free air, tracking interloop abscess      PLAN:  - LRD, gentle hydration  - Several allergies, IV abx for 5 days total. If no improvement, will re-scan  - Serial abdominal exams  - Surgical plan pending any patient changes  - Hep SQ for DVT ppx      Red Surgery  [id number]

## 2022-11-24 NOTE — PROGRESS NOTE ADULT - SUBJECTIVE AND OBJECTIVE BOX
Colorectal Surgery Daily Progress Note    SUBJECTIVE:  Pt seen and examined, and is resting comfortably in bed. Large, dark BM overnight. Pain is adequately controlled on current regimen. Pt has no complaints at this time. +/+ for flatus and BM.     OBJECTIVE:  Vital Signs Last 24 Hrs  T(C): 36.6 (24 Nov 2022 05:00), Max: 37 (23 Nov 2022 14:22)  T(F): 97.8 (24 Nov 2022 05:00), Max: 98.6 (23 Nov 2022 14:22)  HR: 78 (24 Nov 2022 05:00) (75 - 78)  BP: 159/83 (24 Nov 2022 05:00) (150/70 - 172/76)  BP(mean): --  RR: 18 (24 Nov 2022 05:00) (18 - 18)  SpO2: 96% (24 Nov 2022 05:00) (95% - 98%)    Parameters below as of 24 Nov 2022 05:00  Patient On (Oxygen Delivery Method): room air        I&O's Detail    23 Nov 2022 07:01  -  24 Nov 2022 07:00  --------------------------------------------------------  IN:    dextrose 5% + sodium chloride 0.45% w/ Additives: 1725 mL    Oral Fluid: 840 mL  Total IN: 2565 mL    OUT:    Voided (mL): 1 mL  Total OUT: 1 mL    Total NET: 2564 mL      24 Nov 2022 07:01  -  24 Nov 2022 11:26  --------------------------------------------------------  IN:    Oral Fluid: 200 mL  Total IN: 200 mL    OUT:  Total OUT: 0 mL    Total NET: 200 mL        Exam:  Constitutional: well developed, well nourished, NAD  Gastrointestinal: abdomen soft, nondistended, mild TTP. No obvious masses. No peritonitis  Extremities: FROM, warm  Neurological: intact, non-focal                        11.8   8.42  )-----------( 337      ( 24 Nov 2022 07:58 )             37.8       11-24    139  |  101  |  6<L>  ----------------------------<  210<H>  4.5   |  27  |  0.64    Ca    9.4      24 Nov 2022 08:02  Phos  2.5     11-24  Mg     1.9     11-24    TPro  6.1  /  Alb  3.2<L>  /  TBili  0.2  /  DBili  x   /  AST  22  /  ALT  17  /  AlkPhos  79  11-24

## 2022-11-25 LAB
ALBUMIN SERPL ELPH-MCNC: 3.1 G/DL — LOW (ref 3.3–5)
ALP SERPL-CCNC: 70 U/L — SIGNIFICANT CHANGE UP (ref 40–120)
ALT FLD-CCNC: 19 U/L — SIGNIFICANT CHANGE UP (ref 10–45)
ANION GAP SERPL CALC-SCNC: 7 MMOL/L — SIGNIFICANT CHANGE UP (ref 5–17)
AST SERPL-CCNC: 27 U/L — SIGNIFICANT CHANGE UP (ref 10–40)
BILIRUB SERPL-MCNC: 0.1 MG/DL — LOW (ref 0.2–1.2)
BUN SERPL-MCNC: 7 MG/DL — SIGNIFICANT CHANGE UP (ref 7–23)
CALCIUM SERPL-MCNC: 9.2 MG/DL — SIGNIFICANT CHANGE UP (ref 8.4–10.5)
CHLORIDE SERPL-SCNC: 104 MMOL/L — SIGNIFICANT CHANGE UP (ref 96–108)
CO2 SERPL-SCNC: 28 MMOL/L — SIGNIFICANT CHANGE UP (ref 22–31)
CREAT SERPL-MCNC: 0.68 MG/DL — SIGNIFICANT CHANGE UP (ref 0.5–1.3)
EGFR: 90 ML/MIN/1.73M2 — SIGNIFICANT CHANGE UP
GLUCOSE BLDC GLUCOMTR-MCNC: 209 MG/DL — HIGH (ref 70–99)
GLUCOSE BLDC GLUCOMTR-MCNC: 224 MG/DL — HIGH (ref 70–99)
GLUCOSE BLDC GLUCOMTR-MCNC: 227 MG/DL — HIGH (ref 70–99)
GLUCOSE BLDC GLUCOMTR-MCNC: 237 MG/DL — HIGH (ref 70–99)
GLUCOSE SERPL-MCNC: 225 MG/DL — HIGH (ref 70–99)
HCT VFR BLD CALC: 36.9 % — SIGNIFICANT CHANGE UP (ref 34.5–45)
HGB BLD-MCNC: 11.8 G/DL — SIGNIFICANT CHANGE UP (ref 11.5–15.5)
MAGNESIUM SERPL-MCNC: 1.9 MG/DL — SIGNIFICANT CHANGE UP (ref 1.6–2.6)
MCHC RBC-ENTMCNC: 25.9 PG — LOW (ref 27–34)
MCHC RBC-ENTMCNC: 32 GM/DL — SIGNIFICANT CHANGE UP (ref 32–36)
MCV RBC AUTO: 81.1 FL — SIGNIFICANT CHANGE UP (ref 80–100)
NRBC # BLD: 0 /100 WBCS — SIGNIFICANT CHANGE UP (ref 0–0)
PHOSPHATE SERPL-MCNC: 2.7 MG/DL — SIGNIFICANT CHANGE UP (ref 2.5–4.5)
PLATELET # BLD AUTO: 364 K/UL — SIGNIFICANT CHANGE UP (ref 150–400)
POTASSIUM SERPL-MCNC: 4.5 MMOL/L — SIGNIFICANT CHANGE UP (ref 3.5–5.3)
POTASSIUM SERPL-SCNC: 4.5 MMOL/L — SIGNIFICANT CHANGE UP (ref 3.5–5.3)
PROT SERPL-MCNC: 5.6 G/DL — LOW (ref 6–8.3)
RBC # BLD: 4.55 M/UL — SIGNIFICANT CHANGE UP (ref 3.8–5.2)
RBC # FLD: 14.4 % — SIGNIFICANT CHANGE UP (ref 10.3–14.5)
SODIUM SERPL-SCNC: 139 MMOL/L — SIGNIFICANT CHANGE UP (ref 135–145)
WBC # BLD: 7.54 K/UL — SIGNIFICANT CHANGE UP (ref 3.8–10.5)
WBC # FLD AUTO: 7.54 K/UL — SIGNIFICANT CHANGE UP (ref 3.8–10.5)

## 2022-11-25 PROCEDURE — 74177 CT ABD & PELVIS W/CONTRAST: CPT | Mod: 26

## 2022-11-25 RX ORDER — INSULIN LISPRO 100/ML
VIAL (ML) SUBCUTANEOUS AT BEDTIME
Refills: 0 | Status: DISCONTINUED | OUTPATIENT
Start: 2022-11-25 | End: 2022-11-28

## 2022-11-25 RX ORDER — INSULIN LISPRO 100/ML
VIAL (ML) SUBCUTANEOUS
Refills: 0 | Status: DISCONTINUED | OUTPATIENT
Start: 2022-11-25 | End: 2022-11-28

## 2022-11-25 RX ADMIN — MEROPENEM 100 MILLIGRAM(S): 1 INJECTION INTRAVENOUS at 05:35

## 2022-11-25 RX ADMIN — LOSARTAN POTASSIUM 50 MILLIGRAM(S): 100 TABLET, FILM COATED ORAL at 05:36

## 2022-11-25 RX ADMIN — MEROPENEM 100 MILLIGRAM(S): 1 INJECTION INTRAVENOUS at 14:21

## 2022-11-25 RX ADMIN — Medication 100 MILLIGRAM(S): at 21:03

## 2022-11-25 RX ADMIN — MONTELUKAST 10 MILLIGRAM(S): 4 TABLET, CHEWABLE ORAL at 21:03

## 2022-11-25 RX ADMIN — Medication 81 MILLIGRAM(S): at 21:03

## 2022-11-25 RX ADMIN — Medication 2.5 MILLIGRAM(S): at 12:50

## 2022-11-25 RX ADMIN — Medication 4: at 12:50

## 2022-11-25 RX ADMIN — Medication 4: at 09:38

## 2022-11-25 RX ADMIN — MEROPENEM 100 MILLIGRAM(S): 1 INJECTION INTRAVENOUS at 21:03

## 2022-11-25 RX ADMIN — ENOXAPARIN SODIUM 40 MILLIGRAM(S): 100 INJECTION SUBCUTANEOUS at 21:03

## 2022-11-25 RX ADMIN — Medication 81 MILLIGRAM(S): at 05:36

## 2022-11-25 RX ADMIN — Medication 4: at 17:31

## 2022-11-25 RX ADMIN — Medication 81 MILLIGRAM(S): at 14:22

## 2022-11-25 RX ADMIN — PANTOPRAZOLE SODIUM 40 MILLIGRAM(S): 20 TABLET, DELAYED RELEASE ORAL at 05:36

## 2022-11-25 RX ADMIN — Medication 2.5 MILLIGRAM(S): at 05:36

## 2022-11-25 NOTE — PROGRESS NOTE ADULT - SUBJECTIVE AND OBJECTIVE BOX
RED SURGERY DAILY PROGRESS NOTE:       SUBJECTIVE/ROS: patient seen and examined at bedside. tolerating diet. denies f/c/n/v         MEDICATIONS  (STANDING):  aspirin enteric coated 81 milliGRAM(s) Oral three times a day  dextrose 5%. 1000 milliLiter(s) (100 mL/Hr) IV Continuous <Continuous>  dextrose 5%. 1000 milliLiter(s) (50 mL/Hr) IV Continuous <Continuous>  dextrose 50% Injectable 25 Gram(s) IV Push once  dextrose 50% Injectable 12.5 Gram(s) IV Push once  dextrose 50% Injectable 25 Gram(s) IV Push once  diazepam    Tablet 2.5 milliGRAM(s) Oral two times a day  enoxaparin Injectable 40 milliGRAM(s) SubCutaneous every 24 hours  glucagon  Injectable 1 milliGRAM(s) IntraMuscular once  insulin lispro (ADMELOG) corrective regimen sliding scale   SubCutaneous at bedtime  insulin lispro (ADMELOG) corrective regimen sliding scale   SubCutaneous three times a day before meals  losartan 50 milliGRAM(s) Oral daily  meropenem  IVPB 1000 milliGRAM(s) IV Intermittent every 8 hours  montelukast 10 milliGRAM(s) Oral daily  pantoprazole  Injectable 40 milliGRAM(s) IV Push every 24 hours  traZODone 100 milliGRAM(s) Oral at bedtime    MEDICATIONS  (PRN):  dextrose Oral Gel 15 Gram(s) Oral once PRN Blood Glucose LESS THAN 70 milliGRAM(s)/deciliter      OBJECTIVE:    Vital Signs Last 24 Hrs  T(C): 36.6 (25 Nov 2022 09:26), Max: 36.9 (24 Nov 2022 13:22)  T(F): 97.8 (25 Nov 2022 09:26), Max: 98.5 (24 Nov 2022 13:22)  HR: 78 (25 Nov 2022 09:26) (67 - 91)  BP: 160/84 (25 Nov 2022 09:26) (142/63 - 160/84)  BP(mean): --  RR: 18 (25 Nov 2022 09:26) (18 - 18)  SpO2: 96% (25 Nov 2022 09:26) (95% - 96%)    Parameters below as of 25 Nov 2022 09:26  Patient On (Oxygen Delivery Method): room air      Exam:  Constitutional: well developed, well nourished, NAD  Gastrointestinal: abdomen soft, nondistended, mild TTP. No obvious masses. No peritonitis  Extremities: FROM, warm  Neurological: intact, non-focal      I&O's Detail    24 Nov 2022 07:01  -  25 Nov 2022 07:00  --------------------------------------------------------  IN:    dextrose 5% + sodium chloride 0.45% w/ Additives: 600 mL    IV PiggyBack: 50 mL    Oral Fluid: 800 mL  Total IN: 1450 mL    OUT:  Total OUT: 0 mL    Total NET: 1450 mL      25 Nov 2022 07:01  -  25 Nov 2022 09:55  --------------------------------------------------------  IN:    Oral Fluid: 180 mL  Total IN: 180 mL    OUT:  Total OUT: 0 mL    Total NET: 180 mL          Daily     Daily     LABS:                        11.8   7.54  )-----------( 364      ( 25 Nov 2022 07:20 )             36.9     11-25    139  |  104  |  7   ----------------------------<  225<H>  4.5   |  28  |  0.68    Ca    9.2      25 Nov 2022 07:20  Phos  2.7     11-25  Mg     1.9     11-25    TPro  5.6<L>  /  Alb  3.1<L>  /  TBili  0.1<L>  /  DBili  x   /  AST  27  /  ALT  19  /  AlkPhos  70  11-25

## 2022-11-25 NOTE — PROGRESS NOTE ADULT - ASSESSMENT
7y F PMH perforated diverticulitis, HTN, DM, breast cancer, cerebral aneurysm s/p coiling presenting with 5 days of worsening abdominal pain, treated as outpatient for uncomplicated diverticulitis, now presenting with Hinchey 1b perforated diverticulitis. CT with evidence of small intra-abdominal free air, tracking interloop abscess      PLAN:  - LRD, gentle hydration  - Several allergies, IV abx for 5 days total. If no improvement, will re-scan  - Serial abdominal exams  - Surgical plan pending any patient changes  - Hep SQ for DVT ppx      Red Surgery  [id number]

## 2022-11-26 LAB
ALBUMIN SERPL ELPH-MCNC: 3.3 G/DL — SIGNIFICANT CHANGE UP (ref 3.3–5)
ALP SERPL-CCNC: 73 U/L — SIGNIFICANT CHANGE UP (ref 40–120)
ALT FLD-CCNC: 25 U/L — SIGNIFICANT CHANGE UP (ref 10–45)
ANION GAP SERPL CALC-SCNC: 9 MMOL/L — SIGNIFICANT CHANGE UP (ref 5–17)
AST SERPL-CCNC: 37 U/L — SIGNIFICANT CHANGE UP (ref 10–40)
BILIRUB SERPL-MCNC: 0.2 MG/DL — SIGNIFICANT CHANGE UP (ref 0.2–1.2)
BUN SERPL-MCNC: 9 MG/DL — SIGNIFICANT CHANGE UP (ref 7–23)
CALCIUM SERPL-MCNC: 9.2 MG/DL — SIGNIFICANT CHANGE UP (ref 8.4–10.5)
CHLORIDE SERPL-SCNC: 102 MMOL/L — SIGNIFICANT CHANGE UP (ref 96–108)
CO2 SERPL-SCNC: 28 MMOL/L — SIGNIFICANT CHANGE UP (ref 22–31)
CREAT SERPL-MCNC: 0.76 MG/DL — SIGNIFICANT CHANGE UP (ref 0.5–1.3)
EGFR: 81 ML/MIN/1.73M2 — SIGNIFICANT CHANGE UP
GLUCOSE BLDC GLUCOMTR-MCNC: 205 MG/DL — HIGH (ref 70–99)
GLUCOSE BLDC GLUCOMTR-MCNC: 213 MG/DL — HIGH (ref 70–99)
GLUCOSE BLDC GLUCOMTR-MCNC: 229 MG/DL — HIGH (ref 70–99)
GLUCOSE BLDC GLUCOMTR-MCNC: 237 MG/DL — HIGH (ref 70–99)
GLUCOSE SERPL-MCNC: 211 MG/DL — HIGH (ref 70–99)
HCT VFR BLD CALC: 37.8 % — SIGNIFICANT CHANGE UP (ref 34.5–45)
HGB BLD-MCNC: 11.8 G/DL — SIGNIFICANT CHANGE UP (ref 11.5–15.5)
MAGNESIUM SERPL-MCNC: 1.8 MG/DL — SIGNIFICANT CHANGE UP (ref 1.6–2.6)
MCHC RBC-ENTMCNC: 25.8 PG — LOW (ref 27–34)
MCHC RBC-ENTMCNC: 31.2 GM/DL — LOW (ref 32–36)
MCV RBC AUTO: 82.5 FL — SIGNIFICANT CHANGE UP (ref 80–100)
NRBC # BLD: 0 /100 WBCS — SIGNIFICANT CHANGE UP (ref 0–0)
PHOSPHATE SERPL-MCNC: 2.8 MG/DL — SIGNIFICANT CHANGE UP (ref 2.5–4.5)
PLATELET # BLD AUTO: 380 K/UL — SIGNIFICANT CHANGE UP (ref 150–400)
POTASSIUM SERPL-MCNC: 4.6 MMOL/L — SIGNIFICANT CHANGE UP (ref 3.5–5.3)
POTASSIUM SERPL-SCNC: 4.6 MMOL/L — SIGNIFICANT CHANGE UP (ref 3.5–5.3)
PROT SERPL-MCNC: 5.8 G/DL — LOW (ref 6–8.3)
RBC # BLD: 4.58 M/UL — SIGNIFICANT CHANGE UP (ref 3.8–5.2)
RBC # FLD: 14.3 % — SIGNIFICANT CHANGE UP (ref 10.3–14.5)
SODIUM SERPL-SCNC: 139 MMOL/L — SIGNIFICANT CHANGE UP (ref 135–145)
WBC # BLD: 7.28 K/UL — SIGNIFICANT CHANGE UP (ref 3.8–10.5)
WBC # FLD AUTO: 7.28 K/UL — SIGNIFICANT CHANGE UP (ref 3.8–10.5)

## 2022-11-26 PROCEDURE — 99223 1ST HOSP IP/OBS HIGH 75: CPT

## 2022-11-26 PROCEDURE — 99231 SBSQ HOSP IP/OBS SF/LOW 25: CPT | Mod: GC

## 2022-11-26 RX ORDER — ERTAPENEM SODIUM 1 G/1
1000 INJECTION, POWDER, LYOPHILIZED, FOR SOLUTION INTRAMUSCULAR; INTRAVENOUS EVERY 24 HOURS
Refills: 0 | Status: DISCONTINUED | OUTPATIENT
Start: 2022-11-26 | End: 2022-11-28

## 2022-11-26 RX ORDER — MEROPENEM 1 G/30ML
1000 INJECTION INTRAVENOUS EVERY 8 HOURS
Refills: 0 | Status: DISCONTINUED | OUTPATIENT
Start: 2022-11-26 | End: 2022-11-26

## 2022-11-26 RX ADMIN — Medication 81 MILLIGRAM(S): at 21:10

## 2022-11-26 RX ADMIN — Medication 2.5 MILLIGRAM(S): at 13:06

## 2022-11-26 RX ADMIN — Medication 4: at 08:35

## 2022-11-26 RX ADMIN — Medication 81 MILLIGRAM(S): at 05:19

## 2022-11-26 RX ADMIN — Medication 4: at 17:46

## 2022-11-26 RX ADMIN — PANTOPRAZOLE SODIUM 40 MILLIGRAM(S): 20 TABLET, DELAYED RELEASE ORAL at 05:20

## 2022-11-26 RX ADMIN — Medication 4: at 13:06

## 2022-11-26 RX ADMIN — ERTAPENEM SODIUM 120 MILLIGRAM(S): 1 INJECTION, POWDER, LYOPHILIZED, FOR SOLUTION INTRAMUSCULAR; INTRAVENOUS at 13:05

## 2022-11-26 RX ADMIN — Medication 81 MILLIGRAM(S): at 13:07

## 2022-11-26 RX ADMIN — LOSARTAN POTASSIUM 50 MILLIGRAM(S): 100 TABLET, FILM COATED ORAL at 05:20

## 2022-11-26 RX ADMIN — Medication 100 MILLIGRAM(S): at 21:10

## 2022-11-26 RX ADMIN — ENOXAPARIN SODIUM 40 MILLIGRAM(S): 100 INJECTION SUBCUTANEOUS at 21:10

## 2022-11-26 RX ADMIN — Medication 2.5 MILLIGRAM(S): at 05:19

## 2022-11-26 RX ADMIN — MONTELUKAST 10 MILLIGRAM(S): 4 TABLET, CHEWABLE ORAL at 21:10

## 2022-11-26 NOTE — PROGRESS NOTE ADULT - ASSESSMENT
7y F PMH perforated diverticulitis, HTN, DM, breast cancer, cerebral aneurysm s/p coiling presenting with 5 days of worsening abdominal pain, treated as outpatient for uncomplicated diverticulitis, now presenting with Hinchey 1b perforated diverticulitis. CT with evidence of small intra-abdominal free air, tracking interloop abscess      PLAN:  - ID consult for abx management  - LRD, gentle hydration  - Several allergies, IV abx for 5 days total. If no improvement, will re-scan  - Serial abdominal exams  - Surgical plan pending any patient changes  - Hep SQ for DVT ppx      Red Surgery  [id number]

## 2022-11-26 NOTE — CONSULT NOTE ADULT - ASSESSMENT
77F had diverticulitis with microperforation in 8/2022.   ED visit 11/17 with sigmoid diverticulitis, went home on Cipro and Flagyl.   Returns 11/21 with worsening pain, perforation, multiloculated abscess 6.4 x 2.6 x 5.7 cm.   I think this could've happened regardless of antibiotics choice but has multiple drug allergies and improved on Meropenem (11/21- ).     Suggest  -narrow to Ertapenem 1GM q24h, plan for 2-4 weeks via midline depending on clinical course and surgical plan     Discussed with red surgery     Zain Starr MD   Infectious Disease   Available on TEAMS. After 5PM and on weekends please page fellow on call or call [phone]
77F h/o HTN, DM, breast cancer, cerebral aneurysm s/p coiling and stenting on asa and plavix, recent ed visit with uncomplicated diverticulitis failed outpatient po cipro/flagyl and presented with perforated sigmoid diverticulitis with tracking interloop abscess on repeat CT.

## 2022-11-26 NOTE — CONSULT NOTE ADULT - SUBJECTIVE AND OBJECTIVE BOX
HPI:  77F A1c 7.2%, had diverticulitis in 2022.   Seen in the ED  for abdominal pain, had uncomplicated sigmoid diverticulitis and went home with PO flagyl & cipro.   Felt worse and came back  with progressively pain. Low grade on and off fevers.   No nausea or vomiting, no blood in stool.   Last complete colonoscopy 2020 with pancolonic diverticulosis, visible vessel in sigmoid diverticulum clipped. Repeat colonoscopy  without adequate prep.  Feels better since admission.   No more abdominal tenderness. Afebrile.   Now on Meropenem. Multiple drug allergies, doesn't remember them all. Has seen an allergist before.       PAST MEDICAL & SURGICAL HISTORY:  Breast CA right  Diverticulitis  CVA (cerebral vascular accident)   Fibromyalgia  Chronic fatigue  Lumbar disc disease  Kidney stones  HTN (hypertension)  DM (diabetes mellitus)  Anxiety  Depression  Reflux esophagitis  Asthma  History of D&C  H/O:   H/O tubal ligation  Cerebral aneurysm repair with coil and stent -   Basal cell carcinoma removed - left eyelid- 3/15  H/O shoulder surgery right - 2001      Allergies    Augmentin (Rash)  Ceclor (Other)  codeine (Nausea)  Cytotec (Unknown)  Entex (Other)  Norflex (Other)  penicillin (Urticaria; Rash)  ramipril (Other)  shellfish (Rash)  strawberry (Rash)  sulfites - nausea/ gi upset (Other)  tetracycline (Other)    Intolerances        ANTIMICROBIALS:  meropenem  IVPB 1000 every 8 hours      OTHER MEDS:  aspirin enteric coated 81 milliGRAM(s) Oral three times a day  dextrose 5%. 1000 milliLiter(s) IV Continuous <Continuous>  dextrose 5%. 1000 milliLiter(s) IV Continuous <Continuous>  dextrose 50% Injectable 25 Gram(s) IV Push once  dextrose 50% Injectable 12.5 Gram(s) IV Push once  dextrose 50% Injectable 25 Gram(s) IV Push once  dextrose Oral Gel 15 Gram(s) Oral once PRN  diazepam    Tablet 2.5 milliGRAM(s) Oral two times a day  enoxaparin Injectable 40 milliGRAM(s) SubCutaneous every 24 hours  glucagon  Injectable 1 milliGRAM(s) IntraMuscular once  insulin lispro (ADMELOG) corrective regimen sliding scale   SubCutaneous three times a day before meals  insulin lispro (ADMELOG) corrective regimen sliding scale   SubCutaneous at bedtime  losartan 50 milliGRAM(s) Oral daily  montelukast 10 milliGRAM(s) Oral daily  pantoprazole  Injectable 40 milliGRAM(s) IV Push every 24 hours  traZODone 100 milliGRAM(s) Oral at bedtime      SOCIAL HISTORY: Nonsmoker     FAMILY HISTORY:  Family history of CVA  mother, father        ROS:  All other systems negative   Constitutional: per HPI   Eye: no vision changes  ENT: no sore throat, no rhinorrhea  Cardiovascular: no chest pain, no palpitation  Respiratory: no SOB, no cough  GI:  per HPI   urinary: no dysuria   musculoskeletal: chronic back and neck pain, nothing new   skin: no rash  neurology: no headache, no change in mental status  psych: no anxiety    Physical Exam:  General: awake, alert, non toxic  Head: atraumatic, normocephalic  Eyes: normal sclera and conjunctiva  ENT: grossly normal   Cardio: regular rate   Respiratory: nonlabored on room air, clear bilaterally, no wheezing  abd: soft, bowel sounds present, not tender  : no suprapubic tenderness   Musculoskeletal: no joint swelling, no edema  Skin: no rash  vascular: no phlebitis  Neurologic: no focal deficits  psych: normal affect       Drug Dosing Weight  Height (cm): 157.5 (2022 11:01)  Weight (kg): 63.5 (2022 11:01)  BMI (kg/m2): 25.6 (2022 11:)  BSA (m2): 1.64 (2022 11:)    Vital Signs Last 24 Hrs  T(F): 97.7 (22 @ 10:18), Max: 99.5 (22 @ 11:26)    Vital Signs Last 24 Hrs  HR: 79 (22 @ 10:18) (67 - 91)  BP: 146/65 (22 @ 10:18) (146/65 - 159/86)  RR: 17 (22 @ 10:18)  SpO2: 93% (22 @ 10:18) (93% - 96%)  Wt(kg): --                          11.8   7.28  )-----------( 380      ( 2022 07:28 )             37.8           139  |  102  |  9   ----------------------------<  211<H>  4.6   |  28  |  0.76    Ca    9.2      2022 07:27  Phos  2.8       Mg     1.8         TPro  5.8<L>  /  Alb  3.3  /  TBili  0.2  /  DBili  x   /  AST  37  /  ALT  25  /  AlkPhos  73            MICROBIOLOGY:        RADIOLOGY:  Images below reviewed personally  CT Abdomen and Pelvis w/ IV Cont (22 @ 10:53)   Sigmoid diverticulitis.  Multiloculated pelvic abscess interposed between the sigmoid colon and   small bowel loops and right adnexa without significant change from prior   imaging 2022.  6.4 x 2.6 x 5.7 cm  Involvement of the right fallopian tube/adnexa is a   diagnostic consideration.  
[facility] Division of Hospital Medicine  Sophie Kaur DO  8a-5pm: [phone]  after 5pm call [phone]    HPI:  77F h/o HTN, DM, breast cancer, cerebral aneurysm s/p coiling and stenting on asa and plavix went to ED with abdominal pain and diarrhea found to have diverticulitis discharged on cipro/flagyl now returns with worsened abdominal pain and low grade fevers found to have perforated sigmoid diverticulitis with tracking interloop abscess on repeat CT. she was admitted to surgery and receiving IV abx. She was started on clear liquid diet today. Currently, still having diffuse abdominal pain mostly in RLQ but overall improved. no further diarrhea. no nausea or vomiting at this time. She is able to tolerate clear diet.        PAST MEDICAL & SURGICAL HISTORY:  Breast CA  right      Aneurysm  cerebral      Vertigo      Hearing loss  left      Basal cell carcinoma      Diverticulitis      CVA (cerebral vascular accident)        Shoulder disorder  right bone spur      Benign intracranial hypertension  1980 r/t &quot;tetracyclines&quot;      Fibromyalgia      Chronic fatigue      Lumbar disc disease      Kidney stones  hx of      HTN (hypertension)      DM (diabetes mellitus)      Anxiety      Depression      Reflux esophagitis      Asthma      History of D&amp;C      H/O:       Cerebral aneurysm  repair with coil and stent -       Basal cell carcinoma  removed - left eyelid- 3/15      H/O shoulder surgery  right - 2001      H/O tubal ligation      History of kidney stones            Review of Systems:   CONSTITUTIONAL: no fever or chills   EYES: No eye pain, visual disturbances  ENMT:  No difficulty hearing,   NECK: No pain or stiffness  RESPIRATORY: No cough, No shortness of breath  CARDIOVASCULAR: No chest pain, palpitations,  GASTROINTESTINAL: + abdominal pain. No nausea or vomiting,  GENITOURINARY: No dysuria,   NEUROLOGICAL: No headaches,   SKIN: No rashes  ENDOCRINE: No heat or cold intolerance  MUSCULOSKELETAL: No joint pain or swelling;   PSYCHIATRIC: No depression,   ALLERY AND IMMUNOLOGIC: No hives or eczema    Allergies    Augmentin (Rash)  Ceclor - pt cannot recall   codeine (Nausea)  Cytotec (unknown)  Entex (Other)  Norflex (Other)  penicillin - itching/rash  ramipril (Other)  shellfish (Rash)  strawberry (Rash)  sulfites - nausea/ gi upset (Other)  tetracycline (Other)        Social History: denies smoking or etoh abuse    FAMILY HISTORY:  Family history of CVA  mother, father        MEDICATIONS  (STANDING):  ciprofloxacin   IVPB 400 milliGRAM(s) IV Intermittent every 12 hours  dextrose 5% + sodium chloride 0.45% with potassium chloride 20 mEq/L 1000 milliLiter(s) (50 mL/Hr) IV Continuous <Continuous>  dextrose 5%. 1000 milliLiter(s) (100 mL/Hr) IV Continuous <Continuous>  dextrose 5%. 1000 milliLiter(s) (50 mL/Hr) IV Continuous <Continuous>  dextrose 50% Injectable 25 Gram(s) IV Push once  dextrose 50% Injectable 12.5 Gram(s) IV Push once  dextrose 50% Injectable 25 Gram(s) IV Push once  diazepam    Tablet 2.5 milliGRAM(s) Oral two times a day  glucagon  Injectable 1 milliGRAM(s) IntraMuscular once  heparin   Injectable 5000 Unit(s) SubCutaneous every 8 hours  insulin lispro (ADMELOG) corrective regimen sliding scale   SubCutaneous every 6 hours  losartan 50 milliGRAM(s) Oral daily  metroNIDAZOLE  IVPB 500 milliGRAM(s) IV Intermittent every 8 hours  montelukast 10 milliGRAM(s) Oral daily  pantoprazole  Injectable 40 milliGRAM(s) IV Push every 24 hours  potassium phosphate / sodium phosphate Powder (PHOS-NaK) 1 Packet(s) Oral once  traZODone 50 milliGRAM(s) Oral at bedtime    MEDICATIONS  (PRN):  dextrose Oral Gel 15 Gram(s) Oral once PRN Blood Glucose LESS THAN 70 milliGRAM(s)/deciliter      Vital Signs Last 24 Hrs  T(C): 37 (2022 14:22), Max: 37.1 (2022 01:00)  T(F): 98.6 (2022 14:22), Max: 98.7 (2022 01:00)  HR: 75 (2022 14:22) (69 - 80)  BP: 164/69 (2022 14:22) (142/78 - 164/69)  BP(mean): --  RR: 18 (2022 14:22) (18 - 18)  SpO2: 98% (2022 14:22) (94% - 98%)    Parameters below as of 2022 14:22  Patient On (Oxygen Delivery Method): room air      CAPILLARY BLOOD GLUCOSE      POCT Blood Glucose.: 215 mg/dL (2022 11:40)  POCT Blood Glucose.: 234 mg/dL (2022 05:51)  POCT Blood Glucose.: 141 mg/dL (2022 00:19)  POCT Blood Glucose.: 175 mg/dL (2022 18:10)    I&O's Summary    2022 07:01  -  2022 07:00  --------------------------------------------------------  IN: 2825 mL / OUT: 0 mL / NET: 2825 mL    2022 07:01  -  2022 15:13  --------------------------------------------------------  IN: 360 mL / OUT: 0 mL / NET: 360 mL        PHYSICAL EXAM:  GENERAL: NAD, breathing normal  HEAD:  Atraumatic, Normocephalic  EYES: conjunctiva and sclera clear  NECK: supple, No JVD  CHEST/LUNG: CTA b/l  HEART: S1 S2 RRR  ABDOMEN: +BS Soft, diffuse abd tenderness greatest in RLQ, no rebound appreciated  EXTREMITIES:  2+ DP Pulses, No c/c. no LE edema  NEUROLOGY: AAOx3, no facial droop, moving all extremities   SKIN: No rashes or lesions      LABS:                        11.8   11.14 )-----------( 348      ( 2022 07:24 )             36.8         138  |  101  |  7   ----------------------------<  233<H>  4.4   |  27  |  0.61    Ca    9.3      2022 07:24  Phos  2.1       Mg     1.6         TPro  6.0  /  Alb  3.4  /  TBili  0.2  /  DBili  x   /  AST  19  /  ALT  17  /  AlkPhos  76                RADIOLOGY & ADDITIONAL TESTS:    Imaging Personally Reviewed: CT a/p  reviewed - 1. Perforated sigmoid diverticulitis with tracking interloop abscess and   reactive small bowel thickening. Mild free intraperitoneal gas. Mild   right hydroureteronephrosis extending to the right pelvic inflammation   and tracking collection.  2. No evidence of pulmonary embolism.  CT a/p  - uncomplicated diverticulitis     EKG tracing reviewe d- SVT@176bpm      Consultant(s) Notes Reviewed:      Care Discussed with Consultants/Other Providers: d/w Surgery - pager  regarding fs, home medications asa and plavix, and considering ertapenem instead of meropenem

## 2022-11-26 NOTE — PROGRESS NOTE ADULT - SUBJECTIVE AND OBJECTIVE BOX
[[email or web address]] : URI sx last week.  Was better. Got flu shot on Friday.  Then cough and congestion started again.  No fevers.   TEAM RED Surgery Daily Progress Note  =====================================================    SUBJECTIVE: Patient seen and examined at bedside on AM rounds. Patient reports that they're feeling similar to yesterday. Tolerating low res diet, denies nausea, vomiting. +/+. OOB/Amublating as tolerated. Denies fever, chills.    --------------------------------------------------------------------------------------  OBJECTIVE:    VITAL SIGNS:  Vital Signs Last 24 Hrs  T(C): 36.5 (26 Nov 2022 10:18), Max: 37.2 (25 Nov 2022 13:41)  T(F): 97.7 (26 Nov 2022 10:18), Max: 99 (25 Nov 2022 13:41)  HR: 79 (26 Nov 2022 10:18) (67 - 91)  BP: 146/65 (26 Nov 2022 10:18) (146/65 - 159/86)  BP(mean): --  RR: 17 (26 Nov 2022 10:18) (16 - 18)  SpO2: 93% (26 Nov 2022 10:18) (93% - 96%)    Parameters below as of 26 Nov 2022 10:18  Patient On (Oxygen Delivery Method): room air      --------------------------------------------------------------------------------------    EXAM:  General: NAD, resting in bed comfortably.  Cardiac: regular rate, warm and well perfused  Respiratory: Nonlabored respirations, normal cw expansion.  Abdomen: soft, nontender, mildly distended     --------------------------------------------------------------------------------------    LABS:                        11.8   7.28  )-----------( 380      ( 26 Nov 2022 07:28 )             37.8     11-26    139  |  102  |  9   ----------------------------<  211<H>  4.6   |  28  |  0.76    Ca    9.2      26 Nov 2022 07:27  Phos  2.8     11-26  Mg     1.8     11-26    TPro  5.8<L>  /  Alb  3.3  /  TBili  0.2  /  DBili  x   /  AST  37  /  ALT  25  /  AlkPhos  73  11-26          --------------------------------------------------------------------------------------    INS AND OUTS:    11-25-22 @ 07:01  -  11-26-22 @ 07:00  --------------------------------------------------------  IN: 720 mL / OUT: 0 mL / NET: 720 mL    11-26-22 @ 07:01  -  11-26-22 @ 10:53  --------------------------------------------------------  IN: 250 mL / OUT: 0 mL / NET: 250 mL        --------------------------------------------------------------------------------------    MEDICATIONS:  MEDICATIONS  (STANDING):  aspirin enteric coated 81 milliGRAM(s) Oral three times a day  dextrose 5%. 1000 milliLiter(s) (100 mL/Hr) IV Continuous <Continuous>  dextrose 5%. 1000 milliLiter(s) (50 mL/Hr) IV Continuous <Continuous>  dextrose 50% Injectable 25 Gram(s) IV Push once  dextrose 50% Injectable 12.5 Gram(s) IV Push once  dextrose 50% Injectable 25 Gram(s) IV Push once  diazepam    Tablet 2.5 milliGRAM(s) Oral two times a day  enoxaparin Injectable 40 milliGRAM(s) SubCutaneous every 24 hours  glucagon  Injectable 1 milliGRAM(s) IntraMuscular once  insulin lispro (ADMELOG) corrective regimen sliding scale   SubCutaneous three times a day before meals  insulin lispro (ADMELOG) corrective regimen sliding scale   SubCutaneous at bedtime  losartan 50 milliGRAM(s) Oral daily  meropenem  IVPB 1000 milliGRAM(s) IV Intermittent every 8 hours  montelukast 10 milliGRAM(s) Oral daily  pantoprazole  Injectable 40 milliGRAM(s) IV Push every 24 hours  traZODone 100 milliGRAM(s) Oral at bedtime    MEDICATIONS  (PRN):  dextrose Oral Gel 15 Gram(s) Oral once PRN Blood Glucose LESS THAN 70 milliGRAM(s)/deciliter    --------------------------------------------------------------------------------------

## 2022-11-26 NOTE — PROGRESS NOTE ADULT - ATTENDING COMMENTS
Perforated diverticulitis  -Pt seen and evaluated.  Persistent RLQ pain, but improved from admission.  -Mild elevation in WBC  -I had a long discussion with the patient and her  about diverticular disease and expectations going forward.  Pt is slowly improving clinically.  Given the pt's significant episode of perforated diverticulitis, she will require at least 5 days of IV abx.  If she continues to improve, has no abdominal pain, lab values have normalized and is tolerating a low residue diet, we will likely discharge and schedule outpt imaging before discontinuing abx.  If, the pt has not completely normalized by hospital day #5, we will repeat the CT scan to evaluate for progression of abscess.  If at any point, the pt's condition worsens, she would require surgical intervention.  -Surgery is not an ideal option in this patient given her multiple comorbidities and need for chronic antiplatelet medications for cerebral stent.  -The pt and her  understood and agreed to this plan going forward. All questions were answered  -continue IV abx  -Appreciate medical input  -clear liquid diet today  -serial abdominal exams
Agree with E&M above
Agree with E&M above  -will discuss abx plan with ID
Agree with E&M above  WBC trending down, pain improved  -ADAT

## 2022-11-27 LAB
ANION GAP SERPL CALC-SCNC: 12 MMOL/L — SIGNIFICANT CHANGE UP (ref 5–17)
BUN SERPL-MCNC: 11 MG/DL — SIGNIFICANT CHANGE UP (ref 7–23)
CALCIUM SERPL-MCNC: 9.5 MG/DL — SIGNIFICANT CHANGE UP (ref 8.4–10.5)
CHLORIDE SERPL-SCNC: 100 MMOL/L — SIGNIFICANT CHANGE UP (ref 96–108)
CO2 SERPL-SCNC: 27 MMOL/L — SIGNIFICANT CHANGE UP (ref 22–31)
CREAT SERPL-MCNC: 0.68 MG/DL — SIGNIFICANT CHANGE UP (ref 0.5–1.3)
EGFR: 90 ML/MIN/1.73M2 — SIGNIFICANT CHANGE UP
GLUCOSE BLDC GLUCOMTR-MCNC: 220 MG/DL — HIGH (ref 70–99)
GLUCOSE BLDC GLUCOMTR-MCNC: 233 MG/DL — HIGH (ref 70–99)
GLUCOSE BLDC GLUCOMTR-MCNC: 239 MG/DL — HIGH (ref 70–99)
GLUCOSE BLDC GLUCOMTR-MCNC: 266 MG/DL — HIGH (ref 70–99)
GLUCOSE BLDC GLUCOMTR-MCNC: 279 MG/DL — HIGH (ref 70–99)
GLUCOSE SERPL-MCNC: 203 MG/DL — HIGH (ref 70–99)
HCT VFR BLD CALC: 39.7 % — SIGNIFICANT CHANGE UP (ref 34.5–45)
HGB BLD-MCNC: 12.6 G/DL — SIGNIFICANT CHANGE UP (ref 11.5–15.5)
MAGNESIUM SERPL-MCNC: 2 MG/DL — SIGNIFICANT CHANGE UP (ref 1.6–2.6)
MCHC RBC-ENTMCNC: 25.9 PG — LOW (ref 27–34)
MCHC RBC-ENTMCNC: 31.7 GM/DL — LOW (ref 32–36)
MCV RBC AUTO: 81.7 FL — SIGNIFICANT CHANGE UP (ref 80–100)
NRBC # BLD: 0 /100 WBCS — SIGNIFICANT CHANGE UP (ref 0–0)
PHOSPHATE SERPL-MCNC: 2.6 MG/DL — SIGNIFICANT CHANGE UP (ref 2.5–4.5)
PLATELET # BLD AUTO: 393 K/UL — SIGNIFICANT CHANGE UP (ref 150–400)
POTASSIUM SERPL-MCNC: 4.4 MMOL/L — SIGNIFICANT CHANGE UP (ref 3.5–5.3)
POTASSIUM SERPL-SCNC: 4.4 MMOL/L — SIGNIFICANT CHANGE UP (ref 3.5–5.3)
RBC # BLD: 4.86 M/UL — SIGNIFICANT CHANGE UP (ref 3.8–5.2)
RBC # FLD: 14.3 % — SIGNIFICANT CHANGE UP (ref 10.3–14.5)
SODIUM SERPL-SCNC: 139 MMOL/L — SIGNIFICANT CHANGE UP (ref 135–145)
WBC # BLD: 9.26 K/UL — SIGNIFICANT CHANGE UP (ref 3.8–10.5)
WBC # FLD AUTO: 9.26 K/UL — SIGNIFICANT CHANGE UP (ref 3.8–10.5)

## 2022-11-27 PROCEDURE — 99232 SBSQ HOSP IP/OBS MODERATE 35: CPT

## 2022-11-27 RX ORDER — ERTAPENEM SODIUM 1 G/1
1 INJECTION, POWDER, LYOPHILIZED, FOR SOLUTION INTRAMUSCULAR; INTRAVENOUS
Qty: 28 | Refills: 0
Start: 2022-11-27 | End: 2022-12-24

## 2022-11-27 RX ORDER — CLOPIDOGREL BISULFATE 75 MG/1
75 TABLET, FILM COATED ORAL DAILY
Refills: 0 | Status: DISCONTINUED | OUTPATIENT
Start: 2022-11-27 | End: 2022-11-28

## 2022-11-27 RX ORDER — SODIUM,POTASSIUM PHOSPHATES 278-250MG
1 POWDER IN PACKET (EA) ORAL ONCE
Refills: 0 | Status: COMPLETED | OUTPATIENT
Start: 2022-11-27 | End: 2022-11-27

## 2022-11-27 RX ADMIN — ERTAPENEM SODIUM 120 MILLIGRAM(S): 1 INJECTION, POWDER, LYOPHILIZED, FOR SOLUTION INTRAMUSCULAR; INTRAVENOUS at 12:25

## 2022-11-27 RX ADMIN — Medication 6: at 14:16

## 2022-11-27 RX ADMIN — Medication 2.5 MILLIGRAM(S): at 05:47

## 2022-11-27 RX ADMIN — Medication 81 MILLIGRAM(S): at 12:26

## 2022-11-27 RX ADMIN — LOSARTAN POTASSIUM 50 MILLIGRAM(S): 100 TABLET, FILM COATED ORAL at 05:46

## 2022-11-27 RX ADMIN — Medication 81 MILLIGRAM(S): at 21:25

## 2022-11-27 RX ADMIN — Medication 4: at 09:59

## 2022-11-27 RX ADMIN — Medication 1 PACKET(S): at 12:25

## 2022-11-27 RX ADMIN — Medication 81 MILLIGRAM(S): at 05:46

## 2022-11-27 RX ADMIN — ENOXAPARIN SODIUM 40 MILLIGRAM(S): 100 INJECTION SUBCUTANEOUS at 21:25

## 2022-11-27 RX ADMIN — Medication 2.5 MILLIGRAM(S): at 12:25

## 2022-11-27 RX ADMIN — Medication 6: at 18:38

## 2022-11-27 RX ADMIN — PANTOPRAZOLE SODIUM 40 MILLIGRAM(S): 20 TABLET, DELAYED RELEASE ORAL at 05:46

## 2022-11-27 RX ADMIN — MONTELUKAST 10 MILLIGRAM(S): 4 TABLET, CHEWABLE ORAL at 21:25

## 2022-11-27 RX ADMIN — CLOPIDOGREL BISULFATE 75 MILLIGRAM(S): 75 TABLET, FILM COATED ORAL at 18:37

## 2022-11-27 RX ADMIN — Medication 100 MILLIGRAM(S): at 21:25

## 2022-11-27 NOTE — PROGRESS NOTE ADULT - ASSESSMENT
Sigmoid diverticulitis, tried Cipro and Flagyl 11/17, came back 11/21 with worsening pain, perforation, multiloculated abscess 6.4 x 2.6 x 5.7 cm.   Doubt an issue of antibiotic choice but limited options due to drug allergies and improved on Meropenem (11/21- ).   Eventual surgery planned but not for months.     Suggest  -Ertapenem 1GM q24h via midline for another 3 weeks ending 12/18 for a total 4-week given lack of source control although might stop sooner   -weekly CBC, BUN, creatinine   -follow up with me outpatient     Discussed with red surgery     Zain Starr MD   Infectious Disease   Available on TEAMS. After 5PM and on weekends please page fellow on call or call [phone]

## 2022-11-27 NOTE — PROGRESS NOTE ADULT - SUBJECTIVE AND OBJECTIVE BOX
Surgery Daily Progress Note    SUBJECTIVE:  Pt seen and examined, and is resting comfortably in bed. No acute events overnight. Pain is adequately controlled on current regimen. Pt has no complaints at this time.       OBJECTIVE:  Vital Signs Last 24 Hrs  T(C): 36.6 (27 Nov 2022 09:18), Max: 36.9 (26 Nov 2022 13:45)  T(F): 97.8 (27 Nov 2022 09:18), Max: 98.4 (26 Nov 2022 13:45)  HR: 74 (27 Nov 2022 09:18) (66 - 97)  BP: 142/80 (27 Nov 2022 09:18) (142/80 - 156/73)  BP(mean): --  RR: 17 (27 Nov 2022 09:18) (16 - 18)  SpO2: 97% (27 Nov 2022 09:18) (95% - 97%)    Parameters below as of 27 Nov 2022 09:18  Patient On (Oxygen Delivery Method): room air        I&O's Detail    26 Nov 2022 07:01  -  27 Nov 2022 07:00  --------------------------------------------------------  IN:    Oral Fluid: 1170 mL  Total IN: 1170 mL    OUT:  Total OUT: 0 mL    Total NET: 1170 mL        Exam:  General: NAD, resting in bed comfortably.  Cardiac: warm and well perfused  Respiratory: Nonlabored respirations, normal cw expansion.  Abdomen: soft, mildly tender in lower quadrants, nondistended                           12.6   9.26  )-----------( 393      ( 27 Nov 2022 06:36 )             39.7       11-27    139  |  100  |  11  ----------------------------<  203<H>  4.4   |  27  |  0.68    Ca    9.5      27 Nov 2022 06:33  Phos  2.6     11-27  Mg     2.0     11-27    TPro  5.8<L>  /  Alb  3.3  /  TBili  0.2  /  DBili  x   /  AST  37  /  ALT  25  /  AlkPhos  73  11-26

## 2022-11-27 NOTE — PROGRESS NOTE ADULT - ASSESSMENT
77y F PMH perforated diverticulitis, HTN, DM, breast cancer, cerebral aneurysm s/p coiling presenting with 5 days of worsening abdominal pain, treated as outpatient for uncomplicated diverticulitis, now presenting with Hinchey 1b perforated diverticulitis. CT with evidence of small intra-abdominal free air, tracking interloop abscess    PLAN:  - ID consult for abx management, 1 g ertapenem daily for 2-4 weeks. Midline is appropriate  - LRD  - Serial abdominal exams  - Surgical plan pending any patient changes  - Hep SQ for DVT ppx  - restart Plavix after midline placement  - Dispo: home tomorrow with VNS for midline abx    Red Surgery  [id number]

## 2022-11-27 NOTE — PROCEDURE NOTE - ADDITIONAL PROCEDURE DETAILS
4Fr Bard® PowerMidline™ Catheter placed under ultrasound guidance in Right Arm. REF [id number] Assembled in [place]. 4Fr 20 cm Bard® PowerMidline™ Catheter placed under ultrasound guidance in Right Arm. REF [id number] Assembled in [place].

## 2022-11-27 NOTE — PROGRESS NOTE ADULT - SUBJECTIVE AND OBJECTIVE BOX
Follow Up: diverticulitis     Interval History/ROS: Afebrile, no pain. Had a bowel movement, not diarrhea. No chills.     Allergies  Augmentin (Rash)  Ceclor (Other)  codeine (Nausea)  Cytotec (Unknown)  Entex (Other)  Norflex (Other)  penicillin (Urticaria; Rash)  ramipril (Other)  shellfish (Rash)  strawberry (Rash)  sulfites - nausea/ gi upset (Other)  tetracycline (Other)        ANTIMICROBIALS:  ertapenem  IVPB 1000 every 24 hours      OTHER MEDS:  aspirin enteric coated 81 milliGRAM(s) Oral three times a day  clopidogrel Tablet 75 milliGRAM(s) Oral daily  dextrose 5%. 1000 milliLiter(s) IV Continuous <Continuous>  dextrose 5%. 1000 milliLiter(s) IV Continuous <Continuous>  dextrose 50% Injectable 25 Gram(s) IV Push once  dextrose 50% Injectable 12.5 Gram(s) IV Push once  dextrose 50% Injectable 25 Gram(s) IV Push once  dextrose Oral Gel 15 Gram(s) Oral once PRN  diazepam    Tablet 2.5 milliGRAM(s) Oral two times a day  enoxaparin Injectable 40 milliGRAM(s) SubCutaneous every 24 hours  glucagon  Injectable 1 milliGRAM(s) IntraMuscular once  insulin lispro (ADMELOG) corrective regimen sliding scale   SubCutaneous three times a day before meals  insulin lispro (ADMELOG) corrective regimen sliding scale   SubCutaneous at bedtime  losartan 50 milliGRAM(s) Oral daily  montelukast 10 milliGRAM(s) Oral daily  pantoprazole  Injectable 40 milliGRAM(s) IV Push every 24 hours  traZODone 100 milliGRAM(s) Oral at bedtime      Vital Signs Last 24 Hrs  T(C): 36.9 (27 Nov 2022 13:28), Max: 36.9 (26 Nov 2022 21:23)  T(F): 98.4 (27 Nov 2022 13:28), Max: 98.4 (26 Nov 2022 21:23)  HR: 79 (27 Nov 2022 13:28) (66 - 97)  BP: 146/68 (27 Nov 2022 13:28) (142/80 - 156/73)  BP(mean): --  RR: 18 (27 Nov 2022 13:28) (16 - 18)  SpO2: 95% (27 Nov 2022 13:28) (95% - 97%)    Parameters below as of 27 Nov 2022 13:28  Patient On (Oxygen Delivery Method): room air        Physical Exam:  General: non toxic  Cardio: regular rate   Respiratory: nonlabored   abd: nondistended, soft, nontender   Musculoskeletal: no focal joint swelling, no edema  vascular: peripheral IV, no phlebitis   Skin: no rash                          12.6   9.26  )-----------( 393      ( 27 Nov 2022 06:36 )             39.7       11-27    139  |  100  |  11  ----------------------------<  203<H>  4.4   |  27  |  0.68    Ca    9.5      27 Nov 2022 06:33  Phos  2.6     11-27  Mg     2.0     11-27    TPro  5.8<L>  /  Alb  3.3  /  TBili  0.2  /  DBili  x   /  AST  37  /  ALT  25  /  AlkPhos  73  11-26          MICROBIOLOGY:      RADIOLOGY:  Images below reviewed personally  CT Abdomen and Pelvis w/ IV Cont (11.25.22 @ 10:53)   Sigmoid diverticulitis.  Multiloculated pelvic abscess interposed between the sigmoid colon and   small bowel loops and right adnexa without significant change from prior   imaging 11/21/2022.  6.4 x 2.6 x 5.7 cm  Involvement of the right fallopian tube/adnexa is a   diagnostic consideration.

## 2022-11-28 ENCOUNTER — NON-APPOINTMENT (OUTPATIENT)
Age: 77
End: 2022-11-28

## 2022-11-28 ENCOUNTER — TRANSCRIPTION ENCOUNTER (OUTPATIENT)
Age: 77
End: 2022-11-28

## 2022-11-28 VITALS
RESPIRATION RATE: 18 BRPM | OXYGEN SATURATION: 96 % | SYSTOLIC BLOOD PRESSURE: 130 MMHG | TEMPERATURE: 98 F | DIASTOLIC BLOOD PRESSURE: 71 MMHG | HEART RATE: 70 BPM

## 2022-11-28 LAB
ANION GAP SERPL CALC-SCNC: 11 MMOL/L — SIGNIFICANT CHANGE UP (ref 5–17)
BUN SERPL-MCNC: 12 MG/DL — SIGNIFICANT CHANGE UP (ref 7–23)
CALCIUM SERPL-MCNC: 9.5 MG/DL — SIGNIFICANT CHANGE UP (ref 8.4–10.5)
CHLORIDE SERPL-SCNC: 100 MMOL/L — SIGNIFICANT CHANGE UP (ref 96–108)
CO2 SERPL-SCNC: 27 MMOL/L — SIGNIFICANT CHANGE UP (ref 22–31)
CREAT SERPL-MCNC: 0.65 MG/DL — SIGNIFICANT CHANGE UP (ref 0.5–1.3)
EGFR: 91 ML/MIN/1.73M2 — SIGNIFICANT CHANGE UP
GLUCOSE BLDC GLUCOMTR-MCNC: 231 MG/DL — HIGH (ref 70–99)
GLUCOSE BLDC GLUCOMTR-MCNC: 234 MG/DL — HIGH (ref 70–99)
GLUCOSE SERPL-MCNC: 230 MG/DL — HIGH (ref 70–99)
HCT VFR BLD CALC: 38.9 % — SIGNIFICANT CHANGE UP (ref 34.5–45)
HGB BLD-MCNC: 12.6 G/DL — SIGNIFICANT CHANGE UP (ref 11.5–15.5)
MAGNESIUM SERPL-MCNC: 2 MG/DL — SIGNIFICANT CHANGE UP (ref 1.6–2.6)
MCHC RBC-ENTMCNC: 26.3 PG — LOW (ref 27–34)
MCHC RBC-ENTMCNC: 32.4 GM/DL — SIGNIFICANT CHANGE UP (ref 32–36)
MCV RBC AUTO: 81.2 FL — SIGNIFICANT CHANGE UP (ref 80–100)
NRBC # BLD: 0 /100 WBCS — SIGNIFICANT CHANGE UP (ref 0–0)
PHOSPHATE SERPL-MCNC: 3 MG/DL — SIGNIFICANT CHANGE UP (ref 2.5–4.5)
PLATELET # BLD AUTO: 396 K/UL — SIGNIFICANT CHANGE UP (ref 150–400)
POTASSIUM SERPL-MCNC: 4.5 MMOL/L — SIGNIFICANT CHANGE UP (ref 3.5–5.3)
POTASSIUM SERPL-SCNC: 4.5 MMOL/L — SIGNIFICANT CHANGE UP (ref 3.5–5.3)
RBC # BLD: 4.79 M/UL — SIGNIFICANT CHANGE UP (ref 3.8–5.2)
RBC # FLD: 14.3 % — SIGNIFICANT CHANGE UP (ref 10.3–14.5)
SODIUM SERPL-SCNC: 138 MMOL/L — SIGNIFICANT CHANGE UP (ref 135–145)
WBC # BLD: 8.88 K/UL — SIGNIFICANT CHANGE UP (ref 3.8–10.5)
WBC # FLD AUTO: 8.88 K/UL — SIGNIFICANT CHANGE UP (ref 3.8–10.5)

## 2022-11-28 PROCEDURE — 85025 COMPLETE CBC W/AUTO DIFF WBC: CPT

## 2022-11-28 PROCEDURE — 86901 BLOOD TYPING SEROLOGIC RH(D): CPT

## 2022-11-28 PROCEDURE — 74177 CT ABD & PELVIS W/CONTRAST: CPT | Mod: MA

## 2022-11-28 PROCEDURE — U0005: CPT

## 2022-11-28 PROCEDURE — 86850 RBC ANTIBODY SCREEN: CPT

## 2022-11-28 PROCEDURE — 84132 ASSAY OF SERUM POTASSIUM: CPT

## 2022-11-28 PROCEDURE — 83735 ASSAY OF MAGNESIUM: CPT

## 2022-11-28 PROCEDURE — U0003: CPT

## 2022-11-28 PROCEDURE — 85018 HEMOGLOBIN: CPT

## 2022-11-28 PROCEDURE — 80053 COMPREHEN METABOLIC PANEL: CPT

## 2022-11-28 PROCEDURE — 96365 THER/PROPH/DIAG IV INF INIT: CPT

## 2022-11-28 PROCEDURE — 36415 COLL VENOUS BLD VENIPUNCTURE: CPT

## 2022-11-28 PROCEDURE — 85027 COMPLETE CBC AUTOMATED: CPT

## 2022-11-28 PROCEDURE — 84295 ASSAY OF SERUM SODIUM: CPT

## 2022-11-28 PROCEDURE — 83036 HEMOGLOBIN GLYCOSYLATED A1C: CPT

## 2022-11-28 PROCEDURE — 82947 ASSAY GLUCOSE BLOOD QUANT: CPT

## 2022-11-28 PROCEDURE — 82803 BLOOD GASES ANY COMBINATION: CPT

## 2022-11-28 PROCEDURE — 85730 THROMBOPLASTIN TIME PARTIAL: CPT

## 2022-11-28 PROCEDURE — 71275 CT ANGIOGRAPHY CHEST: CPT | Mod: MA

## 2022-11-28 PROCEDURE — 85014 HEMATOCRIT: CPT

## 2022-11-28 PROCEDURE — 82435 ASSAY OF BLOOD CHLORIDE: CPT

## 2022-11-28 PROCEDURE — 84100 ASSAY OF PHOSPHORUS: CPT

## 2022-11-28 PROCEDURE — 82330 ASSAY OF CALCIUM: CPT

## 2022-11-28 PROCEDURE — 80048 BASIC METABOLIC PNL TOTAL CA: CPT

## 2022-11-28 PROCEDURE — 86900 BLOOD TYPING SEROLOGIC ABO: CPT

## 2022-11-28 PROCEDURE — 85610 PROTHROMBIN TIME: CPT

## 2022-11-28 PROCEDURE — 96368 THER/DIAG CONCURRENT INF: CPT

## 2022-11-28 PROCEDURE — 82962 GLUCOSE BLOOD TEST: CPT

## 2022-11-28 PROCEDURE — 83605 ASSAY OF LACTIC ACID: CPT

## 2022-11-28 PROCEDURE — 99285 EMERGENCY DEPT VISIT HI MDM: CPT | Mod: 25

## 2022-11-28 RX ADMIN — LOSARTAN POTASSIUM 50 MILLIGRAM(S): 100 TABLET, FILM COATED ORAL at 05:35

## 2022-11-28 RX ADMIN — Medication 4: at 13:19

## 2022-11-28 RX ADMIN — Medication 81 MILLIGRAM(S): at 05:35

## 2022-11-28 RX ADMIN — Medication 2.5 MILLIGRAM(S): at 12:41

## 2022-11-28 RX ADMIN — Medication 2.5 MILLIGRAM(S): at 05:33

## 2022-11-28 RX ADMIN — Medication 4: at 09:15

## 2022-11-28 RX ADMIN — CLOPIDOGREL BISULFATE 75 MILLIGRAM(S): 75 TABLET, FILM COATED ORAL at 12:38

## 2022-11-28 RX ADMIN — PANTOPRAZOLE SODIUM 40 MILLIGRAM(S): 20 TABLET, DELAYED RELEASE ORAL at 05:35

## 2022-11-28 RX ADMIN — Medication 81 MILLIGRAM(S): at 13:43

## 2022-11-28 RX ADMIN — ERTAPENEM SODIUM 120 MILLIGRAM(S): 1 INJECTION, POWDER, LYOPHILIZED, FOR SOLUTION INTRAMUSCULAR; INTRAVENOUS at 11:46

## 2022-11-28 NOTE — PROGRESS NOTE ADULT - ASSESSMENT
77y F PMH perforated diverticulitis, HTN, DM, breast cancer, cerebral aneurysm s/p coiling presenting with 5 days of worsening abdominal pain, treated as outpatient for uncomplicated diverticulitis, now presenting with Hinchey 1b perforated diverticulitis. CT with evidence of small intra-abdominal free air, tracking interloop abscess    PLAN:  - ID consult for abx management, 1 g ertapenem daily for 3 weeks  - LRD  - Lovenox for DVT ppx  - ASA & Plavix for cerebral stent  - Dispo: home today with VNS for midline abx    Red Surgery  [id number]

## 2022-11-28 NOTE — DISCHARGE NOTE PROVIDER - NSDCFUSCHEDAPPT_GEN_ALL_CORE_FT
Juan M Blancas  [facility] Physician [address]  Scheduled Appointment: 12/07/2022

## 2022-11-28 NOTE — DISCHARGE NOTE PROVIDER - CARE PROVIDER_API CALL
Juan M Funes)  ColonRectal Surgery; Surgery  [facility], [address] [place] [address]  Phone: [phone]  Fax: [phone]  Follow Up Time: Routine    Juan M Blancas)  ColonRectal Surgery; Surgery  [address] [facility], [address]  [place] [address]  Phone: [phone]  Fax: [phone]  Follow Up Time: 1 week

## 2022-11-28 NOTE — DISCHARGE NOTE PROVIDER - CARE PROVIDERS DIRECT ADDRESSES
[email or web address]

## 2022-11-28 NOTE — DISCHARGE NOTE PROVIDER - NSDCMRMEDTOKEN_GEN_ALL_CORE_FT
Aspirin Low Dose 81 mg oral delayed release tablet: 1 tab(s) orally 3 times a day  diazepam 5 mg oral tablet: 0.5 tab(s) orally once a day in the morning  diazePAM 5 mg oral tablet: 0.5 tab(s) orally once a day in the afternoon  diazePAM 5 mg oral tablet: 1 tab(s) orally once a day in the evening  dicyclomine 20 mg oral tablet: 1 tab(s) orally 4 times a day, As Needed  ertapenem 1 g injection: 1 gram(s) intravenously once a day   exemestane 25 mg oral tablet: 1 tab(s) orally once a day  Jardiance 10 mg oral tablet: 1 tab(s) orally once a day (in the morning)  losartan 50 mg oral tablet: 1 tab(s) orally once a day  metFORMIN 500 mg oral tablet, extended release: 1 tab(s) orally 2 times a day  montelukast 10 mg oral tablet: 1 tab(s) orally once a day (at bedtime)  Pepcid 20 mg oral tablet: 2 tab(s) orally once a day (at bedtime)  Plavix 75 mg oral tablet: 1 tab(s) orally once a day  SymlinPen 120 subcutaneous solution: 120 microgram(s) subcutaneous 3 times a day - 1/2/hour before meals  traZODone 100 mg oral tablet: 1 tab(s) orally once a day (at bedtime)  Tresiba 100 units/mL subcutaneous solution: 28 unit(s) subcutaneous once a day (at bedtime)  Victoza 18 mg/3 mL subcutaneous solution: 1.8 milligram(s) subcutaneous once a day- in morning before breakfast  Xyzal 5 mg oral tablet: 1 tab(s) orally once a day (in the evening)

## 2022-11-28 NOTE — DISCHARGE NOTE PROVIDER - NSDCHHNEEDSERVICE_GEN_ALL_CORE
Letter by Samuel Almonte MD at      Author: Samuel Almonte MD Service: -- Author Type: --    Filed:  Encounter Date: 2019 Status: (Other)       Patient: Shay Mendosa  :  1959      Shay,    My staff has been unsuccessful in reaching you by phone. You had stopped by the clinic on 19 with some questions regarding medications and your eyes.  I am writing you with my recommendations.    I do NOT think you should increase your spironolactone to more than 3 pills twice daily.  If your vision is deteriorating, you should discuss that further with your ophthalmologist.    If you have further questions, please contact my office.      Electronically Signed,      Samuel Almonte MD/alhaji         
Central venous access care/Other, specify...

## 2022-11-28 NOTE — PROGRESS NOTE ADULT - SUBJECTIVE AND OBJECTIVE BOX
Colorectal Surgery Daily Progress Note    SUBJECTIVE:  Pt seen and examined, and is resting comfortably in bed. No acute events overnight. Pain is adequately controlled on current regimen. Pt has no complaints at this time. +/+ for flatus and BM.     OBJECTIVE:  Vital Signs Last 24 Hrs  T(C): 36.7 (28 Nov 2022 05:00), Max: 36.9 (27 Nov 2022 13:28)  T(F): 98.1 (28 Nov 2022 05:00), Max: 98.4 (27 Nov 2022 13:28)  HR: 69 (28 Nov 2022 05:00) (69 - 80)  BP: 145/80 (28 Nov 2022 05:00) (124/70 - 164/82)  BP(mean): --  RR: 18 (28 Nov 2022 05:00) (17 - 18)  SpO2: 94% (28 Nov 2022 05:00) (93% - 98%)    Parameters below as of 28 Nov 2022 05:00  Patient On (Oxygen Delivery Method): room air        I&O's Detail    27 Nov 2022 07:01  -  28 Nov 2022 07:00  --------------------------------------------------------  IN:    IV PiggyBack: 100 mL    Oral Fluid: 940 mL  Total IN: 1040 mL    OUT:  Total OUT: 0 mL    Total NET: 1040 mL        Exam:  General: NAD, resting in bed comfortably.  Cardiac: warm and well perfused  Respiratory: Nonlabored respirations, normal cw expansion.  Abdomen: soft, mildly tender in lower quadrants, nondistended                           12.6   9.26  )-----------( 393      ( 27 Nov 2022 06:36 )             39.7       11-27    139  |  100  |  11  ----------------------------<  203<H>  4.4   |  27  |  0.68    Ca    9.5      27 Nov 2022 06:33  Phos  2.6     11-27  Mg     2.0     11-27

## 2022-11-28 NOTE — DISCHARGE NOTE PROVIDER - NSDCCPCAREPLAN_GEN_ALL_CORE_FT
PRINCIPAL DISCHARGE DIAGNOSIS  Diagnosis: Perforated diverticulum  Assessment and Plan of Treatment: DIET: Low fiber diet  NOTIFY YOUR SURGEON IF: You have any fever (over 100.4 F) or chills, persistent nausea/vomiting with inability to tolerate food or liquids, persistent diarrhea, or if your pain is not controlled.  FOLLOW-UP:  1. Please call to make a follow-up appointment within one week of discharge   2. Please follow up with your primary care physician in one week regarding your hospitalization.         PRINCIPAL DISCHARGE DIAGNOSIS  Diagnosis: Perforated diverticulum  Assessment and Plan of Treatment: DIET: Low fiber diet  NOTIFY YOUR SURGEON IF: You have any fever (over 100.4 F) or chills, persistent nausea/vomiting with inability to tolerate food or liquids, persistent diarrhea, or if your pain is not controlled.  FOLLOW-UP:  1. Please call to make a follow-up appointment within one week of discharge   2. Please follow up with your primary care physician in one week regarding your hospitalization.        SECONDARY DISCHARGE DIAGNOSES  Diagnosis: Type 2 diabetes mellitus  Assessment and Plan of Treatment: your blood sugars were elevated on admission  please follow up with your primary care doctor/endocrinologist as outpatient and monitor blood sugars at home

## 2022-11-28 NOTE — DISCHARGE NOTE NURSING/CASE MANAGEMENT/SOCIAL WORK - PATIENT PORTAL LINK FT
You can access the FollowMyHealth Patient Portal offered by [facility] by registering at the following website: [email or web address]. By joining [employer]’s FollowMyHealth portal, you will also be able to view your health information using other applications (apps) compatible with our system.

## 2022-11-28 NOTE — PROGRESS NOTE ADULT - PROVIDER SPECIALTY LIST ADULT
Colorectal Surgery
Colorectal Surgery
Surgery
Colorectal Surgery
Colorectal Surgery
Infectious Disease
Surgery

## 2022-11-28 NOTE — DISCHARGE NOTE PROVIDER - HOSPITAL COURSE
77y F PMH perforated diverticulitis, HTN, DM, breast cancer, cerebral aneurysm s/p coiling presenting with 5 days of worsening abdominal pain, treated as outpatient for uncomplicated diverticulitis, now presenting with Hinchey 1b perforated diverticulitis. CT with evidence of small intra-abdominal free air, tracking interloop abscess. Patient non-peritoneal on physical exam. Patient hemodynamically stable. Tachycardia on presentation to ED now resolved, believed to be related to anxiety vs. benzo withdrawal. Patient admitted to surgery team.     Patient started on IV antibiotics and made NPO. Diet was advanced as tolerated. Midline placed. Once symptomatically improved patient sent home with IV antibiotics. Patient to follow up with Dr. Blancas as outpatient for surgical planning. On day of discharge, the patient was tolerating diet, ambulating well and pain controlled.

## 2022-11-28 NOTE — DISCHARGE NOTE PROVIDER - PROVIDER TOKENS
PROVIDER:[TOKEN:[[id number]],FOLLOWUP:[Routine]],PROVIDER:[TOKEN:[[id number]],FOLLOWUP:[1 week]]

## 2022-11-29 ENCOUNTER — NON-APPOINTMENT (OUTPATIENT)
Age: 77
End: 2022-11-29

## 2022-11-30 ENCOUNTER — NON-APPOINTMENT (OUTPATIENT)
Age: 77
End: 2022-11-30

## 2022-12-02 ENCOUNTER — LABORATORY RESULT (OUTPATIENT)
Age: 77
End: 2022-12-02

## 2022-12-07 ENCOUNTER — APPOINTMENT (OUTPATIENT)
Dept: COLORECTAL SURGERY | Facility: CLINIC | Age: 77
End: 2022-12-07

## 2022-12-07 VITALS
HEIGHT: 62 IN | TEMPERATURE: 98.6 F | SYSTOLIC BLOOD PRESSURE: 128 MMHG | RESPIRATION RATE: 14 BRPM | HEART RATE: 88 BPM | BODY MASS INDEX: 24.84 KG/M2 | DIASTOLIC BLOOD PRESSURE: 80 MMHG | WEIGHT: 135 LBS

## 2022-12-07 PROCEDURE — 99214 OFFICE O/P EST MOD 30 MIN: CPT

## 2022-12-07 RX ORDER — OXYCODONE 5 MG/1
5 TABLET ORAL
Qty: 45 | Refills: 0 | Status: DISCONTINUED | COMMUNITY
Start: 2022-10-14 | End: 2022-12-07

## 2022-12-07 RX ORDER — COLESTIPOL HYDROCHLORIDE 1 G/1
1 TABLET ORAL DAILY
Refills: 0 | Status: DISCONTINUED | COMMUNITY
Start: 2022-05-19 | End: 2022-12-07

## 2022-12-08 ENCOUNTER — APPOINTMENT (OUTPATIENT)
Dept: INFECTIOUS DISEASE | Facility: CLINIC | Age: 77
End: 2022-12-08

## 2022-12-08 ENCOUNTER — LABORATORY RESULT (OUTPATIENT)
Age: 77
End: 2022-12-08

## 2022-12-08 ENCOUNTER — OUTPATIENT (OUTPATIENT)
Dept: OUTPATIENT SERVICES | Facility: HOSPITAL | Age: 77
LOS: 1 days | End: 2022-12-08
Payer: MEDICARE

## 2022-12-08 VITALS
RESPIRATION RATE: 14 BRPM | BODY MASS INDEX: 25.03 KG/M2 | SYSTOLIC BLOOD PRESSURE: 163 MMHG | HEART RATE: 105 BPM | HEIGHT: 62 IN | DIASTOLIC BLOOD PRESSURE: 81 MMHG | TEMPERATURE: 98.4 F | OXYGEN SATURATION: 98 % | WEIGHT: 136 LBS

## 2022-12-08 VITALS
DIASTOLIC BLOOD PRESSURE: 82 MMHG | SYSTOLIC BLOOD PRESSURE: 140 MMHG | TEMPERATURE: 98 F | WEIGHT: 132.94 LBS | OXYGEN SATURATION: 97 % | RESPIRATION RATE: 16 BRPM | HEART RATE: 78 BPM | HEIGHT: 62 IN

## 2022-12-08 DIAGNOSIS — Z98.51 TUBAL LIGATION STATUS: Chronic | ICD-10-CM

## 2022-12-08 DIAGNOSIS — Z29.9 ENCOUNTER FOR PROPHYLACTIC MEASURES, UNSPECIFIED: ICD-10-CM

## 2022-12-08 DIAGNOSIS — I72.9 ANEURYSM OF UNSPECIFIED SITE: ICD-10-CM

## 2022-12-08 DIAGNOSIS — Z98.89 OTHER SPECIFIED POSTPROCEDURAL STATES: Chronic | ICD-10-CM

## 2022-12-08 DIAGNOSIS — I67.1 CEREBRAL ANEURYSM, NONRUPTURED: Chronic | ICD-10-CM

## 2022-12-08 DIAGNOSIS — C44.91 BASAL CELL CARCINOMA OF SKIN, UNSPECIFIED: Chronic | ICD-10-CM

## 2022-12-08 DIAGNOSIS — I63.9 CEREBRAL INFARCTION, UNSPECIFIED: ICD-10-CM

## 2022-12-08 DIAGNOSIS — K57.92 DIVERTICULITIS OF INTESTINE, PART UNSPECIFIED, WITHOUT PERFORATION OR ABSCESS WITHOUT BLEEDING: ICD-10-CM

## 2022-12-08 DIAGNOSIS — Z01.818 ENCOUNTER FOR OTHER PREPROCEDURAL EXAMINATION: ICD-10-CM

## 2022-12-08 DIAGNOSIS — Z87.442 PERSONAL HISTORY OF URINARY CALCULI: Chronic | ICD-10-CM

## 2022-12-08 DIAGNOSIS — K57.20 DIVERTICULITIS OF LARGE INTESTINE WITH PERFORATION AND ABSCESS WITHOUT BLEEDING: ICD-10-CM

## 2022-12-08 LAB
ALBUMIN SERPL ELPH-MCNC: 4.5 G/DL — SIGNIFICANT CHANGE UP (ref 3.3–5)
ALP SERPL-CCNC: 100 U/L — SIGNIFICANT CHANGE UP (ref 40–120)
ALT FLD-CCNC: 11 U/L — SIGNIFICANT CHANGE UP (ref 10–45)
ANION GAP SERPL CALC-SCNC: 14 MMOL/L — SIGNIFICANT CHANGE UP (ref 5–17)
AST SERPL-CCNC: 14 U/L — SIGNIFICANT CHANGE UP (ref 10–40)
BILIRUB SERPL-MCNC: 0.3 MG/DL — SIGNIFICANT CHANGE UP (ref 0.2–1.2)
BLD GP AB SCN SERPL QL: NEGATIVE — SIGNIFICANT CHANGE UP
BUN SERPL-MCNC: 22 MG/DL — SIGNIFICANT CHANGE UP (ref 7–23)
CALCIUM SERPL-MCNC: 10.1 MG/DL — SIGNIFICANT CHANGE UP (ref 8.4–10.5)
CHLORIDE SERPL-SCNC: 101 MMOL/L — SIGNIFICANT CHANGE UP (ref 96–108)
CO2 SERPL-SCNC: 25 MMOL/L — SIGNIFICANT CHANGE UP (ref 22–31)
CREAT SERPL-MCNC: 0.7 MG/DL — SIGNIFICANT CHANGE UP (ref 0.5–1.3)
EGFR: 89 ML/MIN/1.73M2 — SIGNIFICANT CHANGE UP
GLUCOSE SERPL-MCNC: 139 MG/DL — HIGH (ref 70–99)
HCT VFR BLD CALC: 41.2 % — SIGNIFICANT CHANGE UP (ref 34.5–45)
HGB BLD-MCNC: 13.1 G/DL — SIGNIFICANT CHANGE UP (ref 11.5–15.5)
MCHC RBC-ENTMCNC: 25.9 PG — LOW (ref 27–34)
MCHC RBC-ENTMCNC: 31.8 GM/DL — LOW (ref 32–36)
MCV RBC AUTO: 81.6 FL — SIGNIFICANT CHANGE UP (ref 80–100)
NRBC # BLD: 0 /100 WBCS — SIGNIFICANT CHANGE UP (ref 0–0)
PLATELET # BLD AUTO: 510 K/UL — HIGH (ref 150–400)
POTASSIUM SERPL-MCNC: 4 MMOL/L — SIGNIFICANT CHANGE UP (ref 3.5–5.3)
POTASSIUM SERPL-SCNC: 4 MMOL/L — SIGNIFICANT CHANGE UP (ref 3.5–5.3)
PROT SERPL-MCNC: 7.3 G/DL — SIGNIFICANT CHANGE UP (ref 6–8.3)
RBC # BLD: 5.05 M/UL — SIGNIFICANT CHANGE UP (ref 3.8–5.2)
RBC # FLD: 14.6 % — HIGH (ref 10.3–14.5)
RH IG SCN BLD-IMP: NEGATIVE — SIGNIFICANT CHANGE UP
SODIUM SERPL-SCNC: 140 MMOL/L — SIGNIFICANT CHANGE UP (ref 135–145)
WBC # BLD: 8.44 K/UL — SIGNIFICANT CHANGE UP (ref 3.8–10.5)
WBC # FLD AUTO: 8.44 K/UL — SIGNIFICANT CHANGE UP (ref 3.8–10.5)

## 2022-12-08 PROCEDURE — 99214 OFFICE O/P EST MOD 30 MIN: CPT

## 2022-12-08 PROCEDURE — 80053 COMPREHEN METABOLIC PANEL: CPT

## 2022-12-08 PROCEDURE — 36415 COLL VENOUS BLD VENIPUNCTURE: CPT

## 2022-12-08 PROCEDURE — 83036 HEMOGLOBIN GLYCOSYLATED A1C: CPT

## 2022-12-08 PROCEDURE — 85027 COMPLETE CBC AUTOMATED: CPT

## 2022-12-08 PROCEDURE — G0463: CPT

## 2022-12-08 PROCEDURE — 86901 BLOOD TYPING SEROLOGIC RH(D): CPT

## 2022-12-08 PROCEDURE — 86850 RBC ANTIBODY SCREEN: CPT

## 2022-12-08 PROCEDURE — 87086 URINE CULTURE/COLONY COUNT: CPT

## 2022-12-08 PROCEDURE — 86900 BLOOD TYPING SEROLOGIC ABO: CPT

## 2022-12-08 RX ORDER — LEVOCETIRIZINE DIHYDROCHLORIDE 0.5 MG/ML
1 SOLUTION ORAL
Qty: 0 | Refills: 0 | DISCHARGE

## 2022-12-08 RX ORDER — DIAZEPAM 5 MG
1 TABLET ORAL
Qty: 0 | Refills: 0 | DISCHARGE

## 2022-12-08 RX ORDER — CIPROFLOXACIN LACTATE 400MG/40ML
400 VIAL (ML) INTRAVENOUS ONCE
Refills: 0 | Status: DISCONTINUED | OUTPATIENT
Start: 2022-12-13 | End: 2022-12-16

## 2022-12-08 NOTE — HISTORY OF PRESENT ILLNESS
[FreeTextEntry1] : 78yo WF with multiple comorbidities. Diverticulosis w/LGI bleed x2 in past. Last colonoscopy April 2022.\par \par November 2022 developed lower abdominal pain. Seen in [facility] ER. CT scan revealed uncomplicated diverticulitis. Pt discharged on oral ABX. Three days later pain persisted/developed fever. Admitted with diverticulitis w/abscess. Received IV ABX. Discharged with RUE PICC line for ABX at home. F/U appt with ID (Ro) tomorrow.\par \par Presently without pain/fever. Maintaining LRD. Daily formed BMs. Presents for consultation.

## 2022-12-08 NOTE — H&P PST ADULT - ADDITIONAL PE
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

## 2022-12-08 NOTE — H&P PST ADULT - NSANTHOSAYNRD_GEN_A_CORE
No. MAKNENA screening performed.  STOP BANG Legend: 0-2 = LOW Risk; 3-4 = INTERMEDIATE Risk; 5-8 = HIGH Risk

## 2022-12-08 NOTE — PHYSICAL EXAM
[Normal Breath Sounds] : Normal breath sounds [Normal Heart Sounds] : normal heart sounds [Normal Rate and Rhythm] : normal rate and rhythm [No Edema] : No edema [Alert] : alert [Oriented to Person] : oriented to person [Oriented to Place] : oriented to place [Oriented to Time] : oriented to time [Anxious] : anxious [de-identified] : round soft +BS NT/ND [de-identified] : NC/AT [de-identified] : +ROM. RUE [facility] [de-identified] : intact

## 2022-12-08 NOTE — H&P PST ADULT - NSICDXPROCEDURE_GEN_ALL_CORE_FT
PROCEDURES:  Laparoscopic low anterior resection 08-Dec-2022 16:35:53 Diverticulitis Alejandro Marie

## 2022-12-08 NOTE — H&P PST ADULT - ASSESSMENT
77 year old RHD female with h/o diverticulitis with recent loss of weight of about 15 pounds in 3 months,  s/p hospitalised on 2022 with perforated diverticulitis, treated,  currently on Ertapenam 1 gm IV daily via Rt SL PICC line until22,  now presents for scheduled ERAS, Laparoscopic Anterior Resection on 2022.  Preop covid test on 2022 at [facility].    CAPRINI SCORE [CLOT]    AGE RELATED RISK FACTORS                                                       MOBILITY RELATED FACTORS  [ ] Age 41-60 years                                            (1 Point)                  [ ] Bed rest                                                        (1 Point)  [ ] Age: 61-74 years                                           (2 Points)                 [ ] Plaster cast                                                   (2 Points)  [x ] Age= 75 years                                              (3 Points)                 [ ] Bed bound for more than 72 hours                 (2 Points)    DISEASE RELATED RISK FACTORS                                               GENDER SPECIFIC FACTORS  [ ] Edema in the lower extremities                       (1 Point)                  [ ] Pregnancy                                                     (1 Point)  [ ] Varicose veins                                               (1 Point)                  [ ] Post-partum < 6 weeks                                   (1 Point)             [ ] BMI > 25 Kg/m2                                            (1 Point)                  [ ] Hormonal therapy  or oral contraception          (1 Point)                 [ ] Sepsis (in the previous month)                        (1 Point)                  [ ] History of pregnancy complications                 (1 point)  [ ] Pneumonia or serious lung disease                                               [ ] Unexplained or recurrent                     (1 Point)           (in the previous month)                               (1 Point)  [ ] Abnormal pulmonary function test                     (1 Point)                 SURGERY RELATED RISK FACTORS  [ ] Acute myocardial infarction                              (1 Point)                 [ ]  Section                                             (1 Point)  [ ] Congestive heart failure (in the previous month)  (1 Point)               [ ] Minor surgery                                                  (1 Point)   [ ] Inflammatory bowel disease                             (1 Point)                 [ ] Arthroscopic surgery                                        (2 Points)  [ ] Central venous access                                      (2 Points)                [x ] General surgery lasting more than 45 minutes   (2 Points)       [ ] Stroke (in the previous month)                          (5 Points)               [ ] Elective arthroplasty                                         (5 Points)                                                                                                                                               HEMATOLOGY RELATED FACTORS                                                 TRAUMA RELATED RISK FACTORS  [ ] Prior episodes of VTE                                     (3 Points)                [ ] Fracture of the hip, pelvis, or leg                       (5 Points)  [ ] Positive family history for VTE                         (3 Points)                 [ ] Acute spinal cord injury (in the previous month)  (5 Points)  [ ] Prothrombin 32921 A                                     (3 Points)                 [ ] Paralysis  (less than 1 month)                             (5 Points)  [ ] Factor V Leiden                                             (3 Points)                  [ ] Multiple Trauma within 1 month                        (5 Points)  [ ] Lupus anticoagulants                                     (3 Points)                                                           [ ] Anticardiolipin antibodies                               (3 Points)                                                       [ ] High homocysteine in the blood                      (3 Points)                                             [ ] Other congenital or acquired thrombophilia      (3 Points)                                                [ ] Heparin induced thrombocytopenia                  (3 Points)                                          Total Score [      5    ]    Caprini Score 0 - 2:  Low Risk, No VTE Prophylaxis required for most patients, encourage ambulation  Caprini Score 3 - 6:  At Risk, pharmacologic VTE prophylaxis is indicated for most patients (in the absence of a contraindication)  Caprini Score Greater than or = 7:  High Risk, pharmacologic VTE prophylaxis is indicated for most patients (in the absence of a contraindication)

## 2022-12-08 NOTE — H&P PST ADULT - PROBLEM SELECTOR PLAN 1
lower back pain since Friday work ERAS, Laparoscopic Anterior Resection on 12/13/2022.  Preop covid test on 12/09/2022 at [facility].  Will follow up with labs/ fingerstick. No insulin in AM of surgery. Preop HgA1c ordered Instructed to hold metformin ( last dose on 12/12 AM  & Jardiance 12/9 am, Tresiba 22 units pm 12/12,    STAT FS  on the day of surgery ordered. advised to confirm  medication doses with endocrinologist

## 2022-12-08 NOTE — H&P PST ADULT - NSICDXPASTSURGICALHX_GEN_ALL_CORE_FT
PAST SURGICAL HISTORY:  Basal cell carcinoma removed - left eyelid- 3/15    Cerebral aneurysm repair with coil and stent - 11/12    H/O shoulder surgery right - 5/2001    H/O tubal ligation     History of D&C     History of kidney stones 2001

## 2022-12-08 NOTE — ASSESSMENT
[FreeTextEntry1] : 77-year-old female with multiple medical comorbidities who presents for consultation regarding complicated diverticulitis.\par \par Patient had actually 2 episodes of lower GI bleed thought secondary to diverticulosis in the past.  One of these was treated with a clip at the bleeding site and the second resolved spontaneously.  Her most recent colonoscopy was in 2022 revealing diverticulosis.  In November she developed significant left lower quadrant abdominal pain.  She was evaluated in the emergency room and a CAT scan revealed noncomplicated diverticulitis.  She was discharged home on oral antibiotics.  She returned a few days later with persistent abdominal pain.  A repeat CAT scan revealed complicated diverticulitis with an associated abscess.  The abscess was not amenable to percutaneous drainage.  This process seem to possibly involve the right tube and ovary and small bowel loops.  Eventually her pain improved and we arranged for discharge home with a PICC line in place and 2 weeks of intravenous antibiotics which she is currently on.  She states that she does feel better at this point.  She denies fever or chills.\par \par Her past medical history includes the clipping and stenting of a cerebral aneurysm 12 years ago.  She does have diabetes and is on multiple medications.  Her past surgical history includes the aforementioned cerebral aneurysm clipping and stenting.  She also had a right breast carcinoma and received postoperative chemo and radiation.  She has never had abdominal surgery.\par \par I had a long discussion with the patient and her  regarding the etiology of complicated diverticulitis.  Her abscess is not amenable to percutaneous drainage and therefore she requires resection.  We discussed both laparoscopic and open approaches.  We discussed anticipated operative time, hospital stay and time to complete recuperation with both approaches.  Risks, alternatives and benefits including but not limited to anastomotic leak, wound infection and deep venous thrombosis were discussed.  The possible need for temporary either colostomy or ileostomy was also discussed.\par \par I have asked her to stop her blood thinners at this point.  She will obtain a medical clearance from her primary care physician.  She is to complete the 14 days of intravenous antibiotics and we will schedule surgery.  Questions were answered and she voiced understanding.

## 2022-12-08 NOTE — REVIEW OF SYSTEMS
[Feeling Tired] : feeling tired [As Noted in HPI] : as noted in HPI [Easy Bleeding] : a tendency for easy bleeding [Easy Bruising] : a tendency for easy bruising [Negative] : Psychiatric [Chest Pain] : no chest pain [Shortness Of Breath] : no shortness of breath [FreeTextEntry9] : joint pains [de-identified] : AM FS range @90

## 2022-12-08 NOTE — H&P PST ADULT - NEGATIVE PSYCHIATRIC SYMPTOMS
no depression/no anxiety/no insomnia/no memory loss/no paranoia/no mood swings/no agitation/no visual hallucinations/no auditory hallucinations

## 2022-12-08 NOTE — H&P PST ADULT - GASTROINTESTINAL COMMENTS
mostly diarrhea, IBS, was hospitalised with abdominal pain & diverticulitis perforation, last dose Ertapenam IV monday 12/12/22

## 2022-12-09 ENCOUNTER — APPOINTMENT (OUTPATIENT)
Dept: GERIATRICS | Facility: CLINIC | Age: 77
End: 2022-12-09

## 2022-12-09 ENCOUNTER — LABORATORY RESULT (OUTPATIENT)
Age: 77
End: 2022-12-09

## 2022-12-09 ENCOUNTER — NON-APPOINTMENT (OUTPATIENT)
Age: 77
End: 2022-12-09

## 2022-12-09 ENCOUNTER — OUTPATIENT (OUTPATIENT)
Dept: OUTPATIENT SERVICES | Facility: HOSPITAL | Age: 77
LOS: 1 days | End: 2022-12-09
Payer: MEDICARE

## 2022-12-09 VITALS
OXYGEN SATURATION: 99 % | DIASTOLIC BLOOD PRESSURE: 58 MMHG | RESPIRATION RATE: 15 BRPM | BODY MASS INDEX: 25.21 KG/M2 | WEIGHT: 137 LBS | HEART RATE: 92 BPM | HEIGHT: 62 IN | SYSTOLIC BLOOD PRESSURE: 142 MMHG | TEMPERATURE: 97.9 F

## 2022-12-09 DIAGNOSIS — I67.1 CEREBRAL ANEURYSM, NONRUPTURED: Chronic | ICD-10-CM

## 2022-12-09 DIAGNOSIS — Z98.51 TUBAL LIGATION STATUS: Chronic | ICD-10-CM

## 2022-12-09 DIAGNOSIS — Z11.52 ENCOUNTER FOR SCREENING FOR COVID-19: ICD-10-CM

## 2022-12-09 DIAGNOSIS — Z98.89 OTHER SPECIFIED POSTPROCEDURAL STATES: Chronic | ICD-10-CM

## 2022-12-09 DIAGNOSIS — Z01.818 ENCOUNTER FOR OTHER PREPROCEDURAL EXAMINATION: ICD-10-CM

## 2022-12-09 DIAGNOSIS — Z87.442 PERSONAL HISTORY OF URINARY CALCULI: Chronic | ICD-10-CM

## 2022-12-09 DIAGNOSIS — C44.91 BASAL CELL CARCINOMA OF SKIN, UNSPECIFIED: Chronic | ICD-10-CM

## 2022-12-09 DIAGNOSIS — K57.20 DIVERTICULITIS OF LARGE INTESTINE WITH PERFORATION AND ABSCESS W/OUT BLEEDING: ICD-10-CM

## 2022-12-09 LAB
A1C WITH ESTIMATED AVERAGE GLUCOSE RESULT: 7.2 % — HIGH (ref 4–5.6)
CULTURE RESULTS: NO GROWTH — SIGNIFICANT CHANGE UP
ESTIMATED AVERAGE GLUCOSE: 160 MG/DL — HIGH (ref 68–114)
SARS-COV-2 RNA SPEC QL NAA+PROBE: SIGNIFICANT CHANGE UP
SPECIMEN SOURCE: SIGNIFICANT CHANGE UP

## 2022-12-09 PROCEDURE — U0003: CPT

## 2022-12-09 PROCEDURE — 99215 OFFICE O/P EST HI 40 MIN: CPT

## 2022-12-09 PROCEDURE — U0005: CPT

## 2022-12-09 PROCEDURE — C9803: CPT

## 2022-12-09 RX ORDER — EMPAGLIFLOZIN 10 MG/1
10 TABLET, FILM COATED ORAL DAILY
Refills: 0 | Status: ACTIVE | COMMUNITY
Start: 2017-06-19

## 2022-12-09 RX ORDER — OMEGA-3/DHA/EPA/FISH OIL 300-1000MG
400 CAPSULE ORAL
Qty: 90 | Refills: 2 | Status: DISCONTINUED | COMMUNITY
Start: 2021-06-24 | End: 2022-12-09

## 2022-12-09 NOTE — ASSESSMENT
[FreeTextEntry1] : Sigmoid diverticulitis, tried Cipro and Flagyl 11/17. \par Came back 11/21 with worsening pain, perforation, multiloculated abscess 6.4 x 2.6 x 5.7 cm. \par Has numerous drug intolerances. \par Went home on Ertapenem 11/28 to finish 4 weeks on 12/18. \par Now planned for OR resection 12/13. \par Doing well and midline is working. \par \par Plan\par -Ertapenem for now with weekly CBC, BUN, creatinine \par -if surgery goes well can finish a 4-5 day post op course with PO Cipro/Flagyl, otherwise already scheduled for Ertapenem through 12/18 \par -spoke with Dr Blancas \par -received Flu shot already this season

## 2022-12-09 NOTE — HISTORY OF PRESENT ILLNESS
[Scheduled Procedure ___] : a [unfilled] [Date of Surgery ___] : on [unfilled] [Surgeon Name ___] : surgeon: [unfilled] [Completely Independent] : Completely independent. [0] : 2) Feeling down, depressed, or hopeless: Not at all (0) [PHQ-2 Negative - No further assessment needed] : PHQ-2 Negative - No further assessment needed [Diabetes] : diabetes [Anti-Platelet Agents] : anti-platelet agents [GI Disease] : gastrointestinal disease [Prior Anesthesia] : Prior anesthesia [Electrocardiogram] : ~T an ECG ~C was performed [Preoperative Visit] : for a medical evaluation prior to surgery [Poor] : Poor [Fatigue] : fatigue [With Patient/Caregiver] : , with patient/caregiver [Reviewed no changes] : Reviewed, no changes [Fever] : no fever [Chills] : no chills [Chest Pain] : no chest pain [Cough] : no cough [Dyspnea] : no dyspnea [Dysuria] : no dysuria [Urinary Frequency] : no urinary frequency [Nausea] : no nausea [Vomiting] : no vomiting [Diarrhea] : no diarrhea [Abdominal Pain] : no abdominal pain [Easy Bruising] : no easy bruising [Lower Extremity Swelling] : no lower extremity swelling [Poor Exercise Tolerance] : no poor exercise tolerance [Cardiovascular Disease] : no cardiovascular disease [Pulmonary Disease] : no pulmonary disease [Nicotine Dependence] : no nicotine dependence [Renal Disease] : no renal disease [Sleep Apnea] : no sleep apnea [Thromboembolic Problems] : no thromboembolic problems [Clotting Disorder] : no clotting disorder [Frequent use of NSAIDs] : no use of NSAIDs [Bleeding Disorder] : no bleeding disorder [Transfusion Reaction] : no transfusion reaction [Frequent Aspirin Use] : no frequent aspirin use [Prev Anesthesia Reaction] : no previous anesthesia reaction [Anesthesia Reaction] : no anesthesia reaction [FreeTextEntry1] : TORRIE SHETH is a 78 yo woman with PMH of diabetes, HTN,  hx of breast CA, diverticulosis with hx of diverticulitis with perforation, and tongue lesion who presents today for preoperative clearance. \par \par Patient is scheduled to have colon resection on 12/13/22 with Dr. Blancas. States that while she feels very tired, she is overall doing ok. Denies abdominal pain, nausea, or vomiting. \par \par Diverticulitis:\par -Patient had actually 2 episodes of lower GI bleed thought secondary to diverticulosis in the past; one of these was treated with a clip at the bleeding site and the second resolved spontaneously\par -was seen on 11/17 for BP check, found to have acute abdomen. Sent to ER for evaluation where CAT scan revealed noncomplicated diverticulitis.  She was discharged home on oral antibiotics.  \par -She returned a few days later with persistent abdominal pain.  A repeat CAT scan revealed complicated diverticulitis with an associated abscess.  The abscess was not amenable to percutaneous drainage.  This process seem to possibly involve the right tube and ovary and small bowel loops\par -pain improved and discharged with a PICC line in place and 2 weeks of intravenous antibiotics which she is currently on\par -patient was seen by Dr. Astudillo on 12/7 to discuss colon resection which will be on 12/13; went to presurgical testing yesterday\par -blood work reviewed; unremarkable \par ___________________________________________________________________________________________________________\par T2DM:\par -on complicated DM regimen; follows with Dr. De La Fuente\par -jardiance, Tresiba, metformin,Symlin, and Victoza\par -A1C was 7.2% from yesterday 7.2%\par \par hx of CVA:\par -clipping/stenting of cerebral aneurysm 12 years ago \par -on asa 81mg tid (did not tolerate high dose) and plavix\par -currently holding as per Dr. Verdugo prior to surgery \par \par HTN:\par -on losartan 50mg qd\par \par \par  [AdvancecareDate] : 12/22 [FreeTextEntry4] : primary HCP: Kaiden Gray ()\par secondary HCP: Jean Gray\par  [Driving Concerns] : not driving or driving without noted concerns [de-identified] : 6 [de-identified] : 8 [[id number]] : 0

## 2022-12-09 NOTE — REVIEW OF SYSTEMS
[Feeling Tired] : feeling tired [Anxiety] : anxiety [Depression] : depression [Feeling Poorly] : not feeling poorly [Discharge From Eyes] : no purulent discharge from the eyes [Dry Eyes] : no dryness of the eyes [Nasal Discharge] : no nasal discharge [Sore Throat] : no sore throat [Chest Pain] : no chest pain [Palpitations] : no palpitations [Shortness Of Breath] : no shortness of breath [Wheezing] : no wheezing [Cough] : no cough [SOB on Exertion] : no shortness of breath during exertion [Abdominal Pain] : no abdominal pain [Vomiting] : no vomiting [Constipation] : no constipation [Diarrhea] : no diarrhea [Dysuria] : no dysuria [Limb Pain] : no limb pain [Dizziness] : no dizziness

## 2022-12-09 NOTE — ASSESSMENT
[Procedure High Risk] : the procedure risk is high [Patient Intermediate Risk] : the patient is an intermediate risk [Optimized for Surgery] : the patient is optimized for surgery [As per surgery] : as per surgery [Continue] : Continue medications as currently directed [FreeTextEntry1] : follow up after surgery\par \par Yarelis Best, FNP-BC

## 2022-12-09 NOTE — PHYSICAL EXAM
[Alert] : alert [Sclera] : the sclera and conjunctiva were normal [EOMI] : extraocular movements were intact [PERRL] : pupils were equal in size, round, and reactive to light [Normal Oral Mucosa] : normal oral mucosa [Normal Outer Ear/Nose] : the ears and nose were normal in appearance [Normal Appearance] : the appearance of the neck was normal [Supple] : the neck was supple [No Respiratory Distress] : no respiratory distress [No Acc Muscle Use] : no accessory muscle use [Respiration, Rhythm And Depth] : normal respiratory rhythm and effort [Auscultation Breath Sounds / Voice Sounds] : lungs were clear to auscultation bilaterally [Normal S1, S2] : normal S1 and S2 [Heart Rate And Rhythm] : heart rate was normal and rhythm regular [Edema] : edema was not present [Pedal Pulses Normal] : the pedal pulses are present [Cervical Lymph Nodes Enlarged Posterior Bilaterally] : posterior cervical [Cervical Lymph Nodes Enlarged Anterior Bilaterally] : anterior cervical, supraclavicular [No CVA Tenderness] : no CVA  tenderness [No Spinal Tenderness] : no spinal tenderness [Normal Gait] : normal gait [Normal Color / Pigmentation] : normal skin color and pigmentation [No Focal Deficits] : no focal deficits [Oriented To Time, Place, And Person] : oriented to person, place, and time [Normal Affect] : the affect was normal [Normal Insight/Judgment] : insight and judgment were intact [Normal Mood] : the mood was normal [No Acute Distress] : in no acute distress [Bowel Sounds] : normal bowel sounds [Abdomen Tenderness] : non-tender [Abdomen Soft] : soft

## 2022-12-09 NOTE — REVIEW OF SYSTEMS
[Fever] : no fever [Chills] : no chills [Shortness Of Breath] : no shortness of breath [Cough] : no cough [Abdominal Pain] : no abdominal pain [Diarrhea] : no diarrhea

## 2022-12-09 NOTE — PHYSICAL EXAM
[General Appearance - Alert] : alert [General Appearance - In No Acute Distress] : in no acute distress [] : no respiratory distress [Respiration, Rhythm And Depth] : normal respiratory rhythm and effort [Auscultation Breath Sounds / Voice Sounds] : lungs were clear to auscultation bilaterally [Bowel Sounds] : normal bowel sounds [Abdomen Soft] : soft [Abdomen Tenderness] : non-tender [FreeTextEntry1] : right arm midline site not inflamed  [No Focal Deficits] : no focal deficits

## 2022-12-09 NOTE — HISTORY OF PRESENT ILLNESS
[FreeTextEntry1] : Sigmoid diverticulitis, tried Cipro and Flagyl 11/17. \par Came back 11/21 with worsening pain, perforation, multiloculated abscess 6.4 x 2.6 x 5.7 cm. \par Surgery deferred until later, went home on Ertapenem 11/28 to finish 4 weeks on 12/18. \par Doing well, no pain, diarrhea, fevers or chills. \par Midline is working well, no issues. \par  accompanied her. \par Now planned for OR resection 12/13 with colorectal.

## 2022-12-12 ENCOUNTER — TRANSCRIPTION ENCOUNTER (OUTPATIENT)
Age: 77
End: 2022-12-12

## 2022-12-13 ENCOUNTER — RESULT REVIEW (OUTPATIENT)
Age: 77
End: 2022-12-13

## 2022-12-13 ENCOUNTER — APPOINTMENT (OUTPATIENT)
Dept: COLORECTAL SURGERY | Facility: HOSPITAL | Age: 77
End: 2022-12-13
Payer: MEDICARE

## 2022-12-13 ENCOUNTER — INPATIENT (INPATIENT)
Facility: HOSPITAL | Age: 77
LOS: 2 days | Discharge: HOME | DRG: 329 | End: 2022-12-16
Attending: SURGERY | Admitting: SURGERY
Payer: MEDICARE

## 2022-12-13 VITALS
OXYGEN SATURATION: 100 % | TEMPERATURE: 98 F | HEIGHT: 62 IN | WEIGHT: 132.94 LBS | RESPIRATION RATE: 17 BRPM | HEART RATE: 89 BPM | SYSTOLIC BLOOD PRESSURE: 168 MMHG | DIASTOLIC BLOOD PRESSURE: 66 MMHG

## 2022-12-13 DIAGNOSIS — Z98.89 OTHER SPECIFIED POSTPROCEDURAL STATES: Chronic | ICD-10-CM

## 2022-12-13 DIAGNOSIS — C44.91 BASAL CELL CARCINOMA OF SKIN, UNSPECIFIED: Chronic | ICD-10-CM

## 2022-12-13 DIAGNOSIS — Z98.51 TUBAL LIGATION STATUS: Chronic | ICD-10-CM

## 2022-12-13 DIAGNOSIS — I67.1 CEREBRAL ANEURYSM, NONRUPTURED: Chronic | ICD-10-CM

## 2022-12-13 DIAGNOSIS — Z87.442 PERSONAL HISTORY OF URINARY CALCULI: Chronic | ICD-10-CM

## 2022-12-13 DIAGNOSIS — K57.20 DIVERTICULITIS OF LARGE INTESTINE WITH PERFORATION AND ABSCESS WITHOUT BLEEDING: ICD-10-CM

## 2022-12-13 LAB
GLUCOSE BLDC GLUCOMTR-MCNC: 149 MG/DL — HIGH (ref 70–99)
GLUCOSE BLDC GLUCOMTR-MCNC: 169 MG/DL — HIGH (ref 70–99)
GLUCOSE BLDC GLUCOMTR-MCNC: 172 MG/DL — HIGH (ref 70–99)
GLUCOSE BLDC GLUCOMTR-MCNC: 182 MG/DL — HIGH (ref 70–99)

## 2022-12-13 PROCEDURE — 44310 ILEOSTOMY/JEJUNOSTOMY: CPT

## 2022-12-13 PROCEDURE — 88307 TISSUE EXAM BY PATHOLOGIST: CPT | Mod: 26

## 2022-12-13 PROCEDURE — 45020 DRAINAGE OF RECTAL ABSCESS: CPT

## 2022-12-13 PROCEDURE — 44120 REMOVAL OF SMALL INTESTINE: CPT

## 2022-12-13 PROCEDURE — 44145 PARTIAL REMOVAL OF COLON: CPT

## 2022-12-13 PROCEDURE — 58720 REMOVAL OF OVARY/TUBE(S): CPT

## 2022-12-13 PROCEDURE — 45300 PROCTOSIGMOIDOSCOPY DX: CPT

## 2022-12-13 PROCEDURE — 52005 CYSTO W/URTRL CATHJ: CPT

## 2022-12-13 DEVICE — STAPLER ETHICON PROXIMATE 75MM WITH BLUE RELOAD: Type: IMPLANTABLE DEVICE | Status: FUNCTIONAL

## 2022-12-13 DEVICE — STAPLER COVIDIEN TA 90 BLUE: Type: IMPLANTABLE DEVICE | Status: FUNCTIONAL

## 2022-12-13 DEVICE — URETERAL CATH WHISTLE TIP 5FR LEFT: Type: IMPLANTABLE DEVICE | Status: FUNCTIONAL

## 2022-12-13 DEVICE — VISTASEAL FIBRIN HUMAN 4ML: Type: IMPLANTABLE DEVICE | Status: FUNCTIONAL

## 2022-12-13 DEVICE — STAPLER COVIDIEN PURSTRING 65MM: Type: IMPLANTABLE DEVICE | Status: FUNCTIONAL

## 2022-12-13 DEVICE — STAPLER CONTOUR CURVED WITH BLUE CART: Type: IMPLANTABLE DEVICE | Status: FUNCTIONAL

## 2022-12-13 DEVICE — STAPLER ETHICON CIRCULAR XL 29MM: Type: IMPLANTABLE DEVICE | Status: FUNCTIONAL

## 2022-12-13 DEVICE — STAPLER ETHICON PROXIMATE RELOAD 75MM BLUE: Type: IMPLANTABLE DEVICE | Status: FUNCTIONAL

## 2022-12-13 RX ORDER — SODIUM CHLORIDE 9 MG/ML
1000 INJECTION, SOLUTION INTRAVENOUS
Refills: 0 | Status: DISCONTINUED | OUTPATIENT
Start: 2022-12-13 | End: 2022-12-15

## 2022-12-13 RX ORDER — NALOXONE HYDROCHLORIDE 4 MG/.1ML
0.1 SPRAY NASAL
Refills: 0 | Status: DISCONTINUED | OUTPATIENT
Start: 2022-12-13 | End: 2022-12-14

## 2022-12-13 RX ORDER — GABAPENTIN 400 MG/1
600 CAPSULE ORAL ONCE
Refills: 0 | Status: COMPLETED | OUTPATIENT
Start: 2022-12-13 | End: 2022-12-13

## 2022-12-13 RX ORDER — INSULIN LISPRO 100/ML
VIAL (ML) SUBCUTANEOUS
Refills: 0 | Status: DISCONTINUED | OUTPATIENT
Start: 2022-12-13 | End: 2022-12-16

## 2022-12-13 RX ORDER — HYDROMORPHONE HYDROCHLORIDE 2 MG/ML
0.5 INJECTION INTRAMUSCULAR; INTRAVENOUS; SUBCUTANEOUS EVERY 4 HOURS
Refills: 0 | Status: DISCONTINUED | OUTPATIENT
Start: 2022-12-13 | End: 2022-12-15

## 2022-12-13 RX ORDER — GLUCAGON INJECTION, SOLUTION 0.5 MG/.1ML
1 INJECTION, SOLUTION SUBCUTANEOUS ONCE
Refills: 0 | Status: DISCONTINUED | OUTPATIENT
Start: 2022-12-13 | End: 2022-12-16

## 2022-12-13 RX ORDER — SODIUM CHLORIDE 9 MG/ML
3 INJECTION INTRAMUSCULAR; INTRAVENOUS; SUBCUTANEOUS EVERY 8 HOURS
Refills: 0 | Status: DISCONTINUED | OUTPATIENT
Start: 2022-12-13 | End: 2022-12-13

## 2022-12-13 RX ORDER — DEXTROSE 50 % IN WATER 50 %
25 SYRINGE (ML) INTRAVENOUS ONCE
Refills: 0 | Status: DISCONTINUED | OUTPATIENT
Start: 2022-12-13 | End: 2022-12-16

## 2022-12-13 RX ORDER — ACETAMINOPHEN 500 MG
1000 TABLET ORAL EVERY 6 HOURS
Refills: 0 | Status: DISCONTINUED | OUTPATIENT
Start: 2022-12-15 | End: 2022-12-16

## 2022-12-13 RX ORDER — LOSARTAN POTASSIUM 100 MG/1
50 TABLET, FILM COATED ORAL DAILY
Refills: 0 | Status: DISCONTINUED | OUTPATIENT
Start: 2022-12-13 | End: 2022-12-16

## 2022-12-13 RX ORDER — TRAZODONE HCL 50 MG
100 TABLET ORAL AT BEDTIME
Refills: 0 | Status: DISCONTINUED | OUTPATIENT
Start: 2022-12-13 | End: 2022-12-16

## 2022-12-13 RX ORDER — OXYCODONE HYDROCHLORIDE 5 MG/1
2.5 TABLET ORAL EVERY 4 HOURS
Refills: 0 | Status: DISCONTINUED | OUTPATIENT
Start: 2022-12-13 | End: 2022-12-16

## 2022-12-13 RX ORDER — DEXTROSE 50 % IN WATER 50 %
15 SYRINGE (ML) INTRAVENOUS ONCE
Refills: 0 | Status: DISCONTINUED | OUTPATIENT
Start: 2022-12-13 | End: 2022-12-16

## 2022-12-13 RX ORDER — HYDROMORPHONE HYDROCHLORIDE 2 MG/ML
0.5 INJECTION INTRAMUSCULAR; INTRAVENOUS; SUBCUTANEOUS
Refills: 0 | Status: DISCONTINUED | OUTPATIENT
Start: 2022-12-13 | End: 2022-12-13

## 2022-12-13 RX ORDER — SODIUM CHLORIDE 9 MG/ML
1000 INJECTION, SOLUTION INTRAVENOUS
Refills: 0 | Status: DISCONTINUED | OUTPATIENT
Start: 2022-12-13 | End: 2022-12-16

## 2022-12-13 RX ORDER — FENTANYL CITRATE 50 UG/ML
50 INJECTION INTRAVENOUS
Refills: 0 | Status: DISCONTINUED | OUTPATIENT
Start: 2022-12-13 | End: 2022-12-13

## 2022-12-13 RX ORDER — DEXTROSE 50 % IN WATER 50 %
12.5 SYRINGE (ML) INTRAVENOUS ONCE
Refills: 0 | Status: DISCONTINUED | OUTPATIENT
Start: 2022-12-13 | End: 2022-12-16

## 2022-12-13 RX ORDER — INSULIN LISPRO 100/ML
VIAL (ML) SUBCUTANEOUS AT BEDTIME
Refills: 0 | Status: DISCONTINUED | OUTPATIENT
Start: 2022-12-13 | End: 2022-12-16

## 2022-12-13 RX ORDER — LIDOCAINE HCL 20 MG/ML
0.2 VIAL (ML) INJECTION ONCE
Refills: 0 | Status: COMPLETED | OUTPATIENT
Start: 2022-12-13 | End: 2022-12-13

## 2022-12-13 RX ORDER — NALBUPHINE HYDROCHLORIDE 10 MG/ML
2.5 INJECTION, SOLUTION INTRAMUSCULAR; INTRAVENOUS; SUBCUTANEOUS EVERY 6 HOURS
Refills: 0 | Status: DISCONTINUED | OUTPATIENT
Start: 2022-12-13 | End: 2022-12-14

## 2022-12-13 RX ORDER — OXYCODONE HYDROCHLORIDE 5 MG/1
5 TABLET ORAL EVERY 4 HOURS
Refills: 0 | Status: DISCONTINUED | OUTPATIENT
Start: 2022-12-13 | End: 2022-12-16

## 2022-12-13 RX ORDER — MORPHINE SULFATE 50 MG/1
0.15 CAPSULE, EXTENDED RELEASE ORAL ONCE
Refills: 0 | Status: DISCONTINUED | OUTPATIENT
Start: 2022-12-13 | End: 2022-12-14

## 2022-12-13 RX ORDER — PANTOPRAZOLE SODIUM 20 MG/1
40 TABLET, DELAYED RELEASE ORAL
Refills: 0 | Status: DISCONTINUED | OUTPATIENT
Start: 2022-12-13 | End: 2022-12-16

## 2022-12-13 RX ORDER — MONTELUKAST 4 MG/1
10 TABLET, CHEWABLE ORAL AT BEDTIME
Refills: 0 | Status: DISCONTINUED | OUTPATIENT
Start: 2022-12-13 | End: 2022-12-16

## 2022-12-13 RX ORDER — ONDANSETRON 8 MG/1
4 TABLET, FILM COATED ORAL ONCE
Refills: 0 | Status: DISCONTINUED | OUTPATIENT
Start: 2022-12-13 | End: 2022-12-13

## 2022-12-13 RX ORDER — CELECOXIB 200 MG/1
400 CAPSULE ORAL ONCE
Refills: 0 | Status: COMPLETED | OUTPATIENT
Start: 2022-12-13 | End: 2022-12-13

## 2022-12-13 RX ORDER — HEPARIN SODIUM 5000 [USP'U]/ML
5000 INJECTION INTRAVENOUS; SUBCUTANEOUS EVERY 8 HOURS
Refills: 0 | Status: DISCONTINUED | OUTPATIENT
Start: 2022-12-13 | End: 2022-12-16

## 2022-12-13 RX ORDER — ONDANSETRON 8 MG/1
4 TABLET, FILM COATED ORAL EVERY 6 HOURS
Refills: 0 | Status: DISCONTINUED | OUTPATIENT
Start: 2022-12-13 | End: 2022-12-14

## 2022-12-13 RX ORDER — CHLORHEXIDINE GLUCONATE 213 G/1000ML
1 SOLUTION TOPICAL ONCE
Refills: 0 | Status: COMPLETED | OUTPATIENT
Start: 2022-12-13 | End: 2022-12-13

## 2022-12-13 RX ORDER — ACETAMINOPHEN 500 MG
1000 TABLET ORAL EVERY 6 HOURS
Refills: 0 | Status: COMPLETED | OUTPATIENT
Start: 2022-12-13 | End: 2022-12-14

## 2022-12-13 RX ADMIN — ONDANSETRON 4 MILLIGRAM(S): 8 TABLET, FILM COATED ORAL at 17:41

## 2022-12-13 RX ADMIN — SODIUM CHLORIDE 40 MILLILITER(S): 9 INJECTION, SOLUTION INTRAVENOUS at 16:00

## 2022-12-13 RX ADMIN — GABAPENTIN 600 MILLIGRAM(S): 400 CAPSULE ORAL at 08:09

## 2022-12-13 RX ADMIN — CHLORHEXIDINE GLUCONATE 1 APPLICATION(S): 213 SOLUTION TOPICAL at 08:12

## 2022-12-13 RX ADMIN — Medication 2: at 16:09

## 2022-12-13 RX ADMIN — Medication 400 MILLIGRAM(S): at 23:31

## 2022-12-13 RX ADMIN — HEPARIN SODIUM 5000 UNIT(S): 5000 INJECTION INTRAVENOUS; SUBCUTANEOUS at 23:30

## 2022-12-13 RX ADMIN — SODIUM CHLORIDE 3 MILLILITER(S): 9 INJECTION INTRAMUSCULAR; INTRAVENOUS; SUBCUTANEOUS at 06:41

## 2022-12-13 RX ADMIN — Medication 400 MILLIGRAM(S): at 17:41

## 2022-12-13 RX ADMIN — MONTELUKAST 10 MILLIGRAM(S): 4 TABLET, CHEWABLE ORAL at 23:31

## 2022-12-13 RX ADMIN — Medication 0.2 MILLILITER(S): at 08:12

## 2022-12-13 NOTE — PATIENT PROFILE ADULT - FUNCTIONAL SCREEN CURRENT LEVEL: SWALLOWING (IF SCORE 2 OR MORE FOR ANY ITEM, CONSULT REHAB SERVICES), MLM)
51 y/o male patient presents today for a consultation for a colorectal cancer screening. Patient admits this is his  first colonoscopy due to age. Patient currently admits __ bowel movements per day with no melena, mucus  or blood in stools. Patient denies/admits abdominal pain, bloating, gas, or  heartburn.  Patient denies having a colonoscopy/EGD in --- by  ---- with --  findings.  Patient denies/admits a family hx of colon, gastric, or esophageal cancer/polyps. 0 = swallows foods/liquids without difficulty

## 2022-12-13 NOTE — PATIENT PROFILE ADULT - FALL HARM RISK - HARM RISK INTERVENTIONS

## 2022-12-13 NOTE — PRE-ANESTHESIA EVALUATION ADULT - NSANTHPMHFT_GEN_ALL_CORE
77 year old RHD female with h/o diverticulitis with recent loss of weight of about 15 pounds in 3 months,  s/p hospitalised on 11/21/2022 with perforated diverticulitis, treated,  currently on Ertapenam 1 gm IV daily via Rt SL PICC line until 12/12/22    Hx of CVA in 2012 in which she presented with left sided weakness

## 2022-12-13 NOTE — CHART NOTE - NSCHARTNOTEFT_GEN_A_CORE
Surgery Post-Op Note    Pre-Op Dx: Diverticulitis  Procedure: Laparoscopic low anterior resection    Resection, colon, anterior      Surgeon: Dr. Blancas    SUBJECTIVE:  Pt seen and examined at the bedside. Pt w/ no complaints. Denies F/C/N/V. Pain controlled with medication.     OBJECTIVE:  Vital Signs Last 24 Hrs  T(C): 36.2 (13 Dec 2022 13:25), Max: 36.4 (13 Dec 2022 07:11)  T(F): 97.2 (13 Dec 2022 13:25), Max: 97.5 (13 Dec 2022 07:11)  HR: 78 (13 Dec 2022 16:00) (77 - 93)  BP: 142/64 (13 Dec 2022 16:00) (129/61 - 168/66)  BP(mean): 92 (13 Dec 2022 16:00) (88 - 101)  RR: 18 (13 Dec 2022 16:00) (16 - 18)  SpO2: 100% (13 Dec 2022 16:00) (94% - 100%)    Parameters below as of 13 Dec 2022 13:25  Patient On (Oxygen Delivery Method): nasal cannula  O2 Flow (L/min): 2      Physical Exam:  General: NAD, resting comfortably in bed  Neuro: A/O x 3, no focal deficits  Pulmonary: Nonlabored breathing, no respiratory distress  Cardiovascular: NSR  Abdominal: soft, ATTP in lower quadrants L > R. ND, midline incision with minimal serosang strikethrough. Pink stoma, ostomy with minimal bowel sweat, red rubber in place  Extremities: WWP    LABS:            CAPILLARY BLOOD GLUCOSE      POCT Blood Glucose.: 169 mg/dL (13 Dec 2022 15:36)  POCT Blood Glucose.: 182 mg/dL (13 Dec 2022 14:10)  POCT Blood Glucose.: 172 mg/dL (13 Dec 2022 07:04)            IMAGING:    ASSESSMENT:77y Female now 4hours s/p open LAR, R salpingo-oophorectomy, small bowel resection, and DLI for diverticulitis with pus    PLAN:  - Pain control: ATC tylenol, oxy PRN  - Encourage IS  - Nausea control PRN  - Diet: CLD  - L Ucath & maxwell  - OOB/ Ambulate as tolerated  - DVT ppx: Lovenox    Red Surgery  [id number] Surgery Post-Op Note    Pre-Op Dx: Diverticulitis  Procedure: Laparoscopic low anterior resection    Resection, colon, anterior      Surgeon: Dr. Blancas    SUBJECTIVE:  Pt seen and examined at the bedside. Pt w/ no complaints. Denies F/C/N/V. Pain controlled with medication.     OBJECTIVE:  Vital Signs Last 24 Hrs  T(C): 36.2 (13 Dec 2022 13:25), Max: 36.4 (13 Dec 2022 07:11)  T(F): 97.2 (13 Dec 2022 13:25), Max: 97.5 (13 Dec 2022 07:11)  HR: 78 (13 Dec 2022 16:00) (77 - 93)  BP: 142/64 (13 Dec 2022 16:00) (129/61 - 168/66)  BP(mean): 92 (13 Dec 2022 16:00) (88 - 101)  RR: 18 (13 Dec 2022 16:00) (16 - 18)  SpO2: 100% (13 Dec 2022 16:00) (94% - 100%)    Parameters below as of 13 Dec 2022 13:25  Patient On (Oxygen Delivery Method): nasal cannula  O2 Flow (L/min): 2      Physical Exam:  General: NAD, resting comfortably in bed  Neuro: A/O x 3, no focal deficits  Pulmonary: Nonlabored breathing, no respiratory distress  Cardiovascular: NSR  Abdominal: soft, ATTP in lower quadrants L > R. ND, midline incision with minimal serosang strikethrough. Pink stoma, ostomy with minimal bowel sweat, red rubber in place  Extremities: WWP    LABS:            CAPILLARY BLOOD GLUCOSE      POCT Blood Glucose.: 169 mg/dL (13 Dec 2022 15:36)  POCT Blood Glucose.: 182 mg/dL (13 Dec 2022 14:10)  POCT Blood Glucose.: 172 mg/dL (13 Dec 2022 07:04)            IMAGING:    ASSESSMENT:77y Female now 4hours s/p open LAR, R salpingo-oophorectomy, small bowel resection, and DLI for diverticulitis with pus    PLAN:  - Pain control: ATC tylenol, oxy PRN  - Encourage IS  - Nausea control PRN  - Diet: CLD  - L Ucath & maxwell  - OOB/ Ambulate as tolerated  - DVT ppx: heparin subq    Red Surgery  [id number]

## 2022-12-13 NOTE — PRE-OP CHECKLIST - SURGICAL CONSENT
Pt was brought with basket full of belongings. Will inventory and label for pt. Will attempt to find NOK.    done

## 2022-12-13 NOTE — BRIEF OPERATIVE NOTE - OPERATION/FINDINGS
7 cm lower midline incision made. Dense adhesions from the sigmoid colon abscess to the surrounding structures in the pelvis (right ovarian and fallopian tube, loop of terminal ileum). Right salpingo-oopherectomy done. After this was done, the sigmoid colon was mobilized anteriorly and medially. The mesorectum was dissected free from the retroperitoneum posteriorly. After the proximal and distal resection sites were chosen, the intervening mesentery was divided. The proximal site was divided using a KRISTEN and the distal resection margin was divided using a curved contour. Approximately 12 inches of colon was resected. Then an end to end anastomosis was done 11 cm from the anal verge using a 29 mm EEA stapler. Leak test revealed no leak. We then did a side to side functional end to end anastomosis and resection of the portion of the TI which was involved in the sigmoid abscess. A loop ileostomy was done in Jacy fashion using ileum approximately 20 cm proximal to the anastomosis. Fascia closed with interrupted #1 maxon. Skin closed with staples.

## 2022-12-14 LAB
ANION GAP SERPL CALC-SCNC: 9 MMOL/L — SIGNIFICANT CHANGE UP (ref 5–17)
BUN SERPL-MCNC: 15 MG/DL — SIGNIFICANT CHANGE UP (ref 7–23)
CALCIUM SERPL-MCNC: 9.1 MG/DL — SIGNIFICANT CHANGE UP (ref 8.4–10.5)
CHLORIDE SERPL-SCNC: 101 MMOL/L — SIGNIFICANT CHANGE UP (ref 96–108)
CO2 SERPL-SCNC: 27 MMOL/L — SIGNIFICANT CHANGE UP (ref 22–31)
CREAT SERPL-MCNC: 0.74 MG/DL — SIGNIFICANT CHANGE UP (ref 0.5–1.3)
EGFR: 83 ML/MIN/1.73M2 — SIGNIFICANT CHANGE UP
GLUCOSE BLDC GLUCOMTR-MCNC: 160 MG/DL — HIGH (ref 70–99)
GLUCOSE BLDC GLUCOMTR-MCNC: 174 MG/DL — HIGH (ref 70–99)
GLUCOSE BLDC GLUCOMTR-MCNC: 206 MG/DL — HIGH (ref 70–99)
GLUCOSE BLDC GLUCOMTR-MCNC: 223 MG/DL — HIGH (ref 70–99)
GLUCOSE SERPL-MCNC: 249 MG/DL — HIGH (ref 70–99)
HCT VFR BLD CALC: 33.4 % — LOW (ref 34.5–45)
HGB BLD-MCNC: 10.9 G/DL — LOW (ref 11.5–15.5)
MAGNESIUM SERPL-MCNC: 1.8 MG/DL — SIGNIFICANT CHANGE UP (ref 1.6–2.6)
MCHC RBC-ENTMCNC: 26.5 PG — LOW (ref 27–34)
MCHC RBC-ENTMCNC: 32.6 GM/DL — SIGNIFICANT CHANGE UP (ref 32–36)
MCV RBC AUTO: 81.3 FL — SIGNIFICANT CHANGE UP (ref 80–100)
NRBC # BLD: 0 /100 WBCS — SIGNIFICANT CHANGE UP (ref 0–0)
PHOSPHATE SERPL-MCNC: 2.8 MG/DL — SIGNIFICANT CHANGE UP (ref 2.5–4.5)
PLATELET # BLD AUTO: 302 K/UL — SIGNIFICANT CHANGE UP (ref 150–400)
POTASSIUM SERPL-MCNC: 4.4 MMOL/L — SIGNIFICANT CHANGE UP (ref 3.5–5.3)
POTASSIUM SERPL-SCNC: 4.4 MMOL/L — SIGNIFICANT CHANGE UP (ref 3.5–5.3)
RBC # BLD: 4.11 M/UL — SIGNIFICANT CHANGE UP (ref 3.8–5.2)
RBC # FLD: 14.9 % — HIGH (ref 10.3–14.5)
SODIUM SERPL-SCNC: 137 MMOL/L — SIGNIFICANT CHANGE UP (ref 135–145)
WBC # BLD: 8.94 K/UL — SIGNIFICANT CHANGE UP (ref 3.8–10.5)
WBC # FLD AUTO: 8.94 K/UL — SIGNIFICANT CHANGE UP (ref 3.8–10.5)

## 2022-12-14 RX ORDER — MAGNESIUM SULFATE 500 MG/ML
1 VIAL (ML) INJECTION ONCE
Refills: 0 | Status: DISCONTINUED | OUTPATIENT
Start: 2022-12-14 | End: 2022-12-14

## 2022-12-14 RX ORDER — MAGNESIUM SULFATE 500 MG/ML
1 VIAL (ML) INJECTION ONCE
Refills: 0 | Status: COMPLETED | OUTPATIENT
Start: 2022-12-14 | End: 2022-12-14

## 2022-12-14 RX ORDER — MAGNESIUM OXIDE 400 MG ORAL TABLET 241.3 MG
1000 TABLET ORAL
Refills: 0 | Status: DISCONTINUED | OUTPATIENT
Start: 2022-12-14 | End: 2022-12-14

## 2022-12-14 RX ORDER — CHLORHEXIDINE GLUCONATE 213 G/1000ML
1 SOLUTION TOPICAL
Refills: 0 | Status: DISCONTINUED | OUTPATIENT
Start: 2022-12-14 | End: 2022-12-16

## 2022-12-14 RX ADMIN — Medication 63.75 MILLIMOLE(S): at 13:27

## 2022-12-14 RX ADMIN — PANTOPRAZOLE SODIUM 40 MILLIGRAM(S): 20 TABLET, DELAYED RELEASE ORAL at 06:28

## 2022-12-14 RX ADMIN — MONTELUKAST 10 MILLIGRAM(S): 4 TABLET, CHEWABLE ORAL at 21:12

## 2022-12-14 RX ADMIN — HEPARIN SODIUM 5000 UNIT(S): 5000 INJECTION INTRAVENOUS; SUBCUTANEOUS at 15:19

## 2022-12-14 RX ADMIN — Medication 4: at 08:22

## 2022-12-14 RX ADMIN — Medication 1000 MILLIGRAM(S): at 00:01

## 2022-12-14 RX ADMIN — Medication 1000 MILLIGRAM(S): at 07:46

## 2022-12-14 RX ADMIN — LOSARTAN POTASSIUM 50 MILLIGRAM(S): 100 TABLET, FILM COATED ORAL at 06:28

## 2022-12-14 RX ADMIN — Medication 100 GRAM(S): at 13:56

## 2022-12-14 RX ADMIN — Medication 400 MILLIGRAM(S): at 06:27

## 2022-12-14 RX ADMIN — Medication 400 MILLIGRAM(S): at 11:09

## 2022-12-14 RX ADMIN — Medication 2: at 18:22

## 2022-12-14 RX ADMIN — Medication 1000 MILLIGRAM(S): at 21:30

## 2022-12-14 RX ADMIN — Medication 1000 MILLIGRAM(S): at 21:12

## 2022-12-14 RX ADMIN — HEPARIN SODIUM 5000 UNIT(S): 5000 INJECTION INTRAVENOUS; SUBCUTANEOUS at 21:12

## 2022-12-14 RX ADMIN — Medication 1000 MILLIGRAM(S): at 11:30

## 2022-12-14 RX ADMIN — HEPARIN SODIUM 5000 UNIT(S): 5000 INJECTION INTRAVENOUS; SUBCUTANEOUS at 06:29

## 2022-12-14 RX ADMIN — Medication 4: at 13:21

## 2022-12-14 NOTE — DIETITIAN INITIAL EVALUATION ADULT - NSFNSGIIOFT_GEN_A_CORE
Patient reports no nausea/vomiting.    12-13-22 @ 07:01  -  12-14-22 @ 07:00  --------------------------------------------------------  OUT:    Ileostomy (mL): 0 mL  Total OUT: 0 mL    Total NET: 0 mL      12-14-22 @ 07:01  -  12-14-22 @ 14:00  --------------------------------------------------------  OUT:    Ileostomy (mL): 100 mL  Total OUT: 100 mL    Total NET: -100 mL

## 2022-12-14 NOTE — DIETITIAN INITIAL EVALUATION ADULT - OTHER INFO
Patient reports UBW 150lbs prior to hospitalizations, reports intentional weight loss prior to hospitalizations.  Dosing weight: 132.9lb (12-13)  Weight history per chart and [facility] HIE: 135lb (12-08), 140lb (11-21), 148.1lb (11-17), 144.3lb (10-27), 145.1lb (08-16), 155lb (04-25).  Weight loss of ~23lbs noted; RD to continue to monitor weight trends as available/able.    Per chart, patient currently ordered for insulin lispro pre-meal sliding scale, insulin lispro corrective sliding scale, lactated ringers, magnesium oxide, magnesium sulfate IVPB, pantoprazole.

## 2022-12-14 NOTE — DIETITIAN INITIAL EVALUATION ADULT - NSFNSADHERENCEPTAFT_GEN_A_CORE
Patient with history of T2DM; checks blood glucose 2-4x/day at home, usually 120-130mg/dL in the AM, does not pass 200mg/dL throughout the day; takes long acting insulin at night as well as oral medications and injections, per chart home meds include SymlinPen, Victoza, Metformin, Jardiance.

## 2022-12-14 NOTE — PHYSICAL THERAPY INITIAL EVALUATION ADULT - GENERAL OBSERVATIONS, REHAB EVAL
Pt received in bed, +IV, +Masimo, reports 7/10 abdominal incisional pain but was previously given pain meds and agreeable to PT; spouse present

## 2022-12-14 NOTE — DIETITIAN INITIAL EVALUATION ADULT - NSFNSNUTRHOMESUPPLEMENTFT_GEN_A_CORE
Patient reports taking multivitamin, vitamin E, fish oil, Edin-Mag; no oral nutrition supplements used at home.

## 2022-12-14 NOTE — DIETITIAN INITIAL EVALUATION ADULT - ADD RECOMMEND
1) Defer diet to team  2) Will continue to monitor PO intake, diet, weight, labs, skin, GI symptoms, and BM regularity  3) RD available upon request for re-education and will follow up per protocol. 1) Defer diet to team, recommend low fiber diet when diet progressed with Ensure Surgery x1/day.  2) Will continue to monitor PO intake, diet, weight, labs, skin, GI symptoms, and BM regularity  3) RD available upon request for re-education and will follow up per protocol.

## 2022-12-14 NOTE — DIETITIAN INITIAL EVALUATION ADULT - REASON FOR ADMISSION
Diverticulitis of large intestine with perforation and abscess without bleeding    Per chart "77y Female now pod 1 s/p  open LAR, R salpingo-oophorectomy, small bowel resection, and DLI for diverticulitis. "

## 2022-12-14 NOTE — DIETITIAN INITIAL EVALUATION ADULT - PERTINENT MEDS FT
insulin lispro pre-meal sliding scale, insulin lispro corrective sliding scale, lactated ringers, magnesium oxide, magnesium sulfate IVPB, pantoprazole.

## 2022-12-14 NOTE — DIETITIAN INITIAL EVALUATION ADULT - PERTINENT LABORATORY DATA
12-14  Glucose, Serum: 249 mg/dL (high)  na, K+, BUN, Cr, eGFR, Mg, Phos within normal ranges  POCT Blood Glucose: 206-223 mg/dL  A1C (12-08): 7.2%   12-14  Glucose, Serum: 249 mg/dL (high)  Na, K+, BUN, Cr, eGFR, Mg, Phos within normal ranges  POCT Blood Glucose: 206-223 mg/dL  A1C (12-08): 7.2%

## 2022-12-14 NOTE — DIETITIAN INITIAL EVALUATION ADULT - PERSON TAUGHT/METHOD
Patient provided with written ileostomy education for diet, managing stool output, and managing GI symptoms. Patient made aware RD to remain available./verbal instruction/written material/patient instructed/spouse instructed

## 2022-12-14 NOTE — DIETITIAN INITIAL EVALUATION ADULT - NS FNS DIET ORDER
Diet, Clear Liquid:   Consistent Carbohydrate {No Snacks} (CSTCHO)  Ensure Clear Cans or Servings Per Day:  1     Special Instructions for Nursing:  To include 1 Ensure clear, 1 Lemon Ice, 2 cups of hot water to make tea or broth.  Caffeinated coffee is permissible (12-14-22 @ 11:17)

## 2022-12-14 NOTE — DIETITIAN INITIAL EVALUATION ADULT - ORAL INTAKE PTA/DIET HISTORY
Patient reports decreased appetite and PO intake PTA; follows low fiber diet at home for diverticulitis; confirms strawberry and shellfish allergies.  Patient reports decreased appetite and PO intake PTA x 6 months; follows low fiber diet at home for diverticulitis; confirms strawberry and shellfish allergies.

## 2022-12-14 NOTE — DIETITIAN INITIAL EVALUATION ADULT - REASON INDICATOR FOR ASSESSMENT
RD consult for ERP LFD education and unintentional weight loss PTA. Chart reviewed, events noted.  Source: EMR, patient, patient's  at bedside

## 2022-12-14 NOTE — PHYSICAL THERAPY INITIAL EVALUATION ADULT - ADDITIONAL COMMENTS
Pt lives in a private house, 2 steps to enter with handrails; main floor set up Pt lives in a private house, 2 steps to enter with handrails; main floor set up. Pt owns RW

## 2022-12-14 NOTE — PHYSICAL THERAPY INITIAL EVALUATION ADULT - PERTINENT HX OF CURRENT PROBLEM, REHAB EVAL
77 year old RHD female with h/o diverticulitis with recent loss of weight of about 15 pounds in 3 months,  s/p hospitalised on 11/21/2022 with perforated diverticulitis, treated,  currently on Ertapenam 1 gm IV daily via Rt SL PICC line until12/12/22

## 2022-12-14 NOTE — DIETITIAN INITIAL EVALUATION ADULT - ENERGY INTAKE
Patient reports fair appetite and PO intake currently. Poor (<50%) Patient reports fair appetite and PO intake currently tolerating clear liquids.

## 2022-12-15 ENCOUNTER — TRANSCRIPTION ENCOUNTER (OUTPATIENT)
Age: 77
End: 2022-12-15

## 2022-12-15 ENCOUNTER — APPOINTMENT (OUTPATIENT)
Dept: GERIATRICS | Facility: CLINIC | Age: 77
End: 2022-12-15

## 2022-12-15 LAB
ANION GAP SERPL CALC-SCNC: 12 MMOL/L — SIGNIFICANT CHANGE UP (ref 5–17)
BUN SERPL-MCNC: 9 MG/DL — SIGNIFICANT CHANGE UP (ref 7–23)
CALCIUM SERPL-MCNC: 9.3 MG/DL — SIGNIFICANT CHANGE UP (ref 8.4–10.5)
CHLORIDE SERPL-SCNC: 104 MMOL/L — SIGNIFICANT CHANGE UP (ref 96–108)
CO2 SERPL-SCNC: 26 MMOL/L — SIGNIFICANT CHANGE UP (ref 22–31)
CREAT SERPL-MCNC: 0.6 MG/DL — SIGNIFICANT CHANGE UP (ref 0.5–1.3)
EGFR: 92 ML/MIN/1.73M2 — SIGNIFICANT CHANGE UP
GLUCOSE BLDC GLUCOMTR-MCNC: 187 MG/DL — HIGH (ref 70–99)
GLUCOSE BLDC GLUCOMTR-MCNC: 226 MG/DL — HIGH (ref 70–99)
GLUCOSE BLDC GLUCOMTR-MCNC: 240 MG/DL — HIGH (ref 70–99)
GLUCOSE BLDC GLUCOMTR-MCNC: 247 MG/DL — HIGH (ref 70–99)
GLUCOSE BLDC GLUCOMTR-MCNC: 278 MG/DL — HIGH (ref 70–99)
GLUCOSE SERPL-MCNC: 133 MG/DL — HIGH (ref 70–99)
HCT VFR BLD CALC: 35.8 % — SIGNIFICANT CHANGE UP (ref 34.5–45)
HGB BLD-MCNC: 11.6 G/DL — SIGNIFICANT CHANGE UP (ref 11.5–15.5)
MAGNESIUM SERPL-MCNC: 1.6 MG/DL — SIGNIFICANT CHANGE UP (ref 1.6–2.6)
MCHC RBC-ENTMCNC: 26.5 PG — LOW (ref 27–34)
MCHC RBC-ENTMCNC: 32.4 GM/DL — SIGNIFICANT CHANGE UP (ref 32–36)
MCV RBC AUTO: 81.7 FL — SIGNIFICANT CHANGE UP (ref 80–100)
NRBC # BLD: 0 /100 WBCS — SIGNIFICANT CHANGE UP (ref 0–0)
PHOSPHATE SERPL-MCNC: 1.8 MG/DL — LOW (ref 2.5–4.5)
PLATELET # BLD AUTO: 283 K/UL — SIGNIFICANT CHANGE UP (ref 150–400)
POTASSIUM SERPL-MCNC: 4.3 MMOL/L — SIGNIFICANT CHANGE UP (ref 3.5–5.3)
POTASSIUM SERPL-SCNC: 4.3 MMOL/L — SIGNIFICANT CHANGE UP (ref 3.5–5.3)
RBC # BLD: 4.38 M/UL — SIGNIFICANT CHANGE UP (ref 3.8–5.2)
RBC # FLD: 14.8 % — HIGH (ref 10.3–14.5)
SODIUM SERPL-SCNC: 142 MMOL/L — SIGNIFICANT CHANGE UP (ref 135–145)
WBC # BLD: 8.1 K/UL — SIGNIFICANT CHANGE UP (ref 3.8–10.5)
WBC # FLD AUTO: 8.1 K/UL — SIGNIFICANT CHANGE UP (ref 3.8–10.5)

## 2022-12-15 RX ORDER — MAGNESIUM SULFATE 500 MG/ML
2 VIAL (ML) INJECTION ONCE
Refills: 0 | Status: COMPLETED | OUTPATIENT
Start: 2022-12-15 | End: 2022-12-15

## 2022-12-15 RX ADMIN — Medication 1000 MILLIGRAM(S): at 11:18

## 2022-12-15 RX ADMIN — Medication 1000 MILLIGRAM(S): at 22:00

## 2022-12-15 RX ADMIN — HEPARIN SODIUM 5000 UNIT(S): 5000 INJECTION INTRAVENOUS; SUBCUTANEOUS at 13:28

## 2022-12-15 RX ADMIN — Medication 2: at 09:22

## 2022-12-15 RX ADMIN — Medication 25 GRAM(S): at 10:49

## 2022-12-15 RX ADMIN — OXYCODONE HYDROCHLORIDE 2.5 MILLIGRAM(S): 5 TABLET ORAL at 23:00

## 2022-12-15 RX ADMIN — OXYCODONE HYDROCHLORIDE 2.5 MILLIGRAM(S): 5 TABLET ORAL at 10:00

## 2022-12-15 RX ADMIN — SODIUM CHLORIDE 40 MILLILITER(S): 9 INJECTION, SOLUTION INTRAVENOUS at 01:05

## 2022-12-15 RX ADMIN — LOSARTAN POTASSIUM 50 MILLIGRAM(S): 100 TABLET, FILM COATED ORAL at 06:29

## 2022-12-15 RX ADMIN — HEPARIN SODIUM 5000 UNIT(S): 5000 INJECTION INTRAVENOUS; SUBCUTANEOUS at 06:29

## 2022-12-15 RX ADMIN — MONTELUKAST 10 MILLIGRAM(S): 4 TABLET, CHEWABLE ORAL at 21:31

## 2022-12-15 RX ADMIN — OXYCODONE HYDROCHLORIDE 2.5 MILLIGRAM(S): 5 TABLET ORAL at 21:32

## 2022-12-15 RX ADMIN — Medication 1000 MILLIGRAM(S): at 06:29

## 2022-12-15 RX ADMIN — CHLORHEXIDINE GLUCONATE 1 APPLICATION(S): 213 SOLUTION TOPICAL at 06:33

## 2022-12-15 RX ADMIN — HEPARIN SODIUM 5000 UNIT(S): 5000 INJECTION INTRAVENOUS; SUBCUTANEOUS at 21:31

## 2022-12-15 RX ADMIN — Medication 6: at 13:28

## 2022-12-15 RX ADMIN — Medication 1000 MILLIGRAM(S): at 17:00

## 2022-12-15 RX ADMIN — Medication 1000 MILLIGRAM(S): at 21:31

## 2022-12-15 RX ADMIN — Medication 85 MILLIMOLE(S): at 11:18

## 2022-12-15 RX ADMIN — Medication 1000 MILLIGRAM(S): at 12:00

## 2022-12-15 RX ADMIN — Medication 4: at 18:55

## 2022-12-15 RX ADMIN — Medication 1000 MILLIGRAM(S): at 06:36

## 2022-12-15 RX ADMIN — PANTOPRAZOLE SODIUM 40 MILLIGRAM(S): 20 TABLET, DELAYED RELEASE ORAL at 06:29

## 2022-12-15 RX ADMIN — OXYCODONE HYDROCHLORIDE 2.5 MILLIGRAM(S): 5 TABLET ORAL at 09:19

## 2022-12-15 RX ADMIN — Medication 1000 MILLIGRAM(S): at 16:45

## 2022-12-15 NOTE — DISCHARGE NOTE PROVIDER - NSDCCPCAREPLAN_GEN_ALL_CORE_FT
PRINCIPAL DISCHARGE DIAGNOSIS  Diagnosis: Diverticulitis  Assessment and Plan of Treatment: Continue antibiotics as prescribed. It is Cipro 500 mg two times a day x 7 days.

## 2022-12-15 NOTE — DISCHARGE NOTE PROVIDER - NSDCMRMEDTOKEN_GEN_ALL_CORE_FT
aspirin 81 mg oral tablet: 1 tab(s) orally once a day  Aspirin Low Dose 81 mg oral delayed release tablet: 1 tab(s) orally 3 times a day, stopped by dr Blancas  diazepam 5 mg oral tablet: 0.5 tab(s) orally once a day in the morning  diazePAM 5 mg oral tablet: 0.5 tab(s) orally once a day in the afternoon  dicyclomine 20 mg oral tablet: 1 tab(s) orally 4 times a day, As Needed  ertapenem 1 g injection: 1 gram(s) intravenously once a day , as per pt ID adivise to take unti 12/12  exemestane 25 mg oral tablet: 1 tab(s) orally once a day  Jardiance 10 mg oral tablet: 1 tab(s) orally once a day (in the morning), last dose 12/9 am  losartan 50 mg oral tablet: 1 tab(s) orally once a day  metFORMIN 500 mg oral tablet, extended release: 1 tab(s) orally 2 times a day, last dose 12/12 am  montelukast 10 mg oral tablet: 1 tab(s) orally once a day (at bedtime)  omeprazole 40 mg oral delayed release capsule: 1 tab(s) orally 2 times a day  Pepcid 20 mg oral tablet: 2 tab(s) orally once a day (at bedtime)  physical therapy:   Plavix 75 mg oral tablet: 1 tab(s) orally once a day, last dose 12/07 as per surgeon  rolling walker:   Sudafed 30 mg oral tablet: 1 tab(s) orally once a day  SymlinPen 120 subcutaneous solution: 120 microgram(s) subcutaneous 3 times a day - 1/2/hour before meals   Not to take in AM of surgery/ preop dose as per ENDO  traZODone 100 mg oral tablet: 1 tab(s) orally once a day (at bedtime)  Tresiba 100 units/mL subcutaneous solution: 28 unit(s) subcutaneous once a day (at bedtime), to take 22 units in PM on 12/12 Pm a sper ENdo  Victoza 18 mg/3 mL subcutaneous solution: 1.8 milligram(s) subcutaneous once a day- in morning before breakfast, no victoza in am of  surgery/a sper eNdo adivse   acetaminophen 500 mg oral tablet: 2 tab(s) orally every 6 hours  aspirin 81 mg oral tablet: 1 tab(s) orally once a day  Cipro 500 mg oral tablet: 1 tab(s) orally 2 times a day   diazepam 5 mg oral tablet: 0.5 tab(s) orally once a day in the morning  exemestane 25 mg oral tablet: 1 tab(s) orally once a day  Jardiance 10 mg oral tablet: 1 tab(s) orally once a day (in the morning), last dose 12/9 am  losartan 50 mg oral tablet: 1 tab(s) orally once a day  metFORMIN 500 mg oral tablet, extended release: 1 tab(s) orally 2 times a day, last dose 12/12 am  montelukast 10 mg oral tablet: 1 tab(s) orally once a day (at bedtime)  omeprazole 40 mg oral delayed release capsule: 1 tab(s) orally 2 times a day  oxyCODONE 5 mg oral tablet: 1 tab(s) orally every 6 hours as needed for severe pain MDD:4 tablets  Pepcid 20 mg oral tablet: 2 tab(s) orally once a day (at bedtime)  Plavix 75 mg oral tablet: 1 tab(s) orally once a day, last dose 12/07 as per surgeon  Sudafed 30 mg oral tablet: 1 tab(s) orally once a day  SymlinPen 120 subcutaneous solution: 120 microgram(s) subcutaneous 3 times a day - 1/2/hour before meals   Not to take in AM of surgery/ preop dose as per ENDO  traZODone 100 mg oral tablet: 1 tab(s) orally once a day (at bedtime)  Tresiba 100 units/mL subcutaneous solution: 28 unit(s) subcutaneous once a day (at bedtime), to take 22 units in PM on 12/12 Pm a sper ENdo  Victoza 18 mg/3 mL subcutaneous solution: 1.8 milligram(s) subcutaneous once a day- in morning before breakfast, no victoza in am of  surgery/a sper eNdo adivse

## 2022-12-15 NOTE — DISCHARGE NOTE PROVIDER - NSDCCPTREATMENT_GEN_ALL_CORE_FT
PRINCIPAL PROCEDURE  Procedure: Laparoscopic low anterior resection  Findings and Treatment: WOUND CARE: Staples will be removed at follow up office visit.  ostomy care  BATHING: Please do not submerge wound underwater. You may shower and/or sponge bathe.  ACTIVITY: No heavy lifting anything more than 10-15lbs or straining. Otherwise, you may return to your usual level of physical activity. If you are taking narcotic pain medication (such as Percocet), do NOT drive a car, operate machinery or make important decisions.  DIET: Low fiber diet  NOTIFY YOUR SURGEON IF: You have any bleeding that does not stop, any pus draining from your wound, any fever (over 100.4 F) or chills, persistent nausea/vomiting with inability to tolerate food or liquids, persistent diarrhea, or if your pain is not controlled on your discharge pain medications.  FOLLOW-UP:  1. Please call to make a follow-up appointment within one week of discharge   2. Please follow up with your primary care physician in one week regarding your hospitalization.

## 2022-12-15 NOTE — DISCHARGE NOTE PROVIDER - ATTENDING ATTESTATION STATEMENT
Right Pl effusion / PNA / Sepsis     anbx  fu with CTS   plan for CT of chest     PAF post op   in sinus  resume amio to be continued short term 1-2 months   off a/c   poor candidate for a/c high bleed risk given sepsis , pl effusion and recent surgery     Hypokalemia  replete K    Hypercalcemia  consider renal eval   treatment as per primary team     DVT proph  on lovenox     Echo personally reviewed EF is incorrectly reported, pt has normal LV function  I have personally seen and examined the patient. I have collaborated with and supervised the

## 2022-12-15 NOTE — PROGRESS NOTE ADULT - ASSESSMENT
77y Female now pod 2 s/p  open LAR, R salpingo-oophorectomy, small bowel resection, and DLI for diverticulitis.     PLAN:  - Pain control: ATC tylenol, oxy PRN  - Encourage IS  - Nausea control PRN  - Diet: continue with low residue diet   - OOB/ Ambulate as tolerated  - DVT ppx: heparin subq    Red Surgery  p90   
77y Female now pod 1 s/p  open LAR, R salpingo-oophorectomy, small bowel resection, and DLI for diverticulitis.     PLAN:  - maxwell will come out today  - Pain control: ATC tylenol, oxy PRN  - Encourage IS  - Nausea control PRN  - Diet: CLD, may advance for dinner of tolerating well   - OOB/ Ambulate as tolerated  - DVT ppx: heparin subq    Red Surgery  p900

## 2022-12-15 NOTE — DISCHARGE NOTE PROVIDER - PROVIDER TOKENS
PROVIDER:[TOKEN:[[id number]],FOLLOWUP:[2 weeks]]
Detail Level: Detailed
Detail Level: Generalized

## 2022-12-15 NOTE — DISCHARGE NOTE PROVIDER - CARE PROVIDER_API CALL
Juan M Blancas)  ColonRectal Surgery; Surgery  [address], [address]  [place] [address]  Phone: [phone]  Fax: [phone]  Follow Up Time: 2 weeks

## 2022-12-15 NOTE — DISCHARGE NOTE PROVIDER - NSDCFUSCHEDAPPT_GEN_ALL_CORE_FT
Juan M Blancas  [facility] Physician [address]  Scheduled Appointment: 12/23/2022

## 2022-12-15 NOTE — DISCHARGE NOTE PROVIDER - NSDCACTIVITY_GEN_ALL_CORE
Showering allowed/No heavy lifting/straining Do not drive or operate machinery while on pain medications./Do not drive or operate machinery/Stairs allowed/Walking - Indoors allowed/No heavy lifting/straining/Walking - Outdoors allowed

## 2022-12-15 NOTE — DISCHARGE NOTE PROVIDER - HOSPITAL COURSE
77 year old RHD female with h/o diverticulitis with recent loss of weight of about 15 pounds in 3 months,  s/p hospitalised on 11/21/2022 with perforated diverticulitis, treated,  currently on Ertapenam 1 gm IV daily via Rt SL PICC line until12/12/22,  presents for scheduled ERAS, Laparoscopic Anterior Resection on 12/13/2022.    On 12/13 patient underwent Laparoscopic low anterior resection. The patient tolerated the procedure well without complications, was extubated, and transferred to the PACU in stable condition. Diet was advanced as tolerated. Physical therapy worked with patient and recommended home physical therapy. On day of discharge, the patient was tolerating diet, ambulating well and pain controlled.

## 2022-12-16 ENCOUNTER — TRANSCRIPTION ENCOUNTER (OUTPATIENT)
Age: 77
End: 2022-12-16

## 2022-12-16 VITALS
SYSTOLIC BLOOD PRESSURE: 148 MMHG | RESPIRATION RATE: 18 BRPM | HEART RATE: 98 BPM | TEMPERATURE: 98 F | DIASTOLIC BLOOD PRESSURE: 71 MMHG | OXYGEN SATURATION: 97 %

## 2022-12-16 LAB
ANION GAP SERPL CALC-SCNC: 10 MMOL/L — SIGNIFICANT CHANGE UP (ref 5–17)
BUN SERPL-MCNC: 8 MG/DL — SIGNIFICANT CHANGE UP (ref 7–23)
CALCIUM SERPL-MCNC: 9.7 MG/DL — SIGNIFICANT CHANGE UP (ref 8.4–10.5)
CHLORIDE SERPL-SCNC: 103 MMOL/L — SIGNIFICANT CHANGE UP (ref 96–108)
CO2 SERPL-SCNC: 27 MMOL/L — SIGNIFICANT CHANGE UP (ref 22–31)
CREAT SERPL-MCNC: 0.58 MG/DL — SIGNIFICANT CHANGE UP (ref 0.5–1.3)
EGFR: 93 ML/MIN/1.73M2 — SIGNIFICANT CHANGE UP
GLUCOSE BLDC GLUCOMTR-MCNC: 160 MG/DL — HIGH (ref 70–99)
GLUCOSE SERPL-MCNC: 181 MG/DL — HIGH (ref 70–99)
HCT VFR BLD CALC: 37.3 % — SIGNIFICANT CHANGE UP (ref 34.5–45)
HGB BLD-MCNC: 12.3 G/DL — SIGNIFICANT CHANGE UP (ref 11.5–15.5)
MAGNESIUM SERPL-MCNC: 1.8 MG/DL — SIGNIFICANT CHANGE UP (ref 1.6–2.6)
MCHC RBC-ENTMCNC: 26.7 PG — LOW (ref 27–34)
MCHC RBC-ENTMCNC: 33 GM/DL — SIGNIFICANT CHANGE UP (ref 32–36)
MCV RBC AUTO: 80.9 FL — SIGNIFICANT CHANGE UP (ref 80–100)
NRBC # BLD: 0 /100 WBCS — SIGNIFICANT CHANGE UP (ref 0–0)
PHOSPHATE SERPL-MCNC: 2.1 MG/DL — LOW (ref 2.5–4.5)
PLATELET # BLD AUTO: 305 K/UL — SIGNIFICANT CHANGE UP (ref 150–400)
POTASSIUM SERPL-MCNC: 4 MMOL/L — SIGNIFICANT CHANGE UP (ref 3.5–5.3)
POTASSIUM SERPL-SCNC: 4 MMOL/L — SIGNIFICANT CHANGE UP (ref 3.5–5.3)
RBC # BLD: 4.61 M/UL — SIGNIFICANT CHANGE UP (ref 3.8–5.2)
RBC # FLD: 15 % — HIGH (ref 10.3–14.5)
SODIUM SERPL-SCNC: 140 MMOL/L — SIGNIFICANT CHANGE UP (ref 135–145)
WBC # BLD: 8.18 K/UL — SIGNIFICANT CHANGE UP (ref 3.8–10.5)
WBC # FLD AUTO: 8.18 K/UL — SIGNIFICANT CHANGE UP (ref 3.8–10.5)

## 2022-12-16 PROCEDURE — 82962 GLUCOSE BLOOD TEST: CPT

## 2022-12-16 PROCEDURE — 97161 PT EVAL LOW COMPLEX 20 MIN: CPT

## 2022-12-16 PROCEDURE — 84100 ASSAY OF PHOSPHORUS: CPT

## 2022-12-16 PROCEDURE — C9399: CPT

## 2022-12-16 PROCEDURE — 80048 BASIC METABOLIC PNL TOTAL CA: CPT

## 2022-12-16 PROCEDURE — 83735 ASSAY OF MAGNESIUM: CPT

## 2022-12-16 PROCEDURE — C1758: CPT

## 2022-12-16 PROCEDURE — 85027 COMPLETE CBC AUTOMATED: CPT

## 2022-12-16 PROCEDURE — 88307 TISSUE EXAM BY PATHOLOGIST: CPT

## 2022-12-16 PROCEDURE — C1889: CPT

## 2022-12-16 RX ORDER — CIPROFLOXACIN LACTATE 400MG/40ML
1 VIAL (ML) INTRAVENOUS
Qty: 14 | Refills: 0
Start: 2022-12-16 | End: 2022-12-22

## 2022-12-16 RX ORDER — ACETAMINOPHEN 500 MG
2 TABLET ORAL
Qty: 0 | Refills: 0 | DISCHARGE
Start: 2022-12-16

## 2022-12-16 RX ORDER — DIAZEPAM 5 MG
0.5 TABLET ORAL
Qty: 0 | Refills: 0 | DISCHARGE

## 2022-12-16 RX ORDER — SODIUM,POTASSIUM PHOSPHATES 278-250MG
1 POWDER IN PACKET (EA) ORAL ONCE
Refills: 0 | Status: DISCONTINUED | OUTPATIENT
Start: 2022-12-16 | End: 2022-12-16

## 2022-12-16 RX ORDER — ASPIRIN/CALCIUM CARB/MAGNESIUM 324 MG
1 TABLET ORAL
Qty: 0 | Refills: 0 | DISCHARGE

## 2022-12-16 RX ORDER — OXYCODONE HYDROCHLORIDE 5 MG/1
1 TABLET ORAL
Qty: 4 | Refills: 0
Start: 2022-12-16 | End: 2022-12-16

## 2022-12-16 RX ADMIN — OXYCODONE HYDROCHLORIDE 2.5 MILLIGRAM(S): 5 TABLET ORAL at 10:45

## 2022-12-16 RX ADMIN — LOSARTAN POTASSIUM 50 MILLIGRAM(S): 100 TABLET, FILM COATED ORAL at 06:59

## 2022-12-16 RX ADMIN — HEPARIN SODIUM 5000 UNIT(S): 5000 INJECTION INTRAVENOUS; SUBCUTANEOUS at 07:00

## 2022-12-16 RX ADMIN — Medication 1000 MILLIGRAM(S): at 07:00

## 2022-12-16 RX ADMIN — PANTOPRAZOLE SODIUM 40 MILLIGRAM(S): 20 TABLET, DELAYED RELEASE ORAL at 06:59

## 2022-12-16 NOTE — DISCHARGE NOTE NURSING/CASE MANAGEMENT/SOCIAL WORK - PATIENT PORTAL LINK FT
You can access the FollowMyHealth Patient Portal offered by [facility] by registering at the following website: [email or web address]. By joining [facility]’s FollowMyHealth portal, you will also be able to view your health information using other applications (apps) compatible with our system.

## 2022-12-16 NOTE — PROGRESS NOTE ADULT - SUBJECTIVE AND OBJECTIVE BOX
COLORECTAL SURGERY DAILY PROGRESS NOTE    INTERVAL: CELESTINO overnight    SUBJECTIVE/ROS: Patient seen and examined. Pain controlled. Ambulating/voiding. Tolerating LRD, no nausea/vomiting. Ostomy (+/+).    MEDICATIONS  (STANDING):  acetaminophen     Tablet .. 1000 milliGRAM(s) Oral every 6 hours  chlorhexidine 2% Cloths 1 Application(s) Topical <User Schedule>  ciprofloxacin   IVPB 400 milliGRAM(s) IV Intermittent once  clindamycin IVPB 900 milliGRAM(s) IV Intermittent once  dextrose 5%. 1000 milliLiter(s) (50 mL/Hr) IV Continuous <Continuous>  dextrose 5%. 1000 milliLiter(s) (100 mL/Hr) IV Continuous <Continuous>  dextrose 50% Injectable 25 Gram(s) IV Push once  dextrose 50% Injectable 12.5 Gram(s) IV Push once  dextrose 50% Injectable 25 Gram(s) IV Push once  glucagon  Injectable 1 milliGRAM(s) IntraMuscular once  heparin   Injectable 5000 Unit(s) SubCutaneous every 8 hours  insulin lispro (ADMELOG) corrective regimen sliding scale   SubCutaneous at bedtime  insulin lispro (ADMELOG) corrective regimen sliding scale   SubCutaneous three times a day before meals  lactated ringers. 1000 milliLiter(s) (40 mL/Hr) IV Continuous <Continuous>  losartan 50 milliGRAM(s) Oral daily  montelukast 10 milliGRAM(s) Oral at bedtime  pantoprazole    Tablet 40 milliGRAM(s) Oral before breakfast  traZODone 100 milliGRAM(s) Oral at bedtime    MEDICATIONS  (PRN):  dextrose Oral Gel 15 Gram(s) Oral once PRN Blood Glucose LESS THAN 70 milliGRAM(s)/deciliter  HYDROmorphone  Injectable 0.5 milliGRAM(s) IV Push every 4 hours PRN Severe Pain (7 - 10)  oxyCODONE    IR 2.5 milliGRAM(s) Oral every 4 hours PRN Moderate Pain (4 - 6)  oxyCODONE    IR 5 milliGRAM(s) Oral every 4 hours PRN Severe Pain (7 - 10)      OBJECTIVE:  Vital Signs Last 24 Hrs  T(C): 36.8 (15 Dec 2022 20:58), Max: 36.8 (15 Dec 2022 10:46)  T(F): 98.2 (15 Dec 2022 20:58), Max: 98.3 (15 Dec 2022 10:46)  HR: 87 (15 Dec 2022 20:58) (76 - 89)  BP: 160/69 (15 Dec 2022 20:58) (154/74 - 175/67)  BP(mean): --  RR: 18 (15 Dec 2022 20:58) (18 - 18)  SpO2: 96% (15 Dec 2022 20:58) (95% - 96%)    Parameters below as of 15 Dec 2022 20:58  Patient On (Oxygen Delivery Method): room air      I&O's Detail    15 Dec 2022 07:01  -  16 Dec 2022 07:00  --------------------------------------------------------  IN:    Oral Fluid: 960 mL  Total IN: 960 mL    OUT:    Blood Loss (mL): 0 mL    Ileostomy (mL): 675 mL    Voided (mL): 1200 mL  Total OUT: 1875 mL    Total NET: -915 mL      PHYSICAL EXAM:  General: NAD, resting comfortably in bed  Neuro: A/O x 3, no focal deficits  Pulmonary: Nonlabored breathing, no respiratory distress  Cardiovascular: NSR  Abdominal: soft, ND, ATTP, midline incision c/d/i. Pink stoma, ostomy with minimal bowel sweat, red rubber in place  Extremities: [facility]      LABS:                        11.6   8.10  )-----------( 283      ( 15 Dec 2022 07:28 )             35.8     12-15    142  |  104  |  9   ----------------------------<  133<H>  4.3   |  26  |  0.60    Ca    9.3      15 Dec 2022 07:25  Phos  1.8     12-15  Mg     1.6     12-15        CAPILLARY BLOOD GLUCOSE  POCT Blood Glucose.: 240 mg/dL (15 Dec 2022 22:30)  POCT Blood Glucose.: 247 mg/dL (15 Dec 2022 18:54)  POCT Blood Glucose.: 226 mg/dL (15 Dec 2022 17:42)  POCT Blood Glucose.: 278 mg/dL (15 Dec 2022 13:16)  POCT Blood Glucose.: 187 mg/dL (15 Dec 2022 09:21)        A/P: 77F w/ PMH HTN, HLD, DM2, chronic back pain, IBS, fibromyalgia, neuropathy, breast cancer( [id number] s/p left lumpectomy/chemo/RT), left ear hearing loss (had MRI & incidentally found  basilar tip Brain aneurysm ), stent coiling of basilar tip, cerebral aneurysm repair  11/05/2012 on Plavix 7& Asprin 81, hx of pancolonic diverticulosis w/ GI bleed c/b left sided weakness, pt now s/p open LAR, R. salpingo-oophorectomy, small bowel resection, and DLI for diverticulitis (12/13), recovering appropriately.     - Diet: LRD, tolerating  - PRN antiemetics  - Pain control: ATC tylenol, oxy PRN  - Encourage IS  - AOOBAT  - VTE ppx: SQH  - PT eval: Home PT w/ RW  - Dispo: possible discharge today after ostomy [facility]    Red Surgery  [id number]         
COLORECTAL SURGERY DAILY PROGRESS NOTE:     SUBJECTIVE/ROS: SUBJECTIVE/ROS: Patient seen and examined. Pain is controlled, patient denies n/v. Tolerating clear liquid diet.     MEDICATIONS  (STANDING):  acetaminophen     Tablet .. 1000 milliGRAM(s) Oral every 6 hours  chlorhexidine 2% Cloths 1 Application(s) Topical <User Schedule>  ciprofloxacin   IVPB 400 milliGRAM(s) IV Intermittent once  clindamycin IVPB 900 milliGRAM(s) IV Intermittent once  dextrose 5%. 1000 milliLiter(s) (50 mL/Hr) IV Continuous <Continuous>  dextrose 5%. 1000 milliLiter(s) (100 mL/Hr) IV Continuous <Continuous>  dextrose 50% Injectable 25 Gram(s) IV Push once  dextrose 50% Injectable 12.5 Gram(s) IV Push once  dextrose 50% Injectable 25 Gram(s) IV Push once  glucagon  Injectable 1 milliGRAM(s) IntraMuscular once  heparin   Injectable 5000 Unit(s) SubCutaneous every 8 hours  insulin lispro (ADMELOG) corrective regimen sliding scale   SubCutaneous at bedtime  insulin lispro (ADMELOG) corrective regimen sliding scale   SubCutaneous three times a day before meals  lactated ringers. 1000 milliLiter(s) (40 mL/Hr) IV Continuous <Continuous>  losartan 50 milliGRAM(s) Oral daily  montelukast 10 milliGRAM(s) Oral at bedtime  pantoprazole    Tablet 40 milliGRAM(s) Oral before breakfast  traZODone 100 milliGRAM(s) Oral at bedtime    MEDICATIONS  (PRN):  dextrose Oral Gel 15 Gram(s) Oral once PRN Blood Glucose LESS THAN 70 milliGRAM(s)/deciliter  HYDROmorphone  Injectable 0.5 milliGRAM(s) IV Push every 4 hours PRN Severe Pain (7 - 10)  oxyCODONE    IR 2.5 milliGRAM(s) Oral every 4 hours PRN Moderate Pain (4 - 6)  oxyCODONE    IR 5 milliGRAM(s) Oral every 4 hours PRN Severe Pain (7 - 10)      OBJECTIVE:    Vital Signs Last 24 Hrs  T(C): 36.7 (15 Dec 2022 06:10), Max: 36.9 (14 Dec 2022 13:20)  T(F): 98.1 (15 Dec 2022 06:10), Max: 98.5 (14 Dec 2022 16:57)  HR: 70 (15 Dec 2022 06:10) (66 - 82)  BP: 167/69 (15 Dec 2022 06:10) (117/58 - 168/61)  BP(mean): --  RR: 18 (15 Dec 2022 06:10) (18 - 18)  SpO2: 95% (15 Dec 2022 06:10) (95% - 99%)    Parameters below as of 15 Dec 2022 06:10  Patient On (Oxygen Delivery Method): room air            I&O's Detail    14 Dec 2022 07:01  -  15 Dec 2022 07:00  --------------------------------------------------------  IN:    Lactated Ringers: 880 mL    Oral Fluid: 600 mL  Total IN: 1480 mL    OUT:    Ileostomy (mL): 270 mL    Ureteral Catheter (mL): 100 mL    Voided (mL): 2100 mL  Total OUT: 2470 mL    Total NET: -990 mL          Daily     Daily     LABS:                        10.9   8.94  )-----------( 302      ( 14 Dec 2022 09:13 )             33.4     12-14    137  |  101  |  15  ----------------------------<  249<H>  4.4   |  27  |  0.74    Ca    9.1      14 Dec 2022 09:11  Phos  2.8     12-14  Mg     1.8     12-14                    PHYSICAL EXAM:    General: NAD, resting comfortably in bed  Neuro: A/O x 3, no focal deficits  Pulmonary: Nonlabored breathing, no respiratory distress  Cardiovascular: NSR  Abdominal: soft, ATTP in lower quadrants L > R. ND, midline incision with minimal serosang strikethrough. Pink stoma, ostomy with minimal bowel sweat, red rubber in place  Extremities: WWP        
Day 1 of Anesthesia Pain Management Service    SUBJECTIVE:  Pain Scale Score:          [X] Refer to charted pain scores    THERAPY: Received PF spinal morphine as above    OBJECTIVE:    Sedation:        	[X] Alert	[ ] Drowsy	[ ] Arousable      [ ] Asleep       [ ] Unresponsive    Side Effects:	[X] None	[ ] Nausea	[ ] Vomiting         [ ] Pruritus  		[ ] Weakness            [ ] Numbness	          [ ] Other:    ASSESSMENT/ PLAN  [X] Patient transitioned to prn analgesics  [X] Pain management per primary service, pain service to sign off   [X]Documentation and Verification of current medications
Day 1 of Anesthesia Pain Management Service    SUBJECTIVE: A little dizziness  Pain Scale Score:          [X] Refer to charted pain scores    THERAPY:  s/p 150  mcg PF morphine on 12\13\2022    MEDICATIONS  (STANDING):  acetaminophen   IVPB .. 1000 milliGRAM(s) IV Intermittent every 6 hours  ciprofloxacin   IVPB 400 milliGRAM(s) IV Intermittent once  clindamycin IVPB 900 milliGRAM(s) IV Intermittent once  dextrose 5%. 1000 milliLiter(s) (50 mL/Hr) IV Continuous <Continuous>  dextrose 5%. 1000 milliLiter(s) (100 mL/Hr) IV Continuous <Continuous>  dextrose 50% Injectable 25 Gram(s) IV Push once  dextrose 50% Injectable 12.5 Gram(s) IV Push once  dextrose 50% Injectable 25 Gram(s) IV Push once  glucagon  Injectable 1 milliGRAM(s) IntraMuscular once  heparin   Injectable 5000 Unit(s) SubCutaneous every 8 hours  insulin lispro (ADMELOG) corrective regimen sliding scale   SubCutaneous at bedtime  insulin lispro (ADMELOG) corrective regimen sliding scale   SubCutaneous three times a day before meals  lactated ringers. 1000 milliLiter(s) (40 mL/Hr) IV Continuous <Continuous>  losartan 50 milliGRAM(s) Oral daily  montelukast 10 milliGRAM(s) Oral at bedtime  morphine PF Spinal 0.15 milliGRAM(s) IntraThecal. once  pantoprazole    Tablet 40 milliGRAM(s) Oral before breakfast  traZODone 100 milliGRAM(s) Oral at bedtime    MEDICATIONS  (PRN):  dextrose Oral Gel 15 Gram(s) Oral once PRN Blood Glucose LESS THAN 70 milliGRAM(s)/deciliter  HYDROmorphone  Injectable 0.5 milliGRAM(s) IV Push every 4 hours PRN Severe Pain (7 - 10)  nalbuphine Injectable 2.5 milliGRAM(s) IV Push every 6 hours PRN Pruritus  naloxone Injectable 0.1 milliGRAM(s) IV Push every 3 minutes PRN For ANY of the following changes in patient status:  A. RR LESS THAN 10 breaths per minute, B. Oxygen saturation LESS THAN 90%, C. Sedation score of 6  ondansetron Injectable 4 milliGRAM(s) IV Push every 6 hours PRN Nausea  oxyCODONE    IR 2.5 milliGRAM(s) Oral every 4 hours PRN Moderate Pain (4 - 6)  oxyCODONE    IR 5 milliGRAM(s) Oral every 4 hours PRN Severe Pain (7 - 10)      OBJECTIVE:    Sedation:        	[X] Alert	 [ ] Drowsy	[ ] Arousable      [ ] Asleep       [ ] Unresponsive    Side Effects:	[X] None 	[ ] Nausea	[ ] Vomiting         [ ] Pruritus  		[ ] Weakness            [ ] Numbness	          [ ] Other:    Vital Signs Last 24 Hrs  T(C): 36.7 (14 Dec 2022 06:00), Max: 36.9 (13 Dec 2022 18:34)  T(F): 98 (14 Dec 2022 06:00), Max: 98.5 (13 Dec 2022 18:34)  HR: 84 (14 Dec 2022 06:00) (68 - 94)  BP: 132/55 (14 Dec 2022 06:00) (117/52 - 157/70)  BP(mean): 101 (13 Dec 2022 16:30) (88 - 101)  RR: 18 (14 Dec 2022 06:00) (16 - 18)  SpO2: 98% (14 Dec 2022 06:00) (94% - 100%)    Parameters below as of 14 Dec 2022 06:00  Patient On (Oxygen Delivery Method): nasal cannula  O2 Flow (L/min): 2      ASSESSMENT/ PLAN  [X] Patient transitioned to prn analgesics  [X] Pain management per primary service, pain service to sign off   [X]Documentation and Verification of current medications
COLORECTAL SURGERY DAILY PROGRESS NOTE:     SUBJECTIVE/ROS: Patient seen and examined. Pain is controlled, patient denies n/v.    MEDICATIONS  (STANDING):  acetaminophen   IVPB .. 1000 milliGRAM(s) IV Intermittent every 6 hours  ciprofloxacin   IVPB 400 milliGRAM(s) IV Intermittent once  clindamycin IVPB 900 milliGRAM(s) IV Intermittent once  dextrose 5%. 1000 milliLiter(s) (50 mL/Hr) IV Continuous <Continuous>  dextrose 5%. 1000 milliLiter(s) (100 mL/Hr) IV Continuous <Continuous>  dextrose 50% Injectable 25 Gram(s) IV Push once  dextrose 50% Injectable 12.5 Gram(s) IV Push once  dextrose 50% Injectable 25 Gram(s) IV Push once  glucagon  Injectable 1 milliGRAM(s) IntraMuscular once  heparin   Injectable 5000 Unit(s) SubCutaneous every 8 hours  insulin lispro (ADMELOG) corrective regimen sliding scale   SubCutaneous at bedtime  insulin lispro (ADMELOG) corrective regimen sliding scale   SubCutaneous three times a day before meals  lactated ringers. 1000 milliLiter(s) (40 mL/Hr) IV Continuous <Continuous>  losartan 50 milliGRAM(s) Oral daily  montelukast 10 milliGRAM(s) Oral at bedtime  morphine PF Spinal 0.15 milliGRAM(s) IntraThecal. once  pantoprazole    Tablet 40 milliGRAM(s) Oral before breakfast  traZODone 100 milliGRAM(s) Oral at bedtime    MEDICATIONS  (PRN):  dextrose Oral Gel 15 Gram(s) Oral once PRN Blood Glucose LESS THAN 70 milliGRAM(s)/deciliter  HYDROmorphone  Injectable 0.5 milliGRAM(s) IV Push every 4 hours PRN Severe Pain (7 - 10)  nalbuphine Injectable 2.5 milliGRAM(s) IV Push every 6 hours PRN Pruritus  naloxone Injectable 0.1 milliGRAM(s) IV Push every 3 minutes PRN For ANY of the following changes in patient status:  A. RR LESS THAN 10 breaths per minute, B. Oxygen saturation LESS THAN 90%, C. Sedation score of 6  ondansetron Injectable 4 milliGRAM(s) IV Push every 6 hours PRN Nausea  oxyCODONE    IR 2.5 milliGRAM(s) Oral every 4 hours PRN Moderate Pain (4 - 6)  oxyCODONE    IR 5 milliGRAM(s) Oral every 4 hours PRN Severe Pain (7 - 10)      OBJECTIVE:    Vital Signs Last 24 Hrs  T(C): 36.7 (14 Dec 2022 06:00), Max: 36.9 (13 Dec 2022 18:34)  T(F): 98 (14 Dec 2022 06:00), Max: 98.5 (13 Dec 2022 18:34)  HR: 84 (14 Dec 2022 06:00) (68 - 94)  BP: 132/55 (14 Dec 2022 06:00) (117/52 - 168/66)  BP(mean): 101 (13 Dec 2022 16:30) (88 - 101)  RR: 18 (14 Dec 2022 06:00) (16 - 18)  SpO2: 98% (14 Dec 2022 06:00) (94% - 100%)    Parameters below as of 14 Dec 2022 06:00  Patient On (Oxygen Delivery Method): nasal cannula  O2 Flow (L/min): 2          I&O's Detail    13 Dec 2022 07:01  -  14 Dec 2022 07:00  --------------------------------------------------------  IN:    IV PiggyBack: 100 mL    Lactated Ringers: 600 mL    Oral Fluid: 120 mL  Total IN: 820 mL    OUT:    Ileostomy (mL): 0 mL    Ureteral Catheter (mL): 250 mL    Ureteral Catheter (mL): 835 mL  Total OUT: 1085 mL    Total NET: -265 mL          Daily Height in cm: 157.48 (13 Dec 2022 07:49)    Daily     LABS:                        PHYSICAL EXAM:    General: NAD, resting comfortably in bed  Neuro: A/O x 3, no focal deficits  Pulmonary: Nonlabored breathing, no respiratory distress  Cardiovascular: NSR  Abdominal: soft, ATTP in lower quadrants L > R. ND, midline incision with minimal serosang strikethrough. Pink stoma, ostomy with minimal bowel sweat, red rubber in place  Extremities: WWP

## 2022-12-16 NOTE — DISCHARGE NOTE NURSING/CASE MANAGEMENT/SOCIAL WORK - NSDCPEFALRISK_GEN_ALL_CORE
For information on Fall & Injury Prevention, visit: [email or web address] OR  [email or web address] OR  https://www.cdc.gov/steadi/patient.html

## 2022-12-16 NOTE — DISCHARGE NOTE NURSING/CASE MANAGEMENT/SOCIAL WORK - NSTRANSFERBELONGINGSDISPO_GEN_A_NUR
with patient
patient with swelling and open wound to the lower lip. Patient states he woke up with this this morning, denies any trauma to the area.

## 2022-12-16 NOTE — DISCHARGE NOTE NURSING/CASE MANAGEMENT/SOCIAL WORK - NSDCPETBCESMAN_GEN_ALL_CORE
Received fax from pharmacy requesting refill on pts medication(s). Pt was last seen in office on 6/27/2022  and has a follow up scheduled for 12/28/2022. Will send request to  Dr. Anthony Morfin  for patient.      Requested Prescriptions     Pending Prescriptions Disp Refills    montelukast (SINGULAIR) 10 MG tablet [Pharmacy Med Name: montelukast 10 mg tablet] 30 tablet 11     Sig: TAKE ONE TABLET BY MOUTH AT BEDTIME If you are a smoker, it is important for your health to stop smoking. Please be aware that second hand smoke is also harmful.

## 2022-12-17 ENCOUNTER — NON-APPOINTMENT (OUTPATIENT)
Age: 77
End: 2022-12-17

## 2022-12-19 ENCOUNTER — NON-APPOINTMENT (OUTPATIENT)
Age: 77
End: 2022-12-19

## 2022-12-20 LAB — SURGICAL PATHOLOGY STUDY: SIGNIFICANT CHANGE UP

## 2022-12-23 ENCOUNTER — APPOINTMENT (OUTPATIENT)
Dept: COLORECTAL SURGERY | Facility: CLINIC | Age: 77
End: 2022-12-23

## 2022-12-23 PROCEDURE — 99024 POSTOP FOLLOW-UP VISIT: CPT

## 2022-12-28 NOTE — ASSESSMENT
[FreeTextEntry1] : 77 year old woman who underwent a R SO, small bowel resection, and anterior resection for diverticulitis on 12/13/2022. She is recovering nicely.\par \par Today in the office 5 staples were removed from the midline incision. Next Thursday, our enterostomal therapist will remove the remaining 3 staples.\par \par I would like to see her at the end of January for a flexible sigmoidoscopy to assess the integrity of the anastomosis.\par \par Continue LRD for now.

## 2022-12-28 NOTE — HISTORY OF PRESENT ILLNESS
[FreeTextEntry1] : 77 year old woman who presents to the office today for follow up. She underwent R salpingo-oopherectomy, anterior resection, small bowel resection, and diverting loop ileostomy for chronic diverticulitis on 12/13/2022. Pathology confirmed chronic diverticulitis.\par \par She has been recuperating nicely. Tolerating low residue diet. Stoma functioning well and no issues with pouching.

## 2022-12-28 NOTE — PHYSICAL EXAM
[FreeTextEntry1] : Well healed RLQ ostomy site. The midline surgical wound is healing well with some inflammation secondary to the staples. Abdomen is soft and nontender.

## 2023-01-01 ENCOUNTER — NON-APPOINTMENT (OUTPATIENT)
Age: 78
End: 2023-01-01

## 2023-01-01 DIAGNOSIS — J06.9 ACUTE UPPER RESPIRATORY INFECTION, UNSPECIFIED: ICD-10-CM

## 2023-01-02 ENCOUNTER — LABORATORY RESULT (OUTPATIENT)
Age: 78
End: 2023-01-02

## 2023-01-02 ENCOUNTER — NON-APPOINTMENT (OUTPATIENT)
Age: 78
End: 2023-01-02

## 2023-01-02 ENCOUNTER — APPOINTMENT (OUTPATIENT)
Dept: GERIATRICS | Facility: CLINIC | Age: 78
End: 2023-01-02
Payer: MEDICARE

## 2023-01-02 DIAGNOSIS — U07.1 COVID-19: ICD-10-CM

## 2023-01-02 PROCEDURE — 99443: CPT | Mod: CS,95

## 2023-01-03 ENCOUNTER — FORM ENCOUNTER (OUTPATIENT)
Age: 78
End: 2023-01-03

## 2023-01-03 PROBLEM — U07.1 COVID-19 VIRUS INFECTION: Status: ACTIVE | Noted: 2023-01-03

## 2023-01-17 ENCOUNTER — RX RENEWAL (OUTPATIENT)
Age: 78
End: 2023-01-17

## 2023-01-17 NOTE — CHART NOTE - NSCHARTNOTEFT_GEN_A_CORE
Review on behalf of Dr. Juan M Blancas:     Review of abscess location:   A chronic sigmoid colon abscess was noted. Dense adhesions from the sigmoid colon abscess to the surrounding structures in the pelvis (right ovarian and fallopian tube, loop of terminal ileum).       Review of severity of malnutrition:   Malnutrition present on admission per dietitian noted to be severe.  Severe acute malnutrition   Etiology       inadequate energy intak   Signs/Symptoms     Weight loss > 5% x 1 month, <50% EER >5 days.

## 2023-01-30 ENCOUNTER — APPOINTMENT (OUTPATIENT)
Dept: COLORECTAL SURGERY | Facility: CLINIC | Age: 78
End: 2023-01-30
Payer: MEDICARE

## 2023-01-30 PROCEDURE — 45330 DIAGNOSTIC SIGMOIDOSCOPY: CPT | Mod: 58

## 2023-02-09 ENCOUNTER — NON-APPOINTMENT (OUTPATIENT)
Age: 78
End: 2023-02-09

## 2023-02-09 ENCOUNTER — RX RENEWAL (OUTPATIENT)
Age: 78
End: 2023-02-09

## 2023-02-21 ENCOUNTER — RESULT REVIEW (OUTPATIENT)
Age: 78
End: 2023-02-21

## 2023-02-21 ENCOUNTER — APPOINTMENT (OUTPATIENT)
Dept: RADIOLOGY | Facility: HOSPITAL | Age: 78
End: 2023-02-21

## 2023-02-21 ENCOUNTER — OUTPATIENT (OUTPATIENT)
Dept: OUTPATIENT SERVICES | Facility: HOSPITAL | Age: 78
LOS: 1 days | End: 2023-02-21
Payer: MEDICARE

## 2023-02-21 VITALS
DIASTOLIC BLOOD PRESSURE: 76 MMHG | TEMPERATURE: 98 F | WEIGHT: 126.99 LBS | HEART RATE: 97 BPM | RESPIRATION RATE: 18 BRPM | SYSTOLIC BLOOD PRESSURE: 127 MMHG | HEIGHT: 62 IN | OXYGEN SATURATION: 99 %

## 2023-02-21 DIAGNOSIS — Z98.89 OTHER SPECIFIED POSTPROCEDURAL STATES: Chronic | ICD-10-CM

## 2023-02-21 DIAGNOSIS — K57.20 DIVERTICULITIS OF LARGE INTESTINE WITH PERFORATION AND ABSCESS WITHOUT BLEEDING: ICD-10-CM

## 2023-02-21 DIAGNOSIS — I67.1 CEREBRAL ANEURYSM, NONRUPTURED: ICD-10-CM

## 2023-02-21 DIAGNOSIS — Z98.51 TUBAL LIGATION STATUS: Chronic | ICD-10-CM

## 2023-02-21 DIAGNOSIS — K57.92 DIVERTICULITIS OF INTESTINE, PART UNSPECIFIED, WITHOUT PERFORATION OR ABSCESS WITHOUT BLEEDING: ICD-10-CM

## 2023-02-21 DIAGNOSIS — E11.9 TYPE 2 DIABETES MELLITUS WITHOUT COMPLICATIONS: ICD-10-CM

## 2023-02-21 DIAGNOSIS — Z87.442 PERSONAL HISTORY OF URINARY CALCULI: Chronic | ICD-10-CM

## 2023-02-21 DIAGNOSIS — Z01.818 ENCOUNTER FOR OTHER PREPROCEDURAL EXAMINATION: ICD-10-CM

## 2023-02-21 DIAGNOSIS — C44.91 BASAL CELL CARCINOMA OF SKIN, UNSPECIFIED: Chronic | ICD-10-CM

## 2023-02-21 DIAGNOSIS — Z90.49 ACQUIRED ABSENCE OF OTHER SPECIFIED PARTS OF DIGESTIVE TRACT: Chronic | ICD-10-CM

## 2023-02-21 DIAGNOSIS — I67.1 CEREBRAL ANEURYSM, NONRUPTURED: Chronic | ICD-10-CM

## 2023-02-21 LAB
ANION GAP SERPL CALC-SCNC: 14 MMOL/L — SIGNIFICANT CHANGE UP (ref 5–17)
BUN SERPL-MCNC: 20 MG/DL — SIGNIFICANT CHANGE UP (ref 7–23)
CALCIUM SERPL-MCNC: 9.9 MG/DL — SIGNIFICANT CHANGE UP (ref 8.4–10.5)
CHLORIDE SERPL-SCNC: 107 MMOL/L — SIGNIFICANT CHANGE UP (ref 96–108)
CO2 SERPL-SCNC: 20 MMOL/L — LOW (ref 22–31)
CREAT SERPL-MCNC: 0.57 MG/DL — SIGNIFICANT CHANGE UP (ref 0.5–1.3)
EGFR: 94 ML/MIN/1.73M2 — SIGNIFICANT CHANGE UP
GLUCOSE SERPL-MCNC: 144 MG/DL — HIGH (ref 70–99)
HCT VFR BLD CALC: 42.2 % — SIGNIFICANT CHANGE UP (ref 34.5–45)
HGB BLD-MCNC: 13.4 G/DL — SIGNIFICANT CHANGE UP (ref 11.5–15.5)
MCHC RBC-ENTMCNC: 26.6 PG — LOW (ref 27–34)
MCHC RBC-ENTMCNC: 31.8 GM/DL — LOW (ref 32–36)
MCV RBC AUTO: 83.7 FL — SIGNIFICANT CHANGE UP (ref 80–100)
NRBC # BLD: 0 /100 WBCS — SIGNIFICANT CHANGE UP (ref 0–0)
PLATELET # BLD AUTO: 330 K/UL — SIGNIFICANT CHANGE UP (ref 150–400)
POTASSIUM SERPL-MCNC: 4.5 MMOL/L — SIGNIFICANT CHANGE UP (ref 3.5–5.3)
POTASSIUM SERPL-SCNC: 4.5 MMOL/L — SIGNIFICANT CHANGE UP (ref 3.5–5.3)
RBC # BLD: 5.04 M/UL — SIGNIFICANT CHANGE UP (ref 3.8–5.2)
RBC # FLD: 14.7 % — HIGH (ref 10.3–14.5)
SODIUM SERPL-SCNC: 141 MMOL/L — SIGNIFICANT CHANGE UP (ref 135–145)
WBC # BLD: 8.86 K/UL — SIGNIFICANT CHANGE UP (ref 3.8–10.5)
WBC # FLD AUTO: 8.86 K/UL — SIGNIFICANT CHANGE UP (ref 3.8–10.5)

## 2023-02-21 PROCEDURE — 83036 HEMOGLOBIN GLYCOSYLATED A1C: CPT

## 2023-02-21 PROCEDURE — 74270 X-RAY XM COLON 1CNTRST STD: CPT | Mod: 26

## 2023-02-21 PROCEDURE — 85027 COMPLETE CBC AUTOMATED: CPT

## 2023-02-21 PROCEDURE — 74270 X-RAY XM COLON 1CNTRST STD: CPT

## 2023-02-21 PROCEDURE — G0463: CPT

## 2023-02-21 PROCEDURE — 80048 BASIC METABOLIC PNL TOTAL CA: CPT

## 2023-02-21 RX ORDER — DIAZEPAM 5 MG
0.5 TABLET ORAL
Qty: 0 | Refills: 0 | DISCHARGE

## 2023-02-21 NOTE — H&P PST ADULT - ASSESSMENT
DASI score able to walk 3 blocks, can climb a flight of stairs, no loose or removable teeth DASI score 5.72 able to walk 3 blocks, can climb a flight of stairs, no loose or removable teeth, one missing tooth top right

## 2023-02-21 NOTE — H&P PST ADULT - NSICDXPASTSURGICALHX_GEN_ALL_CORE_FT
PAST SURGICAL HISTORY:  Basal cell carcinoma removed - left eyelid- 3/15    Cerebral aneurysm repair with coil and stent - 11/12    H/O shoulder surgery right - 5/2001    H/O tubal ligation     History of D&C     History of kidney stones 2001     PAST SURGICAL HISTORY:  Basal cell carcinoma removed - left eyelid- 3/15    Cerebral aneurysm repair with coil and stent - 11/12    H/O shoulder surgery right - 5/2001    H/O tubal ligation     History of D&C     History of kidney stones 2001    S/P colon resection

## 2023-02-21 NOTE — H&P PST ADULT - NSICDXPASTMEDICALHX_GEN_ALL_CORE_FT
PAST MEDICAL HISTORY:  Aneurysm cerebral    Anxiety     Asthma     Basal cell carcinoma     Benign intracranial hypertension 1980 r/t "tetracyclines"    Breast CA right    Chronic fatigue     CVA (cerebral vascular accident) 2012    Depression     Diverticulitis     DM (diabetes mellitus)     Fibromyalgia     Hearing loss left    HTN (hypertension)     Kidney stones hx of    Lumbar disc disease     Reflux esophagitis     Shoulder disorder right bone spur    Vertigo      PAST MEDICAL HISTORY:  Aneurysm cerebral    Anxiety     Asthma     Basal cell carcinoma     Benign intracranial hypertension 1980 r/t "tetracyclines"    Breast CA right    Chronic fatigue     CVA (cerebral vascular accident) 2012    Depression     Diverticulitis     DM (diabetes mellitus)     Fibromyalgia     Hearing loss left    HTN (hypertension)     Hypothyroid     Kidney stones hx of    Lumbar disc disease     Reflux esophagitis     Shoulder disorder right bone spur    Vertigo

## 2023-02-21 NOTE — H&P PST ADULT - HISTORY OF PRESENT ILLNESS
This is a 76 y/o female PMH right breast CA, S/P lumpectomy, chemotherapy and radiation, diverticulitis, S/P colon resection with ileostomy formation 12/13/22.  Presents today for ERAS, closure of ileostomy.    COVID+ 1/01/23 was done at her home through a [facility] lab, was treated with remdesivir, cannot find PCR in [employer] or [employer]; is documented on the booking sheet, COVID swab waived This is a 78 y/o female PMH diabetes type 2, right breast CA, S/P lumpectomy, chemotherapy and radiation, S/P excision of pre-cancerous tongue growths, cerebral aneurysm/CVA, S/P cerebral artery stent, GIB 2020,  diverticulitis, S/P colon resection with ileostomy formation 12/13/22.  Presents today for ERAS, closure of ileostomy.    COVID+ 1/01/23 was done at her home through a [facility] lab, was treated with remdesivir, cannot find PCR in [employer] or [employer]; is documented on the booking sheet, COVID swab waived This is a 76 y/o female PMH benign intracranial hypertension 1980, S/P multiple spinal taps at that time, nephrolithiasis, fibromyalgia, diabetes type 2, right breast CA, S/P lumpectomy, chemotherapy and radiation, S/P excision of pre-cancerous tongue growths, cerebral aneurysm/CVA, S/P cerebral artery stent , GIB 2020,  diverticulitis, S/P colon resection with ileostomy formation 12/13/22.  Presents today for ERAS, closure of ileostomy.    COVID+ 1/01/23 was done at her home through a [facility] lab, was treated with remdesivir, cannot find PCR in [employer] or [employer]; is documented on the booking sheet, COVID swab waived This is a 78 y/o female PMH benign intracranial hypertension 1980, S/P multiple spinal taps at that time, hypothyroid,  nephrolithiasis, fibromyalgia, diabetes type 2, right breast CA, S/P lumpectomy, chemotherapy and radiation, S/P excision of pre-cancerous tongue growths, cerebral aneurysm/CVA, S/P cerebral artery stent , GIB 2020,  diverticulitis, S/P colon resection with ileostomy formation 12/13/22.  Presents today for ERAS, closure of ileostomy.    COVID+ 1/01/23 was done at her home through a [facility] lab, was treated with remdesivir, cannot find PCR in [employer] or [employer]; is documented on the booking sheet, COVID swab waived

## 2023-02-21 NOTE — H&P PST ADULT - PROBLEM SELECTOR PLAN 2
Will contact endocrinologist for recommended doses, as patient is taking inconsistent doses due to ileostomy and diet changes. Holding metformin 24 hours and jardiance 72 hours prior to surgery

## 2023-02-22 LAB
A1C WITH ESTIMATED AVERAGE GLUCOSE RESULT: 6.8 % — HIGH (ref 4–5.6)
ESTIMATED AVERAGE GLUCOSE: 148 MG/DL — HIGH (ref 68–114)

## 2023-02-23 ENCOUNTER — NON-APPOINTMENT (OUTPATIENT)
Age: 78
End: 2023-02-23

## 2023-03-05 ENCOUNTER — RX RENEWAL (OUTPATIENT)
Age: 78
End: 2023-03-05

## 2023-03-06 ENCOUNTER — NON-APPOINTMENT (OUTPATIENT)
Age: 78
End: 2023-03-06

## 2023-03-07 ENCOUNTER — NON-APPOINTMENT (OUTPATIENT)
Age: 78
End: 2023-03-07

## 2023-03-07 ENCOUNTER — APPOINTMENT (OUTPATIENT)
Dept: GERIATRICS | Facility: CLINIC | Age: 78
End: 2023-03-07
Payer: MEDICARE

## 2023-03-07 VITALS
OXYGEN SATURATION: 99 % | SYSTOLIC BLOOD PRESSURE: 173 MMHG | RESPIRATION RATE: 12 BRPM | BODY MASS INDEX: 24.55 KG/M2 | DIASTOLIC BLOOD PRESSURE: 78 MMHG | WEIGHT: 134.2 LBS | HEART RATE: 106 BPM

## 2023-03-07 VITALS — SYSTOLIC BLOOD PRESSURE: 140 MMHG | DIASTOLIC BLOOD PRESSURE: 80 MMHG

## 2023-03-07 DIAGNOSIS — B36.9 SUPERFICIAL MYCOSIS, UNSPECIFIED: ICD-10-CM

## 2023-03-07 DIAGNOSIS — G47.00 INSOMNIA, UNSPECIFIED: ICD-10-CM

## 2023-03-07 DIAGNOSIS — Z11.59 ENCOUNTER FOR SCREENING FOR OTHER VIRAL DISEASES: ICD-10-CM

## 2023-03-07 DIAGNOSIS — Z93.2 ILEOSTOMY STATUS: ICD-10-CM

## 2023-03-07 PROBLEM — E03.9 HYPOTHYROIDISM, UNSPECIFIED: Chronic | Status: ACTIVE | Noted: 2023-02-21

## 2023-03-07 PROCEDURE — 99215 OFFICE O/P EST HI 40 MIN: CPT

## 2023-03-07 RX ORDER — CIPROFLOXACIN HYDROCHLORIDE 500 MG/1
500 TABLET, FILM COATED ORAL
Qty: 2 | Refills: 0 | Status: COMPLETED | COMMUNITY
Start: 2022-12-07 | End: 2023-03-07

## 2023-03-07 RX ORDER — CHOLECALCIFEROL (VITAMIN D3) 25 MCG
25 MCG CAPSULE ORAL
Qty: 30 | Refills: 0 | Status: COMPLETED | COMMUNITY
Start: 2020-11-19 | End: 2023-03-07

## 2023-03-07 RX ORDER — METRONIDAZOLE 500 MG/1
500 TABLET ORAL
Qty: 3 | Refills: 0 | Status: COMPLETED | COMMUNITY
Start: 2022-12-07 | End: 2023-03-07

## 2023-03-07 RX ORDER — CALCIUM/D3/MAG OX/COP/MANG/ZN 600 MG-20
600-800 TABLET ORAL
Qty: 90 | Refills: 3 | Status: COMPLETED | COMMUNITY
Start: 2021-06-24 | End: 2023-03-07

## 2023-03-07 RX ORDER — PRAMLINTIDE ACETATE 1000 UG/ML
2700 INJECTION SUBCUTANEOUS
Qty: 1 | Refills: 0 | Status: COMPLETED | COMMUNITY
Start: 2017-06-19 | End: 2023-03-07

## 2023-03-07 RX ORDER — LATANOPROST/PF 0.005 %
0.01 DROPS OPHTHALMIC (EYE)
Qty: 10 | Refills: 0 | Status: COMPLETED | COMMUNITY
Start: 2022-10-31 | End: 2023-03-07

## 2023-03-07 NOTE — HISTORY OF PRESENT ILLNESS
[Preoperative Visit] : for a medical evaluation prior to surgery [Scheduled Procedure ___] : a [unfilled] [Date of Surgery ___] : on [unfilled] [Surgeon Name ___] : surgeon: [unfilled] [Stable] : Stable [Diabetes] : diabetes [Anti-Platelet Agents] : anti-platelet agents [Electrocardiogram] : ~T an ECG ~C was performed [Good] : Good [Designated Healthcare Proxy] : Designated healthcare proxy [Name: ___] : Health Care Proxy's Name: [unfilled]  [Relationship: ___] : Relationship: [unfilled] [Fever] : no fever [Chills] : no chills [Fatigue] : no fatigue [Chest Pain] : no chest pain [Cough] : no cough [Dysuria] : no dysuria [Dyspnea] : no dyspnea [Urinary Frequency] : no urinary frequency [Nausea] : no nausea [Vomiting] : no vomiting [Diarrhea] : no diarrhea [Abdominal Pain] : no abdominal pain [Easy Bruising] : no easy bruising [Lower Extremity Swelling] : no lower extremity swelling [Poor Exercise Tolerance] : no poor exercise tolerance [Cardiovascular Disease] : no cardiovascular disease [Pulmonary Disease] : no pulmonary disease [Nicotine Dependence] : no nicotine dependence [Alcohol Use] : no  alcohol use [Renal Disease] : no renal disease [GI Disease] : no gastrointestinal disease [Sleep Apnea] : no sleep apnea [Thromboembolic Problems] : no thromboembolic problems [Clotting Disorder] : no clotting disorder [Frequent use of NSAIDs] : no use of NSAIDs [Bleeding Disorder] : no bleeding disorder [Transfusion Reaction] : no transfusion reaction [Impaired Immunity] : no impaired immunity [Steroid Use in Last 6 Months] : no steroid use in the last six months [Frequent Aspirin Use] : no frequent aspirin use [Prior Anesthesia] : No prior anesthesia [Prev Anesthesia Reaction] : no previous anesthesia reaction [de-identified] : Pt able to walk several blocks

## 2023-03-07 NOTE — ASSESSMENT
[Patient Intermediate Risk] : the patient is an intermediate risk [Optimized for Surgery] : the patient is optimized for surgery [As per surgery] : as per surgery [Continue] : Continue medications as currently directed [FreeTextEntry3] : plavix held as per surgery [FreeTextEntry1] : 76 yo female with IDDM, Breast cancer, CVA  and aneurysm, HTN, and inomnia who is having Ileostom reversal on March 13, 2023...patient is optimized for surgery\par \par 1) IDDM - hgb a1c = 6.8\par 2) Candida under left breast - start nystatin\par 3) Fibromyalgia - diazepam decreased to twice a day\par 4) CVA - aspirin and plavix, but being held for surgery. Restart once able post surgery\par 5) Breast cancer - exemestane\par 6) Reflux - pepcid, omeprazole\par 7) Insomnia - trazodone\par 8) Asthma - montelukast\par 9 ) HM - up tp date on immunizations

## 2023-03-07 NOTE — PHYSICAL EXAM
[Alert] : alert [No Acute Distress] : in no acute distress [Normal Outer Ear/Nose] : the ears and nose were normal in appearance [Normal Appearance] : the appearance of the neck was normal [Supple] : the neck was supple [No Respiratory Distress] : no respiratory distress [No Acc Muscle Use] : no accessory muscle use [Respiration, Rhythm And Depth] : normal respiratory rhythm and effort [Auscultation Breath Sounds / Voice Sounds] : lungs were clear to auscultation bilaterally [Heart Rate And Rhythm] : heart rate was normal and rhythm regular [Bowel Sounds] : normal bowel sounds [Abdomen Tenderness] : non-tender [Abdomen Soft] : soft [No Spinal Tenderness] : no spinal tenderness [Normal Color / Pigmentation] : normal skin color and pigmentation [Normal Turgor] : normal skin turgor [No Focal Deficits] : no focal deficits [Normal Affect] : the affect was normal [Normal Mood] : the mood was normal [de-identified] : rash under left breast  [de-identified] : ileostomy site is functioning , no visible rashes around the site

## 2023-03-12 ENCOUNTER — TRANSCRIPTION ENCOUNTER (OUTPATIENT)
Age: 78
End: 2023-03-12

## 2023-03-13 ENCOUNTER — APPOINTMENT (OUTPATIENT)
Dept: COLORECTAL SURGERY | Facility: HOSPITAL | Age: 78
End: 2023-03-13
Payer: MEDICARE

## 2023-03-13 ENCOUNTER — INPATIENT (INPATIENT)
Facility: HOSPITAL | Age: 78
LOS: 2 days | Discharge: HOME | DRG: 331 | End: 2023-03-16
Attending: SURGERY | Admitting: SURGERY
Payer: MEDICARE

## 2023-03-13 ENCOUNTER — RESULT REVIEW (OUTPATIENT)
Age: 78
End: 2023-03-13

## 2023-03-13 VITALS
HEIGHT: 62 IN | WEIGHT: 126.99 LBS | OXYGEN SATURATION: 100 % | SYSTOLIC BLOOD PRESSURE: 151 MMHG | HEART RATE: 92 BPM | DIASTOLIC BLOOD PRESSURE: 83 MMHG | RESPIRATION RATE: 18 BRPM | TEMPERATURE: 98 F

## 2023-03-13 DIAGNOSIS — Z98.89 OTHER SPECIFIED POSTPROCEDURAL STATES: Chronic | ICD-10-CM

## 2023-03-13 DIAGNOSIS — K57.20 DIVERTICULITIS OF LARGE INTESTINE WITH PERFORATION AND ABSCESS WITHOUT BLEEDING: ICD-10-CM

## 2023-03-13 DIAGNOSIS — Z98.51 TUBAL LIGATION STATUS: Chronic | ICD-10-CM

## 2023-03-13 DIAGNOSIS — Z87.442 PERSONAL HISTORY OF URINARY CALCULI: Chronic | ICD-10-CM

## 2023-03-13 DIAGNOSIS — I67.1 CEREBRAL ANEURYSM, NONRUPTURED: Chronic | ICD-10-CM

## 2023-03-13 DIAGNOSIS — Z90.49 ACQUIRED ABSENCE OF OTHER SPECIFIED PARTS OF DIGESTIVE TRACT: Chronic | ICD-10-CM

## 2023-03-13 DIAGNOSIS — C44.91 BASAL CELL CARCINOMA OF SKIN, UNSPECIFIED: Chronic | ICD-10-CM

## 2023-03-13 LAB
BLD GP AB SCN SERPL QL: NEGATIVE — SIGNIFICANT CHANGE UP
GLUCOSE BLDC GLUCOMTR-MCNC: 143 MG/DL — HIGH (ref 70–99)
GLUCOSE BLDC GLUCOMTR-MCNC: 173 MG/DL — HIGH (ref 70–99)
GLUCOSE BLDC GLUCOMTR-MCNC: 174 MG/DL — HIGH (ref 70–99)
RH IG SCN BLD-IMP: NEGATIVE — SIGNIFICANT CHANGE UP

## 2023-03-13 PROCEDURE — 88304 TISSUE EXAM BY PATHOLOGIST: CPT | Mod: 26

## 2023-03-13 PROCEDURE — 44625 REPAIR BOWEL OPENING: CPT | Mod: 58

## 2023-03-13 DEVICE — STAPLER COVIDIEN TA 90 BLUE: Type: IMPLANTABLE DEVICE | Status: FUNCTIONAL

## 2023-03-13 DEVICE — STAPLER ETHICON PROXIMATE 75MM WITH BLUE RELOAD: Type: IMPLANTABLE DEVICE | Status: FUNCTIONAL

## 2023-03-13 RX ORDER — FAMOTIDINE 10 MG/ML
20 INJECTION INTRAVENOUS ONCE
Refills: 0 | Status: COMPLETED | OUTPATIENT
Start: 2023-03-13 | End: 2023-03-13

## 2023-03-13 RX ORDER — OXYCODONE HYDROCHLORIDE 5 MG/1
2.5 TABLET ORAL EVERY 6 HOURS
Refills: 0 | Status: DISCONTINUED | OUTPATIENT
Start: 2023-03-13 | End: 2023-03-16

## 2023-03-13 RX ORDER — MORPHINE SULFATE 50 MG/1
0.15 CAPSULE, EXTENDED RELEASE ORAL ONCE
Refills: 0 | Status: DISCONTINUED | OUTPATIENT
Start: 2023-03-13 | End: 2023-03-14

## 2023-03-13 RX ORDER — SODIUM CHLORIDE 9 MG/ML
1000 INJECTION, SOLUTION INTRAVENOUS
Refills: 0 | Status: DISCONTINUED | OUTPATIENT
Start: 2023-03-13 | End: 2023-03-16

## 2023-03-13 RX ORDER — DEXTROSE 50 % IN WATER 50 %
25 SYRINGE (ML) INTRAVENOUS ONCE
Refills: 0 | Status: DISCONTINUED | OUTPATIENT
Start: 2023-03-13 | End: 2023-03-16

## 2023-03-13 RX ORDER — NALOXONE HYDROCHLORIDE 4 MG/.1ML
0.1 SPRAY NASAL
Refills: 0 | Status: DISCONTINUED | OUTPATIENT
Start: 2023-03-13 | End: 2023-03-14

## 2023-03-13 RX ORDER — SODIUM CHLORIDE 9 MG/ML
3 INJECTION INTRAMUSCULAR; INTRAVENOUS; SUBCUTANEOUS EVERY 8 HOURS
Refills: 0 | Status: DISCONTINUED | OUTPATIENT
Start: 2023-03-13 | End: 2023-03-13

## 2023-03-13 RX ORDER — TRAZODONE HCL 50 MG
50 TABLET ORAL AT BEDTIME
Refills: 0 | Status: DISCONTINUED | OUTPATIENT
Start: 2023-03-13 | End: 2023-03-16

## 2023-03-13 RX ORDER — SODIUM CHLORIDE 9 MG/ML
1000 INJECTION, SOLUTION INTRAVENOUS
Refills: 0 | Status: DISCONTINUED | OUTPATIENT
Start: 2023-03-13 | End: 2023-03-14

## 2023-03-13 RX ORDER — MONTELUKAST 4 MG/1
10 TABLET, CHEWABLE ORAL AT BEDTIME
Refills: 0 | Status: DISCONTINUED | OUTPATIENT
Start: 2023-03-13 | End: 2023-03-16

## 2023-03-13 RX ORDER — DEXTROSE 50 % IN WATER 50 %
12.5 SYRINGE (ML) INTRAVENOUS ONCE
Refills: 0 | Status: DISCONTINUED | OUTPATIENT
Start: 2023-03-13 | End: 2023-03-16

## 2023-03-13 RX ORDER — DEXTROSE 50 % IN WATER 50 %
15 SYRINGE (ML) INTRAVENOUS ONCE
Refills: 0 | Status: DISCONTINUED | OUTPATIENT
Start: 2023-03-13 | End: 2023-03-16

## 2023-03-13 RX ORDER — PANTOPRAZOLE SODIUM 20 MG/1
40 TABLET, DELAYED RELEASE ORAL
Refills: 0 | Status: DISCONTINUED | OUTPATIENT
Start: 2023-03-13 | End: 2023-03-16

## 2023-03-13 RX ORDER — MONTELUKAST 4 MG/1
1 TABLET, CHEWABLE ORAL
Qty: 0 | Refills: 0 | DISCHARGE

## 2023-03-13 RX ORDER — ONDANSETRON 8 MG/1
4 TABLET, FILM COATED ORAL ONCE
Refills: 0 | Status: COMPLETED | OUTPATIENT
Start: 2023-03-13 | End: 2023-03-13

## 2023-03-13 RX ORDER — GLUCAGON INJECTION, SOLUTION 0.5 MG/.1ML
1 INJECTION, SOLUTION SUBCUTANEOUS ONCE
Refills: 0 | Status: DISCONTINUED | OUTPATIENT
Start: 2023-03-13 | End: 2023-03-16

## 2023-03-13 RX ORDER — ACETAMINOPHEN 500 MG
1000 TABLET ORAL EVERY 6 HOURS
Refills: 0 | Status: COMPLETED | OUTPATIENT
Start: 2023-03-13 | End: 2023-03-14

## 2023-03-13 RX ORDER — INSULIN LISPRO 100/ML
VIAL (ML) SUBCUTANEOUS AT BEDTIME
Refills: 0 | Status: DISCONTINUED | OUTPATIENT
Start: 2023-03-13 | End: 2023-03-16

## 2023-03-13 RX ORDER — LIDOCAINE HCL 20 MG/ML
0.2 VIAL (ML) INJECTION ONCE
Refills: 0 | Status: DISCONTINUED | OUTPATIENT
Start: 2023-03-13 | End: 2023-03-13

## 2023-03-13 RX ORDER — HYDROMORPHONE HYDROCHLORIDE 2 MG/ML
0.5 INJECTION INTRAMUSCULAR; INTRAVENOUS; SUBCUTANEOUS
Refills: 0 | Status: DISCONTINUED | OUTPATIENT
Start: 2023-03-13 | End: 2023-03-14

## 2023-03-13 RX ORDER — HYDROMORPHONE HYDROCHLORIDE 2 MG/ML
0.5 INJECTION INTRAMUSCULAR; INTRAVENOUS; SUBCUTANEOUS
Refills: 0 | Status: DISCONTINUED | OUTPATIENT
Start: 2023-03-13 | End: 2023-03-13

## 2023-03-13 RX ORDER — ONDANSETRON 8 MG/1
4 TABLET, FILM COATED ORAL EVERY 6 HOURS
Refills: 0 | Status: DISCONTINUED | OUTPATIENT
Start: 2023-03-13 | End: 2023-03-14

## 2023-03-13 RX ORDER — INSULIN LISPRO 100/ML
VIAL (ML) SUBCUTANEOUS
Refills: 0 | Status: DISCONTINUED | OUTPATIENT
Start: 2023-03-13 | End: 2023-03-14

## 2023-03-13 RX ORDER — CIPROFLOXACIN LACTATE 400MG/40ML
400 VIAL (ML) INTRAVENOUS ONCE
Refills: 0 | Status: DISCONTINUED | OUTPATIENT
Start: 2023-03-13 | End: 2023-03-14

## 2023-03-13 RX ORDER — ENOXAPARIN SODIUM 100 MG/ML
40 INJECTION SUBCUTANEOUS EVERY 24 HOURS
Refills: 0 | Status: DISCONTINUED | OUTPATIENT
Start: 2023-03-14 | End: 2023-03-16

## 2023-03-13 RX ORDER — CELECOXIB 200 MG/1
400 CAPSULE ORAL ONCE
Refills: 0 | Status: COMPLETED | OUTPATIENT
Start: 2023-03-13 | End: 2023-03-13

## 2023-03-13 RX ORDER — GABAPENTIN 400 MG/1
600 CAPSULE ORAL ONCE
Refills: 0 | Status: COMPLETED | OUTPATIENT
Start: 2023-03-13 | End: 2023-03-13

## 2023-03-13 RX ADMIN — MONTELUKAST 10 MILLIGRAM(S): 4 TABLET, CHEWABLE ORAL at 21:51

## 2023-03-13 RX ADMIN — ONDANSETRON 4 MILLIGRAM(S): 8 TABLET, FILM COATED ORAL at 18:27

## 2023-03-13 RX ADMIN — GABAPENTIN 600 MILLIGRAM(S): 400 CAPSULE ORAL at 15:42

## 2023-03-13 RX ADMIN — FAMOTIDINE 20 MILLIGRAM(S): 10 INJECTION INTRAVENOUS at 19:14

## 2023-03-13 RX ADMIN — Medication 50 MILLIGRAM(S): at 21:51

## 2023-03-13 RX ADMIN — CELECOXIB 400 MILLIGRAM(S): 200 CAPSULE ORAL at 15:42

## 2023-03-13 NOTE — BRIEF OPERATIVE NOTE - OPERATION/FINDINGS
ileostomy reversal, ileosotomy circumferentially incised, small bowel and mesentry dissected free from fascia, side to side anastomosis completed using KRISTEN stapler, hemostasis achieved, fascia closed figure of 8 sutures

## 2023-03-13 NOTE — PATIENT PROFILE ADULT - FALL HARM RISK - UNIVERSAL INTERVENTIONS
Bed in lowest position, wheels locked, appropriate side rails in place/Call bell, personal items and telephone in reach/Instruct patient to call for assistance before getting out of bed or chair/Non-slip footwear when patient is out of bed/[place] to call system/Physically safe environment - no spills, clutter or unnecessary equipment/Purposeful Proactive Rounding/Room/bathroom lighting operational, light cord in reach

## 2023-03-13 NOTE — CHART NOTE - NSCHARTNOTEFT_GEN_A_CORE
Post Operative Note  Patient: TORRIE SHETH 77y (1945) Female   MRN: [id number]  Location: [facility] 2MON 209 W1  Visit: 03-13-23 Inpatient  Date: 03-13-23 @ 22:53    Procedure: S/P ileostomy closure    Subjective: Patient seen and examined post operatively. Reports pain as controlled. PAtient reports nausea which is improving. Asking for nightly home trazodone to help sleep. Denies vomiting, fever, chills, chest pain, SOB, cough.      Objective:  Vitals: T(F): 97.2 (03-13-23 @ 22:31), Max: 97.5 (03-13-23 @ 14:04)  HR: 84 (03-13-23 @ 22:31)  BP: 116/58 (03-13-23 @ 22:31) (116/58 - 151/83)  RR: 18 (03-13-23 @ 22:31)  SpO2: 100% (03-13-23 @ 22:31)  Vent Settings:     In:   03-13-23 @ 07:01  -  03-13-23 @ 22:53  --------------------------------------------------------  IN: 80 mL      IV Fluids: dextrose 5%. 1000 milliLiter(s) (50 mL/Hr) IV Continuous <Continuous>  dextrose 5%. 1000 milliLiter(s) (100 mL/Hr) IV Continuous <Continuous>  lactated ringers. 1000 milliLiter(s) (40 mL/Hr) IV Continuous <Continuous>      Out:   03-13-23 @ 07:01  -  03-13-23 @ 22:53  --------------------------------------------------------  OUT: 75 mL      EBL:     Voided Urine:   03-13-23 @ 07:01  -  03-13-23 @ 22:53  --------------------------------------------------------  OUT: 75 mL      Antunez Catheter: yes         Physical Examination:  General: NAD, resting comfortably in bed  HEENT: Normocephalic atraumatic  Respiratory: Nonlabored respirations, normal CW expansion.  Cardio: S1S2, regular rate and rhythm.  Abdomen: soft NTND, dressing CDI  Vascular: extremities are warm and well perfused.     Imaging:  No post-op imaging studies    Assessment:  77yFemale patient S/P ileostomy closure. Patient is stable and recovering appropriately on floors.     Plan:  - IV Abx: ciprofloxacin, clindamycin  - restarted home trazodone to help patient sleep  - Pain control PRN  - Diet: CLD  - IVF LR 40  - Antunez  - DVT ppx: LVNX     Date/Time: 03-13-23 @ 22:53

## 2023-03-13 NOTE — PRE-OP CHECKLIST - BP NONINVASIVE DIASTOLIC (MM HG)
Sent prescription in for protonix which is approved by patient insurance.  Please let patient know.  Thanks.    Stalin 83

## 2023-03-14 ENCOUNTER — TRANSCRIPTION ENCOUNTER (OUTPATIENT)
Age: 78
End: 2023-03-14

## 2023-03-14 LAB
ANION GAP SERPL CALC-SCNC: 13 MMOL/L — SIGNIFICANT CHANGE UP (ref 5–17)
BUN SERPL-MCNC: 20 MG/DL — SIGNIFICANT CHANGE UP (ref 7–23)
CALCIUM SERPL-MCNC: 9.1 MG/DL — SIGNIFICANT CHANGE UP (ref 8.4–10.5)
CHLORIDE SERPL-SCNC: 101 MMOL/L — SIGNIFICANT CHANGE UP (ref 96–108)
CO2 SERPL-SCNC: 22 MMOL/L — SIGNIFICANT CHANGE UP (ref 22–31)
CREAT SERPL-MCNC: 0.75 MG/DL — SIGNIFICANT CHANGE UP (ref 0.5–1.3)
EGFR: 82 ML/MIN/1.73M2 — SIGNIFICANT CHANGE UP
GLUCOSE BLDC GLUCOMTR-MCNC: 139 MG/DL — HIGH (ref 70–99)
GLUCOSE BLDC GLUCOMTR-MCNC: 238 MG/DL — HIGH (ref 70–99)
GLUCOSE BLDC GLUCOMTR-MCNC: 259 MG/DL — HIGH (ref 70–99)
GLUCOSE BLDC GLUCOMTR-MCNC: 336 MG/DL — HIGH (ref 70–99)
GLUCOSE BLDC GLUCOMTR-MCNC: 435 MG/DL — HIGH (ref 70–99)
GLUCOSE BLDC GLUCOMTR-MCNC: 464 MG/DL — CRITICAL HIGH (ref 70–99)
GLUCOSE SERPL-MCNC: 267 MG/DL — HIGH (ref 70–99)
HCT VFR BLD CALC: 38.5 % — SIGNIFICANT CHANGE UP (ref 34.5–45)
HGB BLD-MCNC: 12.2 G/DL — SIGNIFICANT CHANGE UP (ref 11.5–15.5)
MAGNESIUM SERPL-MCNC: 1.7 MG/DL — SIGNIFICANT CHANGE UP (ref 1.6–2.6)
MCHC RBC-ENTMCNC: 26.2 PG — LOW (ref 27–34)
MCHC RBC-ENTMCNC: 31.7 GM/DL — LOW (ref 32–36)
MCV RBC AUTO: 82.8 FL — SIGNIFICANT CHANGE UP (ref 80–100)
NRBC # BLD: 0 /100 WBCS — SIGNIFICANT CHANGE UP (ref 0–0)
PHOSPHATE SERPL-MCNC: 4.7 MG/DL — HIGH (ref 2.5–4.5)
PLATELET # BLD AUTO: 227 K/UL — SIGNIFICANT CHANGE UP (ref 150–400)
POTASSIUM SERPL-MCNC: 4.3 MMOL/L — SIGNIFICANT CHANGE UP (ref 3.5–5.3)
POTASSIUM SERPL-SCNC: 4.3 MMOL/L — SIGNIFICANT CHANGE UP (ref 3.5–5.3)
RBC # BLD: 4.65 M/UL — SIGNIFICANT CHANGE UP (ref 3.8–5.2)
RBC # FLD: 13.8 % — SIGNIFICANT CHANGE UP (ref 10.3–14.5)
SODIUM SERPL-SCNC: 136 MMOL/L — SIGNIFICANT CHANGE UP (ref 135–145)
WBC # BLD: 10.13 K/UL — SIGNIFICANT CHANGE UP (ref 3.8–10.5)
WBC # FLD AUTO: 10.13 K/UL — SIGNIFICANT CHANGE UP (ref 3.8–10.5)

## 2023-03-14 PROCEDURE — 99222 1ST HOSP IP/OBS MODERATE 55: CPT

## 2023-03-14 RX ORDER — INSULIN LISPRO 100/ML
5 VIAL (ML) SUBCUTANEOUS
Refills: 0 | Status: DISCONTINUED | OUTPATIENT
Start: 2023-03-14 | End: 2023-03-16

## 2023-03-14 RX ORDER — MAGNESIUM OXIDE 400 MG ORAL TABLET 241.3 MG
1000 TABLET ORAL
Refills: 0 | Status: DISCONTINUED | OUTPATIENT
Start: 2023-03-14 | End: 2023-03-15

## 2023-03-14 RX ORDER — INSULIN LISPRO 100/ML
VIAL (ML) SUBCUTANEOUS
Refills: 0 | Status: DISCONTINUED | OUTPATIENT
Start: 2023-03-14 | End: 2023-03-16

## 2023-03-14 RX ORDER — INSULIN GLARGINE 100 [IU]/ML
20 INJECTION, SOLUTION SUBCUTANEOUS AT BEDTIME
Refills: 0 | Status: DISCONTINUED | OUTPATIENT
Start: 2023-03-14 | End: 2023-03-16

## 2023-03-14 RX ORDER — DIAZEPAM 5 MG
5 TABLET ORAL DAILY
Refills: 0 | Status: DISCONTINUED | OUTPATIENT
Start: 2023-03-14 | End: 2023-03-16

## 2023-03-14 RX ORDER — SODIUM CHLORIDE 9 MG/ML
1000 INJECTION, SOLUTION INTRAVENOUS
Refills: 0 | Status: DISCONTINUED | OUTPATIENT
Start: 2023-03-14 | End: 2023-03-14

## 2023-03-14 RX ORDER — DEXTROSE MONOHYDRATE, SODIUM CHLORIDE, AND POTASSIUM CHLORIDE 50; .745; 4.5 G/1000ML; G/1000ML; G/1000ML
1000 INJECTION, SOLUTION INTRAVENOUS
Refills: 0 | Status: DISCONTINUED | OUTPATIENT
Start: 2023-03-14 | End: 2023-03-14

## 2023-03-14 RX ORDER — ACETAMINOPHEN 500 MG
1000 TABLET ORAL EVERY 6 HOURS
Refills: 0 | Status: DISCONTINUED | OUTPATIENT
Start: 2023-03-15 | End: 2023-03-16

## 2023-03-14 RX ADMIN — MAGNESIUM OXIDE 400 MG ORAL TABLET 1000 MILLIGRAM(S): 241.3 TABLET ORAL at 09:10

## 2023-03-14 RX ADMIN — Medication 1000 MILLIGRAM(S): at 00:27

## 2023-03-14 RX ADMIN — ENOXAPARIN SODIUM 40 MILLIGRAM(S): 100 INJECTION SUBCUTANEOUS at 14:05

## 2023-03-14 RX ADMIN — Medication 12: at 09:08

## 2023-03-14 RX ADMIN — Medication 400 MILLIGRAM(S): at 00:00

## 2023-03-14 RX ADMIN — MONTELUKAST 10 MILLIGRAM(S): 4 TABLET, CHEWABLE ORAL at 21:19

## 2023-03-14 RX ADMIN — Medication 400 MILLIGRAM(S): at 05:19

## 2023-03-14 RX ADMIN — PANTOPRAZOLE SODIUM 40 MILLIGRAM(S): 20 TABLET, DELAYED RELEASE ORAL at 05:19

## 2023-03-14 RX ADMIN — PANTOPRAZOLE SODIUM 40 MILLIGRAM(S): 20 TABLET, DELAYED RELEASE ORAL at 17:38

## 2023-03-14 RX ADMIN — Medication 1: at 21:18

## 2023-03-14 RX ADMIN — Medication 400 MILLIGRAM(S): at 12:34

## 2023-03-14 RX ADMIN — Medication 4: at 17:39

## 2023-03-14 RX ADMIN — INSULIN GLARGINE 20 UNIT(S): 100 INJECTION, SOLUTION SUBCUTANEOUS at 21:19

## 2023-03-14 RX ADMIN — Medication 400 MILLIGRAM(S): at 17:38

## 2023-03-14 RX ADMIN — Medication 1000 MILLIGRAM(S): at 05:59

## 2023-03-14 RX ADMIN — Medication 1000 MILLIGRAM(S): at 13:34

## 2023-03-14 RX ADMIN — Medication 50 MILLIGRAM(S): at 21:19

## 2023-03-14 NOTE — DISCHARGE NOTE PROVIDER - HOSPITAL COURSE
HPI:  This is a 76 y/o female PMH benign intracranial hypertension 1980, S/P multiple spinal taps at that time, hypothyroid,  nephrolithiasis, fibromyalgia, diabetes type 2, right breast CA, S/P lumpectomy, chemotherapy and radiation, S/P excision of pre-cancerous tongue growths, cerebral aneurysm/CVA, S/P cerebral artery stent , GIB 2020,  diverticulitis, S/P colon resection with ileostomy formation 12/13/22.  Presents today for ERAS, closure of ileostomy.    COVID+ 1/01/23 was done at her home through a [facility] lab, was treated with remdesivir, cannot find PCR in [facility] or [employer]; is documented on the booking sheet, COVID swab waived (21 Feb 2023 15:38)      Patient presented for scheduled procedure. Underwent reversal of ileostomy without complication and was transferred to the PACU. When PACU criteria was met, patient was transferred to the floor for observation and continuation of ERP protocol. Postoperatively, pain well controlled. Antunez removed POD1 with subsequent successful trial of void. PT evaluated patient_____. Dietician consulted for education. Diet advanced as tolerated to consistent carb low fiber diet. DVT ppx with SCDs and Lovenox administered daily throughout hospital stay. Ambulation and incentive spirometry encouraged. Home meds resumed. Labs trended and lytes repleted prn. Local wound care to ostomy sites daily. At time of discharge, patient was hemodynamically stable, tolerating LFD and pain was adequately controlled. Cleared for d/c on POD ____. HPI:  This is a 76 y/o female PMH benign intracranial hypertension 1980, S/P multiple spinal taps at that time, hypothyroid,  nephrolithiasis, fibromyalgia, diabetes type 2, right breast CA, S/P lumpectomy, chemotherapy and radiation, S/P excision of pre-cancerous tongue growths, cerebral aneurysm/CVA, S/P cerebral artery stent , GIB 2020,  diverticulitis, S/P colon resection with ileostomy formation 12/13/22.  Presents for ERAS, closure of ileostomy.      Patient presented for scheduled procedure. Underwent reversal of ileostomy without complication and was transferred to the PACU. When PACU criteria was met, patient was transferred to the floor for observation and continuation of ERP protocol. Postoperatively, pain well controlled. Antunez removed POD1 with subsequent successful trial of void. PT evaluated patient. Dietitian consulted for education. Diet advanced as tolerated to consistent carb low fiber diet. DVT ppx with SCDs and Lovenox administered daily throughout hospital stay. Ambulation and incentive spirometry encouraged. Home meds resumed. Labs trended and lytes repleted prn. Local wound care to ostomy sites daily. At time of discharge, patient was hemodynamically stable, tolerating LFD and pain was adequately controlled. Endocrine was consulted for hyperglycemia, pt to follow-up with endocrinologist Dr. Meade within 1-2 weeks.  Cleared for d/c on POD 3 and will follow-up with Dr. Blancas within 1-2 weeks.

## 2023-03-14 NOTE — DIETITIAN INITIAL EVALUATION ADULT - ORAL INTAKE PTA/DIET HISTORY
Pt reports good PO intake and appetite PTA, has been consuming low fiber diet since ostomy surgery in [place]. NKFA. Pt denies chewing/swallowing difficulty, nausea, vomiting, no issues with ostomy output reported.  Pt reports good PO intake and appetite PTA, has been consuming low fiber diet since ostomy surgery in [place]. Noted with allergy to shellfish and strawberry. Pt denies chewing/swallowing difficulty, nausea, vomiting, no issues with ostomy output reported.

## 2023-03-14 NOTE — DIETITIAN INITIAL EVALUATION ADULT - NSICDXPASTSURGICALHX_GEN_ALL_CORE_FT
PAST SURGICAL HISTORY:  Basal cell carcinoma removed - left eyelid- 3/15    Cerebral aneurysm repair with coil and stent - 11/12    H/O shoulder surgery right - 5/2001    H/O tubal ligation     History of D&C     History of kidney stones 2001    S/P colon resection

## 2023-03-14 NOTE — DISCHARGE NOTE PROVIDER - NSDCCPCAREPLAN_GEN_ALL_CORE_FT
PRINCIPAL DISCHARGE DIAGNOSIS  Diagnosis: Diverticulitis  Assessment and Plan of Treatment: WOUND CARE: Replace dry dressings as needed.  BATHING: Please do not submerge wound underwater. No swimming pools, no hot tubs, no tub baths. You may shower and/or sponge bathe in 24 hours. Let soapy water run over incisions. DO NOT scrub or soak incision sites. Pat dry.   ACTIVITY: No heavy lifting more than 10-15lbs or straining. Otherwise, you may return to your usual level of physical activity. You may complete your daily activities. Take frequent walks. If you are taking narcotic pain medication (such as Oxycodone), do NOT drive a car, operate machinery or make important decisions.  DIET: Low fiber diabetic diet as tolerated   NOTIFY YOUR SURGEON IF: You have any bleeding that does not stop, any pus draining from your wound, any fever (over 100.4 F) or chills, persistent nausea/vomiting with inability to tolerate food or liquids, persistent diarrhea, or if your pain is not controlled on your discharge pain medications.  FOLLOW-UP:  1. Please call to make a follow-up appointment within one week of discharge   2. Please follow up with your primary care physician in one week regarding your hospitalization.

## 2023-03-14 NOTE — PHYSICAL THERAPY INITIAL EVALUATION ADULT - ADDITIONAL COMMENTS
Pt lives in a home with her , with 3 stairs to enter. Prior to admit, pt was I with all functional mobility and ADLs. Pt noted she owns a RW from a pervious surgery.

## 2023-03-14 NOTE — DISCHARGE NOTE PROVIDER - NSDCFUSCHEDAPPT_GEN_ALL_CORE_FT
Brittany Howell Physician [facility]  [facility]  Scheduled Appointment: 06/09/2023    Brittany Howell Physician [facility]  Scheduled Appointment: 06/09/2023

## 2023-03-14 NOTE — DIETITIAN INITIAL EVALUATION ADULT - PERTINENT LABORATORY DATA
03-14    136  |  101  |  20  ----------------------------<  267<H>  4.3   |  22  |  0.75    Ca    9.1      14 Mar 2023 07:42  Phos  4.7     03-14  Mg     1.7     03-14    POCT Blood Glucose.: 336 mg/dL (03-14-23 @ 09:53)  A1C with Estimated Average Glucose Result: 6.8 % (02-21-23 @ 16:37)  A1C with Estimated Average Glucose Result: 7.2 % (12-08-22 @ 18:23)  A1C with Estimated Average Glucose Result: 7.2 % (11-24-22 @ 07:58)

## 2023-03-14 NOTE — DIETITIAN INITIAL EVALUATION ADULT - NSFNSADHERENCEPTAFT_GEN_A_CORE
Pt with T2DM, takes Victoza, metformin, Jardiance, Tresiba. HbA1c: 6.8% (2/21). Pt noted with elevated BG, reports drinking Ensure clear this morning.

## 2023-03-14 NOTE — DIETITIAN INITIAL EVALUATION ADULT - REASON FOR ADMISSION
Diverticulitis of large intestine with perforation and abscess without bleeding    Chart reviewed, events noted. This is a "78 y/o F POD1 S/P ileostomy closure"

## 2023-03-14 NOTE — DIETITIAN INITIAL EVALUATION ADULT - ENERGY INTAKE
Adequate (%) in-house pt reports good tolerance to clear liquid diet during RD visit, plan for diet advancement later today.  No acute GI distress noted, no bowel movement noted. Pt familiar with low fiber diet but receptive to brief review. Provided low-fiber nutrition therapy including importance of avoiding  fiber rich foods, fresh fruits/vegetables, whole grains, and added fiber in processed foods. Discussed chewing foods well and adequate hydration and protein intake. Discussed gradual reintroduction of fiber back into diet once cleared by MD. Pt verbalized understanding and accepted written handout. Patient with no nutrition-related questions at this time. Made aware RD remains available as needed.

## 2023-03-14 NOTE — DIETITIAN INITIAL EVALUATION ADULT - PERTINENT MEDS FT
MEDICATIONS  (STANDING):  acetaminophen   IVPB .. 1000 milliGRAM(s) IV Intermittent every 6 hours  dextrose 5%. 1000 milliLiter(s) (50 mL/Hr) IV Continuous <Continuous>  dextrose 5%. 1000 milliLiter(s) (100 mL/Hr) IV Continuous <Continuous>  dextrose 50% Injectable 25 Gram(s) IV Push once  dextrose 50% Injectable 12.5 Gram(s) IV Push once  dextrose 50% Injectable 25 Gram(s) IV Push once  enoxaparin Injectable 40 milliGRAM(s) SubCutaneous every 24 hours  glucagon  Injectable 1 milliGRAM(s) IntraMuscular once  insulin glargine Injectable (LANTUS) 20 Unit(s) SubCutaneous at bedtime  insulin lispro (ADMELOG) corrective regimen sliding scale   SubCutaneous three times a day before meals  insulin lispro (ADMELOG) corrective regimen sliding scale   SubCutaneous at bedtime  magnesium oxide 1000 milliGRAM(s) Oral two times a day with meals  montelukast 10 milliGRAM(s) Oral at bedtime  morphine PF Spinal 0.15 milliGRAM(s) IntraThecal. once  pantoprazole    Tablet 40 milliGRAM(s) Oral two times a day  traZODone 50 milliGRAM(s) Oral at bedtime    MEDICATIONS  (PRN):  dextrose Oral Gel 15 Gram(s) Oral once PRN Blood Glucose LESS THAN 70 milliGRAM(s)/deciliter  HYDROmorphone  Injectable 0.5 milliGRAM(s) IV Push every 3 hours PRN Moderate Pain (4 - 6)  naloxone Injectable 0.1 milliGRAM(s) IV Push every 3 minutes PRN For ANY of the following changes in patient status:  A. RR LESS THAN 10 breaths per minute, B. Oxygen saturation LESS THAN 90%, C. Sedation score of 6  ondansetron Injectable 4 milliGRAM(s) IV Push every 6 hours PRN Nausea  oxyCODONE    IR 2.5 milliGRAM(s) Oral every 6 hours PRN Severe Pain (7 - 10)

## 2023-03-14 NOTE — DIETITIAN INITIAL EVALUATION ADULT - ADD RECOMMEND
1) Continue carbohydrate consistent + low fiber diet. 2) RD to add sugar-free greek yogurt to trays to optimize intake 3) RD to remain available and follow-up as medically appropriate.

## 2023-03-14 NOTE — CONSULT NOTE ADULT - SUBJECTIVE AND OBJECTIVE BOX
HPI: Patient is a 77 F with history of benign intracranial HTN, Hypothyroidism, T2D, neprholithiasis, fibromyalgia, right breast CA, S/P lumpectomy, chemotherapy and radiation, cerebral aneurysm/CVA, S/P cerebral artery stent , GIB 2020,  diverticulitis, S/P colon resection with ileostomy formation 12/13/22 presented today for ERAS, closure of ileostomy. Endocrinology consulted for hyperglycemia management.       Diabetes history:  Diagnosed with diabetes for 10+ years. Denies neuropathy, retinopathy or neprhopathy. Does have history of CVA after aneurysm stent placement. No history of CAD. Patient follows with Dr. Schwarz at [facility] for diabetes and weight management. Home regimen as below.   Reports glucose usually 115-135 in the morning and 135-180 before dinner.     Most recent A1C 6.8    Home DM medications:  Metformin 500 mg ER BID --> stopped several months ago since started on a low fiber diet   Tresiba 20 units QHS  Victoza 1.8 mg daily   Jardiance 10 mg daily      Currently on low fiber diet. Tolerating diet okay. Denies nausea or vomiting. Received Decadron 8 mg on 3/13/2023 at 4:45 pm during anesthesia.       Current inpatient DM Meds:   - Lantus 20 units QHS  - Mod ISS TIDAC  - LDSS QHS    Also on armor thyroid 60 mg daily at home. Follows with a naturopathic doctor for this.     FH:  DM: denies     SH:  Smoking: denies  Etoh: denies  Recreational Drugs: denies       PAST MEDICAL & SURGICAL HISTORY:  Breast CA  right      Aneurysm  cerebral      Vertigo      Hearing loss  left      Basal cell carcinoma      Diverticulitis      CVA (cerebral vascular accident)  2012      Shoulder disorder  right bone spur      Benign intracranial hypertension  1980 r/t &quot;tetracyclines&quot;      Fibromyalgia      Chronic fatigue      Lumbar disc disease      Kidney stones  hx of      HTN (hypertension)      DM (diabetes mellitus)      Anxiety      Depression      Reflux esophagitis      Asthma      Hypothyroid      History of D&amp;C      Cerebral aneurysm  repair with coil and stent - 11/12      Basal cell carcinoma  removed - left eyelid- 3/15      H/O shoulder surgery  right - 5/2001      H/O tubal ligation      History of kidney stones  2001      S/P colon resection              Current Meds:  dextrose 5%. 1000 milliLiter(s) IV Continuous <Continuous>  dextrose 5%. 1000 milliLiter(s) IV Continuous <Continuous>  dextrose 50% Injectable 25 Gram(s) IV Push once  dextrose 50% Injectable 12.5 Gram(s) IV Push once  dextrose 50% Injectable 25 Gram(s) IV Push once  dextrose Oral Gel 15 Gram(s) Oral once PRN  diazepam    Tablet 5 milliGRAM(s) Oral daily PRN  enoxaparin Injectable 40 milliGRAM(s) SubCutaneous every 24 hours  glucagon  Injectable 1 milliGRAM(s) IntraMuscular once  insulin glargine Injectable (LANTUS) 20 Unit(s) SubCutaneous at bedtime  insulin lispro (ADMELOG) corrective regimen sliding scale   SubCutaneous three times a day before meals  insulin lispro (ADMELOG) corrective regimen sliding scale   SubCutaneous at bedtime  magnesium oxide 1000 milliGRAM(s) Oral two times a day with meals  montelukast 10 milliGRAM(s) Oral at bedtime  oxyCODONE    IR 2.5 milliGRAM(s) Oral every 6 hours PRN  pantoprazole    Tablet 40 milliGRAM(s) Oral two times a day  traZODone 50 milliGRAM(s) Oral at bedtime      Allergies:  [This allergen will not trigger allergy alert] orphenadrine hydrochloride (Other)  [This allergen will not trigger allergy alert] propionate (Rash)  [This allergen will not trigger allergy alert] Shellfish-derived Products (Unknown)  [This allergen will not trigger allergy alert] Sulfites (Unknown)  [This allergen will not trigger allergy alert] sulfites - nausea/ gi upset (Other)  Augmentin (Rash)  Ceclor (Other)  codeine (Nausea)  Cytotec (Unknown)  Entex (Other)  Entex PSE (Unknown)  misoprostol (Other)  Norflex (Other)  penicillin (Urticaria; Rash)  ramipril (Other)  shellfish (Rash)  strawberry (Rash)  tetracycline (Other)  Tetracycline Hydrochloride (Other)      ROS:     General: Denies weight loss/weight gain, decreased appetite, fatigue  Eyes: Denies Blurry vision, double vision, visual changes  CV: Denies palpitations  Resp: Denies SOB, CP, cough  GI: Denies NVD, difficulty swallowing, abdominal pain  : Denies polyuria, dysuria   MSK: Denies weakness, joint pain  Skin: Denies rash, dryness, diaphoresis  Heme: Denies Easy bruising or bleeding  Neuro: Denies HA, dizziness, lightheadedness, numbness tingling  Psych: Denies Anxiety, Depression    Vital Signs Last 24 Hrs  T(C): 36.9 (14 Mar 2023 15:53), Max: 36.9 (14 Mar 2023 15:53)  T(F): 98.5 (14 Mar 2023 15:53), Max: 98.5 (14 Mar 2023 15:53)  HR: 82 (14 Mar 2023 15:53) (68 - 90)  BP: 136/67 (14 Mar 2023 15:53) (101/60 - 144/65)  BP(mean): 85 (13 Mar 2023 20:00) (85 - 93)  RR: 18 (14 Mar 2023 15:53) (16 - 18)  SpO2: 98% (14 Mar 2023 15:53) (97% - 100%)    Parameters below as of 14 Mar 2023 15:53  Patient On (Oxygen Delivery Method): room air      Height (cm): 157.5 (03-13 @ 15:03)  Weight (kg): 57.6 (03-13 @ 15:03)  BMI (kg/m2): 23.2 (03-13 @ 15:03)      Constitutional: wn/wd in NAD.   HEENT:   no proptosis or lid retraction  Neck: no thyromegaly   Respiratory: non labored breathing   Cardiovascular:   no peripheral edema  GI: soft,   not distended   Neurology: no tremors, non focal   Skin: no visible rashes/lesions  Psychiatric: AAO x 3, normal affect/mood.  Ext: extremities warm, no cyanosis, clubbing or edema.       LABS:                        12.2   10.13 )-----------( 227      ( 14 Mar 2023 07:42 )             38.5     03-14    136  |  101  |  20  ----------------------------<  267<H>  4.3   |  22  |  0.75    Ca    9.1      14 Mar 2023 07:42  Phos  4.7     03-14  Mg     1.7     03-14                RADIOLOGY & ADDITIONAL STUDIES:  CAPILLARY BLOOD GLUCOSE      POCT Blood Glucose.: 238 mg/dL (14 Mar 2023 17:04)  POCT Blood Glucose.: 139 mg/dL (14 Mar 2023 13:46)  POCT Blood Glucose.: 336 mg/dL (14 Mar 2023 09:53)  POCT Blood Glucose.: 464 mg/dL (14 Mar 2023 08:58)  POCT Blood Glucose.: 435 mg/dL (14 Mar 2023 08:45)  POCT Blood Glucose.: 174 mg/dL (13 Mar 2023 21:17)  POCT Blood Glucose.: 143 mg/dL (13 Mar 2023 19:03)

## 2023-03-14 NOTE — DIETITIAN INITIAL EVALUATION ADULT - NSICDXPASTMEDICALHX_GEN_ALL_CORE_FT
PAST MEDICAL HISTORY:  Aneurysm cerebral    Anxiety     Asthma     Basal cell carcinoma     Benign intracranial hypertension 1980 r/t "tetracyclines"    Breast CA right    Chronic fatigue     CVA (cerebral vascular accident) 2012    Depression     Diverticulitis     DM (diabetes mellitus)     Fibromyalgia     Hearing loss left    HTN (hypertension)     Hypothyroid     Kidney stones hx of    Lumbar disc disease     Reflux esophagitis     Shoulder disorder right bone spur    Vertigo

## 2023-03-14 NOTE — DISCHARGE NOTE PROVIDER - CARE PROVIDER_API CALL
Juan M Blancas)  ColonRectal Surgery; Surgery  [address], [address]  [place] [address]  Phone: [phone]  Fax: [phone]  Follow Up Time: 1 week

## 2023-03-14 NOTE — CONSULT NOTE ADULT - ASSESSMENT
Patient is a 77 F with history of benign intracranial HTN, Hypothyroidism, T2D, neprholithiasis, fibromyalgia, right breast CA, S/P lumpectomy, chemotherapy and radiation, cerebral aneurysm/CVA, S/P cerebral artery stent , GIB 2020,  diverticulitis, S/P colon resection with ileostomy formation 12/13/22 presented today for ERAS, closure of ileostomy. Endocrinology consulted for hyperglycemia management.     1.  T2DM   - Most recent Hemoglobin A1C 6.8 at goal for her age  - Current FS ranges from 140-460  - Current diet: low fiber CHO diet  - Please monitor blood glucose values TID AC & QHS while eating regular meals and Q6H while NPO  - Blood glucose goals pre-meal less than 140 mg/dL and random blood glucose less than 180 mg/dL  - Recommendations:  - Elevated glucose last night till this morning likely due to Decadron given during OR   - Continue with insulin Glargine 20 units  QHS  - Start insulin Lispro 5 units TID with meals, hold if NPO or if eating less than 50% of meals  - Change to low dsoe Correctional scale TID with meals  - Continue with low dose Correctional scale QHS    2. HTN  - BP goal 130/80  - Manage per primary team     3. HLD  -statin if no contraindication  - Manage as outpatient      4. Hypothyroidism   - On armor thyroid 60 mg daily outpatient   - Can be managed outpatient   - Can check TSH while inpatient     Discharge planning:  - Current home DM meds:  Tresiba 20 units QHS+ Jardiance 10 mg daily + Victoza 1.8 mg daily   - Discharge DM meds: can resume the above home regimen   - Patient will follow up with her endocrinologist Dr. Meade   - Patient will need opthalmology and podiatry follow up as outpatient     Thank you for the consult. Will continue to monitor. Please inform the endocrinology team at least 24 hours prior to intended discharge date.     Contact via pager or [employer] during business hours. For follow up questions, discharge recommendations, or new consults please call answering service at [phone] (weekdays), [phone] (nights/weekends). For nonurgent matters, please email  [email or web address].     Марина Garcia MD  Department of Endocrinology, Diabetes and metabolism   Pager [phone]

## 2023-03-14 NOTE — PHYSICAL THERAPY INITIAL EVALUATION ADULT - PERTINENT HX OF CURRENT PROBLEM, REHAB EVAL
This is a 78 y/o female PMH benign intracranial hypertension 1980, S/P multiple spinal taps at that time, hypothyroid,  nephrolithiasis, fibromyalgia, diabetes type 2, right breast CA, S/P lumpectomy, chemotherapy and radiation, S/P excision of pre-cancerous tongue growths, cerebral aneurysm/CVA, S/P cerebral artery stent , GIB 2020,  diverticulitis, S/P colon resection with ileostomy formation 12/13/22.  Presents today for ERAS, closure of ileostomy.

## 2023-03-14 NOTE — PHYSICAL THERAPY INITIAL EVALUATION ADULT - GENERAL OBSERVATIONS, REHAB EVAL
Chart reviewed events to date noted. Blood glucose reviewed. Pt tolerated 35min PT ERP initial evaluation well. Pt received supine in bed, +IV, A&Ox4, agreeable to PT.

## 2023-03-14 NOTE — DISCHARGE NOTE PROVIDER - NSDCMRMEDTOKEN_GEN_ALL_CORE_FT
Aspir 81 oral delayed release tablet: 1 tab(s) orally 3 times a day  diazepam 5 mg oral tablet: 1.5 tab(s) orally once a day  dicyclomine 20 mg oral tablet: 1 tab(s) orally 4 times a day, As Needed  exemestane 25 mg oral tablet: 1 tab(s) orally once a day  Jardiance 10 mg oral tablet: 1 tab(s) orally once a day (in the morning), last dose 12/9 am  losartan 50 mg oral tablet: 1 tab(s) orally once a day  metFORMIN 500 mg oral tablet, extended release: 1 tab(s) orally 2 times a day, last dose 12/12 am  montelukast 10 mg oral tablet: 1 tab(s) orally once a day (at bedtime)  omeprazole 40 mg oral delayed release capsule: 1 tab(s) orally 2 times a day  Pepcid 20 mg oral tablet: 2 tab(s) orally once a day (at bedtime)  Plavix 75 mg oral tablet: 1 tab(s) orally once a day, last dose 12/07 as per surgeon  Sudafed 30 mg oral tablet: 1 tab(s) orally once a day  traZODone 100 mg oral tablet: 1 tab(s) orally once a day (at bedtime)  Tresiba 100 units/mL subcutaneous solution: 28 unit(s) subcutaneous once a day (at bedtime), to take 22 units in PM on 12/12 Pm a sper ENdo  Victoza 18 mg/3 mL subcutaneous solution: 1.8 milligram(s) subcutaneous once a day- in morning before breakfast, no victoza in am of  surgery/a sper eNdo adivse  Vyzulta 0.024% ophthalmic solution: 1 drop(s) to each affected eye once a day (in the evening)   acetaminophen 500 mg oral tablet: 2 tab(s) orally every 6 hours  Aspir 81 oral delayed release tablet: 1 tab(s) orally 3 times a day  diazepam 5 mg oral tablet: 1.5 tab(s) orally once a day  exemestane 25 mg oral tablet: 1 tab(s) orally once a day  Jardiance 10 mg oral tablet: 1 tab(s) orally once a day (in the morning), last dose 12/9 am  losartan 50 mg oral tablet: 1 tab(s) orally once a day  metFORMIN 500 mg oral tablet, extended release: 1 tab(s) orally 2 times a day, last dose 12/12 am  montelukast 10 mg oral tablet: 1 tab(s) orally once a day (at bedtime)  omeprazole 40 mg oral delayed release capsule: 1 tab(s) orally 2 times a day  Pepcid 20 mg oral tablet: 2 tab(s) orally once a day (at bedtime)  Plavix 75 mg oral tablet: 1 tab(s) orally once a day, last dose 12/07 as per surgeon  traZODone 100 mg oral tablet: 1 tab(s) orally once a day (at bedtime)  Tresiba 100 units/mL subcutaneous solution: 28 unit(s) subcutaneous once a day (at bedtime), to take 22 units in PM on 12/12 Pm a sper ENdo  Victoza 18 mg/3 mL subcutaneous solution: 1.8 milligram(s) subcutaneous once a day- in morning before breakfast, no victoza in am of  surgery/a sper eNdo adivse  Vyzulta 0.024% ophthalmic solution: 1 drop(s) to each affected eye once a day (in the evening)   acetaminophen 500 mg oral tablet: 2 tab(s) orally every 6 hours  Aspir 81 oral delayed release tablet: 1 tab(s) orally 3 times a day  diazepam 5 mg oral tablet: 1.5 tab(s) orally once a day  exemestane 25 mg oral tablet: 1 tab(s) orally once a day  Jardiance 10 mg oral tablet: 1 tab(s) orally once a day (in the morning), last dose 12/9 am  losartan 50 mg oral tablet: 1 tab(s) orally once a day  metFORMIN 500 mg oral tablet, extended release: 1 tab(s) orally 2 times a day, last dose 12/12 am  montelukast 10 mg oral tablet: 1 tab(s) orally once a day (at bedtime)  omeprazole 40 mg oral delayed release capsule: 1 tab(s) orally 2 times a day  oxyCODONE 5 mg oral tablet: 1 tab(s) orally every 6 hours, As needed, Severe Pain (7 - 10) MDD:4  Pepcid 20 mg oral tablet: 2 tab(s) orally once a day (at bedtime)  traZODone 100 mg oral tablet: 1 tab(s) orally once a day (at bedtime)  Tresiba 100 units/mL subcutaneous solution: 28 unit(s) subcutaneous once a day (at bedtime), to take 22 units in PM on 12/12 Pm a sper ENdo  Victoza 18 mg/3 mL subcutaneous solution: 1.8 milligram(s) subcutaneous once a day- in morning before breakfast, no victoza in am of  surgery/a sper eNdo adivse  Vyzulta 0.024% ophthalmic solution: 1 drop(s) to each affected eye once a day (in the evening)

## 2023-03-14 NOTE — DIETITIAN INITIAL EVALUATION ADULT - OTHER INFO
Weight: weight per previous RD note: 145lbs (8/16/22),  132lbs (12/13/22). Current dosing weight is ~127lbs

## 2023-03-15 DIAGNOSIS — E78.5 HYPERLIPIDEMIA, UNSPECIFIED: ICD-10-CM

## 2023-03-15 DIAGNOSIS — R53.81 OTHER MALAISE: ICD-10-CM

## 2023-03-15 DIAGNOSIS — I10 ESSENTIAL (PRIMARY) HYPERTENSION: ICD-10-CM

## 2023-03-15 DIAGNOSIS — E11.65 TYPE 2 DIABETES MELLITUS WITH HYPERGLYCEMIA: ICD-10-CM

## 2023-03-15 DIAGNOSIS — Z51.5 ENCOUNTER FOR PALLIATIVE CARE: ICD-10-CM

## 2023-03-15 DIAGNOSIS — R52 PAIN, UNSPECIFIED: ICD-10-CM

## 2023-03-15 LAB
ANION GAP SERPL CALC-SCNC: 10 MMOL/L — SIGNIFICANT CHANGE UP (ref 5–17)
BUN SERPL-MCNC: 21 MG/DL — SIGNIFICANT CHANGE UP (ref 7–23)
CALCIUM SERPL-MCNC: 9.1 MG/DL — SIGNIFICANT CHANGE UP (ref 8.4–10.5)
CHLORIDE SERPL-SCNC: 104 MMOL/L — SIGNIFICANT CHANGE UP (ref 96–108)
CO2 SERPL-SCNC: 25 MMOL/L — SIGNIFICANT CHANGE UP (ref 22–31)
CREAT SERPL-MCNC: 0.67 MG/DL — SIGNIFICANT CHANGE UP (ref 0.5–1.3)
EGFR: 90 ML/MIN/1.73M2 — SIGNIFICANT CHANGE UP
GLUCOSE BLDC GLUCOMTR-MCNC: 111 MG/DL — HIGH (ref 70–99)
GLUCOSE BLDC GLUCOMTR-MCNC: 128 MG/DL — HIGH (ref 70–99)
GLUCOSE BLDC GLUCOMTR-MCNC: 143 MG/DL — HIGH (ref 70–99)
GLUCOSE BLDC GLUCOMTR-MCNC: 172 MG/DL — HIGH (ref 70–99)
GLUCOSE SERPL-MCNC: 171 MG/DL — HIGH (ref 70–99)
HCT VFR BLD CALC: 35.9 % — SIGNIFICANT CHANGE UP (ref 34.5–45)
HGB BLD-MCNC: 11.5 G/DL — SIGNIFICANT CHANGE UP (ref 11.5–15.5)
MAGNESIUM SERPL-MCNC: 1.9 MG/DL — SIGNIFICANT CHANGE UP (ref 1.6–2.6)
MCHC RBC-ENTMCNC: 26.2 PG — LOW (ref 27–34)
MCHC RBC-ENTMCNC: 32 GM/DL — SIGNIFICANT CHANGE UP (ref 32–36)
MCV RBC AUTO: 81.8 FL — SIGNIFICANT CHANGE UP (ref 80–100)
NRBC # BLD: 0 /100 WBCS — SIGNIFICANT CHANGE UP (ref 0–0)
PHOSPHATE SERPL-MCNC: 2 MG/DL — LOW (ref 2.5–4.5)
PLATELET # BLD AUTO: 219 K/UL — SIGNIFICANT CHANGE UP (ref 150–400)
POTASSIUM SERPL-MCNC: 4.3 MMOL/L — SIGNIFICANT CHANGE UP (ref 3.5–5.3)
POTASSIUM SERPL-SCNC: 4.3 MMOL/L — SIGNIFICANT CHANGE UP (ref 3.5–5.3)
RBC # BLD: 4.39 M/UL — SIGNIFICANT CHANGE UP (ref 3.8–5.2)
RBC # FLD: 14.1 % — SIGNIFICANT CHANGE UP (ref 10.3–14.5)
SODIUM SERPL-SCNC: 139 MMOL/L — SIGNIFICANT CHANGE UP (ref 135–145)
WBC # BLD: 7.5 K/UL — SIGNIFICANT CHANGE UP (ref 3.8–10.5)
WBC # FLD AUTO: 7.5 K/UL — SIGNIFICANT CHANGE UP (ref 3.8–10.5)

## 2023-03-15 PROCEDURE — 99223 1ST HOSP IP/OBS HIGH 75: CPT

## 2023-03-15 PROCEDURE — 99232 SBSQ HOSP IP/OBS MODERATE 35: CPT

## 2023-03-15 RX ORDER — MAGNESIUM SULFATE 500 MG/ML
2 VIAL (ML) INJECTION ONCE
Refills: 0 | Status: COMPLETED | OUTPATIENT
Start: 2023-03-15 | End: 2023-03-15

## 2023-03-15 RX ORDER — SODIUM,POTASSIUM PHOSPHATES 278-250MG
2 POWDER IN PACKET (EA) ORAL
Refills: 0 | Status: COMPLETED | OUTPATIENT
Start: 2023-03-15 | End: 2023-03-15

## 2023-03-15 RX ADMIN — OXYCODONE HYDROCHLORIDE 2.5 MILLIGRAM(S): 5 TABLET ORAL at 09:21

## 2023-03-15 RX ADMIN — OXYCODONE HYDROCHLORIDE 2.5 MILLIGRAM(S): 5 TABLET ORAL at 14:14

## 2023-03-15 RX ADMIN — OXYCODONE HYDROCHLORIDE 2.5 MILLIGRAM(S): 5 TABLET ORAL at 22:12

## 2023-03-15 RX ADMIN — Medication 1000 MILLIGRAM(S): at 12:30

## 2023-03-15 RX ADMIN — INSULIN GLARGINE 20 UNIT(S): 100 INJECTION, SOLUTION SUBCUTANEOUS at 22:12

## 2023-03-15 RX ADMIN — Medication 1000 MILLIGRAM(S): at 01:14

## 2023-03-15 RX ADMIN — Medication 1000 MILLIGRAM(S): at 18:41

## 2023-03-15 RX ADMIN — Medication 1: at 09:13

## 2023-03-15 RX ADMIN — PANTOPRAZOLE SODIUM 40 MILLIGRAM(S): 20 TABLET, DELAYED RELEASE ORAL at 05:04

## 2023-03-15 RX ADMIN — MONTELUKAST 10 MILLIGRAM(S): 4 TABLET, CHEWABLE ORAL at 22:13

## 2023-03-15 RX ADMIN — Medication 5 UNIT(S): at 09:14

## 2023-03-15 RX ADMIN — OXYCODONE HYDROCHLORIDE 2.5 MILLIGRAM(S): 5 TABLET ORAL at 10:21

## 2023-03-15 RX ADMIN — Medication 5 UNIT(S): at 13:20

## 2023-03-15 RX ADMIN — PANTOPRAZOLE SODIUM 40 MILLIGRAM(S): 20 TABLET, DELAYED RELEASE ORAL at 18:06

## 2023-03-15 RX ADMIN — Medication 1000 MILLIGRAM(S): at 00:44

## 2023-03-15 RX ADMIN — Medication 1000 MILLIGRAM(S): at 11:32

## 2023-03-15 RX ADMIN — Medication 1000 MILLIGRAM(S): at 05:04

## 2023-03-15 RX ADMIN — Medication 5 UNIT(S): at 18:07

## 2023-03-15 RX ADMIN — Medication 2 PACKET(S): at 13:19

## 2023-03-15 RX ADMIN — OXYCODONE HYDROCHLORIDE 2.5 MILLIGRAM(S): 5 TABLET ORAL at 22:42

## 2023-03-15 RX ADMIN — Medication 1000 MILLIGRAM(S): at 18:06

## 2023-03-15 RX ADMIN — OXYCODONE HYDROCHLORIDE 2.5 MILLIGRAM(S): 5 TABLET ORAL at 15:14

## 2023-03-15 RX ADMIN — Medication 25 GRAM(S): at 08:43

## 2023-03-15 RX ADMIN — Medication 1000 MILLIGRAM(S): at 05:35

## 2023-03-15 RX ADMIN — Medication 50 MILLIGRAM(S): at 22:13

## 2023-03-15 RX ADMIN — Medication 2 PACKET(S): at 08:45

## 2023-03-15 RX ADMIN — ENOXAPARIN SODIUM 40 MILLIGRAM(S): 100 INJECTION SUBCUTANEOUS at 13:20

## 2023-03-15 RX ADMIN — Medication 2 PACKET(S): at 11:36

## 2023-03-15 NOTE — CONSULT NOTE ADULT - PROBLEM SELECTOR RECOMMENDATION 9
Predominant symptom: pain  Likely due to post op effects  Degree of control: suboptimal  Relative to previous day: comparable  Pain (scale is out of 10) : Max:    6  Min:  0     Pain is incidental, worsens with movement. Comfortable while motionless.  Currently attempting to ambulate  Duration of PRN: She cannot characterize the duration fully but it is brief  Current treatment regimen: oxy 2.5 mg PRN  Pt has begun APAP but not within the window of peak intensity   Risk mitigation: Pt uses diazepam 5mg daily (2.5mg bid at home) and trazodone qhs. Judicious use/dosing of concurrent respiratory depressants  Adverse events noted: No bradypnea, sedation, or confusion  They were counseled on strategies for pain tracking and we reviewed the chronicity of the symptoms.  We explored the potential side effect profile of opioids, staff and patient reporting of symptoms, and risk mitigation techniques.

## 2023-03-15 NOTE — CONSULT NOTE ADULT - PROBLEM SELECTOR RECOMMENDATION 5
Actions:  [x] Rapport building     [] Symptom assessment    [] Eliciting preferences of goals   [] Prognostic understanding    [x] Emotional Support  [] Coping skill development  []  Other  Interdisciplinary Referrals: [facility]  Communication: via Teams with the primary team  Documentation Review: [x] Primary Team [] Consultants [x] Interdisciplinary team  Content: CM note reviewed  Plan is to go home tomorrow

## 2023-03-15 NOTE — CONSULT NOTE ADULT - SUBJECTIVE AND OBJECTIVE BOX
HPI:  This is a 76 y/o female PMH benign intracranial hypertension , S/P multiple spinal taps at that time, hypothyroid,  nephrolithiasis, fibromyalgia, diabetes type 2, right breast CA, S/P lumpectomy, chemotherapy and radiation, S/P excision of pre-cancerous tongue growths, cerebral aneurysm/CVA, S/P cerebral artery stent , GIB 2020,  diverticulitis, S/P colon resection with ileostomy formation 22.  Presents today for ERAS, closure of ileostomy.    COVID+ 23 was done at her home through a [facility] lab, was treated with remdesivir, cannot find PCR in [facility] or [employer]; is documented on the booking sheet, COVID swab waived (2023 15:38)    PERTINENT PM/SXH:   Breast CA    Aneurysm    Vertigo    Hearing loss    Basal cell carcinoma    Diverticulitis    CVA (cerebral vascular accident)    Shoulder disorder    Benign intracranial hypertension    Fibromyalgia    Chronic fatigue    Lumbar disc disease    Kidney stones    HTN (hypertension)    DM (diabetes mellitus)    Anxiety    Depression    Reflux esophagitis    Asthma    Hypothyroid      History of D&amp;C    H/O:     Cerebral aneurysm    Basal cell carcinoma    H/O shoulder surgery    H/O tubal ligation    History of kidney stones    S/P colon resection      FAMILY HISTORY:  Family history of CVA  mother, father      Family Hx substance abuse [ ]yes [ ]no  ITEMS NOT CHECKED ARE NOT PRESENT    SOCIAL HISTORY:   Significant other/partner[ ]  Children[ ]  Tenriism/Spirituality:  Substance hx:  [ ]   Tobacco hx:  [ ]   Alcohol hx: [ ]   Home Opioid hx:  [ ] I-Stop Reference No:  Living Situation: [ ]Home  [ ]Long term care  [ ]Rehab [ ]Other    ADVANCE DIRECTIVES:    DNR/MOLST  [ ]  Living Will  [ ]   DECISION MAKER(s):  [ ] Health Care Proxy(s)  [ ] Surrogate(s)  [ ] Guardian           Name(s): Phone Number(s):    BASELINE (I)ADL(s) (prior to admission):  [place]: [ ]Total  [ ] Moderate [ ]Dependent    Allergies    [This allergen will not trigger allergy alert] orphenadrine hydrochloride (Other)  [This allergen will not trigger allergy alert] propionate (Rash)  [This allergen will not trigger allergy alert] Shellfish-derived Products (Unknown)  [This allergen will not trigger allergy alert] Sulfites (Unknown)  [This allergen will not trigger allergy alert] sulfites - nausea/ gi upset (Other)  Augmentin (Rash)  Ceclor (Other)  codeine (Nausea)  Cytotec (Unknown)  Entex (Other)  Entex PSE (Unknown)  misoprostol (Other)  Norflex (Other)  penicillin (Urticaria; Rash)  ramipril (Other)  shellfish (Rash)  strawberry (Rash)  tetracycline (Other)  Tetracycline Hydrochloride (Other)    Intolerances    MEDICATIONS  (STANDING):  acetaminophen     Tablet .. 1000 milliGRAM(s) Oral every 6 hours  dextrose 5%. 1000 milliLiter(s) (50 mL/Hr) IV Continuous <Continuous>  dextrose 5%. 1000 milliLiter(s) (100 mL/Hr) IV Continuous <Continuous>  dextrose 50% Injectable 25 Gram(s) IV Push once  dextrose 50% Injectable 12.5 Gram(s) IV Push once  dextrose 50% Injectable 25 Gram(s) IV Push once  enoxaparin Injectable 40 milliGRAM(s) SubCutaneous every 24 hours  glucagon  Injectable 1 milliGRAM(s) IntraMuscular once  insulin glargine Injectable (LANTUS) 20 Unit(s) SubCutaneous at bedtime  insulin lispro (ADMELOG) corrective regimen sliding scale   SubCutaneous three times a day before meals  insulin lispro (ADMELOG) corrective regimen sliding scale   SubCutaneous at bedtime  insulin lispro Injectable (ADMELOG) 5 Unit(s) SubCutaneous three times a day before meals  magnesium oxide 1000 milliGRAM(s) Oral two times a day with meals  montelukast 10 milliGRAM(s) Oral at bedtime  pantoprazole    Tablet 40 milliGRAM(s) Oral two times a day  potassium phosphate / sodium phosphate Powder (PHOS-NaK) 2 Packet(s) Oral every 2 hours  traZODone 50 milliGRAM(s) Oral at bedtime    MEDICATIONS  (PRN):  dextrose Oral Gel 15 Gram(s) Oral once PRN Blood Glucose LESS THAN 70 milliGRAM(s)/deciliter  diazepam    Tablet 5 milliGRAM(s) Oral daily PRN Patient request  oxyCODONE    IR 2.5 milliGRAM(s) Oral every 6 hours PRN Severe Pain (7 - 10)    PRESENT SYMPTOMS: [ ]Unable to self-report  [ ] CPOT [ ] PAINADs [ ] RDOS  Source if other than patient:  [ ]Family   [ ]Team     Pain: [ ]yes [ ]no  QOL impact -   Location -                    Aggravating factors -  Quality -  Radiation -  Timing-  Severity (0-10 scale):  Minimal acceptable level (0-10 scale):     CPOT:    https://www.[facility].org/getattachment/[email or web address]/Critical-Care-Pain-Observation-Tool-(CPOT)    PAIN AD Score:   http://geriatrictoolkit.[facility]/cog/painad.pdf (press ctrl +  left click to view)    Dyspnea:                           [ ]Mild [ ]Moderate [ ]Severe      RDOS:  0 to 2  minimal or no respiratory distress   3  mild distress  4 to 6 moderate distress  >7 severe distress  https://homecareinformation.net/handouts/hen/Respiratory_Distress_Observation_Scale.pdf (Ctrl +  left click to view)     Anxiety:                             [ ]Mild [ ]Moderate [ ]Severe  Fatigue:                             [ ]Mild [ ]Moderate [ ]Severe  Nausea:                             [ ]Mild [ ]Moderate [ ]Severe  Loss of appetite:              [ ]Mild [ ]Moderate [ ]Severe  Constipation:                    [ ]Mild [ ]Moderate [ ]Severe    PCSSQ[Palliative Care Spiritual Screening Question]   Severity (0-10):  Score of 4 or > indicate consideration of Chaplaincy referral.  Chaplaincy Referral: [ ] yes [ ] refused [ ] following [ ] Deferred     Caregiver [place]? : [ ] yes [ ] no [ ] Deferred [ ] Declined             Social work referral [ ] Patient & Family Centered Care Referral [ ]     Anticipatory Grief present?:  [ ] yes [ ] no  [ ] Deferred                  Social work referral [ ] Chaplaincy Referral[ ]      Other Symptoms:  [ ]All other review of systems negative     Palliative Performance Status Version 2:         %    http://npcrc.org/files/news/palliative_performance_scale_ppsv2.pdf  PHYSICAL EXAM:  Vital Signs Last 24 Hrs  T(C): 36.6 (15 Mar 2023 09:15), Max: 37.1 (14 Mar 2023 20:21)  T(F): 97.8 (15 Mar 2023 09:15), Max: 98.8 (14 Mar 2023 20:21)  HR: 82 (15 Mar 2023 09:15) (78 - 92)  BP: 144/75 (15 Mar 2023 09:15) (115/73 - 158/88)  BP(mean): --  RR: 18 (15 Mar 2023 09:15) (18 - 18)  SpO2: 96% (15 Mar 2023 09:15) (96% - 98%)    Parameters below as of 15 Mar 2023 09:15  Patient On (Oxygen Delivery Method): room air     I&O's Summary    14 Mar 2023 07:01  -  15 Mar 2023 07:00  --------------------------------------------------------  IN: 780 mL / OUT: 1340 mL / NET: -560 mL    15 Mar 2023 07:01  -  15 Mar 2023 09:39  --------------------------------------------------------  IN: 240 mL / OUT: 0 mL / NET: 240 mL      GENERAL: [ ]Cachexia    [ ]Alert  [ ]Oriented x   [ ]Lethargic  [ ]Unarousable  [ ]Verbal  [ ]Non-Verbal  Behavioral:   [ ] Anxiety  [ ] Delirium [ ] Agitation [ ] Other  HEENT:  [ ]Normal   [ ]Dry mouth   [ ]ET Tube/Trach  [ ]Oral lesions  PULMONARY:   [ ]Clear [ ]Tachypnea  [ ]Audible excessive secretions   [ ]Rhonchi        [ ]Right [ ]Left [ ]Bilateral  [ ]Crackles        [ ]Right [ ]Left [ ]Bilateral  [ ]Wheezing     [ ]Right [ ]Left [ ]Bilateral  [ ]Diminished breath sounds [ ]right [ ]left [ ]bilateral  CARDIOVASCULAR:    [ ]Regular [ ]Irregular [ ]Tachy  [ ]Nelson [ ]Murmur [ ]Other  GASTROINTESTINAL:  [ ]Soft  [ ]Distended   [ ]+BS  [ ]Non tender [ ]Tender  [ ]Other [ ]PEG [ ]OGT/ NGT  Last BM:  GENITOURINARY:  [ ]Normal [ ] Incontinent   [ ]Oliguria/Anuria   [ ]Antunez  MUSCULOSKELETAL:   [ ]Normal   [ ]Weakness  [ ]Bed/Wheelchair bound [ ]Edema  NEUROLOGIC:   [ ]No focal deficits  [ ]Cognitive impairment  [ ]Dysphagia [ ]Dysarthria [ ]Paresis [ ]Other   SKIN:   [ ]Normal  [ ]Rash  [ ]Other  [ ]Pressure ulcer(s)       Present on admission [ ]y [ ]n    CRITICAL CARE:  [ ] Shock Present  [ ]Septic [ ]Cardiogenic [ ]Neurologic [ ]Hypovolemic  [ ]  Vasopressors [ ]  Inotropes   [ ]Respiratory failure present [ ]Mechanical ventilation [ ]Non-invasive ventilatory support [ ]High flow    [ ]Acute  [ ]Chronic [ ]Hypoxic  [ ]Hypercarbic [ ]Other  [ ]Other organ failure     LABS:                        11.5   7.50  )-----------( 219      ( 15 Mar 2023 07:01 )             35.9   03-15    139  |  104  |  21  ----------------------------<  171<H>  4.3   |  25  |  0.67    Ca    9.1      15 Mar 2023 07:01  Phos  2.0     03-15  Mg     1.9     03-15          RADIOLOGY & ADDITIONAL STUDIES:    PROTEIN CALORIE MALNUTRITION PRESENT: [ ]mild [ ]moderate [ ]severe [ ]underweight [ ]morbid obesity  https://www.andeal.org/vault/2440/web/files/ONC/Table_Clinical%20Characteristics%20to%20Document%20Malnutrition-White%20JV%20et%20al%2020.pdf    Height (cm): 157.5 (23 @ 15:03), 157.5 (22 @ 07:49), 157.5 (22 @ 11:01)  Weight (kg): 57.6 (23 @ 15:03), 60.3 (22 @ 07:49), 63.5 (22 @ 11:01)  BMI (kg/m2): 23.2 (23 @ 15:03), 24.3 (22 @ 07:49), 25.6 (22 @ 11:01)    [ ]PPSV2 < or = to 30% [ ]significant weight loss  [ ]poor nutritional intake  [ ]anasarca[ ]Artificial Nutrition      Other REFERRALS:  [ ]Hospice  [ ]Child Life  [ ]Social Work  [ ]Case management [ ]Holistic Therapy     Goals of Care Document:  HPI:  This is a 78 y/o female PMH benign intracranial hypertension , S/P multiple spinal taps at that time, hypothyroid,  nephrolithiasis, fibromyalgia, diabetes type 2, right breast CA, S/P lumpectomy, chemotherapy and radiation, S/P excision of pre-cancerous tongue growths, cerebral aneurysm/CVA, S/P cerebral artery stent , GIB 2020,  diverticulitis, S/P colon resection with ileostomy formation 22.  Presents today for ERAS, closure of ileostomy.    COVID+ 23 was done at her home through a [facility] lab, was treated with remdesivir, cannot find PCR in [facility] or [employer]; is documented on the booking sheet, COVID swab waived (2023 15:38)      Reason for consult  Continuity of care for the outpatient team from Geriatrics and Palliative Medicine practice:   Met with the patient today, who reported an original plan for discharge today. However, she felt unwell and experiencing pain. Pt acknowledges taking prn oxy when the pain is close to its peak. She has had reservations about taking opioids before absolutely necessary as a risk mitigation strategy.     PERTINENT PM/SXH:   Breast CA    Aneurysm    Vertigo    Hearing loss    Basal cell carcinoma    Diverticulitis    CVA (cerebral vascular accident)    Shoulder disorder    Benign intracranial hypertension    Fibromyalgia    Chronic fatigue    Lumbar disc disease    Kidney stones    HTN (hypertension)    DM (diabetes mellitus)    Anxiety    Depression    Reflux esophagitis    Asthma    Hypothyroid      History of D&amp;C    H/O:     Cerebral aneurysm    Basal cell carcinoma    H/O shoulder surgery    H/O tubal ligation    History of kidney stones    S/P colon resection      FAMILY HISTORY:  Family history of CVA  mother, father      Family Hx substance abuse [ ]yes [ ]no  ITEMS NOT CHECKED ARE NOT PRESENT    SOCIAL HISTORY:   Significant other/partner[x ]  Children[ ]  Adventism/Spirituality:  Substance hx:  [ ]   Tobacco hx:  [ ]   Alcohol hx: [ ]   Home Opioid hx:  [ ] I-Stop Reference No:  Living Situation: [x ]Home  [ ]Long term care  [ ]Rehab [ ]Other    ADVANCE DIRECTIVES:    DNR/MOLST  [ ]  Living Will  [ ]   DECISION MAKER(s):  [ ] Health Care Proxy(s)  [ ] Surrogate(s)  [ ] Guardian           Name(s): Phone Number(s):    BASELINE (I)ADL(s) (prior to admission):  [place]: [ ]Total  [ ] Moderate [ ]Dependent    Allergies    [This allergen will not trigger allergy alert] orphenadrine hydrochloride (Other)  [This allergen will not trigger allergy alert] propionate (Rash)  [This allergen will not trigger allergy alert] Shellfish-derived Products (Unknown)  [This allergen will not trigger allergy alert] Sulfites (Unknown)  [This allergen will not trigger allergy alert] sulfites - nausea/ gi upset (Other)  Augmentin (Rash)  Ceclor (Other)  codeine (Nausea)  Cytotec (Unknown)  Entex (Other)  Entex PSE (Unknown)  misoprostol (Other)  Norflex (Other)  penicillin (Urticaria; Rash)  ramipril (Other)  shellfish (Rash)  strawberry (Rash)  tetracycline (Other)  Tetracycline Hydrochloride (Other)    Intolerances    MEDICATIONS  (STANDING):  acetaminophen     Tablet .. 1000 milliGRAM(s) Oral every 6 hours  dextrose 5%. 1000 milliLiter(s) (50 mL/Hr) IV Continuous <Continuous>  dextrose 5%. 1000 milliLiter(s) (100 mL/Hr) IV Continuous <Continuous>  dextrose 50% Injectable 25 Gram(s) IV Push once  dextrose 50% Injectable 12.5 Gram(s) IV Push once  dextrose 50% Injectable 25 Gram(s) IV Push once  enoxaparin Injectable 40 milliGRAM(s) SubCutaneous every 24 hours  glucagon  Injectable 1 milliGRAM(s) IntraMuscular once  insulin glargine Injectable (LANTUS) 20 Unit(s) SubCutaneous at bedtime  insulin lispro (ADMELOG) corrective regimen sliding scale   SubCutaneous three times a day before meals  insulin lispro (ADMELOG) corrective regimen sliding scale   SubCutaneous at bedtime  insulin lispro Injectable (ADMELOG) 5 Unit(s) SubCutaneous three times a day before meals  magnesium oxide 1000 milliGRAM(s) Oral two times a day with meals  montelukast 10 milliGRAM(s) Oral at bedtime  pantoprazole    Tablet 40 milliGRAM(s) Oral two times a day  potassium phosphate / sodium phosphate Powder (PHOS-NaK) 2 Packet(s) Oral every 2 hours  traZODone 50 milliGRAM(s) Oral at bedtime    MEDICATIONS  (PRN):  dextrose Oral Gel 15 Gram(s) Oral once PRN Blood Glucose LESS THAN 70 milliGRAM(s)/deciliter  diazepam    Tablet 5 milliGRAM(s) Oral daily PRN Patient request  oxyCODONE    IR 2.5 milliGRAM(s) Oral every 6 hours PRN Severe Pain (7 - 10)    PRESENT SYMPTOMS: [ ]Unable to self-report  [ ] CPOT [ ] PAINADs [ ] RDOS  Source if other than patient:  [ ]Family   [ ]Team     Pain: [x ]yes [ ]no  QOL impact -  significant  Location -                  abdomen  Aggravating factors - movement  Quality - sharp  Radiation - none  Timing- incidental  Severity (0-10 scale): 6   Minimal acceptable level (0-10 scale): PT to determine    CPOT:    https://www.[facility].org/getattachment/[email or web address]/Critical-Care-Pain-Observation-Tool-(CPOT)    PAIN AD Score:   http://geriatrictoolkit.[facility]/cog/painad.pdf (press ctrl +  left click to view)    Dyspnea:                           [ ]Mild [ ]Moderate [ ]Severe      RDOS:  0 to 2  minimal or no respiratory distress   3  mild distress  4 to 6 moderate distress  >7 severe distress  https://homecareinformation.net/handouts/hen/Respiratory_Distress_Observation_Scale.pdf (Ctrl +  left click to view)     Anxiety:                             [ ]Mild [ ]Moderate [ ]Severe  Fatigue:                             [x ]Mild [ ]Moderate [ ]Severe  Nausea:                             [ ]Mild [ ]Moderate [ ]Severe  Loss of appetite:              [ ]Mild [ ]Moderate [ ]Severe  Constipation:                    [ ]Mild [ ]Moderate [ ]Severe    PCSSQ[Palliative Care Spiritual Screening Question]   Severity (0-10):  Score of 4 or > indicate consideration of Chaplaincy referral.  Chaplaincy Referral: [ ] yes [ ] refused [ ] following [x ] Deferred     Caregiver [place]? : [ ] yes [ ] no [x ] Deferred [ ] Declined             Social work referral [ ] Patient & Family Centered Care Referral [ ]     Anticipatory Grief present?:  [ ] yes [ ] no  [x ] Deferred                  Social work referral [ ] Chaplaincy Referral[ ]      Other Symptoms:  [ x]All other review of systems negative     Palliative Performance Status Version 2:   80      %    http://npcrc.org/files/news/palliative_performance_scale_ppsv2.pdf  PHYSICAL EXAM:  Vital Signs Last 24 Hrs  T(C): 36.6 (15 Mar 2023 09:15), Max: 37.1 (14 Mar 2023 20:21)  T(F): 97.8 (15 Mar 2023 09:15), Max: 98.8 (14 Mar 2023 20:21)  HR: 82 (15 Mar 2023 09:15) (78 - 92)  BP: 144/75 (15 Mar 2023 09:15) (115/73 - 158/88)  BP(mean): --  RR: 18 (15 Mar 2023 09:15) (18 - 18)  SpO2: 96% (15 Mar 2023 09:15) (96% - 98%)    Parameters below as of 15 Mar 2023 09:15  Patient On (Oxygen Delivery Method): room air     I&O's Summary    14 Mar 2023 07:01  -  15 Mar 2023 07:00  --------------------------------------------------------  IN: 780 mL / OUT: 1340 mL / NET: -560 mL    15 Mar 2023 07:01  -  15 Mar 2023 09:39  --------------------------------------------------------  IN: 240 mL / OUT: 0 mL / NET: 240 mL      GENERAL: [ ]Cachexia    [x ]Alert  [ ]Oriented x   [ ]Lethargic  [ ]Unarousable  [ ]Verbal  [ ]Non-Verbal  Behavioral:   [ ] Anxiety  [ ] Delirium [ ] Agitation [x ] Other Calm  HEENT:  [ ]Normal   [x ]Dry mouth   [ ]ET Tube/Trach  [ ]Oral lesions  PULMONARY:   [ ]Clear [ x ]No Tachypnea  [ ]Audible excessive secretions   [ ]Rhonchi        [ ]Right [ ]Left [ ]Bilateral  [ ]Crackles        [ ]Right [ ]Left [ ]Bilateral  [ ]Wheezing     [ ]Right [ ]Left [ ]Bilateral  [ ]Diminished breath sounds [ ]right [ ]left [ ]bilateral  CARDIOVASCULAR:    [ ]Regular [ ]Irregular [ ]Tachy  [ ]Nelson [ ]Murmur [ ]Other  GASTROINTESTINAL:  [ ]Soft  [ ]Distended   [ ]+BS  [ ]Non tender [ ]Tender  [ ]Other [ ]PEG [ ]OGT/ NGT  Last BM:  GENITOURINARY:  [ x]Normal [ ] Incontinent   [ ]Oliguria/Anuria   [ ]Antunez  MUSCULOSKELETAL:   [ ]Normal   [ x]Weakness  [ ]Bed/Wheelchair bound [ ]Edema  NEUROLOGIC:   [ x]No focal deficits  [ ]Cognitive impairment  [ ]Dysphagia [ ]Dysarthria [ ]Paresis [ ]Other   SKIN: postop  [  ]Normal  [ ]Rash  [ ]Other  [ ]Pressure ulcer(s)       Present on admission [ ]y [ ]n    CRITICAL CARE:  [ ] Shock Present  [ ]Septic [ ]Cardiogenic [ ]Neurologic [ ]Hypovolemic  [ ]  Vasopressors [ ]  Inotropes   [ ]Respiratory failure present [ ]Mechanical ventilation [ ]Non-invasive ventilatory support [ ]High flow    [ ]Acute  [ ]Chronic [ ]Hypoxic  [ ]Hypercarbic [ ]Other  [ ]Other organ failure     LABS:                        11.5   7.50  )-----------( 219      ( 15 Mar 2023 07:01 )             35.9   03-15    139  |  104  |  21  ----------------------------<  171<H>  4.3   |  25  |  0.67    Ca    9.1      15 Mar 2023 07:01  Phos  2.0     03-15  Mg     1.9     03-15          RADIOLOGY & ADDITIONAL STUDIES:    PROTEIN CALORIE MALNUTRITION PRESENT: [ ]mild [ ]moderate [ ]severe [ ]underweight [ ]morbid obesity  https://www.andeal.org/vault/2440/web/files/ONC/Table_Clinical%20Characteristics%20to%20Document%20Malnutrition-White%20JV%20et%20al%2020.pdf    Height (cm): 157.5 (23 @ 15:03), 157.5 (22 @ 07:49), 157.5 (22 @ 11:01)  Weight (kg): 57.6 (23 @ 15:03), 60.3 (22 @ 07:49), 63.5 (22 @ 11:01)  BMI (kg/m2): 23.2 (23 @ 15:03), 24.3 (22 @ 07:49), 25.6 (22 @ 11:01)    [ ]PPSV2 < or = to 30% [ ]significant weight loss  [ ]poor nutritional intake  [ ]anasarca[ ]Artificial Nutrition      Other REFERRALS:  [ ]Hospice  [ ]Child Life  [ ]Social Work  [ ]Case management [ ]Holistic Therapy     Goals of Care Document:

## 2023-03-16 ENCOUNTER — TRANSCRIPTION ENCOUNTER (OUTPATIENT)
Age: 78
End: 2023-03-16

## 2023-03-16 VITALS
DIASTOLIC BLOOD PRESSURE: 63 MMHG | TEMPERATURE: 98 F | OXYGEN SATURATION: 97 % | SYSTOLIC BLOOD PRESSURE: 130 MMHG | RESPIRATION RATE: 18 BRPM | HEART RATE: 87 BPM

## 2023-03-16 LAB
ANION GAP SERPL CALC-SCNC: 10 MMOL/L — SIGNIFICANT CHANGE UP (ref 5–17)
BUN SERPL-MCNC: 14 MG/DL — SIGNIFICANT CHANGE UP (ref 7–23)
CALCIUM SERPL-MCNC: 9.3 MG/DL — SIGNIFICANT CHANGE UP (ref 8.4–10.5)
CHLORIDE SERPL-SCNC: 102 MMOL/L — SIGNIFICANT CHANGE UP (ref 96–108)
CO2 SERPL-SCNC: 27 MMOL/L — SIGNIFICANT CHANGE UP (ref 22–31)
CREAT SERPL-MCNC: 0.63 MG/DL — SIGNIFICANT CHANGE UP (ref 0.5–1.3)
EGFR: 91 ML/MIN/1.73M2 — SIGNIFICANT CHANGE UP
GLUCOSE BLDC GLUCOMTR-MCNC: 102 MG/DL — HIGH (ref 70–99)
GLUCOSE BLDC GLUCOMTR-MCNC: 156 MG/DL — HIGH (ref 70–99)
GLUCOSE SERPL-MCNC: 141 MG/DL — HIGH (ref 70–99)
HCT VFR BLD CALC: 36.1 % — SIGNIFICANT CHANGE UP (ref 34.5–45)
HGB BLD-MCNC: 11.6 G/DL — SIGNIFICANT CHANGE UP (ref 11.5–15.5)
MAGNESIUM SERPL-MCNC: 2 MG/DL — SIGNIFICANT CHANGE UP (ref 1.6–2.6)
MCHC RBC-ENTMCNC: 26.2 PG — LOW (ref 27–34)
MCHC RBC-ENTMCNC: 32.1 GM/DL — SIGNIFICANT CHANGE UP (ref 32–36)
MCV RBC AUTO: 81.5 FL — SIGNIFICANT CHANGE UP (ref 80–100)
NRBC # BLD: 0 /100 WBCS — SIGNIFICANT CHANGE UP (ref 0–0)
PHOSPHATE SERPL-MCNC: 2.9 MG/DL — SIGNIFICANT CHANGE UP (ref 2.5–4.5)
PLATELET # BLD AUTO: 224 K/UL — SIGNIFICANT CHANGE UP (ref 150–400)
POTASSIUM SERPL-MCNC: 3.9 MMOL/L — SIGNIFICANT CHANGE UP (ref 3.5–5.3)
POTASSIUM SERPL-SCNC: 3.9 MMOL/L — SIGNIFICANT CHANGE UP (ref 3.5–5.3)
RBC # BLD: 4.43 M/UL — SIGNIFICANT CHANGE UP (ref 3.8–5.2)
RBC # FLD: 14.2 % — SIGNIFICANT CHANGE UP (ref 10.3–14.5)
SODIUM SERPL-SCNC: 139 MMOL/L — SIGNIFICANT CHANGE UP (ref 135–145)
WBC # BLD: 7.21 K/UL — SIGNIFICANT CHANGE UP (ref 3.8–10.5)
WBC # FLD AUTO: 7.21 K/UL — SIGNIFICANT CHANGE UP (ref 3.8–10.5)

## 2023-03-16 PROCEDURE — 83735 ASSAY OF MAGNESIUM: CPT

## 2023-03-16 PROCEDURE — 97161 PT EVAL LOW COMPLEX 20 MIN: CPT

## 2023-03-16 PROCEDURE — C9399: CPT

## 2023-03-16 PROCEDURE — 86850 RBC ANTIBODY SCREEN: CPT

## 2023-03-16 PROCEDURE — 86901 BLOOD TYPING SEROLOGIC RH(D): CPT

## 2023-03-16 PROCEDURE — 99232 SBSQ HOSP IP/OBS MODERATE 35: CPT

## 2023-03-16 PROCEDURE — 80048 BASIC METABOLIC PNL TOTAL CA: CPT

## 2023-03-16 PROCEDURE — 84100 ASSAY OF PHOSPHORUS: CPT

## 2023-03-16 PROCEDURE — 86900 BLOOD TYPING SEROLOGIC ABO: CPT

## 2023-03-16 PROCEDURE — 82962 GLUCOSE BLOOD TEST: CPT

## 2023-03-16 PROCEDURE — 85027 COMPLETE CBC AUTOMATED: CPT

## 2023-03-16 PROCEDURE — 36415 COLL VENOUS BLD VENIPUNCTURE: CPT

## 2023-03-16 PROCEDURE — 88304 TISSUE EXAM BY PATHOLOGIST: CPT

## 2023-03-16 PROCEDURE — C1889: CPT

## 2023-03-16 RX ORDER — OXYCODONE HYDROCHLORIDE 5 MG/1
1 TABLET ORAL
Qty: 10 | Refills: 0
Start: 2023-03-16 | End: 2023-03-17

## 2023-03-16 RX ORDER — PSEUDOEPHEDRINE HCL 30 MG
1 TABLET ORAL
Qty: 0 | Refills: 0 | DISCHARGE

## 2023-03-16 RX ORDER — ACETAMINOPHEN 500 MG
2 TABLET ORAL
Qty: 0 | Refills: 0 | DISCHARGE
Start: 2023-03-16

## 2023-03-16 RX ORDER — CLOPIDOGREL BISULFATE 75 MG/1
1 TABLET, FILM COATED ORAL
Qty: 0 | Refills: 0 | DISCHARGE

## 2023-03-16 RX ORDER — OXYCODONE HYDROCHLORIDE 5 MG/1
5 TABLET ORAL EVERY 6 HOURS
Refills: 0 | Status: DISCONTINUED | OUTPATIENT
Start: 2023-03-16 | End: 2023-03-16

## 2023-03-16 RX ADMIN — OXYCODONE HYDROCHLORIDE 2.5 MILLIGRAM(S): 5 TABLET ORAL at 08:27

## 2023-03-16 RX ADMIN — Medication 1000 MILLIGRAM(S): at 05:11

## 2023-03-16 RX ADMIN — Medication 5 UNIT(S): at 08:25

## 2023-03-16 RX ADMIN — Medication 1000 MILLIGRAM(S): at 05:41

## 2023-03-16 RX ADMIN — OXYCODONE HYDROCHLORIDE 2.5 MILLIGRAM(S): 5 TABLET ORAL at 09:27

## 2023-03-16 RX ADMIN — Medication 1000 MILLIGRAM(S): at 11:43

## 2023-03-16 RX ADMIN — PANTOPRAZOLE SODIUM 40 MILLIGRAM(S): 20 TABLET, DELAYED RELEASE ORAL at 05:11

## 2023-03-16 RX ADMIN — Medication 1: at 08:24

## 2023-03-16 NOTE — PROGRESS NOTE ADULT - ASSESSMENT
78 y/o F POD1 S/P ileostomy closure    PLAN:  ERP protocol  Pain control PRN  OOB, Ambulation, PT eval   Diet: CLD for breakfast, will advance to LRD for lunch if tolerating  Home medications  D/c Antunez catheter  DVT ppx with Lovenox            
78 y/o F POD2 S/P ileostomy closure    PLAN:  ERP protocol  Pain control PRN  OOB, Ambulation  Diet: Continue low residue diet  Home medications  Endocrine recs appreciated, monitor finger sticks  DVT ppx: Lovenox  PT: No needs    [facility] Surgery  [id number]      
  Patient is a 77 F with history of benign intracranial HTN, Hypothyroidism, T2D, neprholithiasis, fibromyalgia, right breast CA, S/P lumpectomy, chemotherapy and radiation, cerebral aneurysm/CVA, S/P cerebral artery stent , GIB 2020,  diverticulitis, S/P colon resection with ileostomy formation 12/13/22 presented today for ERAS, closure of ileostomy. Endocrinology consulted for hyperglycemia management.     1.  T2DM   - Most recent Hemoglobin A1C 6.8 at goal for her age  - Current FS ranges from 140-460  - Current diet: low fiber CHO diet  - Please monitor blood glucose values TID AC & QHS while eating regular meals and Q6H while NPO  - Blood glucose goals pre-meal less than 140 mg/dL and random blood glucose less than 180 mg/dL  - Recommendations:  - Elevated glucose last night till this morning likely due to Decadron given during OR   - Continue with insulin Glargine 20 units  QHS  - Continue with insulin Lispro 5 units TID with meals, hold if NPO or if eating less than 50% of meals  - Continue with low dose Correctional scale TID with meals  - Continue with low dose Correctional scale QHS    2. HTN  - BP goal 130/80  - Manage per primary team     3. HLD  - statin if no contraindication  - Manage as outpatient      4. Hypothyroidism   - On armor thyroid 60 mg daily outpatient   - Can be managed outpatient   - Can check TSH while inpatient     Discharge planning:  - Current home DM meds:  Tresiba 20 units QHS+ Jardiance 10 mg daily + Victoza 1.8 mg daily   - Discharge DM meds: can resume the above home regimen   - Patient will follow up with her endocrinologist Dr. Meade   - Patient will need opthalmology and podiatry follow up as outpatient     Thank you for the consult. Will continue to monitor. Please inform the endocrinology team at least 24 hours prior to intended discharge date.     Contact via pager or [employer] Teams during business hours. For follow up questions, discharge recommendations, or new consults please call answering service at [phone] (weekdays), [phone] (nights/weekends). For nonurgent matters, please email  [email or web address].     Марина Garcia MD  Department of Endocrinology, Diabetes and metabolism   Pager [phone]  
76 y/o F POD3 S/P ileostomy closure    PLAN:  - continue LRD  - OOB, Ambulation  - pain control  - Endocrine recs appreciated, monitor finger sticks  - DVT ppx: Lovenox  - PT: No needs  - d/c home today    [facility] Surgery  [id number]    
  Patient is a 77 F with history of benign intracranial HTN, Hypothyroidism, T2D, neprholithiasis, fibromyalgia, right breast CA, S/P lumpectomy, chemotherapy and radiation, cerebral aneurysm/CVA, S/P cerebral artery stent , GIB 2020,  diverticulitis, S/P colon resection with ileostomy formation 12/13/22 presented today for ERAS, closure of ileostomy. Endocrinology consulted for hyperglycemia management.     1.  T2DM   - Most recent Hemoglobin A1C 6.8 at goal for her age  - Current FS ranges from 140-460  - Current diet: low fiber CHO diet  - Please monitor blood glucose values TID AC & QHS while eating regular meals and Q6H while NPO  - Blood glucose goals pre-meal less than 140 mg/dL and random blood glucose less than 180 mg/dL  - Recommendations:  - Glucose mostly at goal    - Continue with insulin Glargine 20 units  QHS  - Continue with insulin Lispro 5 units TID with meals, hold if NPO or if eating less than 50% of meals  - Continue with low dose Correctional scale TID with meals  - Continue with low dose Correctional scale QHS    2. HTN  - BP goal 130/80  - Manage per primary team     3. HLD  - statin if no contraindication  - Manage as outpatient      4. Hypothyroidism   - On armor thyroid 60 mg daily outpatient   - Can be managed outpatient   - Can check TSH while inpatient     Discharge planning:  - Current home DM meds:  Tresiba 20 units QHS+ Jardiance 10 mg daily + Victoza 1.8 mg daily   - Discharge DM meds: can resume the above home DM regimen at discharge  - Patient will follow up with her endocrinologist Dr. Meade   - Patient will need opthalmology and podiatry follow up as outpatient     Thank you for the consult. Glucose at goal. Discharge plan please see above. Follow up with her outpatient endocrinologist. Endocrinology team will sign off at this time.     Contact via pager or [employer] Teams during business hours. For follow up questions, discharge recommendations, or new consults please call answering service at [phone] (weekdays), [phone] (nights/weekends). For nonurgent matters, please email  [email or web address].     Марина Garcia MD  Department of Endocrinology, Diabetes and metabolism   Pager [phone]  
Day 1 of Anesthesia Pain Management Service    SUBJECTIVE:  Pain Scale Score:          [X] Refer to charted pain scores    THERAPY: Received PF neuraxial morphine as per pain service nurse's note    OBJECTIVE:    Sedation:        	[X] Alert	[ ] Drowsy	[ ] Arousable      [ ] Asleep       [ ] Unresponsive    Side Effects:	[X] None	[ ] Nausea	[ ] Vomiting         [ ] Pruritus  		[ ] Weakness            [ ] Numbness	          [ ] Other:    ASSESSMENT/ PLAN  [X] Patient transitioned to prn analgesics  [X] Pain management per primary service, pain service to sign off   [X]Documentation and Verification of current medications

## 2023-03-16 NOTE — DISCHARGE NOTE NURSING/CASE MANAGEMENT/SOCIAL WORK - PATIENT PORTAL LINK FT
You can access the FollowMyHealth Patient Portal offered by [facility] by registering at the following website: [email or web address]. By joining [facility]’s FollowMyHealth portal, you will also be able to view your health information using other applications (apps) compatible with our system.

## 2023-03-16 NOTE — PROGRESS NOTE ADULT - SUBJECTIVE AND OBJECTIVE BOX
HPI:    Patient is a 77 F with history of benign intracranial HTN, Hypothyroidism, T2D, neprholithiasis, fibromyalgia, right breast CA, S/P lumpectomy, chemotherapy and radiation, cerebral aneurysm/CVA, S/P cerebral artery stent , GIB 2020,  diverticulitis, S/P colon resection with ileostomy formation 12/13/22 presented today for ERAS, closure of ileostomy. Endocrinology consulted for hyperglycemia management.      Home diabetes medications:   - Metformin 500 mg ER BID --> stopped several months ago since started on a low fiber diet   Tresiba 20 units QHS  Victoza 1.8 mg daily   Jardiance 10 mg daily     Inpatient diabetes medications:   - - Lantus 20 units QHS  - Admelog 5 units TID   - Mod ISS TIDAC  - LDSS QHS    Most recent HbA1C: 6.8      INTERVAL HPI/OVERNIGHT EVENTS:  Seen at the bedside with the . Did not have a good sleep  because of the pain.   Currently denies polydipsia, polyuria , visual changes, numbness in feet.      Review of systems:   CONSTITUTIONAL:  Feels well, good appetite  CARDIOVASCULAR:  Negative for chest pain or palpitations  RESPIRATORY:  Negative for cough, or SOB   GASTROINTESTINAL:  Negative for nausea, vomiting, or abdominal pain  GENITOURINARY:  Negative frequency, urgency or dysuria     CAPILLARY BLOOD GLUCOSE      POCT Blood Glucose.: 111 mg/dL (15 Mar 2023 17:58)  POCT Blood Glucose.: 128 mg/dL (15 Mar 2023 13:11)  POCT Blood Glucose.: 172 mg/dL (15 Mar 2023 09:12)  POCT Blood Glucose.: 259 mg/dL (14 Mar 2023 21:15)       MEDICATIONS  (STANDING):  acetaminophen     Tablet .. 1000 milliGRAM(s) Oral every 6 hours  dextrose 5%. 1000 milliLiter(s) (50 mL/Hr) IV Continuous <Continuous>  dextrose 5%. 1000 milliLiter(s) (100 mL/Hr) IV Continuous <Continuous>  dextrose 50% Injectable 25 Gram(s) IV Push once  dextrose 50% Injectable 12.5 Gram(s) IV Push once  dextrose 50% Injectable 25 Gram(s) IV Push once  enoxaparin Injectable 40 milliGRAM(s) SubCutaneous every 24 hours  glucagon  Injectable 1 milliGRAM(s) IntraMuscular once  insulin glargine Injectable (LANTUS) 20 Unit(s) SubCutaneous at bedtime  insulin lispro (ADMELOG) corrective regimen sliding scale   SubCutaneous three times a day before meals  insulin lispro (ADMELOG) corrective regimen sliding scale   SubCutaneous at bedtime  insulin lispro Injectable (ADMELOG) 5 Unit(s) SubCutaneous three times a day before meals  montelukast 10 milliGRAM(s) Oral at bedtime  pantoprazole    Tablet 40 milliGRAM(s) Oral two times a day  traZODone 50 milliGRAM(s) Oral at bedtime    MEDICATIONS  (PRN):  dextrose Oral Gel 15 Gram(s) Oral once PRN Blood Glucose LESS THAN 70 milliGRAM(s)/deciliter  diazepam    Tablet 5 milliGRAM(s) Oral daily PRN Patient request  oxyCODONE    IR 2.5 milliGRAM(s) Oral every 6 hours PRN Severe Pain (7 - 10)      PHYSICAL EXAM  Vital Signs Last 24 Hrs  T(C): 37.2 (15 Mar 2023 18:00), Max: 37.2 (15 Mar 2023 18:00)  T(F): 99 (15 Mar 2023 18:00), Max: 99 (15 Mar 2023 18:00)  HR: 87 (15 Mar 2023 18:00) (82 - 92)  BP: 164/85 (15 Mar 2023 18:00) (115/73 - 164/85)  BP(mean): --  RR: 18 (15 Mar 2023 18:00) (18 - 18)  SpO2: 96% (15 Mar 2023 18:00) (96% - 98%)    Parameters below as of 15 Mar 2023 18:00  Patient On (Oxygen Delivery Method): room air        GENERAL: feMale laying in bed in NAD  RESPIRATORY: nonlabored breathing, no accessory muscle use  Extremities: Warm, no edema in all 4 exts   NEURO: A&O X3    LABS:                        11.5   7.50  )-----------( 219      ( 15 Mar 2023 07:01 )             35.9     03-15    139  |  104  |  21  ----------------------------<  171<H>  4.3   |  25  |  0.67    Ca    9.1      15 Mar 2023 07:01  Phos  2.0     03-15  Mg     1.9     03-15               
SURGERY DAILY PROGRESS NOTE:       Subjective / Overnight events:  No acute overnight events.  Tolerating diet, denies N/V.  +flatus, no BM.  Pain controlled.      Objective:      MEDICATIONS  (STANDING):  acetaminophen     Tablet .. 1000 milliGRAM(s) Oral every 6 hours  dextrose 5%. 1000 milliLiter(s) (50 mL/Hr) IV Continuous <Continuous>  dextrose 5%. 1000 milliLiter(s) (100 mL/Hr) IV Continuous <Continuous>  dextrose 50% Injectable 25 Gram(s) IV Push once  dextrose 50% Injectable 12.5 Gram(s) IV Push once  dextrose 50% Injectable 25 Gram(s) IV Push once  enoxaparin Injectable 40 milliGRAM(s) SubCutaneous every 24 hours  glucagon  Injectable 1 milliGRAM(s) IntraMuscular once  insulin glargine Injectable (LANTUS) 20 Unit(s) SubCutaneous at bedtime  insulin lispro (ADMELOG) corrective regimen sliding scale   SubCutaneous three times a day before meals  insulin lispro (ADMELOG) corrective regimen sliding scale   SubCutaneous at bedtime  insulin lispro Injectable (ADMELOG) 5 Unit(s) SubCutaneous three times a day before meals  montelukast 10 milliGRAM(s) Oral at bedtime  pantoprazole    Tablet 40 milliGRAM(s) Oral two times a day  traZODone 50 milliGRAM(s) Oral at bedtime    MEDICATIONS  (PRN):  dextrose Oral Gel 15 Gram(s) Oral once PRN Blood Glucose LESS THAN 70 milliGRAM(s)/deciliter  diazepam    Tablet 5 milliGRAM(s) Oral daily PRN Patient request  oxyCODONE    IR 2.5 milliGRAM(s) Oral every 6 hours PRN Severe Pain (7 - 10)      Vital Signs Last 24 Hrs  T(C): 36.8 (16 Mar 2023 08:31), Max: 37.3 (15 Mar 2023 21:55)  T(F): 98.2 (16 Mar 2023 08:31), Max: 99.1 (15 Mar 2023 21:55)  HR: 87 (16 Mar 2023 08:31) (77 - 88)  BP: 130/63 (16 Mar 2023 08:31) (130/63 - 164/85)  BP(mean): --  RR: 18 (16 Mar 2023 08:31) (18 - 18)  SpO2: 97% (16 Mar 2023 08:31) (95% - 97%)    Parameters below as of 16 Mar 2023 08:31  Patient On (Oxygen Delivery Method): room air        I&O's Detail    15 Mar 2023 07:01  -  16 Mar 2023 07:00  --------------------------------------------------------  IN:    Oral Fluid: 880 mL  Total IN: 880 mL    OUT:    Voided (mL): 550 mL  Total OUT: 550 mL    Total NET: 330 mL      16 Mar 2023 07:01  -  16 Mar 2023 08:45  --------------------------------------------------------  IN:    Oral Fluid: 200 mL  Total IN: 200 mL    OUT:  Total OUT: 0 mL    Total NET: 200 mL          Daily     Daily     LABS:                        11.6   7.21  )-----------( 224      ( 16 Mar 2023 07:01 )             36.1     03-16    139  |  102  |  14  ----------------------------<  141<H>  3.9   |  27  |  0.63    Ca    9.3      16 Mar 2023 06:59  Phos  2.9     03-16  Mg     2.0     03-16          PHYSICAL EXAM:  GENERAL: NAD, resting comfortably in bed  HEAD: Normocephalic, atraumatic   LUNG: Nonlabored breathing.  ABD: packing removed. Soft, nondistended. Nontender.   EXT: Warm, well perfused  NEURO: Responds appropriately, A&Ox3
Day 1 of Anesthesia Pain Management Service    SUBJECTIVE: Doing ok  Pain Scale Score:          [X] Refer to charted pain scores    THERAPY:    s/p   150  mcg PF morphine on 3\13\2023      MEDICATIONS  (STANDING):  acetaminophen   IVPB .. 1000 milliGRAM(s) IV Intermittent every 6 hours  dextrose 5%. 1000 milliLiter(s) (50 mL/Hr) IV Continuous <Continuous>  dextrose 5%. 1000 milliLiter(s) (100 mL/Hr) IV Continuous <Continuous>  dextrose 50% Injectable 25 Gram(s) IV Push once  dextrose 50% Injectable 12.5 Gram(s) IV Push once  dextrose 50% Injectable 25 Gram(s) IV Push once  enoxaparin Injectable 40 milliGRAM(s) SubCutaneous every 24 hours  glucagon  Injectable 1 milliGRAM(s) IntraMuscular once  insulin lispro (ADMELOG) corrective regimen sliding scale   SubCutaneous three times a day before meals  insulin lispro (ADMELOG) corrective regimen sliding scale   SubCutaneous at bedtime  magnesium oxide 1000 milliGRAM(s) Oral two times a day with meals  montelukast 10 milliGRAM(s) Oral at bedtime  morphine PF Spinal 0.15 milliGRAM(s) IntraThecal. once  pantoprazole    Tablet 40 milliGRAM(s) Oral two times a day  traZODone 50 milliGRAM(s) Oral at bedtime    MEDICATIONS  (PRN):  dextrose Oral Gel 15 Gram(s) Oral once PRN Blood Glucose LESS THAN 70 milliGRAM(s)/deciliter  HYDROmorphone  Injectable 0.5 milliGRAM(s) IV Push every 3 hours PRN Moderate Pain (4 - 6)  naloxone Injectable 0.1 milliGRAM(s) IV Push every 3 minutes PRN For ANY of the following changes in patient status:  A. RR LESS THAN 10 breaths per minute, B. Oxygen saturation LESS THAN 90%, C. Sedation score of 6  ondansetron Injectable 4 milliGRAM(s) IV Push every 6 hours PRN Nausea  oxyCODONE    IR 2.5 milliGRAM(s) Oral every 6 hours PRN Severe Pain (7 - 10)      OBJECTIVE:    Sedation:        	[X] Alert	 [ ] Drowsy	[ ] Arousable      [ ] Asleep       [ ] Unresponsive    Side Effects:	[ ] None 	[X ] Nausea: resolved	[ ] Vomiting         [X ] Pruritus: improving  		[ ] Weakness            [ ] Numbness	          [ ] Other:    Vital Signs Last 24 Hrs  T(C): 36.7 (14 Mar 2023 09:04), Max: 36.7 (13 Mar 2023 23:31)  T(F): 98.1 (14 Mar 2023 09:04), Max: 98.1 (14 Mar 2023 09:04)  HR: 90 (14 Mar 2023 09:04) (68 - 92)  BP: 107/50 (14 Mar 2023 09:04) (101/60 - 151/83)  BP(mean): 85 (13 Mar 2023 20:00) (84 - 93)  RR: 18 (14 Mar 2023 09:04) (14 - 18)  SpO2: 97% (14 Mar 2023 09:04) (97% - 100%)    Parameters below as of 14 Mar 2023 09:04  Patient On (Oxygen Delivery Method): room air        ASSESSMENT/ PLAN  [X] Patient transitioned to prn analgesics  [X] Pain management per primary service, pain service to sign off   [X]Documentation and Verification of current medications
HPI:    Patient is a 77 F with history of benign intracranial HTN, Hypothyroidism, T2D, neprholithiasis, fibromyalgia, right breast CA, S/P lumpectomy, chemotherapy and radiation, cerebral aneurysm/CVA, S/P cerebral artery stent , GIB 2020,  diverticulitis, S/P colon resection with ileostomy formation 12/13/22 presented today for ERAS, closure of ileostomy. Endocrinology consulted for hyperglycemia management.      Home diabetes medications:   - Metformin 500 mg ER BID --> stopped several months ago since started on a low fiber diet   Tresiba 20 units QHS  Victoza 1.8 mg daily   Jardiance 10 mg daily     Inpatient diabetes medications:   - - Lantus 20 units QHS  - Admelog 5 units TID   - LDSS TIDAC  - LDSS QHS    Most recent HbA1C: 6.8      INTERVAL HPI/OVERNIGHT EVENTS:  Seen at the bedside with the . Glucose at goal. TOlerating diet.   Currently denies polydipsia, polyuria , visual changes, numbness in feet.      Review of systems:   CONSTITUTIONAL:  Feels well, good appetite  CARDIOVASCULAR:  Negative for chest pain or palpitations  RESPIRATORY:  Negative for cough, or SOB   GASTROINTESTINAL:  Negative for nausea, vomiting, or abdominal pain  GENITOURINARY:  Negative frequency, urgency or dysuria     CAPILLARY BLOOD GLUCOSE    POCT Blood Glucose.: 156 mg/dL (16 Mar 2023 08:05)  POCT Blood Glucose.: 143 mg/dL (15 Mar 2023 21:50)  POCT Blood Glucose.: 111 mg/dL (15 Mar 2023 17:58)  POCT Blood Glucose.: 128 mg/dL (15 Mar 2023 13:11)  POCT Blood Glucose.: 111 mg/dL (15 Mar 2023 17:58)  POCT Blood Glucose.: 128 mg/dL (15 Mar 2023 13:11)  POCT Blood Glucose.: 172 mg/dL (15 Mar 2023 09:12)  POCT Blood Glucose.: 259 mg/dL (14 Mar 2023 21:15)       MEDICATIONS  (STANDING):  acetaminophen     Tablet .. 1000 milliGRAM(s) Oral every 6 hours  dextrose 5%. 1000 milliLiter(s) (50 mL/Hr) IV Continuous <Continuous>  dextrose 5%. 1000 milliLiter(s) (100 mL/Hr) IV Continuous <Continuous>  dextrose 50% Injectable 25 Gram(s) IV Push once  dextrose 50% Injectable 12.5 Gram(s) IV Push once  dextrose 50% Injectable 25 Gram(s) IV Push once  enoxaparin Injectable 40 milliGRAM(s) SubCutaneous every 24 hours  glucagon  Injectable 1 milliGRAM(s) IntraMuscular once  insulin glargine Injectable (LANTUS) 20 Unit(s) SubCutaneous at bedtime  insulin lispro (ADMELOG) corrective regimen sliding scale   SubCutaneous three times a day before meals  insulin lispro (ADMELOG) corrective regimen sliding scale   SubCutaneous at bedtime  insulin lispro Injectable (ADMELOG) 5 Unit(s) SubCutaneous three times a day before meals  montelukast 10 milliGRAM(s) Oral at bedtime  pantoprazole    Tablet 40 milliGRAM(s) Oral two times a day  traZODone 50 milliGRAM(s) Oral at bedtime    MEDICATIONS  (PRN):  dextrose Oral Gel 15 Gram(s) Oral once PRN Blood Glucose LESS THAN 70 milliGRAM(s)/deciliter  diazepam    Tablet 5 milliGRAM(s) Oral daily PRN Patient request  oxyCODONE    IR 2.5 milliGRAM(s) Oral every 6 hours PRN Severe Pain (7 - 10)      PHYSICAL EXAM   Vital Signs Last 24 Hrs  T(C): 36.8 (16 Mar 2023 08:31), Max: 37.3 (15 Mar 2023 21:55)  T(F): 98.2 (16 Mar 2023 08:31), Max: 99.1 (15 Mar 2023 21:55)  HR: 87 (16 Mar 2023 08:31) (77 - 88)  BP: 130/63 (16 Mar 2023 08:31) (130/63 - 164/85)  BP(mean): --  RR: 18 (16 Mar 2023 08:31) (18 - 18)  SpO2: 97% (16 Mar 2023 08:31) (95% - 97%)    Parameters below as of 16 Mar 2023 08:31  Patient On (Oxygen Delivery Method): room air            GENERAL: feMale laying in bed in NAD  RESPIRATORY: nonlabored breathing, no accessory muscle use  Extremities: Warm, no edema in all 4 exts   NEURO: A&O X3    LABS:                    03-16    139  |  102  |  14  ----------------------------<  141<H>  3.9   |  27  |  0.63    Ca    9.3      16 Mar 2023 06:59  Phos  2.9     03-16  Mg     2.0     03-16                   
RED SURGERY DAILY PROGRESS NOTE    SUBJECTIVE:  Patient seen and examined at bedside during morning rounds. No overnight events. Admits to incisional pain. +Flatus. Tolerating diet.     Vital Signs Last 24 Hrs  T(C): 36.9 (15 Mar 2023 05:06), Max: 37.1 (14 Mar 2023 20:21)  T(F): 98.5 (15 Mar 2023 05:06), Max: 98.8 (14 Mar 2023 20:21)  HR: 87 (15 Mar 2023 05:06) (78 - 92)  BP: 115/73 (15 Mar 2023 05:06) (107/50 - 158/88)  BP(mean): --  RR: 18 (15 Mar 2023 05:06) (18 - 18)  SpO2: 97% (15 Mar 2023 05:06) (96% - 98%)    Parameters below as of 15 Mar 2023 05:06  Patient On (Oxygen Delivery Method): room air    I&Os    03-14-23 @ 07:01  -  03-15-23 @ 07:00  --------------------------------------------------------  IN: 780 mL / OUT: 1340 mL / NET: -560 mL      PHYSICAL EXAM:  GENERAL: NAD, resting comfortably in bed  HEAD: Normocephalic, atraumatic   LUNG: Nonlabored breathing.  ABD: Dressing removed and replaced, packing to wound left in place. Soft, nondistended. Appropriate incisional tenderness.   EXT: Warm, well perfused  NEURO: Responds appropriately, A&Ox3    MEDICATIONS:  acetaminophen     Tablet .. 1000 milliGRAM(s) Oral every 6 hours  dextrose 5%. 1000 milliLiter(s) IV Continuous <Continuous>  dextrose 5%. 1000 milliLiter(s) IV Continuous <Continuous>  dextrose 50% Injectable 25 Gram(s) IV Push once  dextrose 50% Injectable 12.5 Gram(s) IV Push once  dextrose 50% Injectable 25 Gram(s) IV Push once  dextrose Oral Gel 15 Gram(s) Oral once PRN  diazepam    Tablet 5 milliGRAM(s) Oral daily PRN  enoxaparin Injectable 40 milliGRAM(s) SubCutaneous every 24 hours  glucagon  Injectable 1 milliGRAM(s) IntraMuscular once  insulin glargine Injectable (LANTUS) 20 Unit(s) SubCutaneous at bedtime  insulin lispro (ADMELOG) corrective regimen sliding scale   SubCutaneous three times a day before meals  insulin lispro (ADMELOG) corrective regimen sliding scale   SubCutaneous at bedtime  insulin lispro Injectable (ADMELOG) 5 Unit(s) SubCutaneous three times a day before meals  magnesium oxide 1000 milliGRAM(s) Oral two times a day with meals  montelukast 10 milliGRAM(s) Oral at bedtime  oxyCODONE    IR 2.5 milliGRAM(s) Oral every 6 hours PRN  pantoprazole    Tablet 40 milliGRAM(s) Oral two times a day  traZODone 50 milliGRAM(s) Oral at bedtime      LABS:                        11.5   7.50  )-----------( 219      ( 15 Mar 2023 07:01 )             35.9     03-15    139  |  104  |  21  ----------------------------<  171<H>  4.3   |  25  |  0.67    Ca    9.1      15 Mar 2023 07:01  Phos  2.0     03-15  Mg     1.9     03-15  
RED SURGERY DAILY PROGRESS NOTE    SUBJECTIVE:  Patient seen and examined at bedside during morning rounds. POD1. No overnight events. Patient feeling well without pain or nausea, tolerating clear liquid diet last night.    Vital Signs Last 24 Hrs  T(C): 36.4 (14 Mar 2023 06:07), Max: 36.7 (13 Mar 2023 23:31)  T(F): 97.5 (14 Mar 2023 06:07), Max: 98 (13 Mar 2023 23:31)  HR: 68 (14 Mar 2023 06:07) (68 - 92)  BP: 103/59 (14 Mar 2023 06:07) (101/60 - 151/83)  BP(mean): 85 (13 Mar 2023 20:00) (84 - 93)  RR: 18 (14 Mar 2023 06:07) (14 - 18)  SpO2: 99% (14 Mar 2023 06:07) (99% - 100%)    Parameters below as of 14 Mar 2023 06:07  Patient On (Oxygen Delivery Method): nasal cannula  O2 Flow (L/min): 2      I&Os    03-13-23 @ 07:01  -  03-14-23 @ 07:00  --------------------------------------------------------  IN: 560 mL / OUT: 625 mL / NET: -65 mL      PHYSICAL EXAM:  GENERAL: NAD, resting comfortably in bed  HEAD: Normocephalic, atraumatic   LUNG: Nonlabored breathing.  ABD: Dressing removed and replaced, packing to wound left in place. Soft, nondistended. Nontender.   EXT: Warm, well perfused  NEURO: Responds appropriately, A&Ox3    MEDICATIONS:  acetaminophen   IVPB .. 1000 milliGRAM(s) IV Intermittent every 6 hours  dextrose 5%. 1000 milliLiter(s) IV Continuous <Continuous>  dextrose 5%. 1000 milliLiter(s) IV Continuous <Continuous>  dextrose 50% Injectable 25 Gram(s) IV Push once  dextrose 50% Injectable 12.5 Gram(s) IV Push once  dextrose 50% Injectable 25 Gram(s) IV Push once  dextrose Oral Gel 15 Gram(s) Oral once PRN  enoxaparin Injectable 40 milliGRAM(s) SubCutaneous every 24 hours  glucagon  Injectable 1 milliGRAM(s) IntraMuscular once  HYDROmorphone  Injectable 0.5 milliGRAM(s) IV Push every 3 hours PRN  insulin lispro (ADMELOG) corrective regimen sliding scale   SubCutaneous three times a day before meals  insulin lispro (ADMELOG) corrective regimen sliding scale   SubCutaneous at bedtime  lactated ringers. 1000 milliLiter(s) IV Continuous <Continuous>  magnesium oxide 1000 milliGRAM(s) Oral two times a day with meals  montelukast 10 milliGRAM(s) Oral at bedtime  morphine PF Spinal 0.15 milliGRAM(s) IntraThecal. once  naloxone Injectable 0.1 milliGRAM(s) IV Push every 3 minutes PRN  ondansetron Injectable 4 milliGRAM(s) IV Push every 6 hours PRN  oxyCODONE    IR 2.5 milliGRAM(s) Oral every 6 hours PRN  pantoprazole    Tablet 40 milliGRAM(s) Oral two times a day  traZODone 50 milliGRAM(s) Oral at bedtime      LABS: AM labs pending

## 2023-03-16 NOTE — DISCHARGE NOTE NURSING/CASE MANAGEMENT/SOCIAL WORK - NSDCPEFALRISK_GEN_ALL_CORE
For information on Fall & Injury Prevention, visit: [email or web address] OR  [email or web address] OR  https://www.cdc.gov/steadi/patient.html

## 2023-03-17 ENCOUNTER — NON-APPOINTMENT (OUTPATIENT)
Age: 78
End: 2023-03-17

## 2023-03-22 ENCOUNTER — APPOINTMENT (OUTPATIENT)
Dept: COLORECTAL SURGERY | Facility: CLINIC | Age: 78
End: 2023-03-22
Payer: MEDICARE

## 2023-03-22 LAB — SURGICAL PATHOLOGY STUDY: SIGNIFICANT CHANGE UP

## 2023-03-22 PROCEDURE — 99024 POSTOP FOLLOW-UP VISIT: CPT

## 2023-03-23 NOTE — HISTORY OF PRESENT ILLNESS
[FreeTextEntry1] : Patient is a pleasant 77-year-old female who presents for her first postoperative visit.\par \par Patient is approximately 9 days status post closure of diverting loop ileostomy.  The ileostomy was constructed to protect an at risk anastomosis done after resection of complicated diverticulitis.\par \par Patient is actually doing well.  She was having significant incisional pain but this is finally starting to improve.  She is tolerating diet and moving her bowels.  She denies temperature elevation or rectal bleeding.\par \par Physical examination reveals a soft abdomen.  There is some firmness felt just above the skin aperture which corresponds to the abdominal wall closure site of her old ileostomy.  There is no evidence of infection.  The wound is already yulissa and granulating nicely.\par \par The patient and her  were assured that she is making an excellent recuperation.  I want her to remain on a low residue diet and avoid strenuous activity.\par \par I will see her in 1 month for a wound check.

## 2023-03-30 ENCOUNTER — LABORATORY RESULT (OUTPATIENT)
Age: 78
End: 2023-03-30

## 2023-03-30 ENCOUNTER — APPOINTMENT (OUTPATIENT)
Dept: GERIATRICS | Facility: CLINIC | Age: 78
End: 2023-03-30
Payer: MEDICARE

## 2023-03-30 VITALS
DIASTOLIC BLOOD PRESSURE: 78 MMHG | TEMPERATURE: 97.3 F | WEIGHT: 134 LBS | RESPIRATION RATE: 16 BRPM | BODY MASS INDEX: 24.66 KG/M2 | HEIGHT: 62 IN | OXYGEN SATURATION: 99 % | SYSTOLIC BLOOD PRESSURE: 138 MMHG | HEART RATE: 97 BPM

## 2023-03-30 DIAGNOSIS — R10.9 UNSPECIFIED ABDOMINAL PAIN: ICD-10-CM

## 2023-03-30 PROCEDURE — 99214 OFFICE O/P EST MOD 30 MIN: CPT

## 2023-03-30 RX ORDER — DICYCLOMINE HYDROCHLORIDE 20 MG/1
20 TABLET ORAL
Refills: 0 | Status: COMPLETED | COMMUNITY
Start: 2019-06-20 | End: 2023-03-30

## 2023-04-01 PROBLEM — R10.9 ABDOMINAL PAIN: Status: ACTIVE | Noted: 2022-11-17

## 2023-04-01 NOTE — HISTORY OF PRESENT ILLNESS
[Completely Independent] : Completely independent. [0] : 2) Feeling down, depressed, or hopeless: Not at all (0) [FreeTextEntry1] : 76 yo female post op of repair of ileostomy with anastomosisi on  March 13. Pt saw Dr. Serra on March 22. Pt eating low fiber diet. Pt now having itchiness all over since surgery...\par Pain is at wound site and other chronic pain but less chronic pain\par Low fiber diet, no appetite\par Eating protein every meal, making self to eat\par Weight at pre-surgical weight\par \par Pt is sleeping\par \par

## 2023-04-01 NOTE — PHYSICAL EXAM
[Alert] : alert [Normal Outer Ear/Nose] : the ears and nose were normal in appearance [Normal Appearance] : the appearance of the neck was normal [Supple] : the neck was supple [No Respiratory Distress] : no respiratory distress [No Acc Muscle Use] : no accessory muscle use [Respiration, Rhythm And Depth] : normal respiratory rhythm and effort [Auscultation Breath Sounds / Voice Sounds] : lungs were clear to auscultation bilaterally [Heart Rate And Rhythm] : heart rate was normal and rhythm regular [Abdomen Tenderness] : non-tender [Bowel Sounds] : normal bowel sounds [Abdomen Soft] : soft [No Spinal Tenderness] : no spinal tenderness [Normal Color / Pigmentation] : normal skin color and pigmentation [Normal Turgor] : normal skin turgor [No Focal Deficits] : no focal deficits [Normal Affect] : the affect was normal [Normal Mood] : the mood was normal [de-identified] : rash under left breast  [de-identified] : ileostomy site reversed, small opening with bandage clean

## 2023-04-07 LAB
ALBUMIN SERPL ELPH-MCNC: 4.7 G/DL
ALP BLD-CCNC: 111 U/L
ALT SERPL-CCNC: 15 U/L
ANION GAP SERPL CALC-SCNC: 11 MMOL/L
AST SERPL-CCNC: 13 U/L
BASOPHILS # BLD AUTO: 0.06 K/UL
BASOPHILS NFR BLD AUTO: 0.7 %
BILIRUB SERPL-MCNC: 0.2 MG/DL
BUN SERPL-MCNC: 26 MG/DL
CALCIUM SERPL-MCNC: 10.4 MG/DL
CHLORIDE SERPL-SCNC: 102 MMOL/L
CO2 SERPL-SCNC: 27 MMOL/L
CREAT SERPL-MCNC: 0.73 MG/DL
EGFR: 85 ML/MIN/1.73M2
EOSINOPHIL # BLD AUTO: 0.25 K/UL
EOSINOPHIL NFR BLD AUTO: 3 %
ESTIMATED AVERAGE GLUCOSE: 169 MG/DL
GLUCOSE SERPL-MCNC: 177 MG/DL
HBA1C MFR BLD HPLC: 7.5 %
HCT VFR BLD CALC: 34.5 %
HGB BLD-MCNC: 10.6 G/DL
IMM GRANULOCYTES NFR BLD AUTO: 0.2 %
LYMPHOCYTES # BLD AUTO: 1.61 K/UL
LYMPHOCYTES NFR BLD AUTO: 19.2 %
MAN DIFF?: NORMAL
MCHC RBC-ENTMCNC: 25.6 PG
MCHC RBC-ENTMCNC: 30.7 GM/DL
MCV RBC AUTO: 83.3 FL
MONOCYTES # BLD AUTO: 0.57 K/UL
MONOCYTES NFR BLD AUTO: 6.8 %
NEUTROPHILS # BLD AUTO: 5.89 K/UL
NEUTROPHILS NFR BLD AUTO: 70.1 %
PLATELET # BLD AUTO: 665 K/UL
POTASSIUM SERPL-SCNC: 5 MMOL/L
PROT SERPL-MCNC: 6.6 G/DL
RBC # BLD: 4.14 M/UL
RBC # FLD: 14.2 %
SODIUM SERPL-SCNC: 140 MMOL/L
TSH SERPL-ACNC: 0.07 UIU/ML
WBC # FLD AUTO: 8.4 K/UL

## 2023-04-20 DIAGNOSIS — B37.9 CANDIDIASIS, UNSPECIFIED: ICD-10-CM

## 2023-04-26 ENCOUNTER — APPOINTMENT (OUTPATIENT)
Dept: COLORECTAL SURGERY | Facility: CLINIC | Age: 78
End: 2023-04-26
Payer: MEDICARE

## 2023-04-26 PROCEDURE — 99024 POSTOP FOLLOW-UP VISIT: CPT

## 2023-04-28 NOTE — HISTORY OF PRESENT ILLNESS
[FreeTextEntry1] : Very pleasant 77-year-old female who presents for her second postoperative visit.\par \par On March 13, 2023 I performed closure of the loop ileostomy on Mrs. Gray.  The ileostomy was constructed to protect an at risk colorectal anastomosis done after resection of complicated diverticulitis in December 2022.\par \par The patient looks and feels quite well.  She is having no further incisional pain.  She is tolerating diet and denies rectal bleeding or temperature elevation.  She is still experiencing some erratic and frequent bowel movements.\par \par Physical examination reveals a soft, nontender, nondistended abdomen.  Her ileostomy closure site has fully healed.\par \par I explained to her and her  that she has no further dietary or physical activity restrictions.  I have also asked her to introduce a daily dose of a bulk forming laxative to help congeal the multiple small movements into a more formed movement.  I told them to call me with any further questions.\par \par I will see her as needed.

## 2023-06-01 ENCOUNTER — NON-APPOINTMENT (OUTPATIENT)
Age: 78
End: 2023-06-01

## 2023-06-01 ENCOUNTER — APPOINTMENT (OUTPATIENT)
Dept: GERIATRICS | Facility: CLINIC | Age: 78
End: 2023-06-01
Payer: MEDICARE

## 2023-06-01 VITALS
RESPIRATION RATE: 15 BRPM | OXYGEN SATURATION: 98 % | SYSTOLIC BLOOD PRESSURE: 163 MMHG | DIASTOLIC BLOOD PRESSURE: 83 MMHG | TEMPERATURE: 98.4 F | WEIGHT: 139 LBS | HEART RATE: 118 BPM | BODY MASS INDEX: 25.42 KG/M2

## 2023-06-01 DIAGNOSIS — L40.9 PSORIASIS, UNSPECIFIED: ICD-10-CM

## 2023-06-01 DIAGNOSIS — R00.0 TACHYCARDIA, UNSPECIFIED: ICD-10-CM

## 2023-06-01 PROCEDURE — 93000 ELECTROCARDIOGRAM COMPLETE: CPT

## 2023-06-01 RX ORDER — FLUCONAZOLE 100 MG/1
100 TABLET ORAL
Qty: 3 | Refills: 0 | Status: COMPLETED | COMMUNITY
Start: 2020-04-07 | End: 2023-06-01

## 2023-06-01 RX ORDER — KETOCONAZOLE 20 MG/G
2 CREAM TOPICAL TWICE DAILY
Qty: 1 | Refills: 3 | Status: COMPLETED | COMMUNITY
Start: 2023-04-20 | End: 2023-06-01

## 2023-06-01 RX ORDER — NYSTATIN 100000 [USP'U]/G
100000 CREAM TOPICAL 3 TIMES DAILY
Qty: 1 | Refills: 3 | Status: COMPLETED | COMMUNITY
Start: 2021-09-07 | End: 2023-06-01

## 2023-06-05 ENCOUNTER — APPOINTMENT (OUTPATIENT)
Dept: SURGERY | Facility: CLINIC | Age: 78
End: 2023-06-05
Payer: MEDICARE

## 2023-06-05 LAB
25(OH)D3 SERPL-MCNC: 27.3 NG/ML
ALBUMIN SERPL ELPH-MCNC: 4.7 G/DL
ALP BLD-CCNC: 117 U/L
ALT SERPL-CCNC: 18 U/L
ANION GAP SERPL CALC-SCNC: 13 MMOL/L
AST SERPL-CCNC: 17 U/L
BILIRUB SERPL-MCNC: 0.3 MG/DL
BUN SERPL-MCNC: 26 MG/DL
CALCIUM SERPL-MCNC: 10.1 MG/DL
CHLORIDE SERPL-SCNC: 106 MMOL/L
CO2 SERPL-SCNC: 25 MMOL/L
CREAT SERPL-MCNC: 0.81 MG/DL
EGFR: 75 ML/MIN/1.73M2
ESTIMATED AVERAGE GLUCOSE: 171 MG/DL
FOLATE SERPL-MCNC: >20 NG/ML
GLUCOSE SERPL-MCNC: 183 MG/DL
HBA1C MFR BLD HPLC: 7.6 %
POTASSIUM SERPL-SCNC: 5.5 MMOL/L
PROT SERPL-MCNC: 6.6 G/DL
SODIUM SERPL-SCNC: 143 MMOL/L
TSH SERPL-ACNC: 0.59 UIU/ML
VIT B12 SERPL-MCNC: 651 PG/ML

## 2023-06-05 PROCEDURE — 99213K: CUSTOM

## 2023-06-06 ENCOUNTER — NON-APPOINTMENT (OUTPATIENT)
Age: 78
End: 2023-06-06

## 2023-06-06 ENCOUNTER — LABORATORY RESULT (OUTPATIENT)
Age: 78
End: 2023-06-06

## 2023-06-06 ENCOUNTER — INPATIENT (INPATIENT)
Facility: HOSPITAL | Age: 78
LOS: 0 days | Discharge: HOME | DRG: 812 | End: 2023-06-07
Attending: STUDENT IN AN ORGANIZED HEALTH CARE EDUCATION/TRAINING PROGRAM | Admitting: STUDENT IN AN ORGANIZED HEALTH CARE EDUCATION/TRAINING PROGRAM
Payer: COMMERCIAL

## 2023-06-06 VITALS
TEMPERATURE: 98 F | OXYGEN SATURATION: 98 % | HEART RATE: 98 BPM | DIASTOLIC BLOOD PRESSURE: 79 MMHG | WEIGHT: 139.99 LBS | SYSTOLIC BLOOD PRESSURE: 177 MMHG | RESPIRATION RATE: 16 BRPM | HEIGHT: 62 IN

## 2023-06-06 DIAGNOSIS — D64.9 ANEMIA, UNSPECIFIED: ICD-10-CM

## 2023-06-06 DIAGNOSIS — Z98.890 OTHER SPECIFIED POSTPROCEDURAL STATES: Chronic | ICD-10-CM

## 2023-06-06 DIAGNOSIS — Z98.89 OTHER SPECIFIED POSTPROCEDURAL STATES: Chronic | ICD-10-CM

## 2023-06-06 DIAGNOSIS — D50.9 IRON DEFICIENCY ANEMIA, UNSPECIFIED: ICD-10-CM

## 2023-06-06 DIAGNOSIS — Z98.51 TUBAL LIGATION STATUS: Chronic | ICD-10-CM

## 2023-06-06 DIAGNOSIS — Z90.49 ACQUIRED ABSENCE OF OTHER SPECIFIED PARTS OF DIGESTIVE TRACT: Chronic | ICD-10-CM

## 2023-06-06 DIAGNOSIS — Z87.442 PERSONAL HISTORY OF URINARY CALCULI: Chronic | ICD-10-CM

## 2023-06-06 DIAGNOSIS — C44.91 BASAL CELL CARCINOMA OF SKIN, UNSPECIFIED: Chronic | ICD-10-CM

## 2023-06-06 DIAGNOSIS — I67.1 CEREBRAL ANEURYSM, NONRUPTURED: Chronic | ICD-10-CM

## 2023-06-06 PROBLEM — L40.9 PSORIASIS: Status: ACTIVE | Noted: 2023-06-01

## 2023-06-06 PROBLEM — R00.0 TACHYCARDIA: Status: ACTIVE | Noted: 2023-06-01

## 2023-06-06 LAB
ALBUMIN SERPL ELPH-MCNC: 4.8 G/DL — SIGNIFICANT CHANGE UP (ref 3.3–5)
ALP SERPL-CCNC: 110 U/L — SIGNIFICANT CHANGE UP (ref 40–120)
ALT FLD-CCNC: 22 U/L — SIGNIFICANT CHANGE UP (ref 10–45)
ANION GAP SERPL CALC-SCNC: 15 MMOL/L — SIGNIFICANT CHANGE UP (ref 5–17)
ANISOCYTOSIS BLD QL: SLIGHT — SIGNIFICANT CHANGE UP
APTT BLD: 27.8 SEC — SIGNIFICANT CHANGE UP (ref 27.5–35.5)
AST SERPL-CCNC: 38 U/L — SIGNIFICANT CHANGE UP (ref 10–40)
BASOPHILS # BLD AUTO: 0.07 K/UL — SIGNIFICANT CHANGE UP (ref 0–0.2)
BASOPHILS NFR BLD AUTO: 0.8 % — SIGNIFICANT CHANGE UP (ref 0–2)
BILIRUB SERPL-MCNC: 0.3 MG/DL — SIGNIFICANT CHANGE UP (ref 0.2–1.2)
BLD GP AB SCN SERPL QL: NEGATIVE — SIGNIFICANT CHANGE UP
BUN SERPL-MCNC: 30 MG/DL — HIGH (ref 7–23)
CALCIUM SERPL-MCNC: 9.7 MG/DL — SIGNIFICANT CHANGE UP (ref 8.4–10.5)
CHLORIDE SERPL-SCNC: 102 MMOL/L — SIGNIFICANT CHANGE UP (ref 96–108)
CO2 SERPL-SCNC: 20 MMOL/L — LOW (ref 22–31)
CREAT SERPL-MCNC: 0.79 MG/DL — SIGNIFICANT CHANGE UP (ref 0.5–1.3)
EGFR: 77 ML/MIN/1.73M2 — SIGNIFICANT CHANGE UP
EOSINOPHIL # BLD AUTO: 1.09 K/UL — HIGH (ref 0–0.5)
EOSINOPHIL NFR BLD AUTO: 12.7 % — HIGH (ref 0–6)
FERRITIN SERPL-MCNC: 8 NG/ML
GLUCOSE SERPL-MCNC: 150 MG/DL — HIGH (ref 70–99)
HCT VFR BLD CALC: 28.1 % — LOW (ref 34.5–45)
HGB BLD-MCNC: 7.9 G/DL — LOW (ref 11.5–15.5)
HYPOCHROMIA BLD QL: SLIGHT — SIGNIFICANT CHANGE UP
INR BLD: 1.02 RATIO — SIGNIFICANT CHANGE UP (ref 0.88–1.16)
IRON SATN MFR SERPL: 3 %
IRON SERPL-MCNC: 16 UG/DL
LYMPHOCYTES # BLD AUTO: 1.32 K/UL — SIGNIFICANT CHANGE UP (ref 1–3.3)
LYMPHOCYTES # BLD AUTO: 15.3 % — SIGNIFICANT CHANGE UP (ref 13–44)
MANUAL SMEAR VERIFICATION: SIGNIFICANT CHANGE UP
MCHC RBC-ENTMCNC: 19.6 PG — LOW (ref 27–34)
MCHC RBC-ENTMCNC: 28.1 GM/DL — LOW (ref 32–36)
MCV RBC AUTO: 69.6 FL — LOW (ref 80–100)
MICROCYTES BLD QL: SLIGHT — SIGNIFICANT CHANGE UP
MONOCYTES # BLD AUTO: 0.58 K/UL — SIGNIFICANT CHANGE UP (ref 0–0.9)
MONOCYTES NFR BLD AUTO: 6.8 % — SIGNIFICANT CHANGE UP (ref 2–14)
NEUTROPHILS # BLD AUTO: 5.54 K/UL — SIGNIFICANT CHANGE UP (ref 1.8–7.4)
NEUTROPHILS NFR BLD AUTO: 64.4 % — SIGNIFICANT CHANGE UP (ref 43–77)
OB PNL STL: NEGATIVE — SIGNIFICANT CHANGE UP
OVALOCYTES BLD QL SMEAR: SIGNIFICANT CHANGE UP
PLAT MORPH BLD: NORMAL — SIGNIFICANT CHANGE UP
PLATELET # BLD AUTO: 423 K/UL — HIGH (ref 150–400)
POIKILOCYTOSIS BLD QL AUTO: SIGNIFICANT CHANGE UP
POLYCHROMASIA BLD QL SMEAR: SLIGHT — SIGNIFICANT CHANGE UP
POTASSIUM SERPL-MCNC: 4.1 MMOL/L — SIGNIFICANT CHANGE UP (ref 3.5–5.3)
POTASSIUM SERPL-SCNC: 4.1 MMOL/L — SIGNIFICANT CHANGE UP (ref 3.5–5.3)
PROT SERPL-MCNC: 7.1 G/DL — SIGNIFICANT CHANGE UP (ref 6–8.3)
PROTHROM AB SERPL-ACNC: 11.8 SEC — SIGNIFICANT CHANGE UP (ref 10.5–13.4)
RBC # BLD: 4.04 M/UL — SIGNIFICANT CHANGE UP (ref 3.8–5.2)
RBC # FLD: 17.4 % — HIGH (ref 10.3–14.5)
RBC BLD AUTO: ABNORMAL
RH IG SCN BLD-IMP: NEGATIVE — SIGNIFICANT CHANGE UP
SODIUM SERPL-SCNC: 137 MMOL/L — SIGNIFICANT CHANGE UP (ref 135–145)
TARGETS BLD QL SMEAR: SLIGHT — SIGNIFICANT CHANGE UP
TIBC SERPL-MCNC: 533 UG/DL
UIBC SERPL-MCNC: 518 UG/DL
WBC # BLD: 8.6 K/UL — SIGNIFICANT CHANGE UP (ref 3.8–10.5)
WBC # FLD AUTO: 8.6 K/UL — SIGNIFICANT CHANGE UP (ref 3.8–10.5)

## 2023-06-06 PROCEDURE — 99222 1ST HOSP IP/OBS MODERATE 55: CPT

## 2023-06-06 PROCEDURE — 99285 EMERGENCY DEPT VISIT HI MDM: CPT | Mod: FS

## 2023-06-06 RX ORDER — ONDANSETRON 8 MG/1
4 TABLET, FILM COATED ORAL EVERY 8 HOURS
Refills: 0 | Status: DISCONTINUED | OUTPATIENT
Start: 2023-06-06 | End: 2023-06-07

## 2023-06-06 RX ORDER — EMPAGLIFLOZIN 10 MG/1
1 TABLET, FILM COATED ORAL
Qty: 0 | Refills: 0 | DISCHARGE

## 2023-06-06 RX ORDER — EMPAGLIFLOZIN 10 MG/1
1 TABLET, FILM COATED ORAL
Refills: 0 | DISCHARGE

## 2023-06-06 RX ORDER — DIAZEPAM 5 MG
0.5 TABLET ORAL
Refills: 0 | DISCHARGE

## 2023-06-06 RX ORDER — DIAZEPAM 5 MG
1.5 TABLET ORAL
Qty: 0 | Refills: 0 | DISCHARGE

## 2023-06-06 RX ORDER — ACETAMINOPHEN 500 MG
650 TABLET ORAL EVERY 6 HOURS
Refills: 0 | Status: DISCONTINUED | OUTPATIENT
Start: 2023-06-06 | End: 2023-06-07

## 2023-06-06 RX ORDER — LANOLIN ALCOHOL/MO/W.PET/CERES
3 CREAM (GRAM) TOPICAL AT BEDTIME
Refills: 0 | Status: DISCONTINUED | OUTPATIENT
Start: 2023-06-06 | End: 2023-06-07

## 2023-06-06 RX ORDER — THYROID 120 MG
1 TABLET ORAL
Refills: 0 | DISCHARGE

## 2023-06-06 RX ORDER — METFORMIN HYDROCHLORIDE 850 MG/1
1 TABLET ORAL
Qty: 0 | Refills: 0 | DISCHARGE

## 2023-06-06 NOTE — ED ADULT NURSE NOTE - NSFALLUNIVINTERV_ED_ALL_ED
Bed/Stretcher in lowest position, wheels locked, appropriate side rails in place/Call bell, personal items and telephone in reach/Instruct patient to call for assistance before getting out of bed/chair/stretcher/Non-slip footwear applied when patient is off stretcher/[place] to call system/Physically safe environment - no spills, clutter or unnecessary equipment/Purposeful proactive rounding/Room/bathroom lighting operational, light cord in reach

## 2023-06-06 NOTE — H&P ADULT - PROBLEM SELECTOR PLAN 2
Hx of CVA and cerebral aneurysm, s/p coiling and cerebral artery stent in 11/2012  - on aspirin 81mg TID and plavix 75mg daily  - will continue these medications for now, as evaluation thus far is negative for brisk bleeding  - consider neurosurgery consult if these agents need to be held

## 2023-06-06 NOTE — H&P ADULT - PROBLEM SELECTOR PLAN 8
- DVT ppx: hold, in the setting of anemia  - GI ppx: protonix BID  - Diet: diabetic  - Code status: not discussed - continue home diazepam 2.5mg BID  - istop reviewed, see chart note

## 2023-06-06 NOTE — HISTORY OF PRESENT ILLNESS
[FreeTextEntry1] : 78 yo female with fatigue, Pt had bowel ileostomy closure about 8 weeks ago.  Now still having difficulyt with food and bowels\par She is also complaining of skin changes on lower legs. Skin - pruritis - legs, and around eyes, \par Pt interrupted Sleep and has Fatigue\par \par Pt functioning well. Weight has been stable. Eating more foods. Dr. Blancas recommended advancing her diet, trying new foods. \par \par HGB March 30, 2023 = 10.6  Pt discharged on March 17, 2023 post op.\par \par Pt weight stable, eating more, functioning better, just a bit fatigued
0 = independent

## 2023-06-06 NOTE — ED ADULT NURSE NOTE - OBJECTIVE STATEMENT
Pt is 78 y/o female, presenting to the ED c/o abnormal labs. As per pt, had out patient labs at her PCP and was told her hemoglobin has been dropping. Pt reports hemoglobin in March was 11 and now 7.6. Pt reports PCP repeated test and dropped again, prompting ED visit. Pt has extensive PMH, Asthma, DM, HTN, kdiney stones, CVA, Diverticulitis, and recent s/p colon resection w/ ileostomy reversal in March. Pt reports only complaint to be feeling increasing weak. Upon assessment, pt AxOx3, sitting up in stretcher and speaking in full sentences. Breathing spontaneously and unlabored, >95% RA. Abdomen soft and nontender to palpation. Pt placed on continuous cardiac monitor. Pt moves all extremities w/ = strength. Skin is warm, dry, and intact w/ + peripheral pulses. Pt denies SOB, chest pain, n/v/d, dizziness, vision changes, chills, and fever. Safety and comfort measures provided- bed in lowest position, locked, and blanket given.

## 2023-06-06 NOTE — PHYSICAL EXAM
[Alert] : alert [Normal Outer Ear/Nose] : the ears and nose were normal in appearance [Normal Appearance] : the appearance of the neck was normal [Supple] : the neck was supple [No Respiratory Distress] : no respiratory distress [No Acc Muscle Use] : no accessory muscle use [Respiration, Rhythm And Depth] : normal respiratory rhythm and effort [Auscultation Breath Sounds / Voice Sounds] : lungs were clear to auscultation bilaterally [Heart Rate And Rhythm] : heart rate was normal and rhythm regular [Bowel Sounds] : normal bowel sounds [Abdomen Tenderness] : non-tender [Abdomen Soft] : soft [No Spinal Tenderness] : no spinal tenderness [Normal Color / Pigmentation] : normal skin color and pigmentation [Normal Turgor] : normal skin turgor [No Focal Deficits] : no focal deficits [Normal Affect] : the affect was normal [Normal Mood] : the mood was normal [de-identified] : rash under left breast  [de-identified] : ileostomy site reversed, small opening with bandage clean  [de-identified] : red based snummular skin lesion on right lower led with some scales.

## 2023-06-06 NOTE — H&P ADULT - PROBLEM SELECTOR PLAN 9
- DVT ppx: hold, in the setting of anemia  - GI ppx: protonix BID  - Diet: diabetic  - Code status: not discussed

## 2023-06-06 NOTE — H&P ADULT - PROBLEM SELECTOR PLAN 4
- hold home Jardiance and victoza while admitted  - takes tresiba 20 units qPM at home  - while admitted, c/w glargine 18 units qPM  - admelog low insulin sliding scale qAC and qHS

## 2023-06-06 NOTE — ED PROVIDER NOTE - ATTENDING APP SHARED VISIT CONTRIBUTION OF CARE
I, Gene Tejada, performed a history and physical exam of the patient and discussed their management with the advanced care provider. I reviewed the note and agree with the documented findings and plan of care. I was present and available for all procedures.    ---    Please see MDM

## 2023-06-06 NOTE — H&P ADULT - PROBLEM SELECTOR PLAN 1
hgb 7.9, microcytic (previously 11.6 in Mar 2023)  - s/p 1 unit of pRBC in the ED  - maintain 2 large-bore IVs  - maintain active type and screen  - GI consult hgb 7.9, microcytic (previously 11.6 in Mar 2023)  - s/p 1 unit of pRBC in the ED  - maintain 2 large-bore IVs  - maintain active type and screen  - transfuse for goal hgb > 7.0, or for severe symptoms  - pt is on aspirin 81mg TID and plavix 75mg daily for her history of cerebral stent placed in 11/2012. Will continue these agents for now as evaluation thus far is negative for brisk GI bleeding  - takes omeprazole 40mg BID and famotidine daily. For now, c/w protonix 40mg IV BID  - please obtain GI consult (consult team emailed overnight)  - consider colorectal surgery consult as well, given recent ileostomy reversal hgb 7.9, microcytic (previously 11.6 in Mar 2023). Iron deficiency anemia.  - outpatient labs reviewed: ferritin 8, iron 16, % sat 3, iron binding capacity, total 533, vitamin B12 wnl, folate wnl  - s/p 1 unit of pRBC in the ED  - maintain 2 large-bore IVs  - maintain active type and screen  - transfuse for goal hgb > 7.0, or for severe symptoms  - pt is on aspirin 81mg TID and plavix 75mg daily for her history of cerebral stent placed in 11/2012. Will continue these agents for now as evaluation thus far is negative for brisk GI bleeding  - takes omeprazole 40mg BID and famotidine daily. For now, c/w protonix 40mg IV BID  - please obtain GI consult (consult team emailed overnight)  - consider colorectal surgery consult as well, given recent ileostomy reversal

## 2023-06-06 NOTE — H&P ADULT - HISTORY OF PRESENT ILLNESS
77 year old female with a PMHx of DM2, right breast CA (s/p lumpectomy, chemotherapy and radiation), cerebral aneurysm/CVA S/P cerebral artery stent on full dose aspirin and 75mg plavix QD, GIB 2020, complicated diverticulitis S/P colon resection with ileostomy formation 12/13/22, s/p ileostomy reversal 3/2023 who presents with low hemoglobin on outpatient labs.    ED course: pt was transfused 1 unit of pRBC 77 year old female with a PMHx of DM2, right breast cancer (dx'ed in 2016 s/p partial masectomy, chemotherapy and radiation), cerebral aneurysm/CVA S/P cerebral artery stent __, GIB 2020, complicated diverticulitis S/P colon resection with ileostomy formation 12/13/22, s/p ileostomy reversal 3/2023 who presents with low hemoglobin on outpatient labs.    ED course: pt was transfused 1 unit of pRBC 77 year old female with a PMHx of DM2, right breast cancer (dx'ed in 2016 s/p partial masectomy, chemotherapy and radiation), cerebral aneurysm/CVA S/P cerebral aneurysm repair and cerebral artery stent 11/2012 (on aspirin and plavix), GIB 2020 (no transfusions required), complicated diverticulitis S/P colon resection with ileostomy formation 12/13/22, s/p ileostomy reversal 3/2023 who presents with low hemoglobin on outpatient labs.    Pt was assessed with  at bedside. Pt endorses symptoms of generalized fatigue without associated dizziness, chest pain, palpitations, or shortness of breath. No abdominal pain. Pt has not had any change in bowel movements. States her bowel movements are regular, formed stools that are brown.     Pt has a recent history of complicated diverticulitis s/p ileostomy in 12/2022 and reversal in 3/2023 and has eaten a low-fiber diet since, as instructed by her surgeon. After ileostomy reversal however she has slowly re-introduced fiber to her diet, as per her surgeon. She has no prior history of pRBC transfusions. States she had 'mild anemia' in the past and had previously taken over the counter iron supplements. Her last colonoscopy was in 4/2022 which showed diverticulosis. She had an upper endoscopy ~ 5 years ago without significant findings. Per the patient, the endoscopy was done for screening purposes; she had no upper GI symptoms at that time.    ED course: pt was transfused 1 unit of pRBC.

## 2023-06-06 NOTE — H&P ADULT - GASTROINTESTINAL COMMENTS
+healed vertical surgical scar, below the umbilicus. +healed round scar about the RLL, from prior ileostomy.

## 2023-06-06 NOTE — ED PROVIDER NOTE - CLINICAL SUMMARY MEDICAL DECISION MAKING FREE TEXT BOX
Gene Tejada MD (Attending Physician): 77F with PMHx DM, R breast CA s/p lumpectomy, chemotherapy/radiation, cerebral aneurysm with clipping on ASA and plavix, diverticulitis s/p colon resection and ileostomy in 2022 presents for abnormal lab results. Patient has been having 1 month history of fatigue and had blood work done showing low Hb 7.5 last week with repeat today showing Hb much lower and sent here for transfusion. Patient has never had a transfusion before and denies and hematemesis or blood in stool. Denies fever, chills, cp, SOB, n/v/d, abdominal pain.     On exam,   Const: Cooperative, in NAD  HEENT: Head is normocephalic, atraumatic. PERRLA. EOMI. Conjunctival pallor.   Lungs:clear to auscultation bilaterally. No wheezes, crackles, rhonchi   Cardiovascular: RRR, no murmurs, rubs or gallops.  Abdomen: No scars. No distension. Soft and nontender. No rebound, guarding or masses noted.  Back: No midlines or paraspinous tenderness. No CVA tenderness   : Chaperoned KEV Ozuna         No external or thrombosed hemorrhoids, anal tears or fissures.          No rectal pain. No gross blood. (-)guiac   MSK: No pitting edema, erythema, or cyanosis. Full ROM in all extremities   Neuro:Awake, AOx3, follows commands    Differentials include but are not limited to: iron deficiency anemia, anemia of chronic disease, GI bleed. Plan for labs

## 2023-06-06 NOTE — ED PROVIDER NOTE - PHYSICAL EXAMINATION
A&Ox3, NAD, well appearing  Lungs CTAB. No w/r/r  Cardiac +S1S2, RRR, No m/r/g.   Abd soft, NT/ND, +BS, no rebound or guarding.   Extremities: cap refill <2, pulses in distal extremities 4+, no edema.   No focal Deficits, Strength 5/5 UE/LE. Gait steady. A&Ox3, NAD, well appearing  Lungs CTAB. No w/r/r  Cardiac +S1S2, RRR, No m/r/g.   Abd soft, NT/ND, +BS, no rebound or guarding.   Extremities: cap refill <2, pulses in distal extremities 4+, no edema.   No focal Deficits, Strength 5/5 UE/LE. Gait steady.  rectal examined performed by KEV Cheatham with Dr. Tejada: rectum nontender, no hemorrhoids, masses, fissures, no gross blood or stool appreciated on exam.

## 2023-06-06 NOTE — H&P ADULT - NSICDXPASTSURGICALHX_GEN_ALL_CORE_FT
PAST SURGICAL HISTORY:  Basal cell carcinoma removed - left eyelid- 3/15    Cerebral aneurysm repair with coil and stent - 11/12    H/O shoulder surgery right - 5/2001    H/O tubal ligation     History of D&C     History of ileostomy     History of kidney stones 2001    S/P closure of ileostomy     S/P colon resection

## 2023-06-06 NOTE — ED PROVIDER NOTE - NS ED ATTENDING STATEMENT MOD
This was a shared visit with the NELDA. I reviewed and verified the documentation and independently performed the documented:

## 2023-06-06 NOTE — CHART NOTE - NSCHARTNOTEFT_GEN_A_CORE
iSTOP prescription monitoring      PDI	Current Rx	Drug Type	Rx Written	Rx Dispensed	Drug	Quantity	Days Supply  A	Y	B	05/30/2023	05/30/2023	diazepam 5 mg tablet	30	30  Prescriber Name Vilma Reyes MD  Prescriber ABIMBOLA # [id number]  Payment Method Insurance  [facility]  A	N	O	03/16/2023	03/16/2023	oxycodone hcl (ir) 5 mg tablet	10	2  Prescriber Name Talia Chris  Prescriber ABIMBOLA # [id number]  Payment Method Insurance  [facility]  A	N	B	02/09/2023	02/10/2023	diazepam 5 mg tablet	60	30  Prescriber Name Vilma Reyes MD  Prescriber ABIMBOLA # [id number]  Payment Method Insurance  [facility]  A	N	B	10/31/2022	10/31/2022	diazepam 5 mg tablet	60	30  Prescriber Name Shadi Zambrano MD  Prescriber ABIMBOLA # [id number]  Payment Method Insurance  [facility]  A	N	O	10/14/2022	10/14/2022	oxycodone hcl (ir) 5 mg tablet	45	15  Prescriber Name Vilma Reyes MD  Prescriber ABIMBOLA # [id number]  Payment Method Insurance  [facility]  A	N	B	09/02/2022	09/02/2022	diazepam 5 mg tablet	60	30  Prescriber Name Vilma Reyes MD  Prescriber ABIMBOLA # [id number]  Payment Method [facility]

## 2023-06-06 NOTE — ED PROVIDER NOTE - OBJECTIVE STATEMENT
76 y/o female PMH  diabetes type 2, right breast CA, S/P lumpectomy, chemotherapy and radiation, cerebral aneurysm/CVA, S/P cerebral artery stent on full dose aspirin and 75mg plavix QD, GIB 2020,  diverticulitis, S/P colon resection with ileostomy formation 12/13/22 here 76 y/o female PMH  diabetes type 2, right breast CA, S/P lumpectomy, chemotherapy and radiation, cerebral aneurysm/CVA, S/P cerebral artery stent on full dose aspirin and 75mg plavix QD, GIB 2020, diverticulitis, S/P colon resection with ileostomy formation 12/13/22 here for low h/h on out pt labs. Pt states she had routine labs completed last week which showed hemoglobin 7.5, had repeat h/h today which was lower however is unsure of the exact value. She denies black or bloody stools, abdominal pain, nausea or vomiting.

## 2023-06-06 NOTE — H&P ADULT - ASSESSMENT
77 year old female with a PMHx of DM2, right breast cancer (dx'ed in 2016 s/p partial masectomy, chemotherapy and radiation), cerebral aneurysm/CVA S/P cerebral aneurysm repair and cerebral artery stent 11/2012 (on aspirin and plavix), GIB 2020 (no transfusions required), complicated diverticulitis S/P colon resection with ileostomy formation 12/13/22, s/p ileostomy reversal 3/2023 who presents with low hemoglobin on outpatient labs.    Pt presents with significant drop in hemoglobin since 3/2023; 3.7 point drop in hemoglobin in about three months. Pt had her outpatient records with her; her ferritin (collected 6/6/23) was low at 8, iron low at 16 consistent with iron deficiency. Her evaluation however is negative for any obvious signs of GI bleeding, though her history is significant for recent GI surgeries (ileostomy followed by ileostomy reversal). Ddx also includes iron deficiency anemia from dietary deficiencies; the patient was on a low-fiber diet, has not been eating much vegetables, and at baseline does not eat much red meats. Ddx also includes issues with iron absorption s/p recent ileostomy?

## 2023-06-06 NOTE — ED PROVIDER NOTE - PROGRESS NOTE DETAILS
spoke with pts PCP Dr. Vilma Reyes, discussed hemoglobin 6.9, she states less than 3 months ago pts hemoglobin >11, concerned about low h/h and requesting in patient work up and 1U PRBC to be administered overnight. -Salome Cheatham PA-C

## 2023-06-06 NOTE — H&P ADULT - NSHPLABSRESULTS_GEN_ALL_CORE
LABS:                         7.9    8.60  )-----------( 423      ( 06 Jun 2023 20:33 )             28.1     06-06    137  |  102  |  30<H>  ----------------------------<  150<H>  4.1   |  20<L>  |  0.79    Ca    9.7      06 Jun 2023 20:33    TPro  7.1  /  Alb  4.8  /  TBili  0.3  /  DBili  x   /  AST  38  /  ALT  22  /  AlkPhos  110  06-06    PT/INR - ( 06 Jun 2023 20:33 )   PT: 11.8 sec;   INR: 1.02 ratio         PTT - ( 06 Jun 2023 20:33 )  PTT:27.8 sec            Records reviewed from prior hospitalization.  Labs reviewed remarkable for - hgb 7.9, microcytic (previously 11.6 in Mar 2023). Azotemia. FOBT negative.  EKG personally reviewed - NSR. No ST changes. LABS:                         7.9    8.60  )-----------( 423      ( 06 Jun 2023 20:33 )             28.1     06-06    137  |  102  |  30<H>  ----------------------------<  150<H>  4.1   |  20<L>  |  0.79    Ca    9.7      06 Jun 2023 20:33    TPro  7.1  /  Alb  4.8  /  TBili  0.3  /  DBili  x   /  AST  38  /  ALT  22  /  AlkPhos  110  06-06    PT/INR - ( 06 Jun 2023 20:33 )   PT: 11.8 sec;   INR: 1.02 ratio         PTT - ( 06 Jun 2023 20:33 )  PTT:27.8 sec            Records reviewed from prior hospitalization.  Labs reviewed remarkable for - hgb 7.9, microcytic (previously 11.6 in Mar 2023). Azotemia. FOBT negative.  EKG personally reviewed - NSR. No ST changes.  -------------------  Colonoscopy 4/2022  Findings:       The perianal and digital rectal examinations were normal.       Many small and large-mouthed diverticula were found in the sigmoid colon, transverse colon        and ascending colon. There was severe diverticulosis in the sigmoid colon in particular with        tortuosity of the region.       Non-bleeding internal hemorrhoids were found during retroflexion.       A small amount of liquid semi-liquid brown stool was found in the sigmoid colon, in the        transverse colon, in the ascending colon and in the cecum, making visualization difficult.                                                                                                        Impression:          - Preparation of the colon was fair.                       - Diverticulosis in the sigmoid colon, in the transverse colon and in the                        ascending colon. Significant diverticulosis in sigmoid and tortuous colon.                       - Non-bleeding internal hemorrhoids.                       - Stool in the sigmoid colon, in the transverse colon, in the ascending colon                        and in the cecum.                       - No specimens collected.  Recommendation:      - Return patient to hospital [facility] for ongoing care.                       - High fiber diet.                       - Colonoscopy for screening/surviellance as an outpatient as previously                        scheduled given prep not adequate for screening purposes.                                                                                                        Attending Participation:       I was present and participated during the entire procedure, including non-key portions.                                                                                                          __________________  Vicki Andrade MD  4/25/2022 3:58:05 PM  This report has been signed electronically.  Number of Addenda: 0 LABS:                         7.9    8.60  )-----------( 423      ( 06 Jun 2023 20:33 )             28.1     06-06    137  |  102  |  30<H>  ----------------------------<  150<H>  4.1   |  20<L>  |  0.79    Ca    9.7      06 Jun 2023 20:33    TPro  7.1  /  Alb  4.8  /  TBili  0.3  /  DBili  x   /  AST  38  /  ALT  22  /  AlkPhos  110  06-06    PT/INR - ( 06 Jun 2023 20:33 )   PT: 11.8 sec;   INR: 1.02 ratio         PTT - ( 06 Jun 2023 20:33 )  PTT:27.8 sec            Records reviewed from prior hospitalization.  Labs reviewed remarkable for - hgb 7.9, microcytic (previously 11.6 in Mar 2023). Azotemia. FOBT negative.  EKG personally reviewed - NSR. No ST changes.  ---------------------------------------------------------  Colonoscopy 4/2022  Findings:       The perianal and digital rectal examinations were normal.       Many small and large-mouthed diverticula were found in the sigmoid colon, transverse colon        and ascending colon. There was severe diverticulosis in the sigmoid colon in particular with        tortuosity of the region.       Non-bleeding internal hemorrhoids were found during retroflexion.       A small amount of liquid semi-liquid brown stool was found in the sigmoid colon, in the        transverse colon, in the ascending colon and in the cecum, making visualization difficult.                                                                                                        Impression:          - Preparation of the colon was fair.                       - Diverticulosis in the sigmoid colon, in the transverse colon and in the                        ascending colon. Significant diverticulosis in sigmoid and tortuous colon.                       - Non-bleeding internal hemorrhoids.                       - Stool in the sigmoid colon, in the transverse colon, in the ascending colon                        and in the cecum.                       - No specimens collected.  Recommendation:      - Return patient to hospital [facility] for ongoing care.                       - High fiber diet.                       - Colonoscopy for screening/surviellance as an outpatient as previously                        scheduled given prep not adequate for screening purposes.                                                                                                        Attending Participation:       I was present and participated during the entire procedure, including non-key portions.                                                                                                          __________________  Vicki Andrade MD  4/25/2022 3:58:05 PM  This report has been signed electronically.  Number of Addenda: 0

## 2023-06-06 NOTE — H&P ADULT - NSHPPHYSICALEXAM_GEN_ALL_CORE
Vital Signs Last 24 Hrs  T(C): 36.7 (06 Jun 2023 20:00), Max: 36.9 (06 Jun 2023 18:31)  T(F): 98.1 (06 Jun 2023 20:00), Max: 98.4 (06 Jun 2023 18:31)  HR: 88 (06 Jun 2023 20:00) (88 - 98)  BP: 170/60 (06 Jun 2023 20:00) (170/60 - 177/79)  BP(mean): 81 (06 Jun 2023 20:00) (81 - 81)  RR: 16 (06 Jun 2023 20:00) (16 - 16)  SpO2: 100% (06 Jun 2023 20:00) (98% - 100%)    Parameters below as of 06 Jun 2023 20:00  Patient On (Oxygen Delivery Method): room air

## 2023-06-06 NOTE — H&P ADULT - PROBLEM SELECTOR PROBLEM 7
History of breast cancer Glycopyrrolate Pregnancy And Lactation Text: This medication is Pregnancy Category B and is considered safe during pregnancy. It is unknown if it is excreted breast milk.

## 2023-06-07 ENCOUNTER — TRANSCRIPTION ENCOUNTER (OUTPATIENT)
Age: 78
End: 2023-06-07

## 2023-06-07 ENCOUNTER — OUTPATIENT (OUTPATIENT)
Dept: OUTPATIENT SERVICES | Facility: HOSPITAL | Age: 78
LOS: 1 days | Discharge: HOME | End: 2023-06-07

## 2023-06-07 VITALS
OXYGEN SATURATION: 96 % | RESPIRATION RATE: 16 BRPM | SYSTOLIC BLOOD PRESSURE: 126 MMHG | TEMPERATURE: 98 F | HEART RATE: 82 BPM | DIASTOLIC BLOOD PRESSURE: 82 MMHG

## 2023-06-07 DIAGNOSIS — C50.311 MALIGNANT NEOPLASM OF LOWER-INNER QUADRANT OF RIGHT FEMALE BREAST: ICD-10-CM

## 2023-06-07 DIAGNOSIS — Z87.39 PERSONAL HISTORY OF OTHER DISEASES OF THE MUSCULOSKELETAL SYSTEM AND CONNECTIVE TISSUE: ICD-10-CM

## 2023-06-07 DIAGNOSIS — Z90.49 ACQUIRED ABSENCE OF OTHER SPECIFIED PARTS OF DIGESTIVE TRACT: Chronic | ICD-10-CM

## 2023-06-07 DIAGNOSIS — Z87.09 PERSONAL HISTORY OF OTHER DISEASES OF THE RESPIRATORY SYSTEM: ICD-10-CM

## 2023-06-07 DIAGNOSIS — Z29.9 ENCOUNTER FOR PROPHYLACTIC MEASURES, UNSPECIFIED: ICD-10-CM

## 2023-06-07 DIAGNOSIS — I67.1 CEREBRAL ANEURYSM, NONRUPTURED: Chronic | ICD-10-CM

## 2023-06-07 DIAGNOSIS — C44.91 BASAL CELL CARCINOMA OF SKIN, UNSPECIFIED: Chronic | ICD-10-CM

## 2023-06-07 DIAGNOSIS — Z98.890 OTHER SPECIFIED POSTPROCEDURAL STATES: Chronic | ICD-10-CM

## 2023-06-07 DIAGNOSIS — Z98.51 TUBAL LIGATION STATUS: Chronic | ICD-10-CM

## 2023-06-07 DIAGNOSIS — Z85.3 PERSONAL HISTORY OF MALIGNANT NEOPLASM OF BREAST: ICD-10-CM

## 2023-06-07 DIAGNOSIS — E03.9 HYPOTHYROIDISM, UNSPECIFIED: ICD-10-CM

## 2023-06-07 DIAGNOSIS — Z98.89 OTHER SPECIFIED POSTPROCEDURAL STATES: Chronic | ICD-10-CM

## 2023-06-07 DIAGNOSIS — I10 ESSENTIAL (PRIMARY) HYPERTENSION: ICD-10-CM

## 2023-06-07 DIAGNOSIS — E11.9 TYPE 2 DIABETES MELLITUS WITHOUT COMPLICATIONS: ICD-10-CM

## 2023-06-07 DIAGNOSIS — Z86.73 PERSONAL HISTORY OF TRANSIENT ISCHEMIC ATTACK (TIA), AND CEREBRAL INFARCTION WITHOUT RESIDUAL DEFICITS: ICD-10-CM

## 2023-06-07 DIAGNOSIS — Z87.442 PERSONAL HISTORY OF URINARY CALCULI: Chronic | ICD-10-CM

## 2023-06-07 LAB
A1C WITH ESTIMATED AVERAGE GLUCOSE RESULT: 7.3 % — HIGH (ref 4–5.6)
ANION GAP SERPL CALC-SCNC: 14 MMOL/L — SIGNIFICANT CHANGE UP (ref 5–17)
BUN SERPL-MCNC: 29 MG/DL — HIGH (ref 7–23)
CALCIUM SERPL-MCNC: 8.9 MG/DL — SIGNIFICANT CHANGE UP (ref 8.4–10.5)
CHLORIDE SERPL-SCNC: 106 MMOL/L — SIGNIFICANT CHANGE UP (ref 96–108)
CO2 SERPL-SCNC: 21 MMOL/L — LOW (ref 22–31)
CREAT SERPL-MCNC: 0.84 MG/DL — SIGNIFICANT CHANGE UP (ref 0.5–1.3)
EGFR: 72 ML/MIN/1.73M2 — SIGNIFICANT CHANGE UP
ESTIMATED AVERAGE GLUCOSE: 163 MG/DL — HIGH (ref 68–114)
GLUCOSE BLDC GLUCOMTR-MCNC: 119 MG/DL — HIGH (ref 70–99)
GLUCOSE BLDC GLUCOMTR-MCNC: 129 MG/DL — HIGH (ref 70–99)
GLUCOSE BLDC GLUCOMTR-MCNC: 187 MG/DL — HIGH (ref 70–99)
GLUCOSE SERPL-MCNC: 143 MG/DL — HIGH (ref 70–99)
HCT VFR BLD CALC: 28.4 % — LOW (ref 34.5–45)
HGB BLD-MCNC: 8.4 G/DL — LOW (ref 11.5–15.5)
MAGNESIUM SERPL-MCNC: 2.2 MG/DL — SIGNIFICANT CHANGE UP (ref 1.6–2.6)
MCHC RBC-ENTMCNC: 20.6 PG — LOW (ref 27–34)
MCHC RBC-ENTMCNC: 29.6 GM/DL — LOW (ref 32–36)
MCV RBC AUTO: 69.6 FL — LOW (ref 80–100)
NRBC # BLD: 0 /100 WBCS — SIGNIFICANT CHANGE UP (ref 0–0)
PHOSPHATE SERPL-MCNC: 3.8 MG/DL — SIGNIFICANT CHANGE UP (ref 2.5–4.5)
PLATELET # BLD AUTO: 363 K/UL — SIGNIFICANT CHANGE UP (ref 150–400)
POTASSIUM SERPL-MCNC: 4.1 MMOL/L — SIGNIFICANT CHANGE UP (ref 3.5–5.3)
POTASSIUM SERPL-SCNC: 4.1 MMOL/L — SIGNIFICANT CHANGE UP (ref 3.5–5.3)
RBC # BLD: 4.08 M/UL — SIGNIFICANT CHANGE UP (ref 3.8–5.2)
RBC # FLD: 18 % — HIGH (ref 10.3–14.5)
SODIUM SERPL-SCNC: 141 MMOL/L — SIGNIFICANT CHANGE UP (ref 135–145)
WBC # BLD: 7.04 K/UL — SIGNIFICANT CHANGE UP (ref 3.8–10.5)
WBC # FLD AUTO: 7.04 K/UL — SIGNIFICANT CHANGE UP (ref 3.8–10.5)

## 2023-06-07 PROCEDURE — 82962 GLUCOSE BLOOD TEST: CPT

## 2023-06-07 PROCEDURE — 80053 COMPREHEN METABOLIC PANEL: CPT

## 2023-06-07 PROCEDURE — 93005 ELECTROCARDIOGRAM TRACING: CPT

## 2023-06-07 PROCEDURE — 36415 COLL VENOUS BLD VENIPUNCTURE: CPT

## 2023-06-07 PROCEDURE — 85610 PROTHROMBIN TIME: CPT

## 2023-06-07 PROCEDURE — 82272 OCCULT BLD FECES 1-3 TESTS: CPT

## 2023-06-07 PROCEDURE — 86923 COMPATIBILITY TEST ELECTRIC: CPT

## 2023-06-07 PROCEDURE — 86850 RBC ANTIBODY SCREEN: CPT

## 2023-06-07 PROCEDURE — 36430 TRANSFUSION BLD/BLD COMPNT: CPT

## 2023-06-07 PROCEDURE — 86900 BLOOD TYPING SEROLOGIC ABO: CPT

## 2023-06-07 PROCEDURE — 85027 COMPLETE CBC AUTOMATED: CPT

## 2023-06-07 PROCEDURE — 85730 THROMBOPLASTIN TIME PARTIAL: CPT

## 2023-06-07 PROCEDURE — 99285 EMERGENCY DEPT VISIT HI MDM: CPT

## 2023-06-07 PROCEDURE — 83036 HEMOGLOBIN GLYCOSYLATED A1C: CPT

## 2023-06-07 PROCEDURE — 85025 COMPLETE CBC W/AUTO DIFF WBC: CPT

## 2023-06-07 PROCEDURE — 83735 ASSAY OF MAGNESIUM: CPT

## 2023-06-07 PROCEDURE — 84100 ASSAY OF PHOSPHORUS: CPT

## 2023-06-07 PROCEDURE — 80048 BASIC METABOLIC PNL TOTAL CA: CPT

## 2023-06-07 PROCEDURE — 99239 HOSP IP/OBS DSCHRG MGMT >30: CPT

## 2023-06-07 PROCEDURE — 99222 1ST HOSP IP/OBS MODERATE 55: CPT | Mod: GC

## 2023-06-07 PROCEDURE — P9016: CPT

## 2023-06-07 PROCEDURE — 86901 BLOOD TYPING SEROLOGIC RH(D): CPT

## 2023-06-07 RX ORDER — SOD SULF/SODIUM/NAHCO3/KCL/PEG
4000 SOLUTION, RECONSTITUTED, ORAL ORAL ONCE
Refills: 0 | Status: DISCONTINUED | OUTPATIENT
Start: 2023-06-07 | End: 2023-06-07

## 2023-06-07 RX ORDER — DEXTROSE 50 % IN WATER 50 %
15 SYRINGE (ML) INTRAVENOUS ONCE
Refills: 0 | Status: DISCONTINUED | OUTPATIENT
Start: 2023-06-07 | End: 2023-06-07

## 2023-06-07 RX ORDER — EXEMESTANE 25 MG/1
1 TABLET, SUGAR COATED ORAL
Qty: 0 | Refills: 0 | DISCHARGE

## 2023-06-07 RX ORDER — INSULIN GLARGINE 100 [IU]/ML
18 INJECTION, SOLUTION SUBCUTANEOUS AT BEDTIME
Refills: 0 | Status: DISCONTINUED | OUTPATIENT
Start: 2023-06-07 | End: 2023-06-07

## 2023-06-07 RX ORDER — FERROUS SULFATE 325(65) MG
1 TABLET ORAL
Qty: 30 | Refills: 0
Start: 2023-06-07 | End: 2023-07-06

## 2023-06-07 RX ORDER — MONTELUKAST 4 MG/1
1 TABLET, CHEWABLE ORAL
Refills: 0 | DISCHARGE

## 2023-06-07 RX ORDER — TRAZODONE HCL 50 MG
50 TABLET ORAL AT BEDTIME
Refills: 0 | Status: DISCONTINUED | OUTPATIENT
Start: 2023-06-07 | End: 2023-06-07

## 2023-06-07 RX ORDER — DEXTROSE 50 % IN WATER 50 %
12.5 SYRINGE (ML) INTRAVENOUS ONCE
Refills: 0 | Status: DISCONTINUED | OUTPATIENT
Start: 2023-06-07 | End: 2023-06-07

## 2023-06-07 RX ORDER — TRAZODONE HCL 50 MG
1 TABLET ORAL
Refills: 0 | DISCHARGE

## 2023-06-07 RX ORDER — INSULIN DEGLUDEC 100 U/ML
20 INJECTION, SOLUTION SUBCUTANEOUS
Refills: 0 | DISCHARGE

## 2023-06-07 RX ORDER — FERROUS SULFATE 325(65) MG
325 TABLET ORAL DAILY
Refills: 0 | Status: DISCONTINUED | OUTPATIENT
Start: 2023-06-07 | End: 2023-06-07

## 2023-06-07 RX ORDER — DIAZEPAM 5 MG
2.5 TABLET ORAL
Refills: 0 | Status: DISCONTINUED | OUTPATIENT
Start: 2023-06-06 | End: 2023-06-07

## 2023-06-07 RX ORDER — SODIUM CHLORIDE 9 MG/ML
1000 INJECTION, SOLUTION INTRAVENOUS
Refills: 0 | Status: DISCONTINUED | OUTPATIENT
Start: 2023-06-07 | End: 2023-06-07

## 2023-06-07 RX ORDER — CLOPIDOGREL BISULFATE 75 MG/1
75 TABLET, FILM COATED ORAL DAILY
Refills: 0 | Status: DISCONTINUED | OUTPATIENT
Start: 2023-06-07 | End: 2023-06-07

## 2023-06-07 RX ORDER — CLOPIDOGREL BISULFATE 75 MG/1
1 TABLET, FILM COATED ORAL
Refills: 0 | DISCHARGE

## 2023-06-07 RX ORDER — INSULIN DEGLUDEC 100 U/ML
28 INJECTION, SOLUTION SUBCUTANEOUS
Qty: 0 | Refills: 0 | DISCHARGE

## 2023-06-07 RX ORDER — EXEMESTANE 25 MG/1
25 TABLET, SUGAR COATED ORAL DAILY
Refills: 0 | Status: DISCONTINUED | OUTPATIENT
Start: 2023-06-07 | End: 2023-06-07

## 2023-06-07 RX ORDER — MONTELUKAST 4 MG/1
10 TABLET, CHEWABLE ORAL AT BEDTIME
Refills: 0 | Status: DISCONTINUED | OUTPATIENT
Start: 2023-06-07 | End: 2023-06-07

## 2023-06-07 RX ORDER — GLUCAGON INJECTION, SOLUTION 0.5 MG/.1ML
1 INJECTION, SOLUTION SUBCUTANEOUS ONCE
Refills: 0 | Status: DISCONTINUED | OUTPATIENT
Start: 2023-06-07 | End: 2023-06-07

## 2023-06-07 RX ORDER — LOSARTAN POTASSIUM 100 MG/1
1 TABLET, FILM COATED ORAL
Qty: 0 | Refills: 0 | DISCHARGE

## 2023-06-07 RX ORDER — FAMOTIDINE 10 MG/ML
2 INJECTION INTRAVENOUS
Qty: 0 | Refills: 0 | DISCHARGE

## 2023-06-07 RX ORDER — POLYETHYLENE GLYCOL 3350 17 G/17G
17 POWDER, FOR SOLUTION ORAL
Refills: 0 | Status: DISCONTINUED | OUTPATIENT
Start: 2023-06-07 | End: 2023-06-07

## 2023-06-07 RX ORDER — DORZOLAMIDE HYDROCHLORIDE 20 MG/ML
1 SOLUTION/ DROPS OPHTHALMIC
Refills: 0 | DISCHARGE

## 2023-06-07 RX ORDER — ASPIRIN/CALCIUM CARB/MAGNESIUM 324 MG
81 TABLET ORAL THREE TIMES A DAY
Refills: 0 | Status: DISCONTINUED | OUTPATIENT
Start: 2023-06-07 | End: 2023-06-07

## 2023-06-07 RX ORDER — PANTOPRAZOLE SODIUM 20 MG/1
40 TABLET, DELAYED RELEASE ORAL EVERY 12 HOURS
Refills: 0 | Status: DISCONTINUED | OUTPATIENT
Start: 2023-06-07 | End: 2023-06-07

## 2023-06-07 RX ORDER — INSULIN LISPRO 100/ML
VIAL (ML) SUBCUTANEOUS
Refills: 0 | Status: DISCONTINUED | OUTPATIENT
Start: 2023-06-07 | End: 2023-06-07

## 2023-06-07 RX ORDER — POLYETHYLENE GLYCOL 3350 17 G/17G
17 POWDER, FOR SOLUTION ORAL
Qty: 0 | Refills: 0 | DISCHARGE
Start: 2023-06-07

## 2023-06-07 RX ORDER — PANTOPRAZOLE SODIUM 20 MG/1
1 TABLET, DELAYED RELEASE ORAL
Qty: 30 | Refills: 0
Start: 2023-06-07 | End: 2023-07-06

## 2023-06-07 RX ORDER — LOSARTAN POTASSIUM 100 MG/1
1 TABLET, FILM COATED ORAL
Refills: 0 | DISCHARGE

## 2023-06-07 RX ORDER — LATANOPROSTENE BUNOD 0.24 MG/ML
1 SOLUTION/ DROPS OPHTHALMIC
Qty: 0 | Refills: 0 | DISCHARGE

## 2023-06-07 RX ORDER — PANTOPRAZOLE SODIUM 20 MG/1
40 TABLET, DELAYED RELEASE ORAL ONCE
Refills: 0 | Status: COMPLETED | OUTPATIENT
Start: 2023-06-07 | End: 2023-06-07

## 2023-06-07 RX ORDER — LOSARTAN POTASSIUM 100 MG/1
100 TABLET, FILM COATED ORAL DAILY
Refills: 0 | Status: DISCONTINUED | OUTPATIENT
Start: 2023-06-07 | End: 2023-06-07

## 2023-06-07 RX ORDER — OMEPRAZOLE 10 MG/1
1 CAPSULE, DELAYED RELEASE ORAL
Qty: 0 | Refills: 0 | DISCHARGE

## 2023-06-07 RX ORDER — DEXTROSE 50 % IN WATER 50 %
25 SYRINGE (ML) INTRAVENOUS ONCE
Refills: 0 | Status: DISCONTINUED | OUTPATIENT
Start: 2023-06-07 | End: 2023-06-07

## 2023-06-07 RX ORDER — THYROID 120 MG
60 TABLET ORAL DAILY
Refills: 0 | Status: DISCONTINUED | OUTPATIENT
Start: 2023-06-07 | End: 2023-06-07

## 2023-06-07 RX ORDER — INSULIN LISPRO 100/ML
VIAL (ML) SUBCUTANEOUS AT BEDTIME
Refills: 0 | Status: DISCONTINUED | OUTPATIENT
Start: 2023-06-07 | End: 2023-06-07

## 2023-06-07 RX ORDER — ASPIRIN/CALCIUM CARB/MAGNESIUM 324 MG
1 TABLET ORAL
Qty: 0 | Refills: 0 | DISCHARGE

## 2023-06-07 RX ADMIN — Medication 2.5 MILLIGRAM(S): at 07:17

## 2023-06-07 RX ADMIN — PANTOPRAZOLE SODIUM 40 MILLIGRAM(S): 20 TABLET, DELAYED RELEASE ORAL at 07:15

## 2023-06-07 RX ADMIN — EXEMESTANE 25 MILLIGRAM(S): 25 TABLET, SUGAR COATED ORAL at 13:10

## 2023-06-07 RX ADMIN — Medication 2.5 MILLIGRAM(S): at 13:09

## 2023-06-07 RX ADMIN — PANTOPRAZOLE SODIUM 40 MILLIGRAM(S): 20 TABLET, DELAYED RELEASE ORAL at 01:16

## 2023-06-07 RX ADMIN — LOSARTAN POTASSIUM 100 MILLIGRAM(S): 100 TABLET, FILM COATED ORAL at 07:16

## 2023-06-07 NOTE — DISCHARGE NOTE NURSING/CASE MANAGEMENT/SOCIAL WORK - PATIENT PORTAL LINK FT
You can access the FollowMyHealth Patient Portal offered by [facility] by registering at the following website: [email or web address]. By joining [employer]’s FollowMyHealth portal, you will also be able to view your health information using other applications (apps) compatible with our system.

## 2023-06-07 NOTE — DISCHARGE NOTE PROVIDER - NSDCMRMEDTOKEN_GEN_ALL_CORE_FT
[place] Thyroid 60 mg oral tablet: 1 tab(s) orally once a day  Aspir 81 oral delayed release tablet: 1 tab(s) orally 3 times a day  diazePAM 10 mg oral tablet: 0.5 tab(s) orally twice a day after breakfast and lunch  dorzolamide 2% ophthalmic solution: 1 in each eye  exemestane 25 mg oral tablet: 1 tab(s) orally once a day  Jardiance 10 mg oral tablet: 1 tab(s) orally once a day  losartan 100 mg oral tablet: 1 tab(s) orally once a day  omeprazole 40 mg oral delayed release capsule: 1 tab(s) orally 2 times a day  Pepcid 20 mg oral tablet: 2 tab(s) orally once a day (at bedtime)  Plavix 75 mg oral tablet: 1 tab(s) orally once a day  Singulair 10 mg oral tablet: 1 tab(s) orally once a day (at bedtime)  traZODone 50 mg oral tablet: 1 tab(s) orally once a day (at bedtime)  Tresiba 100 units/mL subcutaneous solution: 20 unit(s) subcutaneous once a day (at bedtime)  Victoza 18 mg/3 mL subcutaneous solution: 1.8 milligram(s) subcutaneous once a day- in morning before breakfast, no victoza in am of  surgery/a sper eNdo adivse  Vyzulta 0.024% ophthalmic solution: 1 drop(s) to each affected eye once a day (in the evening)   [place] Thyroid 60 mg oral tablet: 1 tab(s) orally once a day  Aspir 81 oral delayed release tablet: 1 tab(s) orally 3 times a day  diazePAM 10 mg oral tablet: 0.5 tab(s) orally twice a day after breakfast and lunch  dorzolamide 2% ophthalmic solution: 1 in each eye  exemestane 25 mg oral tablet: 1 tab(s) orally once a day  ferrous sulfate 325 mg (65 mg elemental iron) oral tablet: 1 tab(s) orally once a day  Jardiance 10 mg oral tablet: 1 tab(s) orally once a day  losartan 100 mg oral tablet: 1 tab(s) orally once a day  pantoprazole 40 mg oral delayed release tablet: 1 tab(s) orally once a day  Pepcid 20 mg oral tablet: 2 tab(s) orally once a day (at bedtime)  Plavix 75 mg oral tablet: 1 tab(s) orally once a day  polyethylene glycol 3350 oral powder for reconstitution: 17 gram(s) orally 2 times a day  Singulair 10 mg oral tablet: 1 tab(s) orally once a day (at bedtime)  traZODone 50 mg oral tablet: 1 tab(s) orally once a day (at bedtime)  Tresiba 100 units/mL subcutaneous solution: 20 unit(s) subcutaneous once a day (at bedtime)  Victoza 18 mg/3 mL subcutaneous solution: 1.8 milligram(s) subcutaneous once a day- in morning before breakfast, no victoza in am of  surgery/a sper eNdo adivse  Vyzulta 0.024% ophthalmic solution: 1 drop(s) to each affected eye once a day (in the evening)

## 2023-06-07 NOTE — DISCHARGE NOTE PROVIDER - CARE PROVIDER_API CALL
Ciaran Syed  Gastroenterology  [address] [place] [address]  Phone: [phone]  Fax: [phone]  Follow Up Time:    Ciaran Syed  Gastroenterology  [address] [place] [id number]  Phone: [phone]  Fax: [phone]  Follow Up Time:     Vilma Reyes  Internal Medicine  [address] [facility], [address]  [place] [address]  Phone: [phone]  Fax: [phone]  Follow Up Time:    Ciaran Syed  Gastroenterology  [address] [place] [id number]  Phone: [phone]  Fax: [phone]  Follow Up Time: 1-3 days    Vilma Reyes  Internal Medicine  [address] [place] [address], [address]  [place] [address]  Phone: [phone]  Fax: [phone]  Follow Up Time: 1 week

## 2023-06-07 NOTE — PATIENT PROFILE ADULT - FALL HARM RISK - UNIVERSAL INTERVENTIONS
Bed in lowest position, wheels locked, appropriate side rails in place/Call bell, personal items and telephone in reach/Instruct patient to call for assistance before getting out of bed or chair/Non-slip footwear when patient is out of bed/[place] to call system/Physically safe environment - no spills, clutter or unnecessary equipment/Purposeful Proactive Rounding/Room/bathroom lighting operational, light cord in reach

## 2023-06-07 NOTE — DISCHARGE NOTE PROVIDER - NSDCFUSCHEDAPPT_GEN_ALL_CORE_FT
[facility] Physician [facility]  LUPILLO OP [address]   Scheduled Appointment: 07/21/2023    [facility]  REDD RODRIGUEZ  Scheduled Appointment: 07/21/2023

## 2023-06-07 NOTE — CONSULT NOTE ADULT - SUBJECTIVE AND OBJECTIVE BOX
HPI: 77 year old female with a PMHx of DM2, right breast cancer (dx'ed in 2016 s/p partial masectomy, chemotherapy and radiation), cerebral aneurysm/CVA S/P cerebral aneurysm repair and cerebral artery stent 2012 (on aspirin and plavix), GIB  (no transfusions required), complicated diverticulitis S/P colon resection with ileostomy formation 22, s/p ileostomy reversal 3/2023 who presents with low hemoglobin on outpatient labs.      Allergies:  Entex PSE (Unknown)  Norflex (Other)  [This allergen will not trigger allergy alert] propionate (Rash)  codeine (Nausea)  misoprostol (Other)  Ceclor (Other)  shellfish (Rash)  [This allergen will not trigger allergy alert] Sulfites (Unknown)  [This allergen will not trigger allergy alert] sulfites - nausea/ gi upset (Other)  Cytotec (Unknown)  Entex (Other)  Tetracycline Hydrochloride (Other)  tetracycline (Other)  Augmentin (Rash)  ramipril (Other)  strawberry (Rash)  [This allergen will not trigger allergy alert] Shellfish-derived Products (Unknown)  penicillin (Urticaria; Rash)  [This allergen will not trigger allergy alert] orphenadrine hydrochloride (Other)      Home Medications:    Hospital Medications:  acetaminophen     Tablet .. 650 milliGRAM(s) Oral every 6 hours PRN  aluminum hydroxide/magnesium hydroxide/simethicone Suspension 30 milliLiter(s) Oral every 4 hours PRN  aspirin enteric coated 81 milliGRAM(s) Oral three times a day  clopidogrel Tablet 75 milliGRAM(s) Oral daily  dextrose 5%. 1000 milliLiter(s) IV Continuous <Continuous>  dextrose 5%. 1000 milliLiter(s) IV Continuous <Continuous>  dextrose 50% Injectable 25 Gram(s) IV Push once  dextrose 50% Injectable 25 Gram(s) IV Push once  dextrose 50% Injectable 12.5 Gram(s) IV Push once  dextrose Oral Gel 15 Gram(s) Oral once PRN  diazepam    Tablet 2.5 milliGRAM(s) Oral <User Schedule>  exemestane 25 milliGRAM(s) Oral daily  glucagon  Injectable 1 milliGRAM(s) IntraMuscular once  insulin glargine Injectable (LANTUS) 18 Unit(s) SubCutaneous at bedtime  insulin lispro (ADMELOG) corrective regimen sliding scale   SubCutaneous three times a day before meals  insulin lispro (ADMELOG) corrective regimen sliding scale   SubCutaneous at bedtime  losartan 100 milliGRAM(s) Oral daily  melatonin 3 milliGRAM(s) Oral at bedtime PRN  montelukast 10 milliGRAM(s) Oral at bedtime  ondansetron Injectable 4 milliGRAM(s) IV Push every 8 hours PRN  pantoprazole  Injectable 40 milliGRAM(s) IV Push every 12 hours  thyroid 60 milliGRAM(s) Oral daily  traZODone 50 milliGRAM(s) Oral at bedtime      PMHX/PSHX:  Breast CA    Aneurysm    Vertigo    Hearing loss    Basal cell carcinoma    Diverticulitis    CVA (cerebral vascular accident)    Shoulder disorder    Benign intracranial hypertension    Fibromyalgia    Chronic fatigue    Lumbar disc disease    Kidney stones    HTN (hypertension)    DM (diabetes mellitus)    Anxiety    Depression    Reflux esophagitis    Asthma    Hypothyroid    History of D&C    H/O:     Cerebral aneurysm    Basal cell carcinoma    H/O shoulder surgery    H/O tubal ligation    History of kidney stones    S/P colon resection    History of ileostomy    S/P closure of ileostomy        Family history:  No pertinent family history in first degree relatives    Family history of CVA        Denies family history of colon cancer/polyps, stomach cancer/polyps, pancreatic cancer/masses, liver cancer/disease, ovarian cancer and endometrial cancer.    Social History:   Tob: Denies  EtOH: Denies  Illicit Drugs: Denies    ROS:     General:  No wt loss, fevers, chills, night sweats, fatigue  Eyes:  Good vision, no reported pain  ENT:  No sore throat, pain, runny nose, dysphagia  CV:  No pain, palpitations, hypo/hypertension  Pulm:  No dyspnea, cough, tachypnea, wheezing  GI:  see HPI  :  No pain, bleeding, incontinence, nocturia  Muscle:  No pain, weakness  Neuro:  No weakness, tingling, memory problems  Psych:  No fatigue, insomnia, mood problems, depression  Endocrine:  No polyuria, polydipsia, cold/heat intolerance  Heme:  No petechiae, ecchymosis, easy bruisability  Skin:  No rash, tattoos, scars, edema    PHYSICAL EXAM:     GENERAL:  No acute distress  HEENT:  NCAT, no scleral icterus   CHEST:  no respiratory distress  HEART:  Regular rate and rhythm  ABDOMEN:  Soft, non-tender, non-distended, normoactive bowel sounds,  no masses  EXTREMITIES: No edema  SKIN:  No rash/erythema/ecchymoses/petechiae/wounds/abscess/warm/dry  NEURO:  Alert and oriented x 3, no asterixis    Vital Signs:  Vital Signs Last 24 Hrs  T(C): 36.7 (2023 06:14), Max: 36.9 (2023 18:31)  T(F): 98.1 (2023 06:14), Max: 98.5 (2023 23:05)  HR: 91 (2023 06:14) (71 - 98)  BP: 131/72 (2023 06:14) (127/71 - 177/79)  BP(mean): 81 (2023 20:00) (81 - 81)  RR: 18 (2023 06:14) (16 - 18)  SpO2: 97% (2023 06:14) (96% - 100%)    Parameters below as of 2023 06:14  Patient On (Oxygen Delivery Method): room air      Daily Height in cm: 157.48 (2023 18:31)    Daily     LABS:                        8.4    7.04  )-----------( 363      ( 2023 07:15 )             28.4     Mean Cell Volume: 69.6 fl (- @ 07:15)    -    141  |  106  |  29<H>  ----------------------------<  143<H>  4.1   |  21<L>  |  0.84    Ca    8.9      2023 07:13  Phos  3.8     06-  Mg     2.2     -    TPro  7.1  /  Alb  4.8  /  TBili  0.3  /  DBili  x   /  AST  38  /  ALT  22  /  AlkPhos  110  06-06    LIVER FUNCTIONS - ( 2023 20:33 )  Alb: 4.8 g/dL / Pro: 7.1 g/dL / ALK PHOS: 110 U/L / ALT: 22 U/L / AST: 38 U/L / GGT: x           PT/INR - ( 2023 20:33 )   PT: 11.8 sec;   INR: 1.02 ratio         PTT - ( 2023 20:33 )  PTT:27.8 sec                            8.4    7.04  )-----------( 363      ( 2023 07:15 )             28.4                         7.9    8.60  )-----------( 423      ( 2023 20:33 )             28.1       Imaging:             HPI: 77 year old female with a PMHx of DM2, right breast cancer (dx'ed in 2016 s/p partial masectomy, chemotherapy and radiation), cerebral aneurysm/CVA S/P cerebral aneurysm repair and cerebral artery stent 2012 (on aspirin and plavix), GIB  (no transfusions required), complicated diverticulitis S/P colon resection with ileostomy formation 22, s/p ileostomy reversal 3/2023 who presents with low hemoglobin on outpatient labs.    Patient reports no symptoms but has increased fatigue since her initial surgery on 2022. Patient denies shortness of breath, difficulty breathing, dizziness. Reports continued brown bowel movements daily.  Last colonoscopy in 2022 with no significant findings. Patient reports upper endoscopy in the past without significant findings as well.     Allergies:  Entex PSE (Unknown)  Norflex (Other)  [This allergen will not trigger allergy alert] propionate (Rash)  codeine (Nausea)  misoprostol (Other)  Ceclor (Other)  shellfish (Rash)  [This allergen will not trigger allergy alert] Sulfites (Unknown)  [This allergen will not trigger allergy alert] sulfites - nausea/ gi upset (Other)  Cytotec (Unknown)  Entex (Other)  Tetracycline Hydrochloride (Other)  tetracycline (Other)  Augmentin (Rash)  ramipril (Other)  strawberry (Rash)  [This allergen will not trigger allergy alert] Shellfish-derived Products (Unknown)  penicillin (Urticaria; Rash)  [This allergen will not trigger allergy alert] orphenadrine hydrochloride (Other)      Home Medications:    Hospital Medications:  acetaminophen     Tablet .. 650 milliGRAM(s) Oral every 6 hours PRN  aluminum hydroxide/magnesium hydroxide/simethicone Suspension 30 milliLiter(s) Oral every 4 hours PRN  aspirin enteric coated 81 milliGRAM(s) Oral three times a day  clopidogrel Tablet 75 milliGRAM(s) Oral daily  dextrose 5%. 1000 milliLiter(s) IV Continuous <Continuous>  dextrose 5%. 1000 milliLiter(s) IV Continuous <Continuous>  dextrose 50% Injectable 25 Gram(s) IV Push once  dextrose 50% Injectable 25 Gram(s) IV Push once  dextrose 50% Injectable 12.5 Gram(s) IV Push once  dextrose Oral Gel 15 Gram(s) Oral once PRN  diazepam    Tablet 2.5 milliGRAM(s) Oral <User Schedule>  exemestane 25 milliGRAM(s) Oral daily  glucagon  Injectable 1 milliGRAM(s) IntraMuscular once  insulin glargine Injectable (LANTUS) 18 Unit(s) SubCutaneous at bedtime  insulin lispro (ADMELOG) corrective regimen sliding scale   SubCutaneous three times a day before meals  insulin lispro (ADMELOG) corrective regimen sliding scale   SubCutaneous at bedtime  losartan 100 milliGRAM(s) Oral daily  melatonin 3 milliGRAM(s) Oral at bedtime PRN  montelukast 10 milliGRAM(s) Oral at bedtime  ondansetron Injectable 4 milliGRAM(s) IV Push every 8 hours PRN  pantoprazole  Injectable 40 milliGRAM(s) IV Push every 12 hours  thyroid 60 milliGRAM(s) Oral daily  traZODone 50 milliGRAM(s) Oral at bedtime      PMHX/PSHX:  Breast CA    Aneurysm    Vertigo    Hearing loss    Basal cell carcinoma    Diverticulitis    CVA (cerebral vascular accident)    Shoulder disorder    Benign intracranial hypertension    Fibromyalgia    Chronic fatigue    Lumbar disc disease    Kidney stones    HTN (hypertension)    DM (diabetes mellitus)    Anxiety    Depression    Reflux esophagitis    Asthma    Hypothyroid    History of D&C    H/O:     Cerebral aneurysm    Basal cell carcinoma    H/O shoulder surgery    H/O tubal ligation    History of kidney stones    S/P colon resection    History of ileostomy    S/P closure of ileostomy        Family history:  No pertinent family history in first degree relatives    Family history of CVA        Denies family history of colon cancer/polyps, stomach cancer/polyps, pancreatic cancer/masses, liver cancer/disease, ovarian cancer and endometrial cancer.    Social History:   Tob: Denies  EtOH: Denies  Illicit Drugs: Denies    ROS:     General:  No wt loss, fevers, chills, night sweats, fatigue  Eyes:  Good vision, no reported pain  ENT:  No sore throat, pain, runny nose, dysphagia  CV:  No pain, palpitations, hypo/hypertension  Pulm:  No dyspnea, cough, tachypnea, wheezing  GI:  see HPI  :  No pain, bleeding, incontinence, nocturia  Muscle:  No pain, weakness  Neuro:  No weakness, tingling, memory problems  Psych:  No fatigue, insomnia, mood problems, depression  Endocrine:  No polyuria, polydipsia, cold/heat intolerance  Heme:  No petechiae, ecchymosis, easy bruisability  Skin:  No rash, tattoos, scars, edema    PHYSICAL EXAM:     GENERAL:  No acute distress  HEENT:  NCAT, no scleral icterus   CHEST:  no respiratory distress  HEART:  Regular rate and rhythm  ABDOMEN:  Soft, non-tender, non-distended, normoactive bowel sounds,  no masses  EXTREMITIES: No edema  SKIN:  No rash/erythema/ecchymoses/petechiae/wounds/abscess/warm/dry  NEURO:  Alert and oriented x 3, no asterixis    Vital Signs:  Vital Signs Last 24 Hrs  T(C): 36.7 (2023 06:14), Max: 36.9 (2023 18:31)  T(F): 98.1 (2023 06:14), Max: 98.5 (2023 23:05)  HR: 91 (2023 06:14) (71 - 98)  BP: 131/72 (2023 06:14) (127/71 - 177/79)  BP(mean): 81 (2023 20:00) (81 - 81)  RR: 18 (2023 06:14) (16 - 18)  SpO2: 97% (2023 06:14) (96% - 100%)    Parameters below as of 2023 06:14  Patient On (Oxygen Delivery Method): room air      Daily Height in cm: 157.48 (2023 18:31)    Daily     LABS:                        8.4    7.04  )-----------( 363      ( 2023 07:15 )             28.4     Mean Cell Volume: 69.6 fl (- @ 07:15)        141  |  106  |  29<H>  ----------------------------<  143<H>  4.1   |  21<L>  |  0.84    Ca    8.9      2023 07:13  Phos  3.8     -  Mg     2.2     -    TPro  7.1  /  Alb  4.8  /  TBili  0.3  /  DBili  x   /  AST  38  /  ALT  22  /  AlkPhos  110  06-06    LIVER FUNCTIONS - ( 2023 20:33 )  Alb: 4.8 g/dL / Pro: 7.1 g/dL / ALK PHOS: 110 U/L / ALT: 22 U/L / AST: 38 U/L / GGT: x           PT/INR - ( 2023 20:33 )   PT: 11.8 sec;   INR: 1.02 ratio         PTT - ( 2023 20:33 )  PTT:27.8 sec                            8.4    7.04  )-----------( 363      ( 2023 07:15 )             28.4                         7.9    8.60  )-----------( 423      ( 2023 20:33 )             28.1       Imaging:             HPI: 77 year old female with a PMHx of DM2, right breast cancer (dx , s/p partial mastectomy, chemotherapy and radiation), cerebral aneurysm/CVA S/P cerebral aneurysm repair and cerebral artery stent 2012 (on aspirin and Plavix), GIB  (no transfusions required), complicated diverticulitis S/P colon resection with ileostomy formation 22, s/p ileostomy reversal 3/2023 who was admitted because of microcytic anemia with Hb 8.4, decreased from 11.6 in March.    Patient reports no symptoms but has increased fatigue since her initial surgery on 2022. Patient denies shortness of breath, difficulty breathing, dizziness. Reports continued brown bowel movements daily.  Last colonoscopy in 2022 with no significant findings. Patient reports upper endoscopy in the past without significant findings as well.     Allergies:  Entex PSE (Unknown)  Norflex (Other)  [This allergen will not trigger allergy alert] propionate (Rash)  codeine (Nausea)  misoprostol (Other)  Ceclor (Other)  shellfish (Rash)  [This allergen will not trigger allergy alert] Sulfites (Unknown)  [This allergen will not trigger allergy alert] sulfites - nausea/ gi upset (Other)  Cytotec (Unknown)  Entex (Other)  Tetracycline Hydrochloride (Other)  tetracycline (Other)  Augmentin (Rash)  ramipril (Other)  strawberry (Rash)  [This allergen will not trigger allergy alert] Shellfish-derived Products (Unknown)  penicillin (Urticaria; Rash)  [This allergen will not trigger allergy alert] orphenadrine hydrochloride (Other)      Home Medications:    Hospital Medications:  acetaminophen     Tablet .. 650 milliGRAM(s) Oral every 6 hours PRN  aluminum hydroxide/magnesium hydroxide/simethicone Suspension 30 milliLiter(s) Oral every 4 hours PRN  aspirin enteric coated 81 milliGRAM(s) Oral three times a day  clopidogrel Tablet 75 milliGRAM(s) Oral daily  dextrose 5%. 1000 milliLiter(s) IV Continuous <Continuous>  dextrose 5%. 1000 milliLiter(s) IV Continuous <Continuous>  dextrose 50% Injectable 25 Gram(s) IV Push once  dextrose 50% Injectable 25 Gram(s) IV Push once  dextrose 50% Injectable 12.5 Gram(s) IV Push once  dextrose Oral Gel 15 Gram(s) Oral once PRN  diazepam    Tablet 2.5 milliGRAM(s) Oral <User Schedule>  exemestane 25 milliGRAM(s) Oral daily  glucagon  Injectable 1 milliGRAM(s) IntraMuscular once  insulin glargine Injectable (LANTUS) 18 Unit(s) SubCutaneous at bedtime  insulin lispro (ADMELOG) corrective regimen sliding scale   SubCutaneous three times a day before meals  insulin lispro (ADMELOG) corrective regimen sliding scale   SubCutaneous at bedtime  losartan 100 milliGRAM(s) Oral daily  melatonin 3 milliGRAM(s) Oral at bedtime PRN  montelukast 10 milliGRAM(s) Oral at bedtime  ondansetron Injectable 4 milliGRAM(s) IV Push every 8 hours PRN  pantoprazole  Injectable 40 milliGRAM(s) IV Push every 12 hours  thyroid 60 milliGRAM(s) Oral daily  traZODone 50 milliGRAM(s) Oral at bedtime      PMHX/PSHX:  Breast CA    Aneurysm    Vertigo    Hearing loss    Basal cell carcinoma    Diverticulitis    CVA (cerebral vascular accident)    Shoulder disorder    Benign intracranial hypertension    Fibromyalgia    Chronic fatigue    Lumbar disc disease    Kidney stones    HTN (hypertension)    DM (diabetes mellitus)    Anxiety    Depression    Reflux esophagitis    Asthma    Hypothyroid    History of D&C    H/O:     Cerebral aneurysm    Basal cell carcinoma    H/O shoulder surgery    H/O tubal ligation    History of kidney stones    S/P colon resection    History of ileostomy    S/P closure of ileostomy        Family history:  No pertinent family history in first degree relatives    Family history of CVA        Denies family history of colon cancer/polyps, stomach cancer/polyps, pancreatic cancer/masses, liver cancer/disease, ovarian cancer and endometrial cancer.    Social History:   Tob: Denies  EtOH: Denies  Illicit Drugs: Denies    ROS:     General:  No wt loss, fevers, chills, night sweats, fatigue  Eyes:  Good vision, no reported pain  ENT:  No sore throat, pain, runny nose, dysphagia  CV:  No pain, palpitations, hypo/hypertension  Pulm:  No dyspnea, cough, tachypnea, wheezing  GI:  see HPI  :  No pain, bleeding, incontinence, nocturia  Muscle:  No pain, weakness  Neuro:  No weakness, tingling, memory problems  Psych:  No fatigue, insomnia, mood problems, depression  Endocrine:  No polyuria, polydipsia, cold/heat intolerance  Heme:  No petechiae, ecchymosis, easy bruisability  Skin:  No rash, tattoos, scars, edema    PHYSICAL EXAM:     GENERAL:  No acute distress  HEENT:  NCAT, no scleral icterus   CHEST:  no respiratory distress  HEART:  Regular rate and rhythm  ABDOMEN:  Soft, non-tender, non-distended, normoactive bowel sounds,  no masses  EXTREMITIES: No edema  SKIN:  No rash/erythema/ecchymoses/petechiae/wounds/abscess/warm/dry  NEURO:  Alert and oriented x 3, no asterixis    Vital Signs:  Vital Signs Last 24 Hrs  T(C): 36.7 (2023 06:14), Max: 36.9 (2023 18:31)  T(F): 98.1 (2023 06:14), Max: 98.5 (2023 23:05)  HR: 91 (2023 06:14) (71 - 98)  BP: 131/72 (2023 06:14) (127/71 - 177/79)  BP(mean): 81 (2023 20:00) (81 - 81)  RR: 18 (2023 06:14) (16 - 18)  SpO2: 97% (2023 06:14) (96% - 100%)    Parameters below as of 2023 06:14  Patient On (Oxygen Delivery Method): room air      Daily Height in cm: 157.48 (2023 18:31)    Daily     LABS:                        8.4    7.04  )-----------( 363      ( 2023 07:15 )             28.4     Mean Cell Volume: 69.6 fl (- @ 07:15)        141  |  106  |  29<H>  ----------------------------<  143<H>  4.1   |  21<L>  |  0.84    Ca    8.9      2023 07:13  Phos  3.8       Mg     2.2         TPro  7.1  /  Alb  4.8  /  TBili  0.3  /  DBili  x   /  AST  38  /  ALT  22  /  AlkPhos  110  06-06    LIVER FUNCTIONS - ( 2023 20:33 )  Alb: 4.8 g/dL / Pro: 7.1 g/dL / ALK PHOS: 110 U/L / ALT: 22 U/L / AST: 38 U/L / GGT: x           PT/INR - ( 2023 20:33 )   PT: 11.8 sec;   INR: 1.02 ratio         PTT - ( 2023 20:33 )  PTT:27.8 sec                            8.4    7.04  )-----------( 363      ( 2023 07:15 )             28.4                         7.9    8.60  )-----------( 423      ( 2023 20:33 )             28.1       Imaging:

## 2023-06-07 NOTE — DISCHARGE NOTE PROVIDER - HOSPITAL COURSE
77 year old female with a PMHx of DM2, right breast cancer (dx'ed in 2016 s/p partial masectomy, chemotherapy and radiation), cerebral aneurysm/CVA S/P cerebral aneurysm repair and cerebral artery stent 11/2012 (on aspirin and plavix), GIB 2020 (no transfusions required), complicated diverticulitis S/P colon resection with ileostomy formation 12/13/22, s/p ileostomy reversal 3/2023 who presents with low hemoglobin on outpatient labs.Pt presents with significant drop in hemoglobin since 3/2023; 3.7 point drop in hemoglobin in about three months. Pt had her outpatient records with her; her ferritin (collected 6/6/23) was low at 8, iron low at 16 consistent with iron deficiency. Her evaluation however is negative for any obvious signs of GI bleeding, though her history is significant for recent GI surgeries (ileostomy followed by ileostomy reversal). Ddx also includes iron deficiency anemia from dietary deficiencies; the patient was on a low-fiber diet, has not been eating much vegetables, and at baseline does not eat much red meats. Ddx also includes issues with iron absorption s/p recent ileostomy?   Presented with symptomatic anemia. Hgb 7.9, microcytic (previously 11.6 in Mar 2023). Iron deficiency anemia.outpatient labs reviewed: ferritin 8, iron 16, % sat 3, iron binding capacity, total 533, vitamin B12 wnl, folate wnl,s/p 1 unit of pRBC in the ED. patient is on aspirin 81mg TID and plavix 75mg daily for her history of cerebral stent placed in 11/2012. Will continue these agents for now as evaluation thus far is negative for brisk GI bleeding,takes omeprazole 40mg BID and famotidine daily. In hospital patient received protonix 40mg IV BID.GI consulted ,S/P colon resection with ileostomy formation 12/13/22, s/p ileostomy reversal 3/2023 .GI discussed with patient two options for workup of microcytic, iron deficiency anemia with either EGD/colonoscopy tomorrow with the knowledge that exam may be limited given patient on Plavix and asa vs EGD/colonoscopy in 5-7 days after anemia resolved.Patient able to secure appt with private GI for next week; opted to followup as outpatient .Patient will require  neurosurgery consultation in future to discuss long term plan for DAPT ,has a History of CVA and cerebral aneurysm, s/p coiling and cerebral artery stent in 11/2012,on aspirin 81mg TID and plavix 75mg daily  will continue these medications for now, as evaluation thus far is negative for brisk bleeding. Continue home losartan 100mg daily for hypertension.  Resume home Jardiance and victoza , tresiba 20 units qPM at home. Continue home armour thyroid 60mg daily.History of asthma, Not in exacerbation   - c/w home montelukast 10 mg qhs. history of breast cancer, continue home exemestane 25mg daily. Continue home diazepam 2.5mg BID History of muscle spasm  Medically cleared for discharge by medical attending with follow up as advised.   77 year old female with a PMHx of DM2, right breast cancer (dx'ed in 2016 s/p partial masectomy, chemotherapy and radiation), cerebral aneurysm/CVA S/P cerebral aneurysm repair and cerebral artery stent 11/2012 (on aspirin and plavix), GIB 2020 (no transfusions required), complicated diverticulitis S/P colon resection with ileostomy formation 12/13/22, s/p ileostomy reversal 3/2023 who presents with low hemoglobin on outpatient labs.Pt presents with significant drop in hemoglobin since 3/2023; 3.7 point drop in hemoglobin in about three months. Pt had her outpatient records with her; her ferritin (collected 6/6/23) was low at 8, iron low at 16 consistent with iron deficiency. Her evaluation however is negative for any obvious signs of GI bleeding, though her history is significant for recent GI surgeries (ileostomy followed by ileostomy reversal). Ddx also includes iron deficiency anemia from dietary deficiencies; the patient was on a low-fiber diet, has not been eating much vegetables, and at baseline does not eat much red meats. Ddx also includes issues with iron absorption s/p recent ileostomy?   Presented with symptomatic anemia. Hgb 7.9, microcytic (previously 11.6 in Mar 2023). Iron deficiency anemia.outpatient labs reviewed: ferritin 8, iron 16, % sat 3, iron binding capacity, total 533, vitamin B12 wnl, folate wnl,s/p 1 unit of pRBC in the ED. patient is on aspirin 81mg TID and plavix 75mg daily for her history of cerebral stent placed in 11/2012. Will continue these agents for now as evaluation thus far is negative for brisk GI bleeding,takes omeprazole 40mg BID and famotidine daily. In hospital patient received protonix 40mg IV BID.GI consulted ,S/P colon resection with ileostomy formation 12/13/22, s/p ileostomy reversal 3/2023 .GI discussed with patient two options for workup of microcytic, iron deficiency anemia with either EGD/colonoscopy tomorrow with the knowledge that exam may be limited given patient on Plavix and asa vs EGD/colonoscopy in 5-7 days after anemia resolved.Patient able to secure appt with private GI for next week; opted to followup as outpatient .Patient will require  neurosurgery consultation in future to discuss long term plan for DAPT ,has a History of CVA and cerebral aneurysm, s/p coiling and cerebral artery stent in 11/2012,on aspirin 81mg TID and plavix 75mg daily  will continue these medications for now, as evaluation thus far is negative for brisk bleeding. Continue home losartan 100mg daily for hypertension.  Resume home Jardiance and victoza , tresiba 20 units qPM at home. Continue home armour thyroid 60mg daily.History of asthma, Not in exacerbation   - c/w home montelukast 10 mg qhs. history of breast cancer, continue home exemestane 25mg daily. Continue home diazepam 2.5mg BID History of muscle spasm  Medically cleared for discharge by medical attending with follow up as advised.    Discussed with outpatient Dr Vilma Reyes, will follow up closely with pt next week to monitor blood count. Patient will continue aspirin and plavix until GI procedures are scheduled. Patient to see GI next week. Once procedures are scheduled, patient will f/u with neurology/ neuro surgery to determine when to hold aspirin and/or plavix prior to procedure

## 2023-06-07 NOTE — DISCHARGE NOTE PROVIDER - PROVIDER TOKENS
PROVIDER:[TOKEN:[[id number]]] PROVIDER:[TOKEN:[[id number]]],PROVIDER:[TOKEN:[[id number]]] PROVIDER:[TOKEN:[[id number]],FOLLOWUP:[1-3 days]],PROVIDER:[TOKEN:[[id number]],FOLLOWUP:[1 week]]

## 2023-06-07 NOTE — DISCHARGE NOTE PROVIDER - NSDCFUADDAPPT_GEN_ALL_CORE_FT
APPTS ARE READY TO BE MADE: [ ] YES    Best Family or Patient Contact (if needed):    Additional Information about above appointments (if needed):    1:   2:   3:     Other comments or requests:    APPTS ARE READY TO BE MADE: [ ] YES    Best Family or Patient Contact (if needed):    Additional Information about above appointments (if needed):    1: PCP  2: GI  3:     Other comments or requests:

## 2023-06-07 NOTE — DISCHARGE NOTE PROVIDER - DISCHARGE DIET
Problem: At Risk for Falls  Goal: # Patient does not fall  Outcome: Outcome Met, Continue evaluating goal progress toward completion  Fall sign outside pt rm, bed alarm on at all times, bed in lowest position, call light within reach, frequent monitoring.       DASH Diet/Consistent Carbohydrate Diabetic Diets

## 2023-06-07 NOTE — CONSULT NOTE ADULT - ASSESSMENT
Impression:     #microcytic anemia   - no overt bleeding     Recommendations:   - obtain iron studies   -   - rest of care per primary team     All recommendations are tentative until note is attested by attending.     Lisa Manzano, PGY-4   Gastroenterology/Hepatology Fellow  Available on [employer] Teams  [phone] ([facility] Short Range Pager)  [phone] ([facility] Long Range Pager)    After 5pm, please contact the on-call GI fellow. [phone] Impression:     #microcytic anemia   - no overt bleeding but cannot rule out occult source of bleeding   - last colonoscopy 2022 with diverticulosis in the sigmoid colon, in the transverse colon and in the ascending colon. Significant diverticulosis in sigmoid and tortuous colon.  - Hgb 11.6 on 3/16 --> Hgb 8.4 6/7    #S/P colon resection with ileostomy formation 12/13/22, s/p ileostomy reversal 3/2023     Recommendations:   - obtain iron studies   - clear liquid diet today   - plan for endoscopy and colonoscopy +/- capsule study tomorrow   - rest of care per primary team     Instructions for colonoscopy  - clear liquid diet today, NPO at midnight  - please ensure golytely is completed (to be ordered by GI fellow)  - please ensure patient drinks entire prep  - please ensure morning labs are drawn at 4am, electrolytes repleted as necessary, and Hg>7  - rest of care per primary team    All recommendations are tentative until note is attested by attending.     Lisa Manzano, PGY-4   Gastroenterology/Hepatology Fellow  Available on [employer] Teams  [phone] ([facility] Short Range Pager)  [phone] ([facility] Long Range Pager)    After 5pm, please contact the on-call GI fellow. [phone] Impression:     #microcytic anemia   - no overt bleeding but cannot rule out occult source of bleeding   - last colonoscopy 2022 with diverticulosis in the sigmoid colon, in the transverse colon and in the ascending colon. Significant diverticulosis in sigmoid and tortuous colon.  - Hgb 11.6 on 3/16 --> Hgb 8.4 6/7    #S/P colon resection with ileostomy formation 12/13/22, s/p ileostomy reversal 3/2023     Recommendations:   - obtain iron studies   - clear liquid diet today   - plan for endoscopy and colonoscopy +/- capsule study tomorrow   - can continue with plavix/asa   - rest of care per primary team     Instructions for colonoscopy  - clear liquid diet today, NPO at midnight  - please ensure golytely is completed (to be ordered by GI fellow)  - please ensure patient drinks entire prep  - please ensure morning labs are drawn at 4am, electrolytes repleted as necessary, and Hg>7  - rest of care per primary team    All recommendations are tentative until note is attested by attending.     Lisa Manzano, PGY-4   Gastroenterology/Hepatology Fellow  Available on [employer] Teams  [phone] ([employer] [facility] Short Range Pager)  [phone] ([facility] Long Range Pager)    After 5pm, please contact the on-call GI fellow. [phone] Impression:     #microcytic anemia   - no overt bleeding but cannot rule out occult source of bleeding   - last colonoscopy 2022 with diverticulosis in the sigmoid colon, in the transverse colon and in the ascending colon. Significant diverticulosis in sigmoid and tortuous colon.  - Hgb 11.6 on 3/16 --> Hgb 8.4 6/7    #S/P colon resection with ileostomy formation 12/13/22, s/p ileostomy reversal 3/2023     Recommendations:   - discussed with patient two options for workup of microcytic, iron deficiency anemia with either EGD/colonoscopy tomorrow with the knowledge that exam may be limited given patient on plavix and asa vs EGD/colonoscopy in 5-7 days after anemia resolved  - patient able to secure appt with private GI for next week; opted to followup as outpatient   - consider neurosurgery consult for long term plan for DAPT   - rest of care per primary team     All recommendations are tentative until note is attested by attending.     Lisa Manzano, PGY-4   Gastroenterology/Hepatology Fellow  Available on Microsoft Teams  [phone] ([facility] Short Range Pager)  [phone] ([facility] Long Range Pager)    After 5pm, please contact the on-call GI fellow. [phone]     #microcytic anemia   - no overt bleeding but cannot rule out occult source of bleeding   - last colonoscopy 2022 with diverticulosis in the sigmoid colon, in the transverse colon and in the ascending colon. Significant diverticulosis in sigmoid and tortuous colon.  - Hgb 11.6 on 3/16 --> Hgb 8.4 6/7    #S/P colon resection with ileostomy formation 12/13/22, s/p ileostomy reversal 3/2023     Recommendations:   - discussed with patient two options for workup of microcytic, iron deficiency anemia with either EGD/colonoscopy tomorrow with the knowledge that exam may be limited given patient on Plavix and asa vs EGD/colonoscopy in 5-7 days after anemia resolved  - patient able to secure appt with private GI for next week; opted to followup as outpatient   - consider neurosurgery consult for long term plan for DAPT   - rest of care per primary team     All recommendations are tentative until note is attested by attending.     Lisa Manzano, PGY-4   Gastroenterology/Hepatology Fellow  Available on [employer] Teams  [phone] ([facility] Short Range Pager)  [phone] ([facility] Long Range Pager)    After 5pm, please contact the on-call GI fellow. [phone]

## 2023-06-07 NOTE — DISCHARGE NOTE PROVIDER - NSDCCPCAREPLAN_GEN_ALL_CORE_FT
PRINCIPAL DISCHARGE DIAGNOSIS  Diagnosis: Anemia  Assessment and Plan of Treatment: Symptoms to report, bleeding, palpitations, fatigue, pale skin, cold skin, dizziness. Take medications as ordered by PCP.  Gastroenterologist  discussed with patient two options for workup of microcytic, iron deficiency anemia with either EGD/colonoscopy tomorrow with the knowledge that exam may be limited given patient on Plavix and asa vs EGD/colonoscopy in 5-7 days after anemia resolved.  - patient able to secure appt with private GI for next week; opted to followup as outpatient   -Patient should follow up with her private neurosurgeron for long term plan with blood thinner such as use as aspirin and plavix.     PRINCIPAL DISCHARGE DIAGNOSIS  Diagnosis: Anemia  Assessment and Plan of Treatment: You came to the hospital with anemia. You received 1 unit of packed red blood cell. You were seen by GI and agreed to outpatient endoscopy and colonoscopy. You are still on aspirin and plavix. Please follow up with your neurosurgeon to determine when to hold the aspirin or plavix after you have scheduled procedures with the GI team. If you have worsening fatigue, chest pain, shortness of breath, any signs of bleeding, please see a doctor right away.   Please follow up with your pcp within 1 week of discharge to monitor your blood counts  Symptoms to report, bleeding, palpitations, fatigue, pale skin, cold skin, dizziness. Take medications as ordered by PCP.  Gastroenterologist  discussed with patient two options for workup of microcytic, iron deficiency anemia with either EGD/colonoscopy tomorrow with the knowledge that exam may be limited given patient on Plavix and asa vs EGD/colonoscopy in 5-7 days after anemia resolved.  - patient able to secure appt with private GI for next week; opted to followup as outpatient   -Patient should follow up with her private neurosurgeron for long term plan with blood thinner such as use as aspirin and plavix.     PRINCIPAL DISCHARGE DIAGNOSIS  Diagnosis: Anemia  Assessment and Plan of Treatment: One unit PRBC given 6/6/23   You came to the hospital with anemia. You received 1 unit of packed red blood cell. You were seen by GI and agreed to outpatient endoscopy and colonoscopy. You are still on aspirin and plavix. Please follow up with your neurosurgeon to determine when to hold the aspirin or plavix after you have scheduled procedures with the GI team. If you have worsening fatigue, chest pain, shortness of breath, any signs of bleeding, please see a doctor right away.   Please follow up/call  with your GI PMD tomorrow to schedule exam/further testing to  monitor your blood counts. MD  to determine when to stop Aspirin & Plavix.  Symptoms to report, bleeding, palpitations, fatigue, pale skin, cold skin, dizziness. Take medications as ordered by PCP.  Gastroenterologist  discussed with patient two options for workup of microcytic, iron deficiency anemia with either EGD/colonoscopy tomorrow with the knowledge that exam may be limited given patient on Plavix and asa vs EGD/colonoscopy in 5-7 days after anemia resolved.  - patient able to secure appt with private GI for next week; opted to followup as outpatient   -Patient should follow up with her private neurosurgeron for long term plan with blood thinner such as use as aspirin and plavix.

## 2023-06-07 NOTE — DISCHARGE NOTE NURSING/CASE MANAGEMENT/SOCIAL WORK - NSDCPEFALRISK_GEN_ALL_CORE
For information on Fall & Injury Prevention, visit: [email or web address] OR  [email or web address] OR  https://www.cdc.gov/steadi/patient.html

## 2023-06-07 NOTE — DISCHARGE NOTE PROVIDER - ATTENDING DISCHARGE PHYSICAL EXAMINATION:
PHYSICAL EXAM:    Vital Signs Last 24 Hrs  T(C): 36.8 (07 Jun 2023 13:56), Max: 36.9 (06 Jun 2023 18:31)  T(F): 98.3 (07 Jun 2023 13:56), Max: 98.5 (06 Jun 2023 23:05)  HR: 85 (07 Jun 2023 13:56) (71 - 98)  BP: 134/73 (07 Jun 2023 13:56) (127/71 - 177/79)  BP(mean): 81 (06 Jun 2023 20:00) (81 - 81)  RR: 18 (07 Jun 2023 13:56) (16 - 18)  SpO2: 96% (07 Jun 2023 13:56) (96% - 100%)    General: No acute distress.  HEENT: No scleral icterus or injection.  Neck: Supple.  Full ROM.  No JVD.    Heart: RRR.  Normal S1 and S2.  Lungs: CTAB. No wheezes, crackles, or rhonchi.    Abdomen: BS+, soft, NT/ND.   Skin: Warm and dry.  No rashes.  Extremities: No edema, clubbing, or cyanosis  Musculoskeletal: No deformities.  Neuro: A&Ox3.

## 2023-06-09 ENCOUNTER — APPOINTMENT (OUTPATIENT)
Dept: HEMATOLOGY ONCOLOGY | Facility: CLINIC | Age: 78
End: 2023-06-09

## 2023-06-15 ENCOUNTER — APPOINTMENT (OUTPATIENT)
Dept: GERIATRICS | Facility: CLINIC | Age: 78
End: 2023-06-15
Payer: MEDICARE

## 2023-06-15 ENCOUNTER — LABORATORY RESULT (OUTPATIENT)
Age: 78
End: 2023-06-15

## 2023-06-15 VITALS
RESPIRATION RATE: 14 BRPM | SYSTOLIC BLOOD PRESSURE: 173 MMHG | BODY MASS INDEX: 25.24 KG/M2 | DIASTOLIC BLOOD PRESSURE: 67 MMHG | OXYGEN SATURATION: 98 % | TEMPERATURE: 98.6 F | WEIGHT: 138 LBS | HEART RATE: 93 BPM

## 2023-06-15 PROCEDURE — 99213 OFFICE O/P EST LOW 20 MIN: CPT

## 2023-06-19 NOTE — ASSESSMENT
[FreeTextEntry1] : 76 yo female.\par \par Check cbc\par Give iron by mouth and will try to arrange for iron infusion by heme/onc.\par \par No change in regimen. Holding asa and plavix.  \par Need neuro follow up to address need for asa and plavix.

## 2023-06-19 NOTE — HISTORY OF PRESENT ILLNESS
[FreeTextEntry1] : 76 yo female went to [facility] last week with Hgb =7.4. Hgb had been 10 post ileostomy reenastamosis. Iron studies low. Pt given a unit of blood in hosptial and hg b= 8.4 on discharge. Went to GI and had Upper EGD and COlonospcopy by Dr Triston Zelaya.  Reuslts negative for bleeding.  NO evidence of polyp, ulceration.\par \par Pt here for followup. Pt is pale and feelts fatigue, but no palpitaions no light headedness. Pt has held ASA and plavix.\par \par Here for repeat CBC and evaluation\par \par NO dizziness No sob. \par \par Pt is anxious and accompanied by

## 2023-06-19 NOTE — PHYSICAL EXAM
[Alert] : alert [Normal Outer Ear/Nose] : the ears and nose were normal in appearance [Normal Appearance] : the appearance of the neck was normal [Supple] : the neck was supple [No Respiratory Distress] : no respiratory distress [No Acc Muscle Use] : no accessory muscle use [Respiration, Rhythm And Depth] : normal respiratory rhythm and effort [Auscultation Breath Sounds / Voice Sounds] : lungs were clear to auscultation bilaterally [Heart Rate And Rhythm] : heart rate was normal and rhythm regular [Bowel Sounds] : normal bowel sounds [Abdomen Tenderness] : non-tender [Abdomen Soft] : soft [No Spinal Tenderness] : no spinal tenderness [Normal Color / Pigmentation] : normal skin color and pigmentation [Normal Turgor] : normal skin turgor [No Focal Deficits] : no focal deficits [Normal Affect] : the affect was normal [Normal Mood] : the mood was normal [de-identified] : pale [de-identified] : rash under left breast resolved [de-identified] : ileostomy site reversed [de-identified] : red based snummular skin lesion on right lower led with some scales.

## 2023-06-21 ENCOUNTER — APPOINTMENT (OUTPATIENT)
Dept: HEMATOLOGY ONCOLOGY | Facility: CLINIC | Age: 78
End: 2023-06-21
Payer: COMMERCIAL

## 2023-06-21 ENCOUNTER — APPOINTMENT (OUTPATIENT)
Dept: INFUSION THERAPY | Facility: HOSPITAL | Age: 78
End: 2023-06-21

## 2023-06-21 ENCOUNTER — RESULT REVIEW (OUTPATIENT)
Age: 78
End: 2023-06-21

## 2023-06-21 ENCOUNTER — APPOINTMENT (OUTPATIENT)
Dept: HEMATOLOGY ONCOLOGY | Facility: CLINIC | Age: 78
End: 2023-06-21

## 2023-06-21 VITALS
BODY MASS INDEX: 25.81 KG/M2 | OXYGEN SATURATION: 98 % | SYSTOLIC BLOOD PRESSURE: 148 MMHG | WEIGHT: 141.09 LBS | DIASTOLIC BLOOD PRESSURE: 81 MMHG | RESPIRATION RATE: 15 BRPM | HEART RATE: 96 BPM | TEMPERATURE: 97.2 F

## 2023-06-21 DIAGNOSIS — G93.32 MYALGIC ENCEPHALOMYELITIS/CHRONIC FATIGUE SYNDROME: ICD-10-CM

## 2023-06-21 DIAGNOSIS — Z86.2 PERSONAL HISTORY OF DISEASES OF THE BLOOD AND BLOOD-FORMING ORGANS AND CERTAIN DISORDERS INVOLVING THE IMMUNE MECHANISM: ICD-10-CM

## 2023-06-21 LAB
BASOPHILS # BLD AUTO: 0.07 K/UL — SIGNIFICANT CHANGE UP (ref 0–0.2)
BASOPHILS NFR BLD AUTO: 0.8 % — SIGNIFICANT CHANGE UP (ref 0–2)
EOSINOPHIL # BLD AUTO: 0.42 K/UL — SIGNIFICANT CHANGE UP (ref 0–0.5)
EOSINOPHIL NFR BLD AUTO: 4.5 % — SIGNIFICANT CHANGE UP (ref 0–6)
HCT VFR BLD CALC: 31.9 % — LOW (ref 34.5–45)
HGB BLD-MCNC: 9.6 G/DL — LOW (ref 11.5–15.5)
IMM GRANULOCYTES NFR BLD AUTO: 0.5 % — SIGNIFICANT CHANGE UP (ref 0–0.9)
LYMPHOCYTES # BLD AUTO: 1.63 K/UL — SIGNIFICANT CHANGE UP (ref 1–3.3)
LYMPHOCYTES # BLD AUTO: 17.5 % — SIGNIFICANT CHANGE UP (ref 13–44)
MCHC RBC-ENTMCNC: 21.3 PG — LOW (ref 27–34)
MCHC RBC-ENTMCNC: 30.1 G/DL — LOW (ref 32–36)
MCV RBC AUTO: 70.7 FL — LOW (ref 80–100)
MONOCYTES # BLD AUTO: 0.59 K/UL — SIGNIFICANT CHANGE UP (ref 0–0.9)
MONOCYTES NFR BLD AUTO: 6.3 % — SIGNIFICANT CHANGE UP (ref 2–14)
NEUTROPHILS # BLD AUTO: 6.55 K/UL — SIGNIFICANT CHANGE UP (ref 1.8–7.4)
NEUTROPHILS NFR BLD AUTO: 70.4 % — SIGNIFICANT CHANGE UP (ref 43–77)
NRBC # BLD: 0 /100 WBCS — SIGNIFICANT CHANGE UP (ref 0–0)
PLATELET # BLD AUTO: 376 K/UL — SIGNIFICANT CHANGE UP (ref 150–400)
RBC # BLD: 4.51 M/UL — SIGNIFICANT CHANGE UP (ref 3.8–5.2)
RBC # FLD: 22.5 % — HIGH (ref 10.3–14.5)
WBC # BLD: 9.31 K/UL — SIGNIFICANT CHANGE UP (ref 3.8–10.5)
WBC # FLD AUTO: 9.31 K/UL — SIGNIFICANT CHANGE UP (ref 3.8–10.5)

## 2023-06-21 PROCEDURE — 99215 OFFICE O/P EST HI 40 MIN: CPT

## 2023-06-21 NOTE — HISTORY OF PRESENT ILLNESS
[Disease: _____________________] : Disease: [unfilled] [T: ___] : T[unfilled] [N: ___] : N[unfilled] [M: ___] : M[unfilled] [AJCC Stage: ____] : AJCC Stage: [unfilled] [de-identified] : Right breast cancer diagnosed at the age of 70\par 7/19/16 Right breast partial mastectomy with sentinel lymph node biopsy showed  a 6 mm invasive ductal carcinoma, SBR 6/9, ER 90%, CT 0%, HER-2 negative, 2 negative SLN\par 8/1/16 Oncotype DX recurrence score of 37, which predicts a 10 year risk of distant recurrence on Tamoxifen alone of 25%\par 9/16 - 2/17 CMF given IV every 3 weeks x 8 cycles \par 3/27/17 - 4/21/17 Radiation therapy with 5240 cGy in 25 fractions with Dr. Agudelo \par 6/19/17 Anastrozole, d/c due to arthralgias, then tried letrozole and again d/c due to arthralgias\par 9/19 Exemestane started  [de-identified] : 6 mm IDC, SBR 6/9, ER 90%, W[place] 0%, HER-2 negative, Ki-67 15%, 2 negative SLN, Oncotype score = 37 (RR 25%) [de-identified] : Isabelle was recently hospitalized for anemia due to a GI bleed. Hgb was 7.2 and she was transfused with one unit pRBC. Follow up hgb on 6/15 was 9.3 with MCV 70. Hgb today is 9.6 with MCV 70. Iron studies show iron=16, TIBC 533, %sat 3% and ferritin 8. Calculated deficit is 665. Ferraheme does not require a test dose and 510 mg x 2 would correct her deficit. She has a history of multiple drug allergies but typically gets a rash and has never had wheezing or anaphylaxis as an allergic reaction. \par She was hospitalized in 4/22 with a GI bleed. EGD and colonoscopy and CT scan were negative. Hospitalized again 5/23 with similar findings. Now capsule study is planned.\par She started anastrozole in 6/2017 but struggled with arthralgias so changed to letrozole which was worse so then started exemestane 9/19. 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 She finds it is worse in the morning and then improves as the day goes on.\par She is seeing a psychologist, Dr. Blackmon in [place], who has been helpful in managing all the stresses and anxieties.\par \par Routine Health Maintenance:\par PCP: Vilma Reyes MD \par Mammogram and breast ultrasound: 12/22 BI-RADS 2\par Breast MRI 4/15/21 with biopsy 4/20/21 which showed fat necrosis and fibrocystic changes\par Pap Smear: 9/20\par Bone density: 12/23/20 showed T scores of 1.1 in spine, -0.5 in total hip, -0.9 in femoral neck\par                        8/18 showed T scores of 0.3 in the spine, -0.3 in the total hip and -0.7 in the femoral neck\par Colonoscopy: 3/20 showed diverticuli\par \par \par \par \par

## 2023-06-21 NOTE — END OF VISIT
Please return to the emergency department for any worsening symptoms.  Please follow-up with your PCP.  If you have any concerns or questions please return the emergency department.      [Time Spent: ___ minutes] : I have spent [unfilled] minutes of time on the encounter.

## 2023-06-21 NOTE — REVIEW OF SYSTEMS
[Joint Pain] : joint pain [Joint Stiffness] : joint stiffness [Muscle Pain] : muscle pain [Negative] : Allergic/Immunologic [Fever] : no fever [Chills] : no chills [Night Sweats] : no night sweats [Fatigue] : no fatigue [Abdominal Pain] : no abdominal pain [Constipation] : no constipation [Muscle Weakness] : no muscle weakness [Confused] : no confusion [Dizziness] : no dizziness [Fainting] : no fainting [Difficulty Walking] : no difficulty walking [de-identified] : Peripheral numbness and tingling

## 2023-06-28 ENCOUNTER — RESULT REVIEW (OUTPATIENT)
Age: 78
End: 2023-06-28

## 2023-06-28 ENCOUNTER — APPOINTMENT (OUTPATIENT)
Dept: INFUSION THERAPY | Facility: HOSPITAL | Age: 78
End: 2023-06-28

## 2023-06-28 DIAGNOSIS — D50.9 IRON DEFICIENCY ANEMIA, UNSPECIFIED: ICD-10-CM

## 2023-06-28 LAB
BASOPHILS # BLD AUTO: 0.06 K/UL — SIGNIFICANT CHANGE UP (ref 0–0.2)
BASOPHILS NFR BLD AUTO: 0.8 % — SIGNIFICANT CHANGE UP (ref 0–2)
EOSINOPHIL # BLD AUTO: 0.34 K/UL — SIGNIFICANT CHANGE UP (ref 0–0.5)
EOSINOPHIL NFR BLD AUTO: 4.8 % — SIGNIFICANT CHANGE UP (ref 0–6)
HCT VFR BLD CALC: 36.4 % — SIGNIFICANT CHANGE UP (ref 34.5–45)
HGB BLD-MCNC: 10.9 G/DL — LOW (ref 11.5–15.5)
IMM GRANULOCYTES NFR BLD AUTO: 0.1 % — SIGNIFICANT CHANGE UP (ref 0–0.9)
LYMPHOCYTES # BLD AUTO: 1.41 K/UL — SIGNIFICANT CHANGE UP (ref 1–3.3)
LYMPHOCYTES # BLD AUTO: 19.7 % — SIGNIFICANT CHANGE UP (ref 13–44)
MCHC RBC-ENTMCNC: 22.7 PG — LOW (ref 27–34)
MCHC RBC-ENTMCNC: 29.9 G/DL — LOW (ref 32–36)
MCV RBC AUTO: 75.7 FL — LOW (ref 80–100)
MONOCYTES # BLD AUTO: 0.43 K/UL — SIGNIFICANT CHANGE UP (ref 0–0.9)
MONOCYTES NFR BLD AUTO: 6 % — SIGNIFICANT CHANGE UP (ref 2–14)
NEUTROPHILS # BLD AUTO: 4.9 K/UL — SIGNIFICANT CHANGE UP (ref 1.8–7.4)
NEUTROPHILS NFR BLD AUTO: 68.6 % — SIGNIFICANT CHANGE UP (ref 43–77)
NRBC # BLD: 0 /100 WBCS — SIGNIFICANT CHANGE UP (ref 0–0)
PLATELET # BLD AUTO: 390 K/UL — SIGNIFICANT CHANGE UP (ref 150–400)
RBC # BLD: 4.81 M/UL — SIGNIFICANT CHANGE UP (ref 3.8–5.2)
RBC # FLD: 27.9 % — HIGH (ref 10.3–14.5)
WBC # BLD: 7.15 K/UL — SIGNIFICANT CHANGE UP (ref 3.8–10.5)
WBC # FLD AUTO: 7.15 K/UL — SIGNIFICANT CHANGE UP (ref 3.8–10.5)

## 2023-06-30 RX ORDER — NYSTATIN 100000 1/G
100000 POWDER TOPICAL 3 TIMES DAILY
Qty: 1 | Refills: 3 | Status: ACTIVE | COMMUNITY
Start: 2021-05-27 | End: 1900-01-01

## 2023-07-03 NOTE — PROGRESS NOTE ADULT - ASSESSMENT
Impression:  # Hematochezia 2/2 diverticular bleeding: s/p colonoscopy without any further bleeding. Hgb stable. Has significant diverticular disease.    Recommendations:  - high fiber diet  - no objection to outpatient follow up  - will need outpatient colonoscopy for surviellance/screening as prep was not adequate for these purposes  - supportive care  
76 F PMH HTN, T2DM, colonic diverticulosis (s/p clipping 2 years ago) diverticulitis, asthma, cerebral aneurysm s/p stent/coiling, CVA, anxiety, hypothyroid, basal cell ca s/p excision, R breast ca s/p lumpectomy/chemo/RT, p/w rectal bleeding. HDS, Hgb 13.3, CT-A/P w/IV contrast without any GI bleed source. Pt admitted for r/o diverticular/lower GI bleed.
mammogram
76 F PMH HTN, T2DM, colonic diverticulosis (s/p clipping 2 years ago) diverticulitis, asthma, cerebral aneurysm s/p stent/coiling, CVA, anxiety, hypothyroid, basal cell ca s/p excision, R breast ca s/p lumpectomy/chemo/RT, p/w rectal bleeding. HDS, Hgb 13.3, CT-A/P w/IV contrast without any GI bleed source. Pt admitted for r/o diverticular/lower GI bleed.
76 F PMH HTN, T2DM, colonic diverticulosis (s/p clipping 2 years ago) diverticulitis, asthma, cerebral aneurysm s/p stent/coiling, CVA, anxiety, hypothyroid, basal cell ca s/p excision, R breast ca s/p lumpectomy/chemo/RT, p/w rectal bleeding. HDS, Hgb 13.3, CT-A/P w/IV contrast without any GI bleed source. Pt admitted for r/o diverticular/lower GI bleed.

## 2023-07-06 ENCOUNTER — NON-APPOINTMENT (OUTPATIENT)
Age: 78
End: 2023-07-06

## 2023-07-07 ENCOUNTER — OUTPATIENT (OUTPATIENT)
Dept: OUTPATIENT SERVICES | Facility: HOSPITAL | Age: 78
LOS: 1 days | End: 2023-07-07
Payer: MEDICARE

## 2023-07-07 ENCOUNTER — APPOINTMENT (OUTPATIENT)
Dept: RADIOLOGY | Facility: CLINIC | Age: 78
End: 2023-07-07
Payer: MEDICARE

## 2023-07-07 ENCOUNTER — NON-APPOINTMENT (OUTPATIENT)
Age: 78
End: 2023-07-07

## 2023-07-07 DIAGNOSIS — Z98.51 TUBAL LIGATION STATUS: Chronic | ICD-10-CM

## 2023-07-07 DIAGNOSIS — Z98.89 OTHER SPECIFIED POSTPROCEDURAL STATES: Chronic | ICD-10-CM

## 2023-07-07 DIAGNOSIS — Z00.00 ENCOUNTER FOR GENERAL ADULT MEDICAL EXAMINATION WITHOUT ABNORMAL FINDINGS: ICD-10-CM

## 2023-07-07 DIAGNOSIS — I67.1 CEREBRAL ANEURYSM, NONRUPTURED: Chronic | ICD-10-CM

## 2023-07-07 DIAGNOSIS — Z98.890 OTHER SPECIFIED POSTPROCEDURAL STATES: Chronic | ICD-10-CM

## 2023-07-07 DIAGNOSIS — Z90.49 ACQUIRED ABSENCE OF OTHER SPECIFIED PARTS OF DIGESTIVE TRACT: Chronic | ICD-10-CM

## 2023-07-07 DIAGNOSIS — Z87.442 PERSONAL HISTORY OF URINARY CALCULI: Chronic | ICD-10-CM

## 2023-07-07 DIAGNOSIS — C44.91 BASAL CELL CARCINOMA OF SKIN, UNSPECIFIED: Chronic | ICD-10-CM

## 2023-07-07 PROCEDURE — 74018 RADEX ABDOMEN 1 VIEW: CPT | Mod: 26

## 2023-07-07 PROCEDURE — 74018 RADEX ABDOMEN 1 VIEW: CPT

## 2023-07-11 LAB
HCT VFR BLD CALC: 41 %
HGB BLD-MCNC: 12.5 G/DL
MCHC RBC-ENTMCNC: 24.3 PG
MCHC RBC-ENTMCNC: 30.5 GM/DL
MCV RBC AUTO: 79.8 FL
PLATELET # BLD AUTO: 374 K/UL
RBC # BLD: 5.14 M/UL
RBC # FLD: NORMAL
WBC # FLD AUTO: 8.39 K/UL

## 2023-07-13 ENCOUNTER — APPOINTMENT (OUTPATIENT)
Dept: RADIOLOGY | Facility: CLINIC | Age: 78
End: 2023-07-13

## 2023-07-17 NOTE — DISCHARGE NOTE PROVIDER - ATTENDING ATTESTATION STATEMENT
I have personally seen and examined the patient. I have collaborated with and supervised the
17-Jul-2023

## 2023-07-19 ENCOUNTER — OUTPATIENT (OUTPATIENT)
Dept: OUTPATIENT SERVICES | Facility: HOSPITAL | Age: 78
LOS: 1 days | End: 2023-07-19
Payer: MEDICARE

## 2023-07-19 ENCOUNTER — APPOINTMENT (OUTPATIENT)
Dept: CT IMAGING | Facility: CLINIC | Age: 78
End: 2023-07-19
Payer: MEDICARE

## 2023-07-19 DIAGNOSIS — Z98.89 OTHER SPECIFIED POSTPROCEDURAL STATES: Chronic | ICD-10-CM

## 2023-07-19 DIAGNOSIS — Z87.442 PERSONAL HISTORY OF URINARY CALCULI: Chronic | ICD-10-CM

## 2023-07-19 DIAGNOSIS — D50.9 IRON DEFICIENCY ANEMIA, UNSPECIFIED: ICD-10-CM

## 2023-07-19 DIAGNOSIS — Z90.49 ACQUIRED ABSENCE OF OTHER SPECIFIED PARTS OF DIGESTIVE TRACT: Chronic | ICD-10-CM

## 2023-07-19 DIAGNOSIS — Z98.890 OTHER SPECIFIED POSTPROCEDURAL STATES: Chronic | ICD-10-CM

## 2023-07-19 DIAGNOSIS — C44.91 BASAL CELL CARCINOMA OF SKIN, UNSPECIFIED: Chronic | ICD-10-CM

## 2023-07-19 PROCEDURE — 74177 CT ABD & PELVIS W/CONTRAST: CPT | Mod: MH

## 2023-07-19 PROCEDURE — 74177 CT ABD & PELVIS W/CONTRAST: CPT | Mod: 26,MH

## 2023-07-21 ENCOUNTER — APPOINTMENT (OUTPATIENT)
Dept: ULTRASOUND IMAGING | Facility: CLINIC | Age: 78
End: 2023-07-21
Payer: MEDICARE

## 2023-07-21 ENCOUNTER — APPOINTMENT (OUTPATIENT)
Dept: MAMMOGRAPHY | Facility: CLINIC | Age: 78
End: 2023-07-21
Payer: MEDICARE

## 2023-07-21 ENCOUNTER — OUTPATIENT (OUTPATIENT)
Dept: OUTPATIENT SERVICES | Facility: HOSPITAL | Age: 78
LOS: 1 days | End: 2023-07-21
Payer: MEDICARE

## 2023-07-21 DIAGNOSIS — Z90.49 ACQUIRED ABSENCE OF OTHER SPECIFIED PARTS OF DIGESTIVE TRACT: Chronic | ICD-10-CM

## 2023-07-21 DIAGNOSIS — I67.1 CEREBRAL ANEURYSM, NONRUPTURED: Chronic | ICD-10-CM

## 2023-07-21 DIAGNOSIS — Z98.89 OTHER SPECIFIED POSTPROCEDURAL STATES: Chronic | ICD-10-CM

## 2023-07-21 DIAGNOSIS — Z00.8 ENCOUNTER FOR OTHER GENERAL EXAMINATION: ICD-10-CM

## 2023-07-21 DIAGNOSIS — Z98.51 TUBAL LIGATION STATUS: Chronic | ICD-10-CM

## 2023-07-21 DIAGNOSIS — Z98.890 OTHER SPECIFIED POSTPROCEDURAL STATES: Chronic | ICD-10-CM

## 2023-07-21 PROCEDURE — 77067 SCR MAMMO BI INCL CAD: CPT | Mod: 26

## 2023-07-21 PROCEDURE — 77063 BREAST TOMOSYNTHESIS BI: CPT

## 2023-07-21 PROCEDURE — 77063 BREAST TOMOSYNTHESIS BI: CPT | Mod: 26

## 2023-07-21 PROCEDURE — 77067 SCR MAMMO BI INCL CAD: CPT

## 2023-07-21 PROCEDURE — 76641 ULTRASOUND BREAST COMPLETE: CPT | Mod: 26,50,GY

## 2023-07-21 PROCEDURE — 76641 ULTRASOUND BREAST COMPLETE: CPT

## 2023-08-03 ENCOUNTER — APPOINTMENT (OUTPATIENT)
Dept: GERIATRICS | Facility: CLINIC | Age: 78
End: 2023-08-03
Payer: MEDICARE

## 2023-08-03 VITALS
OXYGEN SATURATION: 99 % | SYSTOLIC BLOOD PRESSURE: 157 MMHG | BODY MASS INDEX: 25.76 KG/M2 | DIASTOLIC BLOOD PRESSURE: 72 MMHG | HEART RATE: 97 BPM | WEIGHT: 140 LBS | RESPIRATION RATE: 18 BRPM | HEIGHT: 62 IN

## 2023-08-03 VITALS — SYSTOLIC BLOOD PRESSURE: 120 MMHG | DIASTOLIC BLOOD PRESSURE: 60 MMHG

## 2023-08-03 DIAGNOSIS — R19.7 DIARRHEA, UNSPECIFIED: ICD-10-CM

## 2023-08-03 PROCEDURE — 99214 OFFICE O/P EST MOD 30 MIN: CPT

## 2023-08-06 PROBLEM — R19.7 DIARRHEA: Status: ACTIVE | Noted: 2019-06-20

## 2023-08-06 NOTE — PHYSICAL EXAM
[Alert] : alert [No Acute Distress] : in no acute distress [Normal Outer Ear/Nose] : the ears and nose were normal in appearance [Normal Appearance] : the appearance of the neck was normal [No Respiratory Distress] : no respiratory distress [Supple] : the neck was supple [No Acc Muscle Use] : no accessory muscle use [Respiration, Rhythm And Depth] : normal respiratory rhythm and effort [Auscultation Breath Sounds / Voice Sounds] : lungs were clear to auscultation bilaterally [Heart Rate And Rhythm] : heart rate was normal and rhythm regular [Bowel Sounds] : normal bowel sounds [Abdomen Tenderness] : non-tender [Abdomen Soft] : soft [Normal] : posterior cervical, supraclavicular, anterior cervical, supraclavicular, axillary [No Spinal Tenderness] : no spinal tenderness [Normal Color / Pigmentation] : normal skin color and pigmentation [Normal Turgor] : normal skin turgor [No Focal Deficits] : no focal deficits [Normal Mood] : the mood was normal [Normal Affect] : the affect was normal

## 2023-08-06 NOTE — REVIEW OF SYSTEMS
[As Noted in HPI] : as noted in HPI [Abdominal Pain] : no abdominal pain [Vomiting] : no vomiting [Constipation] : no constipation [Melena] : no melena [Negative] : Heme/Lymph

## 2023-08-06 NOTE — HISTORY OF PRESENT ILLNESS
[FreeTextEntry1] : 78 yo female who has had diarhrea and recently had Ecoli in stool. Pt has not been treated wiht antibiotics as of yet. GI physician involved and pt clinically improving.  NO fever, No abd pain.  Pt with stool improving.  Pt is eating better. Sugars are controlled adequately.   Pt had iron levels checked recently and cbc.  Pt has not been taking PO iron.

## 2023-08-08 ENCOUNTER — OUTPATIENT (OUTPATIENT)
Dept: OUTPATIENT SERVICES | Facility: HOSPITAL | Age: 78
LOS: 1 days | Discharge: HOME | End: 2023-08-08

## 2023-08-08 DIAGNOSIS — D70.1 AGRANULOCYTOSIS SECONDARY TO CANCER CHEMOTHERAPY: ICD-10-CM

## 2023-08-08 DIAGNOSIS — Z87.442 PERSONAL HISTORY OF URINARY CALCULI: Chronic | ICD-10-CM

## 2023-08-08 DIAGNOSIS — Z98.890 OTHER SPECIFIED POSTPROCEDURAL STATES: Chronic | ICD-10-CM

## 2023-08-08 DIAGNOSIS — Z98.51 TUBAL LIGATION STATUS: Chronic | ICD-10-CM

## 2023-08-08 DIAGNOSIS — Z98.89 OTHER SPECIFIED POSTPROCEDURAL STATES: Chronic | ICD-10-CM

## 2023-08-08 DIAGNOSIS — I67.1 CEREBRAL ANEURYSM, NONRUPTURED: Chronic | ICD-10-CM

## 2023-08-08 DIAGNOSIS — C44.91 BASAL CELL CARCINOMA OF SKIN, UNSPECIFIED: Chronic | ICD-10-CM

## 2023-08-08 DIAGNOSIS — Z90.49 ACQUIRED ABSENCE OF OTHER SPECIFIED PARTS OF DIGESTIVE TRACT: Chronic | ICD-10-CM

## 2023-08-15 ENCOUNTER — RESULT REVIEW (OUTPATIENT)
Age: 78
End: 2023-08-15

## 2023-08-15 ENCOUNTER — APPOINTMENT (OUTPATIENT)
Dept: HEMATOLOGY ONCOLOGY | Facility: CLINIC | Age: 78
End: 2023-08-15
Payer: COMMERCIAL

## 2023-08-15 VITALS
OXYGEN SATURATION: 96 % | DIASTOLIC BLOOD PRESSURE: 81 MMHG | BODY MASS INDEX: 26.13 KG/M2 | WEIGHT: 142.86 LBS | RESPIRATION RATE: 18 BRPM | TEMPERATURE: 96.8 F | HEART RATE: 89 BPM | SYSTOLIC BLOOD PRESSURE: 150 MMHG

## 2023-08-15 DIAGNOSIS — M25.541 PAIN IN JOINTS OF RIGHT HAND: ICD-10-CM

## 2023-08-15 DIAGNOSIS — M25.542 PAIN IN JOINTS OF RIGHT HAND: ICD-10-CM

## 2023-08-15 DIAGNOSIS — C50.311 MALIGNANT NEOPLASM OF LOWER-INNER QUADRANT OF RIGHT FEMALE BREAST: ICD-10-CM

## 2023-08-15 DIAGNOSIS — L58.9 RADIODERMATITIS, UNSPECIFIED: ICD-10-CM

## 2023-08-15 LAB
BASOPHILS # BLD AUTO: 0.06 K/UL — SIGNIFICANT CHANGE UP (ref 0–0.2)
BASOPHILS NFR BLD AUTO: 0.8 % — SIGNIFICANT CHANGE UP (ref 0–2)
EOSINOPHIL # BLD AUTO: 0.31 K/UL — SIGNIFICANT CHANGE UP (ref 0–0.5)
EOSINOPHIL NFR BLD AUTO: 3.9 % — SIGNIFICANT CHANGE UP (ref 0–6)
HCT VFR BLD CALC: 42.5 % — SIGNIFICANT CHANGE UP (ref 34.5–45)
HGB BLD-MCNC: 13.7 G/DL — SIGNIFICANT CHANGE UP (ref 11.5–15.5)
IMM GRANULOCYTES NFR BLD AUTO: 0.3 % — SIGNIFICANT CHANGE UP (ref 0–0.9)
LYMPHOCYTES # BLD AUTO: 1.6 K/UL — SIGNIFICANT CHANGE UP (ref 1–3.3)
LYMPHOCYTES # BLD AUTO: 20.3 % — SIGNIFICANT CHANGE UP (ref 13–44)
MCHC RBC-ENTMCNC: 26.1 PG — LOW (ref 27–34)
MCHC RBC-ENTMCNC: 32.2 G/DL — SIGNIFICANT CHANGE UP (ref 32–36)
MCV RBC AUTO: 81.1 FL — SIGNIFICANT CHANGE UP (ref 80–100)
MONOCYTES # BLD AUTO: 0.47 K/UL — SIGNIFICANT CHANGE UP (ref 0–0.9)
MONOCYTES NFR BLD AUTO: 5.9 % — SIGNIFICANT CHANGE UP (ref 2–14)
NEUTROPHILS # BLD AUTO: 5.44 K/UL — SIGNIFICANT CHANGE UP (ref 1.8–7.4)
NEUTROPHILS NFR BLD AUTO: 68.8 % — SIGNIFICANT CHANGE UP (ref 43–77)
NRBC # BLD: 0 /100 WBCS — SIGNIFICANT CHANGE UP (ref 0–0)
PLATELET # BLD AUTO: 272 K/UL — SIGNIFICANT CHANGE UP (ref 150–400)
RBC # BLD: 5.24 M/UL — HIGH (ref 3.8–5.2)
RBC # FLD: 22.4 % — HIGH (ref 10.3–14.5)
WBC # BLD: 7.9 K/UL — SIGNIFICANT CHANGE UP (ref 3.8–10.5)
WBC # FLD AUTO: 7.9 K/UL — SIGNIFICANT CHANGE UP (ref 3.8–10.5)

## 2023-08-15 PROCEDURE — 99214 OFFICE O/P EST MOD 30 MIN: CPT

## 2023-08-15 NOTE — HISTORY OF PRESENT ILLNESS
[Disease: _____________________] : Disease: [unfilled] [T: ___] : T[unfilled] [N: ___] : N[unfilled] [M: ___] : M[unfilled] [AJCC Stage: ____] : AJCC Stage: [unfilled] [de-identified] : Right breast cancer diagnosed at the age of 70 7/19/16 Right breast partial mastectomy with sentinel lymph node biopsy showed  a 6 mm invasive ductal carcinoma, SBR 6/9, ER 90%, VT 0%, HER-2 negative, 2 negative SLN 8/1/16 Oncotype DX recurrence score of 37, which predicts a 10 year risk of distant recurrence on Tamoxifen alone of 25% 9/16 - 2/17 CMF given IV every 3 weeks x 8 cycles  3/27/17 - 4/21/17 Radiation therapy with 5240 cGy in 25 fractions with Dr. Agudelo  6/19/17 Anastrozole, d/c due to arthralgias, then tried letrozole and again d/c due to arthralgias 9/19 Exemestane started  4/15/21 Breast MRI followed by biopsy 4/20/21 which showed fat necrosis and fibrocystic changes 6/2023 Ferraheme 510 mg IV x 2 given for iron deficiency anemia due to GI bleed in 5/2023 [de-identified] : 6 mm IDC, SBR 6/9, ER 90%, KY 0%, HER-2 negative, Ki-67 15%, 2 negative SLN, Oncotype score = 37 (RR 25%) [de-identified] : Isabelle started anastrozole in 6/2017 but struggled with arthralgias so changed to letrozole which was even worse so then started exemestane 9/19.  She has noticed less pain on the exemestane and is now at what she would consider normal pain, but that is still considerable. She has been getting a lot of headaches since 5/23 and is struggling with GI discomfort with frequent bowel movements (~3-4/day) and feeling gassy all the time. She was hospitalized in 4/22 with a GI bleed. EGD and colonoscopy and CT scan were negative. Hospitalized again 5/23 with similar findings.  7/7/23 CT endoscopy was negative and 7/19/23 CT abdomen enterography was also negative. She was seen at [facility] in 6/23 with Hgb 9.6 and MCV 70. Iron studies: iron=16, TIBC 533, % sat 3%, ferritin 8. Calculated deficit was 665. Ferraheme 510 mg IV x 2 given in 6/23. She is currently taking iron twice a day and was advised that studies have shown better absorption for iron taken every other day so would recommend taking a maximum of once a day to avoid symptoms and optimize absorption. 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 She finds it is worse in the morning and then improves as the day goes on. She is seeing a psychologist, Dr. Blackmon in [place], who has been helpful in managing all the stresses and anxieties.  Routine Health Maintenance: PCP: Vilma Reyes MD  Mammogram and breast ultrasound: 7/21/23 BI-RADS 2 Breast MRI: 6/16/22 BI-RADS 2 Pap Smear: 9/20 Bone density: 12/23/20 showed T scores of 1.1 in spine, -0.5 in total hip, -0.9 in femoral neck                        8/18 showed T scores of 0.3 in the spine, -0.3 in the total hip and -0.7 in the femoral neck Colonoscopy: 3/20 showed diverticuli

## 2023-08-15 NOTE — REVIEW OF SYSTEMS
[Joint Pain] : joint pain [Joint Stiffness] : joint stiffness [Muscle Pain] : muscle pain [Diarrhea: Grade 0] : Diarrhea: Grade 0 [Negative] : Musculoskeletal [Fever] : no fever [Chills] : no chills [Night Sweats] : no night sweats [Fatigue] : no fatigue [Abdominal Pain] : no abdominal pain [Constipation] : no constipation [Muscle Weakness] : no muscle weakness [Confused] : no confusion [Dizziness] : no dizziness [Fainting] : no fainting [Difficulty Walking] : no difficulty walking [de-identified] : Peripheral numbness and tingling

## 2023-08-16 LAB
ALBUMIN SERPL ELPH-MCNC: 4.7 G/DL
ALP BLD-CCNC: 109 U/L
ALT SERPL-CCNC: 21 U/L
ANION GAP SERPL CALC-SCNC: 12 MMOL/L
AST SERPL-CCNC: 19 U/L
BILIRUB SERPL-MCNC: 0.2 MG/DL
BUN SERPL-MCNC: 36 MG/DL
CALCIUM SERPL-MCNC: 9.9 MG/DL
CHLORIDE SERPL-SCNC: 103 MMOL/L
CO2 SERPL-SCNC: 24 MMOL/L
CREAT SERPL-MCNC: 0.87 MG/DL
EGFR: 69 ML/MIN/1.73M2
FERRITIN SERPL-MCNC: 119 NG/ML
FOLATE SERPL-MCNC: >20 NG/ML
GLUCOSE SERPL-MCNC: 229 MG/DL
IRON SATN MFR SERPL: 19 %
IRON SERPL-MCNC: 65 UG/DL
POTASSIUM SERPL-SCNC: 4.4 MMOL/L
PROT SERPL-MCNC: 6.7 G/DL
SODIUM SERPL-SCNC: 139 MMOL/L
TIBC SERPL-MCNC: 347 UG/DL
TRANSFERRIN SERPL-MCNC: 260 MG/DL
UIBC SERPL-MCNC: 282 UG/DL
VIT B12 SERPL-MCNC: 921 PG/ML

## 2023-09-07 ENCOUNTER — APPOINTMENT (OUTPATIENT)
Dept: GERIATRICS | Facility: CLINIC | Age: 78
End: 2023-09-07
Payer: MEDICARE

## 2023-09-07 VITALS
BODY MASS INDEX: 26.7 KG/M2 | OXYGEN SATURATION: 97 % | SYSTOLIC BLOOD PRESSURE: 151 MMHG | DIASTOLIC BLOOD PRESSURE: 81 MMHG | WEIGHT: 146 LBS | RESPIRATION RATE: 14 BRPM | TEMPERATURE: 97.3 F | HEART RATE: 87 BPM

## 2023-09-07 PROCEDURE — 99214 OFFICE O/P EST MOD 30 MIN: CPT

## 2023-09-08 LAB
ESTIMATED AVERAGE GLUCOSE: 169 MG/DL
HBA1C MFR BLD HPLC: 7.5 %
TSH SERPL-ACNC: 0.58 UIU/ML

## 2023-10-11 ENCOUNTER — APPOINTMENT (OUTPATIENT)
Age: 78
End: 2023-10-11
Payer: MEDICARE

## 2023-10-11 PROCEDURE — 99213 OFFICE O/P EST LOW 20 MIN: CPT

## 2023-10-19 ENCOUNTER — APPOINTMENT (OUTPATIENT)
Dept: GERIATRICS | Facility: CLINIC | Age: 78
End: 2023-10-19
Payer: MEDICARE

## 2023-10-19 VITALS
RESPIRATION RATE: 19 BRPM | HEIGHT: 62 IN | WEIGHT: 150.5 LBS | HEART RATE: 99 BPM | SYSTOLIC BLOOD PRESSURE: 158 MMHG | DIASTOLIC BLOOD PRESSURE: 76 MMHG | TEMPERATURE: 98.1 F | OXYGEN SATURATION: 98 % | BODY MASS INDEX: 27.7 KG/M2

## 2023-10-19 DIAGNOSIS — M25.511 PAIN IN RIGHT SHOULDER: ICD-10-CM

## 2023-10-19 DIAGNOSIS — Z23 ENCOUNTER FOR IMMUNIZATION: ICD-10-CM

## 2023-10-19 DIAGNOSIS — G89.29 OTHER CHRONIC PAIN: ICD-10-CM

## 2023-10-19 DIAGNOSIS — R10.13 EPIGASTRIC PAIN: ICD-10-CM

## 2023-10-19 DIAGNOSIS — M25.512 PAIN IN RIGHT SHOULDER: ICD-10-CM

## 2023-10-19 DIAGNOSIS — G89.29 PAIN IN RIGHT SHOULDER: ICD-10-CM

## 2023-10-19 DIAGNOSIS — R53.1 WEAKNESS: ICD-10-CM

## 2023-10-19 DIAGNOSIS — K58.9 IRRITABLE BOWEL SYNDROME W/OUT DIARRHEA: ICD-10-CM

## 2023-10-19 PROCEDURE — 99214 OFFICE O/P EST MOD 30 MIN: CPT

## 2023-10-19 RX ORDER — ASPIRIN 81 MG/1
81 TABLET ORAL
Qty: 30 | Refills: 3 | Status: DISCONTINUED | COMMUNITY
Start: 2022-05-19 | End: 2023-10-19

## 2023-10-19 RX ORDER — THYROID,PORK 100 % USP
POWDER (GRAM) MISCELLANEOUS
Refills: 0 | Status: ACTIVE | COMMUNITY
Start: 2022-09-07

## 2023-10-19 RX ORDER — TRIAMCINOLONE ACETONIDE 0.25 MG/G
0.03 OINTMENT TOPICAL
Qty: 1 | Refills: 0 | Status: DISCONTINUED | COMMUNITY
Start: 2023-06-01 | End: 2023-10-19

## 2023-10-19 RX ORDER — FAMOTIDINE 20 MG/1
20 TABLET, FILM COATED ORAL
Qty: 180 | Refills: 0 | Status: ACTIVE | COMMUNITY
Start: 2023-03-07

## 2023-10-19 RX ORDER — LOSARTAN POTASSIUM 100 MG/1
100 TABLET, FILM COATED ORAL DAILY
Qty: 90 | Refills: 3 | Status: ACTIVE | COMMUNITY
Start: 2022-10-27 | End: 1900-01-01

## 2023-10-19 RX ORDER — DORZOLAMIDE HYDROCHLORIDE 20 MG/ML
2 SOLUTION OPHTHALMIC TWICE DAILY
Refills: 0 | Status: ACTIVE | COMMUNITY
Start: 2023-06-01

## 2023-10-19 RX ORDER — MONTELUKAST 10 MG/1
10 TABLET, FILM COATED ORAL
Qty: 90 | Refills: 3 | Status: ACTIVE | COMMUNITY
Start: 2023-02-09 | End: 1900-01-01

## 2023-10-19 RX ORDER — LATANOPROSTENE BUNOD 0.24 MG/ML
0.02 SOLUTION/ DROPS OPHTHALMIC
Refills: 0 | Status: ACTIVE | COMMUNITY
Start: 2023-03-07

## 2023-10-19 RX ORDER — OMEPRAZOLE 40 MG/1
40 CAPSULE, DELAYED RELEASE ORAL
Qty: 90 | Refills: 2 | Status: ACTIVE | COMMUNITY
Start: 2020-12-31

## 2023-10-20 ENCOUNTER — MED ADMIN CHARGE (OUTPATIENT)
Age: 78
End: 2023-10-20

## 2023-10-23 ENCOUNTER — NON-APPOINTMENT (OUTPATIENT)
Age: 78
End: 2023-10-23

## 2023-10-25 LAB
ALBUMIN SERPL ELPH-MCNC: 4.8 G/DL
ALP BLD-CCNC: 124 U/L
ALT SERPL-CCNC: 24 U/L
ANION GAP SERPL CALC-SCNC: 15 MMOL/L
APPEARANCE: CLEAR
AST SERPL-CCNC: 16 U/L
BASOPHILS # BLD AUTO: 0.05 K/UL
BASOPHILS NFR BLD AUTO: 0.7 %
BILIRUB SERPL-MCNC: 0.2 MG/DL
BILIRUBIN URINE: NEGATIVE
BLOOD URINE: NEGATIVE
BUN SERPL-MCNC: 30 MG/DL
CALCIUM SERPL-MCNC: 10.1 MG/DL
CHLORIDE SERPL-SCNC: 103 MMOL/L
CHOLEST SERPL-MCNC: 264 MG/DL
CO2 SERPL-SCNC: 21 MMOL/L
COLOR: YELLOW
CREAT SERPL-MCNC: 0.77 MG/DL
EGFR: 79 ML/MIN/1.73M2
EOSINOPHIL # BLD AUTO: 0.27 K/UL
EOSINOPHIL NFR BLD AUTO: 3.7 %
ESTIMATED AVERAGE GLUCOSE: 192 MG/DL
FERRITIN SERPL-MCNC: 83 NG/ML
GLUCOSE QUALITATIVE U: >=1000 MG/DL
GLUCOSE SERPL-MCNC: 413 MG/DL
HBA1C MFR BLD HPLC: 8.3 %
HCT VFR BLD CALC: 43.4 %
HDLC SERPL-MCNC: 54 MG/DL
HGB BLD-MCNC: 14.3 G/DL
IMM GRANULOCYTES NFR BLD AUTO: 0.3 %
IRON SATN MFR SERPL: 21 %
IRON SERPL-MCNC: 76 UG/DL
KETONES URINE: NEGATIVE MG/DL
LDLC SERPL CALC-MCNC: 147 MG/DL
LEUKOCYTE ESTERASE URINE: NEGATIVE
LYMPHOCYTES # BLD AUTO: 1.34 K/UL
LYMPHOCYTES NFR BLD AUTO: 18.4 %
MAN DIFF?: NORMAL
MCHC RBC-ENTMCNC: 29.2 PG
MCHC RBC-ENTMCNC: 32.9 GM/DL
MCV RBC AUTO: 88.6 FL
MONOCYTES # BLD AUTO: 0.39 K/UL
MONOCYTES NFR BLD AUTO: 5.3 %
NEUTROPHILS # BLD AUTO: 5.22 K/UL
NEUTROPHILS NFR BLD AUTO: 71.6 %
NITRITE URINE: NEGATIVE
NONHDLC SERPL-MCNC: 211 MG/DL
PH URINE: 6
PLATELET # BLD AUTO: 285 K/UL
POTASSIUM SERPL-SCNC: 4.5 MMOL/L
PROT SERPL-MCNC: 6.8 G/DL
PROTEIN URINE: NEGATIVE MG/DL
RBC # BLD: 4.9 M/UL
RBC # FLD: 13.2 %
SODIUM SERPL-SCNC: 139 MMOL/L
SPECIFIC GRAVITY URINE: >1.03
TIBC SERPL-MCNC: 368 UG/DL
TRANSFERRIN SERPL-MCNC: 312 MG/DL
TRIGL SERPL-MCNC: 346 MG/DL
TSH SERPL-ACNC: 0.54 UIU/ML
UIBC SERPL-MCNC: 292 UG/DL
UROBILINOGEN URINE: 0.2 MG/DL
WBC # FLD AUTO: 7.29 K/UL

## 2023-12-04 ENCOUNTER — APPOINTMENT (OUTPATIENT)
Dept: RADIOLOGY | Facility: CLINIC | Age: 78
End: 2023-12-04
Payer: COMMERCIAL

## 2023-12-04 ENCOUNTER — OUTPATIENT (OUTPATIENT)
Dept: OUTPATIENT SERVICES | Facility: HOSPITAL | Age: 78
LOS: 1 days | End: 2023-12-04
Payer: MEDICARE

## 2023-12-04 DIAGNOSIS — Z98.890 OTHER SPECIFIED POSTPROCEDURAL STATES: Chronic | ICD-10-CM

## 2023-12-04 DIAGNOSIS — Z98.51 TUBAL LIGATION STATUS: Chronic | ICD-10-CM

## 2023-12-04 DIAGNOSIS — Z98.89 OTHER SPECIFIED POSTPROCEDURAL STATES: Chronic | ICD-10-CM

## 2023-12-04 DIAGNOSIS — I67.1 CEREBRAL ANEURYSM, NONRUPTURED: Chronic | ICD-10-CM

## 2023-12-04 DIAGNOSIS — Z87.442 PERSONAL HISTORY OF URINARY CALCULI: Chronic | ICD-10-CM

## 2023-12-04 DIAGNOSIS — C44.91 BASAL CELL CARCINOMA OF SKIN, UNSPECIFIED: Chronic | ICD-10-CM

## 2023-12-04 DIAGNOSIS — C50.311 MALIGNANT NEOPLASM OF LOWER-INNER QUADRANT OF RIGHT FEMALE BREAST: ICD-10-CM

## 2023-12-04 DIAGNOSIS — Z90.49 ACQUIRED ABSENCE OF OTHER SPECIFIED PARTS OF DIGESTIVE TRACT: Chronic | ICD-10-CM

## 2023-12-04 PROCEDURE — 77080 DXA BONE DENSITY AXIAL: CPT | Mod: 26

## 2023-12-04 PROCEDURE — 77080 DXA BONE DENSITY AXIAL: CPT

## 2023-12-30 NOTE — ED PROVIDER NOTE - PHYSICAL EXAMINATION
NAUSEA/PAIN/VOMITING
mucus membranes dry
Clindamycin Counseling: I counseled the patient regarding use of clindamycin as an antibiotic for prophylactic and/or therapeutic purposes. Clindamycin is active against numerous classes of bacteria, including skin bacteria. Side effects may include nausea, diarrhea, gastrointestinal upset, rash, hives, yeast infections, and in rare cases, colitis.

## 2024-01-08 ENCOUNTER — RX RENEWAL (OUTPATIENT)
Age: 79
End: 2024-01-08

## 2024-01-17 NOTE — DISCHARGE NOTE NURSING/CASE MANAGEMENT/SOCIAL WORK - MODE OF TRANSPORTATION
Ambulatory/Wheelchair/Stroller Detail Level: Simple Price (Use Numbers Only, No Special Characters Or $): 149.03

## 2024-01-22 ENCOUNTER — OUTPATIENT (OUTPATIENT)
Dept: OUTPATIENT SERVICES | Facility: HOSPITAL | Age: 79
LOS: 1 days | End: 2024-01-22
Payer: MEDICARE

## 2024-01-22 ENCOUNTER — APPOINTMENT (OUTPATIENT)
Dept: MRI IMAGING | Facility: CLINIC | Age: 79
End: 2024-01-22
Payer: MEDICARE

## 2024-01-22 DIAGNOSIS — Z98.89 OTHER SPECIFIED POSTPROCEDURAL STATES: Chronic | ICD-10-CM

## 2024-01-22 DIAGNOSIS — Z98.51 TUBAL LIGATION STATUS: Chronic | ICD-10-CM

## 2024-01-22 DIAGNOSIS — Z98.890 OTHER SPECIFIED POSTPROCEDURAL STATES: Chronic | ICD-10-CM

## 2024-01-22 DIAGNOSIS — Z00.00 ENCOUNTER FOR GENERAL ADULT MEDICAL EXAMINATION WITHOUT ABNORMAL FINDINGS: ICD-10-CM

## 2024-01-22 DIAGNOSIS — Z87.442 PERSONAL HISTORY OF URINARY CALCULI: Chronic | ICD-10-CM

## 2024-01-22 DIAGNOSIS — Z90.49 ACQUIRED ABSENCE OF OTHER SPECIFIED PARTS OF DIGESTIVE TRACT: Chronic | ICD-10-CM

## 2024-01-22 DIAGNOSIS — I67.1 CEREBRAL ANEURYSM, NONRUPTURED: Chronic | ICD-10-CM

## 2024-01-22 DIAGNOSIS — C44.91 BASAL CELL CARCINOMA OF SKIN, UNSPECIFIED: Chronic | ICD-10-CM

## 2024-01-22 PROCEDURE — C8908: CPT | Mod: MH

## 2024-01-22 PROCEDURE — C8937: CPT

## 2024-01-22 PROCEDURE — A9585: CPT

## 2024-01-22 PROCEDURE — 77049 MRI BREAST C-+ W/CAD BI: CPT | Mod: 26,MH

## 2024-01-25 ENCOUNTER — APPOINTMENT (OUTPATIENT)
Dept: GERIATRICS | Facility: CLINIC | Age: 79
End: 2024-01-25
Payer: MEDICARE

## 2024-01-25 VITALS
BODY MASS INDEX: 27.97 KG/M2 | TEMPERATURE: 97.6 F | OXYGEN SATURATION: 99 % | WEIGHT: 152 LBS | SYSTOLIC BLOOD PRESSURE: 135 MMHG | HEIGHT: 62 IN | HEART RATE: 93 BPM | DIASTOLIC BLOOD PRESSURE: 77 MMHG

## 2024-01-25 DIAGNOSIS — D50.9 IRON DEFICIENCY ANEMIA, UNSPECIFIED: ICD-10-CM

## 2024-01-25 PROCEDURE — 99214 OFFICE O/P EST MOD 30 MIN: CPT

## 2024-01-25 NOTE — HISTORY OF PRESENT ILLNESS
[FreeTextEntry1] : Ms. Gray is a 77 yo F with extensive medical including R breast Ca s/p partial mastectomy, depression with anxiety, fibromyalgia and iron deficiency anemia, who presents for follow up visit.  Today she reports following up with endocrinologist and stopped victoza and started on mounjaro continues to be on triseba and jardiance.  Pt  Reports ongoing frequent stools, described as formed but non-bloody, wakes her up at night on occasion. she describes significant caregiver burden given recent dx of her  with MCI. its making her and her  anxious. She has good support from her kids. she does not seem to have memory issues. Taking meds regularly.   [No falls in past year] : Patient reported no falls in the past year [Completely Dependent] : Completely dependent. [0] : 1) Little interest or pleasure doing things: Not at all (0) [1] : 2) Feeling down, depressed, or hopeless for several days (1) [PHQ-2 Negative - No further assessment needed] : PHQ-2 Negative - No further assessment needed [[id number]] : 1

## 2024-01-25 NOTE — PHYSICAL EXAM
[Alert] : alert [Normal Outer Ear/Nose] : the ears and nose were normal in appearance [Normal Appearance] : the appearance of the neck was normal [Supple] : the neck was supple [No Respiratory Distress] : no respiratory distress [No Acc Muscle Use] : no accessory muscle use [Respiration, Rhythm And Depth] : normal respiratory rhythm and effort [Normal S1, S2] : normal S1 and S2 [Auscultation Breath Sounds / Voice Sounds] : lungs were clear to auscultation bilaterally [Heart Rate And Rhythm] : heart rate was normal and rhythm regular [Edema] : edema was not present [Abdomen Soft] : soft [Bowel Sounds] : normal bowel sounds [Abdomen Tenderness] : non-tender [No Spinal Tenderness] : no spinal tenderness [Normal Gait] : normal gait [Involuntary Movements] : no involuntary movements were seen [Normal Color / Pigmentation] : normal skin color and pigmentation [Normal Turgor] : normal skin turgor [No Focal Deficits] : no focal deficits [Oriented To Time, Place, And Person] : oriented to person, place, and time [de-identified] : some fullness on right side of abdomen no pain s/p surgery for bowel resection [Normal Affect] : the affect was normal

## 2024-01-25 NOTE — REVIEW OF SYSTEMS
[Diarrhea] : diarrhea [Vomiting] : no vomiting [Confused] : no confusion [Dizziness] : no dizziness [Fainting] : no fainting [Depression] : depression [Suicidal] : not suicidal [Negative] : Genitourinary

## 2024-01-27 PROBLEM — D50.9 ANEMIA, IRON DEFICIENCY: Status: ACTIVE | Noted: 2023-06-21

## 2024-01-27 LAB
ALBUMIN SERPL ELPH-MCNC: 4.9 G/DL
ALP BLD-CCNC: 113 U/L
ALT SERPL-CCNC: 30 U/L
ANION GAP SERPL CALC-SCNC: 14 MMOL/L
AST SERPL-CCNC: 32 U/L
BILIRUB SERPL-MCNC: 0.2 MG/DL
BUN SERPL-MCNC: 27 MG/DL
CALCIUM SERPL-MCNC: 10.1 MG/DL
CHLORIDE SERPL-SCNC: 101 MMOL/L
CHOLEST SERPL-MCNC: 275 MG/DL
CO2 SERPL-SCNC: 23 MMOL/L
CREAT SERPL-MCNC: 0.78 MG/DL
EGFR: 78 ML/MIN/1.73M2
ESTIMATED AVERAGE GLUCOSE: 200 MG/DL
FERRITIN SERPL-MCNC: 76 NG/ML
GLUCOSE SERPL-MCNC: 266 MG/DL
HBA1C MFR BLD HPLC: 8.6 %
HCT VFR BLD CALC: 45.3 %
HDLC SERPL-MCNC: 36 MG/DL
HGB BLD-MCNC: 15 G/DL
IRON SATN MFR SERPL: 14 %
IRON SERPL-MCNC: 55 UG/DL
LDLC SERPL CALC-MCNC: 153 MG/DL
MCHC RBC-ENTMCNC: 28.9 PG
MCHC RBC-ENTMCNC: 33.1 GM/DL
MCV RBC AUTO: 87.3 FL
NONHDLC SERPL-MCNC: 238 MG/DL
PLATELET # BLD AUTO: 341 K/UL
POTASSIUM SERPL-SCNC: 5.6 MMOL/L
PROT SERPL-MCNC: 7.2 G/DL
RBC # BLD: 5.19 M/UL
RBC # FLD: 12.9 %
SODIUM SERPL-SCNC: 138 MMOL/L
TIBC SERPL-MCNC: 394 UG/DL
TRIGL SERPL-MCNC: 449 MG/DL
TSH SERPL-ACNC: 0.58 UIU/ML
UIBC SERPL-MCNC: 339 UG/DL
WBC # FLD AUTO: 10.37 K/UL

## 2024-03-27 ENCOUNTER — APPOINTMENT (OUTPATIENT)
Dept: SURGERY | Facility: CLINIC | Age: 79
End: 2024-03-27
Payer: MEDICARE

## 2024-03-27 PROCEDURE — 99213K: CUSTOM

## 2024-04-15 ENCOUNTER — APPOINTMENT (OUTPATIENT)
Age: 79
End: 2024-04-15
Payer: MEDICARE

## 2024-04-15 PROCEDURE — 99213 OFFICE O/P EST LOW 20 MIN: CPT

## 2024-04-25 ENCOUNTER — APPOINTMENT (OUTPATIENT)
Dept: GERIATRICS | Facility: CLINIC | Age: 79
End: 2024-04-25
Payer: MEDICARE

## 2024-04-25 VITALS
WEIGHT: 153.13 LBS | OXYGEN SATURATION: 98 % | DIASTOLIC BLOOD PRESSURE: 79 MMHG | BODY MASS INDEX: 28.18 KG/M2 | HEART RATE: 100 BPM | HEIGHT: 62 IN | SYSTOLIC BLOOD PRESSURE: 153 MMHG

## 2024-04-25 DIAGNOSIS — M79.7 FIBROMYALGIA: ICD-10-CM

## 2024-04-25 DIAGNOSIS — E11.9 TYPE 2 DIABETES MELLITUS W/OUT COMPLICATIONS: ICD-10-CM

## 2024-04-25 DIAGNOSIS — Z63.6 DEPENDENT RELATIVE NEEDING CARE AT HOME: ICD-10-CM

## 2024-04-25 DIAGNOSIS — E03.9 HYPOTHYROIDISM, UNSPECIFIED: ICD-10-CM

## 2024-04-25 DIAGNOSIS — I10 ESSENTIAL (PRIMARY) HYPERTENSION: ICD-10-CM

## 2024-04-25 DIAGNOSIS — I63.9 CEREBRAL INFARCTION, UNSPECIFIED: ICD-10-CM

## 2024-04-25 DIAGNOSIS — E78.5 HYPERLIPIDEMIA, UNSPECIFIED: ICD-10-CM

## 2024-04-25 DIAGNOSIS — F41.8 OTHER SPECIFIED ANXIETY DISORDERS: ICD-10-CM

## 2024-04-25 PROCEDURE — 99214 OFFICE O/P EST MOD 30 MIN: CPT

## 2024-04-25 PROCEDURE — G2211 COMPLEX E/M VISIT ADD ON: CPT

## 2024-04-25 PROCEDURE — G0444 DEPRESSION SCREEN ANNUAL: CPT

## 2024-04-25 RX ORDER — TIRZEPATIDE 7.5 MG/.5ML
7.5 INJECTION, SOLUTION SUBCUTANEOUS
Refills: 0 | Status: ACTIVE | COMMUNITY
Start: 2024-01-25

## 2024-04-25 RX ORDER — ALBUTEROL SULFATE 90 UG/1
108 (90 BASE) INHALANT RESPIRATORY (INHALATION) EVERY 4 HOURS
Qty: 1 | Refills: 0 | Status: COMPLETED | COMMUNITY
Start: 2021-08-26 | End: 2024-04-25

## 2024-04-25 RX ORDER — INSULIN DEGLUDEC INJECTION 100 U/ML
100 INJECTION, SOLUTION SUBCUTANEOUS
Qty: 1 | Refills: 0 | Status: ACTIVE | COMMUNITY
Start: 2021-06-24

## 2024-04-25 RX ORDER — FLUTICASONE FUROATE 27.5 UG/1
27.5 SPRAY, METERED NASAL DAILY
Qty: 1 | Refills: 2 | Status: COMPLETED | COMMUNITY
Start: 2023-09-07 | End: 2024-04-25

## 2024-04-25 SDOH — SOCIAL STABILITY - SOCIAL INSECURITY: DEPENDENT RELATIVE NEEDING CARE AT HOME: Z63.6

## 2024-04-26 VITALS — DIASTOLIC BLOOD PRESSURE: 70 MMHG | SYSTOLIC BLOOD PRESSURE: 140 MMHG

## 2024-04-26 PROBLEM — Z63.6 CAREGIVER STRESS: Status: ACTIVE | Noted: 2024-04-26

## 2024-04-26 PROBLEM — M79.7 FIBROMYALGIA: Status: ACTIVE | Noted: 2024-01-25

## 2024-04-26 PROBLEM — F41.8 DEPRESSION WITH ANXIETY: Status: ACTIVE | Noted: 2018-06-13

## 2024-04-26 PROBLEM — E03.9 HYPOTHYROIDISM: Status: ACTIVE | Noted: 2018-03-26

## 2024-04-26 LAB
ALBUMIN SERPL ELPH-MCNC: 4.8 G/DL
ALP BLD-CCNC: 120 U/L
ALT SERPL-CCNC: 24 U/L
ANION GAP SERPL CALC-SCNC: 17 MMOL/L
AST SERPL-CCNC: 17 U/L
BILIRUB SERPL-MCNC: 0.2 MG/DL
BUN SERPL-MCNC: 24 MG/DL
CALCIUM SERPL-MCNC: 10.1 MG/DL
CHLORIDE SERPL-SCNC: 102 MMOL/L
CO2 SERPL-SCNC: 20 MMOL/L
CREAT SERPL-MCNC: 0.82 MG/DL
EGFR: 73 ML/MIN/1.73M2
ESTIMATED AVERAGE GLUCOSE: 171 MG/DL
GLUCOSE SERPL-MCNC: 294 MG/DL
HBA1C MFR BLD HPLC: 7.6 %
POTASSIUM SERPL-SCNC: 4.6 MMOL/L
PROT SERPL-MCNC: 7 G/DL
SODIUM SERPL-SCNC: 139 MMOL/L

## 2024-04-26 NOTE — PHYSICAL EXAM
[No Acute Distress] : in no acute distress [Alert] : alert [Normal Outer Ear/Nose] : the ears and nose were normal in appearance [Normal Appearance] : the appearance of the neck was normal [Supple] : the neck was supple [No Respiratory Distress] : no respiratory distress [No Acc Muscle Use] : no accessory muscle use [Respiration, Rhythm And Depth] : normal respiratory rhythm and effort [Auscultation Breath Sounds / Voice Sounds] : lungs were clear to auscultation bilaterally [Heart Rate And Rhythm] : heart rate was normal and rhythm regular [Bowel Sounds] : normal bowel sounds [Abdomen Tenderness] : non-tender [Abdomen Soft] : soft [No Spinal Tenderness] : no spinal tenderness [Normal Color / Pigmentation] : normal skin color and pigmentation [Normal Turgor] : normal skin turgor [No Focal Deficits] : no focal deficits [Normal Affect] : the affect was normal [Normal Mood] : the mood was normal

## 2024-04-26 NOTE — HISTORY OF PRESENT ILLNESS
[FreeTextEntry1] : 77 yo female with CVA, DM, Fibromyalgia, breast cancer, htn, hypothyroid,,anxiety here for followup. Family members have some issues (grand dtr had concussion, dtr in law with coup de la sabre, son with tourettes exacerbation, and  with MCI) and she is involved in guiding all. Feeling stressed in this phase. pt bowels have normalized, appetite better. Eating more and has gained weight.  Meds reviewed and dosages adjusted. No falls, No ED visits  [0] : 2) Feeling down, depressed, or hopeless: Not at all (0) [PHQ-2 Negative - No further assessment needed] : PHQ-2 Negative - No further assessment needed [I have developed a follow-up plan documented below in the note.] : I have developed a follow-up plan documented below in the note. [[id number]] : 0

## 2024-04-26 NOTE — HISTORY OF PRESENT ILLNESS
[FreeTextEntry1] : 79 yo female with CVA, DM, Fibromyalgia, breast cancer, htn, hypothyroid,,anxiety here for followup. Family members have some issues (grand dtr had concussion, dtr in law with coup de la sabre, son with tourettes exacerbation, and  with MCI) and she is involved in guiding all. Feeling stressed in this phase. pt bowels have normalized, appetite better. Eating more and has gained weight.  Meds reviewed and dosages adjusted. No falls, No ED visits  [0] : 2) Feeling down, depressed, or hopeless: Not at all (0) [PHQ-2 Negative - No further assessment needed] : PHQ-2 Negative - No further assessment needed [I have developed a follow-up plan documented below in the note.] : I have developed a follow-up plan documented below in the note. [[id number]] : 0

## 2024-05-08 ENCOUNTER — RX RENEWAL (OUTPATIENT)
Age: 79
End: 2024-05-08

## 2024-06-22 ENCOUNTER — RX RENEWAL (OUTPATIENT)
Age: 79
End: 2024-06-22

## 2024-06-22 RX ORDER — EXEMESTANE 25 MG/1
25 TABLET, FILM COATED ORAL DAILY
Qty: 90 | Refills: 1 | Status: DISCONTINUED | COMMUNITY
Start: 2019-08-26 | End: 2024-06-22

## 2024-06-26 ENCOUNTER — TRANSCRIPTION ENCOUNTER (OUTPATIENT)
Age: 79
End: 2024-06-26

## 2024-07-25 ENCOUNTER — APPOINTMENT (OUTPATIENT)
Dept: GERIATRICS | Facility: CLINIC | Age: 79
End: 2024-07-25
Payer: MEDICARE

## 2024-07-25 VITALS
WEIGHT: 150 LBS | RESPIRATION RATE: 15 BRPM | OXYGEN SATURATION: 96 % | SYSTOLIC BLOOD PRESSURE: 170 MMHG | BODY MASS INDEX: 27.44 KG/M2 | TEMPERATURE: 98.5 F | DIASTOLIC BLOOD PRESSURE: 82 MMHG | HEART RATE: 93 BPM

## 2024-07-25 DIAGNOSIS — G47.00 INSOMNIA, UNSPECIFIED: ICD-10-CM

## 2024-07-25 DIAGNOSIS — D50.9 IRON DEFICIENCY ANEMIA, UNSPECIFIED: ICD-10-CM

## 2024-07-25 DIAGNOSIS — M79.7 FIBROMYALGIA: ICD-10-CM

## 2024-07-25 DIAGNOSIS — E11.9 TYPE 2 DIABETES MELLITUS W/OUT COMPLICATIONS: ICD-10-CM

## 2024-07-25 DIAGNOSIS — I10 ESSENTIAL (PRIMARY) HYPERTENSION: ICD-10-CM

## 2024-07-25 DIAGNOSIS — M81.8 OTHER OSTEOPOROSIS W/OUT CURRENT PATHOLOGICAL FRACTURE: ICD-10-CM

## 2024-07-25 DIAGNOSIS — I63.9 CEREBRAL INFARCTION, UNSPECIFIED: ICD-10-CM

## 2024-07-25 DIAGNOSIS — E03.9 HYPOTHYROIDISM, UNSPECIFIED: ICD-10-CM

## 2024-07-25 DIAGNOSIS — G89.29 OTHER CHRONIC PAIN: ICD-10-CM

## 2024-07-25 PROCEDURE — G2211 COMPLEX E/M VISIT ADD ON: CPT

## 2024-07-25 PROCEDURE — 99214 OFFICE O/P EST MOD 30 MIN: CPT

## 2024-07-26 VITALS — DIASTOLIC BLOOD PRESSURE: 70 MMHG | SYSTOLIC BLOOD PRESSURE: 140 MMHG

## 2024-07-26 PROBLEM — M81.8 OTHER OSTEOPOROSIS: Status: ACTIVE | Noted: 2024-07-25

## 2024-07-26 LAB
25(OH)D3 SERPL-MCNC: 42.1 NG/ML
ALBUMIN SERPL ELPH-MCNC: 4.7 G/DL
ALP BLD-CCNC: 127 U/L
ALT SERPL-CCNC: 25 U/L
ANION GAP SERPL CALC-SCNC: 15 MMOL/L
AST SERPL-CCNC: 16 U/L
BASOPHILS # BLD AUTO: 0.05 K/UL
BASOPHILS NFR BLD AUTO: 0.6 %
BILIRUB SERPL-MCNC: 0.2 MG/DL
BUN SERPL-MCNC: 28 MG/DL
CALCIUM SERPL-MCNC: 10.2 MG/DL
CHLORIDE SERPL-SCNC: 102 MMOL/L
CHOLEST SERPL-MCNC: 293 MG/DL
CO2 SERPL-SCNC: 23 MMOL/L
CREAT SERPL-MCNC: 0.87 MG/DL
EGFR: 68 ML/MIN/1.73M2
EOSINOPHIL # BLD AUTO: 0.29 K/UL
EOSINOPHIL NFR BLD AUTO: 3.3 %
ESTIMATED AVERAGE GLUCOSE: 171 MG/DL
FERRITIN SERPL-MCNC: 103 NG/ML
FOLATE SERPL-MCNC: >20 NG/ML
GLUCOSE SERPL-MCNC: 285 MG/DL
HBA1C MFR BLD HPLC: 7.6 %
HCT VFR BLD CALC: 46.4 %
HDLC SERPL-MCNC: 39 MG/DL
HGB BLD-MCNC: 14.9 G/DL
IMM GRANULOCYTES NFR BLD AUTO: 0.3 %
IRON SATN MFR SERPL: 13 %
IRON SERPL-MCNC: 45 UG/DL
LDLC SERPL CALC-MCNC: 163 MG/DL
LYMPHOCYTES # BLD AUTO: 1.68 K/UL
LYMPHOCYTES NFR BLD AUTO: 19.1 %
MAN DIFF?: NORMAL
MCHC RBC-ENTMCNC: 28.4 PG
MCHC RBC-ENTMCNC: 32.1 GM/DL
MCV RBC AUTO: 88.5 FL
MONOCYTES # BLD AUTO: 0.57 K/UL
MONOCYTES NFR BLD AUTO: 6.5 %
NEUTROPHILS # BLD AUTO: 6.16 K/UL
NEUTROPHILS NFR BLD AUTO: 70.2 %
NONHDLC SERPL-MCNC: 255 MG/DL
PLATELET # BLD AUTO: 301 K/UL
POTASSIUM SERPL-SCNC: 4.6 MMOL/L
PROT SERPL-MCNC: 7 G/DL
RBC # BLD: 5.24 M/UL
RBC # FLD: 13.4 %
SODIUM SERPL-SCNC: 140 MMOL/L
TIBC SERPL-MCNC: 340 UG/DL
TRIGL SERPL-MCNC: 469 MG/DL
TSH SERPL-ACNC: 0.53 UIU/ML
UIBC SERPL-MCNC: 295 UG/DL
VIT B12 SERPL-MCNC: 783 PG/ML
WBC # FLD AUTO: 8.78 K/UL

## 2024-07-26 NOTE — HISTORY OF PRESENT ILLNESS
[FreeTextEntry1] : 77 yo female very well known to me with IDDM, HTN, hyperlidemia, polypharmacy, fibromyalgia symptoms and diverticulitis s/p bowel resection. Pt here for followup. No new events or issues.  Niacin started  Asking about bergamot italian citrus and berberine supplements. I will review. [No falls in past year] : Patient reported no falls in the past year [Completely Independent] : Completely independent. [NO] : No [0] : 2) Feeling down, depressed, or hopeless: Not at all (0) [PHQ-2 Negative - No further assessment needed] : PHQ-2 Negative - No further assessment needed [[id number]] : 0

## 2024-07-26 NOTE — HISTORY OF PRESENT ILLNESS
[FreeTextEntry1] : 77 yo female very well known to me with IDDM, HTN, hyperlidemia, polypharmacy, fibromyalgia symptoms and diverticulitis s/p bowel resection. Pt here for followup. No new events or issues.  Niacin started  Asking about bergamot italian citrus and berberine supplements. I will review. [No falls in past year] : Patient reported no falls in the past year [Completely Independent] : Completely independent. [NO] : No [0] : 2) Feeling down, depressed, or hopeless: Not at all (0) [PHQ-2 Negative - No further assessment needed] : PHQ-2 Negative - No further assessment needed [[id number]] : 0

## 2024-07-26 NOTE — PHYSICAL EXAM
[Alert] : alert [No Acute Distress] : in no acute distress [Normal Outer Ear/Nose] : the ears and nose were normal in appearance [Normal Appearance] : the appearance of the neck was normal [Supple] : the neck was supple [No Respiratory Distress] : no respiratory distress [No Acc Muscle Use] : no accessory muscle use [Respiration, Rhythm And Depth] : normal respiratory rhythm and effort [Auscultation Breath Sounds / Voice Sounds] : lungs were clear to auscultation bilaterally [Heart Rate And Rhythm] : heart rate was normal and rhythm regular [Bowel Sounds] : normal bowel sounds [Abdomen Tenderness] : non-tender [No Spinal Tenderness] : no spinal tenderness [Abdomen Soft] : soft [Normal Color / Pigmentation] : normal skin color and pigmentation [Normal Turgor] : normal skin turgor [No Focal Deficits] : no focal deficits [Normal Affect] : the affect was normal [Normal Mood] : the mood was normal

## 2024-07-30 NOTE — H&P PST ADULT - NSANTHNECKRD_ENT_A_CORE
4 Eyes Skin Assessment     NAME:  Cayla Farfan  YOB: 1957  MEDICAL RECORD NUMBER:  [id number]    The patient is being assessed for  {Reason for Assessment:76513}    I agree that at least one RN has performed a thorough Head to Toe Skin Assessment on the patient. ALL assessment sites listed below have been assessed.      Areas assessed by both nurses:    {Pressure Areas Assessed:57836}        Does the Patient have a Wound? {Action Wound:95410}       Kavon Prevention initiated by RN: Yes  Wound Care Orders initiated by RN: Yes    Pressure Injury (Stage 3,4, Unstageable, DTI, NWPT, and Complex wounds) if present, place Wound referral order by RN under : No    New Ostomies, if present place, Ostomy referral order under : No     Nurse 1 eSignature: Electronically signed by Dayton Tillman RN on 7/30/24 at 6:50 AM EDT    **SHARE this note so that the co-signing nurse can place an eSignature**    Nurse 2 eSignature: {Esignature:[id number]}     No

## 2024-08-09 ENCOUNTER — OUTPATIENT (OUTPATIENT)
Dept: OUTPATIENT SERVICES | Facility: HOSPITAL | Age: 79
LOS: 1 days | Discharge: HOME | End: 2024-08-09

## 2024-08-09 DIAGNOSIS — Z98.890 OTHER SPECIFIED POSTPROCEDURAL STATES: Chronic | ICD-10-CM

## 2024-08-09 DIAGNOSIS — Z98.51 TUBAL LIGATION STATUS: Chronic | ICD-10-CM

## 2024-08-09 DIAGNOSIS — Z87.442 PERSONAL HISTORY OF URINARY CALCULI: Chronic | ICD-10-CM

## 2024-08-09 DIAGNOSIS — Z98.89 OTHER SPECIFIED POSTPROCEDURAL STATES: Chronic | ICD-10-CM

## 2024-08-09 DIAGNOSIS — Z90.49 ACQUIRED ABSENCE OF OTHER SPECIFIED PARTS OF DIGESTIVE TRACT: Chronic | ICD-10-CM

## 2024-08-09 DIAGNOSIS — C50.311 MALIGNANT NEOPLASM OF LOWER-INNER QUADRANT OF RIGHT FEMALE BREAST: ICD-10-CM

## 2024-08-09 DIAGNOSIS — C44.91 BASAL CELL CARCINOMA OF SKIN, UNSPECIFIED: Chronic | ICD-10-CM

## 2024-08-09 DIAGNOSIS — D70.1 AGRANULOCYTOSIS SECONDARY TO CANCER CHEMOTHERAPY: ICD-10-CM

## 2024-08-09 DIAGNOSIS — I67.1 CEREBRAL ANEURYSM, NONRUPTURED: Chronic | ICD-10-CM

## 2024-08-22 ENCOUNTER — APPOINTMENT (OUTPATIENT)
Dept: ULTRASOUND IMAGING | Facility: CLINIC | Age: 79
End: 2024-08-22

## 2024-08-22 ENCOUNTER — APPOINTMENT (OUTPATIENT)
Dept: MAMMOGRAPHY | Facility: CLINIC | Age: 79
End: 2024-08-22

## 2024-09-05 ENCOUNTER — APPOINTMENT (OUTPATIENT)
Dept: ULTRASOUND IMAGING | Facility: CLINIC | Age: 79
End: 2024-09-05

## 2024-09-05 ENCOUNTER — APPOINTMENT (OUTPATIENT)
Dept: MAMMOGRAPHY | Facility: CLINIC | Age: 79
End: 2024-09-05
Payer: MEDICARE

## 2024-09-05 ENCOUNTER — OUTPATIENT (OUTPATIENT)
Dept: OUTPATIENT SERVICES | Facility: HOSPITAL | Age: 79
LOS: 1 days | End: 2024-09-05
Payer: MEDICARE

## 2024-09-05 DIAGNOSIS — Z98.890 OTHER SPECIFIED POSTPROCEDURAL STATES: Chronic | ICD-10-CM

## 2024-09-05 DIAGNOSIS — Z98.89 OTHER SPECIFIED POSTPROCEDURAL STATES: Chronic | ICD-10-CM

## 2024-09-05 DIAGNOSIS — Z98.51 TUBAL LIGATION STATUS: Chronic | ICD-10-CM

## 2024-09-05 DIAGNOSIS — I67.1 CEREBRAL ANEURYSM, NONRUPTURED: Chronic | ICD-10-CM

## 2024-09-05 DIAGNOSIS — C44.91 BASAL CELL CARCINOMA OF SKIN, UNSPECIFIED: Chronic | ICD-10-CM

## 2024-09-05 DIAGNOSIS — Z90.49 ACQUIRED ABSENCE OF OTHER SPECIFIED PARTS OF DIGESTIVE TRACT: Chronic | ICD-10-CM

## 2024-09-05 DIAGNOSIS — Z87.442 PERSONAL HISTORY OF URINARY CALCULI: Chronic | ICD-10-CM

## 2024-09-05 DIAGNOSIS — Z00.00 ENCOUNTER FOR GENERAL ADULT MEDICAL EXAMINATION WITHOUT ABNORMAL FINDINGS: ICD-10-CM

## 2024-09-05 PROCEDURE — 76641 ULTRASOUND BREAST COMPLETE: CPT

## 2024-09-05 PROCEDURE — 76641 ULTRASOUND BREAST COMPLETE: CPT | Mod: 26,50,GY

## 2024-09-05 PROCEDURE — 77063 BREAST TOMOSYNTHESIS BI: CPT | Mod: 26

## 2024-09-05 PROCEDURE — 77067 SCR MAMMO BI INCL CAD: CPT

## 2024-09-05 PROCEDURE — 77067 SCR MAMMO BI INCL CAD: CPT | Mod: 26

## 2024-09-05 PROCEDURE — 77063 BREAST TOMOSYNTHESIS BI: CPT

## 2024-09-10 NOTE — ED PROVIDER NOTE - PHYSICAL EXAMINATION
Labs reviewed, improved. No emergent changes. Will discuss together at upcoming appointment.     Anastacia Davenport NP GENERAL: Patient awake alert NAD.  HEENT: NC/AT, Moist mucous membranes, PERRL, conjunctivae appear wnl.  LUNGS: CTAB, no wheezes or crackles.  CARDIAC: RRR, no m/r/g.  ABDOMEN: Soft, NT, ND, No rebound, guarding.  EXT: No edema. No calf tenderness.  MSK: No pain with movement, no deformities.  NEURO: A&Ox3. Moving all extremities.  SKIN: Warm and dry. No rash.  PSYCH: Normal affect.

## 2024-10-31 ENCOUNTER — APPOINTMENT (OUTPATIENT)
Dept: GERIATRICS | Facility: CLINIC | Age: 79
End: 2024-10-31
Payer: MEDICARE

## 2024-10-31 VITALS
SYSTOLIC BLOOD PRESSURE: 163 MMHG | TEMPERATURE: 98.2 F | BODY MASS INDEX: 27.44 KG/M2 | OXYGEN SATURATION: 97 % | RESPIRATION RATE: 15 BRPM | WEIGHT: 150 LBS | DIASTOLIC BLOOD PRESSURE: 74 MMHG | HEART RATE: 96 BPM

## 2024-10-31 DIAGNOSIS — I10 ESSENTIAL (PRIMARY) HYPERTENSION: ICD-10-CM

## 2024-10-31 DIAGNOSIS — M79.7 FIBROMYALGIA: ICD-10-CM

## 2024-10-31 DIAGNOSIS — E11.9 TYPE 2 DIABETES MELLITUS W/OUT COMPLICATIONS: ICD-10-CM

## 2024-10-31 DIAGNOSIS — G93.32 MYALGIC ENCEPHALOMYELITIS/CHRONIC FATIGUE SYNDROME: ICD-10-CM

## 2024-10-31 DIAGNOSIS — I63.9 CEREBRAL INFARCTION, UNSPECIFIED: ICD-10-CM

## 2024-10-31 DIAGNOSIS — E78.5 HYPERLIPIDEMIA, UNSPECIFIED: ICD-10-CM

## 2024-10-31 PROCEDURE — G2211 COMPLEX E/M VISIT ADD ON: CPT

## 2024-10-31 PROCEDURE — 99211 OFF/OP EST MAY X REQ PHY/QHP: CPT

## 2024-10-31 RX ORDER — TIRZEPATIDE 12.5 MG/.5ML
12.5 INJECTION, SOLUTION SUBCUTANEOUS
Refills: 0 | Status: ACTIVE | COMMUNITY

## 2024-11-01 LAB
ALBUMIN SERPL ELPH-MCNC: 4.7 G/DL
ALP BLD-CCNC: 117 U/L
ALT SERPL-CCNC: 28 U/L
ANION GAP SERPL CALC-SCNC: 13 MMOL/L
AST SERPL-CCNC: 22 U/L
BILIRUB SERPL-MCNC: 0.2 MG/DL
BUN SERPL-MCNC: 28 MG/DL
CALCIUM SERPL-MCNC: 10.3 MG/DL
CHLORIDE SERPL-SCNC: 101 MMOL/L
CO2 SERPL-SCNC: 24 MMOL/L
CREAT SERPL-MCNC: 0.84 MG/DL
EGFR: 71 ML/MIN/1.73M2
ESTIMATED AVERAGE GLUCOSE: 157 MG/DL
FERRITIN SERPL-MCNC: 94 NG/ML
GLUCOSE SERPL-MCNC: 232 MG/DL
HBA1C MFR BLD HPLC: 7.1 %
HCT VFR BLD CALC: 45.1 %
HGB BLD-MCNC: 14.7 G/DL
IRON SATN MFR SERPL: 12 %
IRON SERPL-MCNC: 40 UG/DL
MCHC RBC-ENTMCNC: 28.3 PG
MCHC RBC-ENTMCNC: 32.6 G/DL
MCV RBC AUTO: 86.7 FL
PLATELET # BLD AUTO: 320 K/UL
POTASSIUM SERPL-SCNC: 4.8 MMOL/L
PROT SERPL-MCNC: 6.8 G/DL
RBC # BLD: 5.2 M/UL
RBC # FLD: 13.3 %
SODIUM SERPL-SCNC: 139 MMOL/L
TIBC SERPL-MCNC: 329 UG/DL
UIBC SERPL-MCNC: 289 UG/DL
WBC # FLD AUTO: 8.24 K/UL

## 2024-11-02 LAB — TSH SERPL-ACNC: 0.67 UIU/ML

## 2024-12-02 ENCOUNTER — APPOINTMENT (OUTPATIENT)
Age: 79
End: 2024-12-02

## 2024-12-11 ENCOUNTER — APPOINTMENT (OUTPATIENT)
Dept: HEMATOLOGY ONCOLOGY | Facility: CLINIC | Age: 79
End: 2024-12-11
Payer: MEDICARE

## 2024-12-11 VITALS
BODY MASS INDEX: 27.79 KG/M2 | TEMPERATURE: 97.1 F | SYSTOLIC BLOOD PRESSURE: 120 MMHG | HEIGHT: 62 IN | WEIGHT: 151 LBS | DIASTOLIC BLOOD PRESSURE: 72 MMHG | HEART RATE: 92 BPM | OXYGEN SATURATION: 98 % | RESPIRATION RATE: 15 BRPM

## 2024-12-11 DIAGNOSIS — G62.9 POLYNEUROPATHY, UNSPECIFIED: ICD-10-CM

## 2024-12-11 DIAGNOSIS — M25.541 PAIN IN JOINTS OF RIGHT HAND: ICD-10-CM

## 2024-12-11 DIAGNOSIS — M81.8 OTHER OSTEOPOROSIS W/OUT CURRENT PATHOLOGICAL FRACTURE: ICD-10-CM

## 2024-12-11 DIAGNOSIS — M25.542 PAIN IN JOINTS OF RIGHT HAND: ICD-10-CM

## 2024-12-11 DIAGNOSIS — C50.311 MALIGNANT NEOPLASM OF LOWER-INNER QUADRANT OF RIGHT FEMALE BREAST: ICD-10-CM

## 2024-12-11 PROCEDURE — 99214 OFFICE O/P EST MOD 30 MIN: CPT

## 2024-12-11 PROCEDURE — G2211 COMPLEX E/M VISIT ADD ON: CPT

## 2024-12-14 NOTE — DISCHARGE NOTE PROVIDER - NSDCCONDITION_GEN_ALL_CORE
[facility]  Colon/Rectal Surgery  [address] [place] [address], [address]  [place] [address]  Phone: [phone]  Fax: [phone]  Follow Up Time:     Chino Guan  Surgery  [address], [address]  [place] [address]  Phone: [phone]  Fax: [phone]  Follow Up Time:     Frantz Finney  Surgery  [address] [address]  Phone: [phone]  Fax: [phone]  Follow Up Time:

## 2024-12-18 ENCOUNTER — APPOINTMENT (OUTPATIENT)
Dept: HEMATOLOGY ONCOLOGY | Facility: CLINIC | Age: 79
End: 2024-12-18

## 2025-01-30 ENCOUNTER — APPOINTMENT (OUTPATIENT)
Dept: GERIATRICS | Facility: CLINIC | Age: 80
End: 2025-01-30
Payer: MEDICARE

## 2025-01-30 ENCOUNTER — LABORATORY RESULT (OUTPATIENT)
Age: 80
End: 2025-01-30

## 2025-01-30 VITALS
WEIGHT: 152 LBS | DIASTOLIC BLOOD PRESSURE: 73 MMHG | HEART RATE: 93 BPM | SYSTOLIC BLOOD PRESSURE: 133 MMHG | RESPIRATION RATE: 15 BRPM | HEIGHT: 62 IN | BODY MASS INDEX: 27.97 KG/M2 | OXYGEN SATURATION: 97 % | TEMPERATURE: 97.6 F

## 2025-01-30 DIAGNOSIS — M79.7 FIBROMYALGIA: ICD-10-CM

## 2025-01-30 DIAGNOSIS — E11.9 TYPE 2 DIABETES MELLITUS W/OUT COMPLICATIONS: ICD-10-CM

## 2025-01-30 DIAGNOSIS — F41.8 OTHER SPECIFIED ANXIETY DISORDERS: ICD-10-CM

## 2025-01-30 DIAGNOSIS — Z63.6 DEPENDENT RELATIVE NEEDING CARE AT HOME: ICD-10-CM

## 2025-01-30 DIAGNOSIS — G93.32 MYALGIC ENCEPHALOMYELITIS/CHRONIC FATIGUE SYNDROME: ICD-10-CM

## 2025-01-30 DIAGNOSIS — D50.9 IRON DEFICIENCY ANEMIA, UNSPECIFIED: ICD-10-CM

## 2025-01-30 DIAGNOSIS — I10 ESSENTIAL (PRIMARY) HYPERTENSION: ICD-10-CM

## 2025-01-30 DIAGNOSIS — M85.80 OTHER SPECIFIED DISORDERS OF BONE DENSITY AND STRUCTURE, UNSPECIFIED SITE: ICD-10-CM

## 2025-01-30 DIAGNOSIS — G47.00 INSOMNIA, UNSPECIFIED: ICD-10-CM

## 2025-01-30 PROCEDURE — 99214 OFFICE O/P EST MOD 30 MIN: CPT

## 2025-01-30 PROCEDURE — G2211 COMPLEX E/M VISIT ADD ON: CPT

## 2025-01-30 SDOH — SOCIAL STABILITY - SOCIAL INSECURITY: DEPENDENT RELATIVE NEEDING CARE AT HOME: Z63.6

## 2025-01-31 LAB
ALBUMIN SERPL ELPH-MCNC: 4.7 G/DL
ALP BLD-CCNC: 127 U/L
ALT SERPL-CCNC: 25 U/L
ANION GAP SERPL CALC-SCNC: 15 MMOL/L
AST SERPL-CCNC: 21 U/L
BILIRUB SERPL-MCNC: 0.3 MG/DL
BUN SERPL-MCNC: 28 MG/DL
CALCIUM SERPL-MCNC: 10.1 MG/DL
CHLORIDE SERPL-SCNC: 100 MMOL/L
CHOLEST SERPL-MCNC: 300 MG/DL
CO2 SERPL-SCNC: 24 MMOL/L
CREAT SERPL-MCNC: 0.83 MG/DL
EGFR: 72 ML/MIN/1.73M2
FERRITIN SERPL-MCNC: 112 NG/ML
GLUCOSE SERPL-MCNC: 240 MG/DL
HCT VFR BLD CALC: 45.9 %
HDLC SERPL-MCNC: 49 MG/DL
HGB BLD-MCNC: 15.4 G/DL
IRON SATN MFR SERPL: 18 %
IRON SERPL-MCNC: 59 UG/DL
LDLC SERPL CALC-MCNC: 170 MG/DL
MCHC RBC-ENTMCNC: 28.7 PG
MCHC RBC-ENTMCNC: 33.6 G/DL
MCV RBC AUTO: 85.6 FL
NONHDLC SERPL-MCNC: 251 MG/DL
PLATELET # BLD AUTO: 342 K/UL
POTASSIUM SERPL-SCNC: 4.2 MMOL/L
PROT SERPL-MCNC: 7.1 G/DL
RBC # BLD: 5.36 M/UL
RBC # FLD: 13.5 %
SODIUM SERPL-SCNC: 139 MMOL/L
TIBC SERPL-MCNC: 332 UG/DL
TRIGL SERPL-MCNC: 416 MG/DL
TSH SERPL-ACNC: 0.53 UIU/ML
UIBC SERPL-MCNC: 272 UG/DL
WBC # FLD AUTO: 8.09 K/UL

## 2025-03-13 ENCOUNTER — APPOINTMENT (OUTPATIENT)
Dept: MRI IMAGING | Facility: CLINIC | Age: 80
End: 2025-03-13
Payer: MEDICARE

## 2025-03-13 ENCOUNTER — OUTPATIENT (OUTPATIENT)
Dept: OUTPATIENT SERVICES | Facility: HOSPITAL | Age: 80
LOS: 1 days | End: 2025-03-13
Payer: MEDICARE

## 2025-03-13 DIAGNOSIS — Z87.442 PERSONAL HISTORY OF URINARY CALCULI: Chronic | ICD-10-CM

## 2025-03-13 DIAGNOSIS — Z98.890 OTHER SPECIFIED POSTPROCEDURAL STATES: Chronic | ICD-10-CM

## 2025-03-13 DIAGNOSIS — I67.1 CEREBRAL ANEURYSM, NONRUPTURED: Chronic | ICD-10-CM

## 2025-03-13 DIAGNOSIS — Z90.49 ACQUIRED ABSENCE OF OTHER SPECIFIED PARTS OF DIGESTIVE TRACT: Chronic | ICD-10-CM

## 2025-03-13 DIAGNOSIS — Z98.89 OTHER SPECIFIED POSTPROCEDURAL STATES: Chronic | ICD-10-CM

## 2025-03-13 DIAGNOSIS — Z98.51 TUBAL LIGATION STATUS: Chronic | ICD-10-CM

## 2025-03-13 DIAGNOSIS — Z00.00 ENCOUNTER FOR GENERAL ADULT MEDICAL EXAMINATION WITHOUT ABNORMAL FINDINGS: ICD-10-CM

## 2025-03-13 DIAGNOSIS — C44.91 BASAL CELL CARCINOMA OF SKIN, UNSPECIFIED: Chronic | ICD-10-CM

## 2025-03-13 PROCEDURE — 77049 MRI BREAST C-+ W/CAD BI: CPT | Mod: 26

## 2025-03-13 PROCEDURE — A9585: CPT

## 2025-03-13 PROCEDURE — C8908: CPT

## 2025-04-29 ENCOUNTER — NON-APPOINTMENT (OUTPATIENT)
Age: 80
End: 2025-04-29

## 2025-05-01 ENCOUNTER — APPOINTMENT (OUTPATIENT)
Dept: GERIATRICS | Facility: CLINIC | Age: 80
End: 2025-05-01
Payer: COMMERCIAL

## 2025-05-01 VITALS
RESPIRATION RATE: 14 BRPM | HEIGHT: 62 IN | TEMPERATURE: 97.7 F | OXYGEN SATURATION: 97 % | HEART RATE: 98 BPM | BODY MASS INDEX: 27.79 KG/M2 | DIASTOLIC BLOOD PRESSURE: 75 MMHG | SYSTOLIC BLOOD PRESSURE: 150 MMHG | WEIGHT: 151 LBS

## 2025-05-01 DIAGNOSIS — G47.00 INSOMNIA, UNSPECIFIED: ICD-10-CM

## 2025-05-01 DIAGNOSIS — D50.9 IRON DEFICIENCY ANEMIA, UNSPECIFIED: ICD-10-CM

## 2025-05-01 DIAGNOSIS — R10.9 UNSPECIFIED ABDOMINAL PAIN: ICD-10-CM

## 2025-05-01 DIAGNOSIS — J30.2 OTHER SEASONAL ALLERGIC RHINITIS: ICD-10-CM

## 2025-05-01 DIAGNOSIS — R19.7 DIARRHEA, UNSPECIFIED: ICD-10-CM

## 2025-05-01 DIAGNOSIS — E11.9 TYPE 2 DIABETES MELLITUS W/OUT COMPLICATIONS: ICD-10-CM

## 2025-05-01 DIAGNOSIS — I10 ESSENTIAL (PRIMARY) HYPERTENSION: ICD-10-CM

## 2025-05-01 PROCEDURE — 99214 OFFICE O/P EST MOD 30 MIN: CPT

## 2025-05-05 PROBLEM — J30.2 SEASONAL ALLERGIC RHINITIS: Status: ACTIVE | Noted: 2025-05-01

## 2025-05-10 LAB
ALBUMIN SERPL ELPH-MCNC: 4.9 G/DL
ALP BLD-CCNC: 106 U/L
ALT SERPL-CCNC: 29 U/L
ANION GAP SERPL CALC-SCNC: 15 MMOL/L
AST SERPL-CCNC: 26 U/L
BASOPHILS # BLD AUTO: 0.06 K/UL
BASOPHILS NFR BLD AUTO: 0.7 %
BILIRUB SERPL-MCNC: 0.2 MG/DL
BUN SERPL-MCNC: 27 MG/DL
CALCIUM SERPL-MCNC: 10.3 MG/DL
CHLORIDE SERPL-SCNC: 103 MMOL/L
CO2 SERPL-SCNC: 23 MMOL/L
CREAT SERPL-MCNC: 0.85 MG/DL
EGFRCR SERPLBLD CKD-EPI 2021: 70 ML/MIN/1.73M2
EOSINOPHIL # BLD AUTO: 0.25 K/UL
EOSINOPHIL NFR BLD AUTO: 2.8 %
ESTIMATED AVERAGE GLUCOSE: 183 MG/DL
FERRITIN SERPL-MCNC: 151 NG/ML
GI PCR PANEL: NOT DETECTED
GLUCOSE SERPL-MCNC: 220 MG/DL
HBA1C MFR BLD HPLC: 8 %
HCT VFR BLD CALC: 46 %
HGB BLD-MCNC: 15.2 G/DL
IMM GRANULOCYTES NFR BLD AUTO: 0.2 %
IRON SATN MFR SERPL: 12 %
IRON SERPL-MCNC: 42 UG/DL
LYMPHOCYTES # BLD AUTO: 1.64 K/UL
LYMPHOCYTES NFR BLD AUTO: 18.3 %
MAN DIFF?: NORMAL
MCHC RBC-ENTMCNC: 28.3 PG
MCHC RBC-ENTMCNC: 33 G/DL
MCV RBC AUTO: 85.7 FL
MONOCYTES # BLD AUTO: 0.6 K/UL
MONOCYTES NFR BLD AUTO: 6.7 %
NEUTROPHILS # BLD AUTO: 6.38 K/UL
NEUTROPHILS NFR BLD AUTO: 71.3 %
PLATELET # BLD AUTO: 346 K/UL
POTASSIUM SERPL-SCNC: 5 MMOL/L
PROT SERPL-MCNC: 7.1 G/DL
RBC # BLD: 5.37 M/UL
RBC # FLD: 13.4 %
SODIUM SERPL-SCNC: 141 MMOL/L
TIBC SERPL-MCNC: 335 UG/DL
UIBC SERPL-MCNC: 293 UG/DL
WBC # FLD AUTO: 8.95 K/UL

## 2025-05-11 LAB — DEPRECATED O AND P PREP STL: NORMAL

## 2025-06-04 ENCOUNTER — APPOINTMENT (OUTPATIENT)
Dept: SURGERY | Facility: CLINIC | Age: 80
End: 2025-06-04
Payer: MEDICARE

## 2025-06-04 PROCEDURE — 99213K: CUSTOM

## 2025-07-29 ENCOUNTER — NON-APPOINTMENT (OUTPATIENT)
Age: 80
End: 2025-07-29

## 2025-07-31 ENCOUNTER — LABORATORY RESULT (OUTPATIENT)
Age: 80
End: 2025-07-31

## 2025-07-31 ENCOUNTER — APPOINTMENT (OUTPATIENT)
Dept: GERIATRICS | Facility: CLINIC | Age: 80
End: 2025-07-31

## 2025-07-31 VITALS
RESPIRATION RATE: 13 BRPM | SYSTOLIC BLOOD PRESSURE: 146 MMHG | OXYGEN SATURATION: 96 % | WEIGHT: 153 LBS | DIASTOLIC BLOOD PRESSURE: 75 MMHG | HEART RATE: 94 BPM | BODY MASS INDEX: 27.98 KG/M2 | TEMPERATURE: 98 F

## 2025-07-31 DIAGNOSIS — E83.52 HYPERCALCEMIA: ICD-10-CM

## 2025-07-31 DIAGNOSIS — M79.7 FIBROMYALGIA: ICD-10-CM

## 2025-07-31 DIAGNOSIS — E78.5 HYPERLIPIDEMIA, UNSPECIFIED: ICD-10-CM

## 2025-07-31 DIAGNOSIS — E03.9 HYPOTHYROIDISM, UNSPECIFIED: ICD-10-CM

## 2025-07-31 DIAGNOSIS — D50.9 IRON DEFICIENCY ANEMIA, UNSPECIFIED: ICD-10-CM

## 2025-07-31 DIAGNOSIS — E11.9 TYPE 2 DIABETES MELLITUS W/OUT COMPLICATIONS: ICD-10-CM

## 2025-07-31 DIAGNOSIS — G47.00 INSOMNIA, UNSPECIFIED: ICD-10-CM

## 2025-07-31 DIAGNOSIS — I10 ESSENTIAL (PRIMARY) HYPERTENSION: ICD-10-CM

## 2025-07-31 PROCEDURE — 99214 OFFICE O/P EST MOD 30 MIN: CPT

## 2025-07-31 RX ORDER — COLESTIPOL HYDROCHLORIDE 1 G/1
1 TABLET, FILM COATED ORAL DAILY
Refills: 0 | Status: ACTIVE | COMMUNITY
Start: 2025-07-31

## 2025-07-31 RX ORDER — CHLORHEXIDINE GLUCONATE 4 %
5 LIQUID (ML) TOPICAL
Qty: 90 | Refills: 1 | Status: ACTIVE | COMMUNITY
Start: 2025-07-31 | End: 1900-01-01

## 2025-08-01 ENCOUNTER — NON-APPOINTMENT (OUTPATIENT)
Age: 80
End: 2025-08-01

## 2025-08-08 ENCOUNTER — NON-APPOINTMENT (OUTPATIENT)
Age: 80
End: 2025-08-08

## 2025-08-12 ENCOUNTER — NON-APPOINTMENT (OUTPATIENT)
Age: 80
End: 2025-08-12

## 2025-08-12 LAB
ALBUMIN SERPL ELPH-MCNC: 4.7 G/DL
ALP BLD-CCNC: 129 U/L
ALT SERPL-CCNC: 28 U/L
ANION GAP SERPL CALC-SCNC: 17 MMOL/L
AST SERPL-CCNC: 21 U/L
BILIRUB SERPL-MCNC: 0.2 MG/DL
BUN SERPL-MCNC: 33 MG/DL
CALCIUM SERPL-MCNC: 10.3 MG/DL
CHLORIDE SERPL-SCNC: 101 MMOL/L
CO2 SERPL-SCNC: 20 MMOL/L
CREAT SERPL-MCNC: 0.81 MG/DL
EGFRCR SERPLBLD CKD-EPI 2021: 74 ML/MIN/1.73M2
ESTIMATED AVERAGE GLUCOSE: 200 MG/DL
GLUCOSE SERPL-MCNC: 340 MG/DL
HBA1C MFR BLD HPLC: 8.6 %
HCT VFR BLD CALC: 45.4 %
HGB BLD-MCNC: 15 G/DL
MAGNESIUM SERPL-MCNC: 2.3 MG/DL
MCHC RBC-ENTMCNC: 28.7 PG
MCHC RBC-ENTMCNC: 33 G/DL
MCV RBC AUTO: 86.8 FL
PLATELET # BLD AUTO: 320 K/UL
POTASSIUM SERPL-SCNC: 4.5 MMOL/L
PROT SERPL-MCNC: 7.1 G/DL
RBC # BLD: 5.23 M/UL
RBC # FLD: 13.4 %
SODIUM SERPL-SCNC: 139 MMOL/L
WBC # FLD AUTO: 10.98 K/UL

## 2025-08-27 ENCOUNTER — OUTPATIENT (OUTPATIENT)
Dept: OUTPATIENT SERVICES | Facility: HOSPITAL | Age: 80
LOS: 1 days | End: 2025-08-27
Payer: MEDICARE

## 2025-08-27 ENCOUNTER — APPOINTMENT (OUTPATIENT)
Dept: RADIOLOGY | Facility: CLINIC | Age: 80
End: 2025-08-27
Payer: MEDICARE

## 2025-08-27 DIAGNOSIS — Z98.51 TUBAL LIGATION STATUS: Chronic | ICD-10-CM

## 2025-08-27 DIAGNOSIS — Z98.890 OTHER SPECIFIED POSTPROCEDURAL STATES: Chronic | ICD-10-CM

## 2025-08-27 DIAGNOSIS — Z98.89 OTHER SPECIFIED POSTPROCEDURAL STATES: Chronic | ICD-10-CM

## 2025-08-27 DIAGNOSIS — C44.91 BASAL CELL CARCINOMA OF SKIN, UNSPECIFIED: Chronic | ICD-10-CM

## 2025-08-27 DIAGNOSIS — M85.80 OTHER SPECIFIED DISORDERS OF BONE DENSITY AND STRUCTURE, UNSPECIFIED SITE: ICD-10-CM

## 2025-08-27 DIAGNOSIS — Z87.442 PERSONAL HISTORY OF URINARY CALCULI: Chronic | ICD-10-CM

## 2025-08-27 DIAGNOSIS — Z90.49 ACQUIRED ABSENCE OF OTHER SPECIFIED PARTS OF DIGESTIVE TRACT: Chronic | ICD-10-CM

## 2025-08-27 DIAGNOSIS — I67.1 CEREBRAL ANEURYSM, NONRUPTURED: Chronic | ICD-10-CM

## 2025-08-27 PROCEDURE — 77080 DXA BONE DENSITY AXIAL: CPT | Mod: 26

## 2025-08-27 PROCEDURE — 77080 DXA BONE DENSITY AXIAL: CPT

## (undated) DEVICE — FOLEY TRAY 16FR 5CC LTX UMETER CLOSED

## (undated) DEVICE — DRAPE INSTRUMENT POUCH 6.75" X 11"

## (undated) DEVICE — PACK MAJOR ABDOMINAL SUPINE

## (undated) DEVICE — TUBING RANGER FLUID IRRIGATION SET DISP

## (undated) DEVICE — GLV 8.5 PROTEXIS (WHITE)

## (undated) DEVICE — CATH IV SAFE BC 20G X 1.16" (PINK)

## (undated) DEVICE — CATH IV SAFE BC 22G X 1" (BLUE)

## (undated) DEVICE — Device

## (undated) DEVICE — GLV 7 PROTEXIS (WHITE)

## (undated) DEVICE — FOLEY HOLDER STATLOCK 2 WAY ADULT

## (undated) DEVICE — BLADE SCALPEL SAFETYLOCK #10

## (undated) DEVICE — PACK BASIN SPECIAL PROCEDURE

## (undated) DEVICE — BLADE SCALPEL SAFETYLOCK #15

## (undated) DEVICE — PREP BETADINE KIT

## (undated) DEVICE — GLV 8 PROTEXIS (WHITE)

## (undated) DEVICE — STAPLER SKIN VISI-STAT 35 WIDE

## (undated) DEVICE — DRSG TEGADERM 6"X8"

## (undated) DEVICE — ACMI SELF-SEALING SEAL UP TO 7FR

## (undated) DEVICE — DRAPE MAYO STAND 30"

## (undated) DEVICE — SPECIMEN CONTAINER 100ML

## (undated) DEVICE — DRSG OPSITE 13.75 X 4"

## (undated) DEVICE — POSITIONER FOAM EGG CRATE ULNAR 2PCS (PINK)

## (undated) DEVICE — PREP CHLORAPREP HI-LITE ORANGE 26ML

## (undated) DEVICE — CLAMP BX HOT RAD JAW 3

## (undated) DEVICE — VENODYNE/SCD SLEEVE CALF LARGE

## (undated) DEVICE — TUBING STRYKEFLOW II SUCTION / IRRIGATOR

## (undated) DEVICE — WARMING BLANKET FULL ADULT

## (undated) DEVICE — SUT MAXON 1 36" GS-24

## (undated) DEVICE — GLV 7.5 PROTEXIS (WHITE)

## (undated) DEVICE — VENODYNE/SCD SLEEVE CALF MEDIUM

## (undated) DEVICE — SUCTION YANKAUER NO CONTROL VENT

## (undated) DEVICE — SUT PDS II 1 54" TP-1

## (undated) DEVICE — SUT SOFSILK 3-0 18" V-20 (POP-OFF)

## (undated) DEVICE — POLY TRAP ETRAP

## (undated) DEVICE — MARKING PEN W RULER

## (undated) DEVICE — ELCTR BOVIE PENCIL SMOKE EVACUATION

## (undated) DEVICE — IRRIGATOR BIO SHIELD

## (undated) DEVICE — LIGASURE BLUNT TIP 37CM

## (undated) DEVICE — CATH NG SALEM SUMP 16FR

## (undated) DEVICE — GLV 6.5 PROTEXIS (WHITE)

## (undated) DEVICE — SUT CHROMIC 0 36" GS-21

## (undated) DEVICE — OSTOMY KIT 2-PIECE 2.25" NS (RED)

## (undated) DEVICE — GELPORT LAPAROSCOPIC SYSTEM

## (undated) DEVICE — TROCAR APPLIED MEDICAL KII BALLOON BLUNT TIP 12MM X 100MM

## (undated) DEVICE — DRAPE LEGGINGS XL

## (undated) DEVICE — DRSG CURITY GAUZE SPONGE 4 X 4" 12-PLY

## (undated) DEVICE — LIGASURE IMPACT

## (undated) DEVICE — SUT CHROMIC 1 30" GS-21

## (undated) DEVICE — DRAPE 1/2 SHEET 40X57"

## (undated) DEVICE — GOWN TRIMAX LG

## (undated) DEVICE — SOL IRR POUR NS 0.9% 500ML

## (undated) DEVICE — DRSG TAPE UMBILICAL COTTON 2" X 30 X 1/8"

## (undated) DEVICE — TAPE SILK 3"

## (undated) DEVICE — ELCTR GROUNDING PAD ADULT COVIDIEN

## (undated) DEVICE — SUT POLYSORB 0 36" GU-46

## (undated) DEVICE — WARMING BLANKET UPPER ADULT

## (undated) DEVICE — SUT POLYSORB 3-0 30" V-20 UNDYED

## (undated) DEVICE — DRAPE TOWEL BLUE 17" X 24"

## (undated) DEVICE — SUT SURGIPRO 0 30" GS-22

## (undated) DEVICE — PACK COLON BUNDLE

## (undated) DEVICE — DRSG OPSITE 2.5 X 2"

## (undated) DEVICE — SOL IRR BAG H2O 3000ML

## (undated) DEVICE — BRUSH COLONOSCOPY CYTOLOGY

## (undated) DEVICE — DRAPE GENERAL ENDOSCOPY

## (undated) DEVICE — SYR ASEPTO

## (undated) DEVICE — SOL IRR POUR H2O 250ML

## (undated) DEVICE — SYR LUER LOK 30CC

## (undated) DEVICE — TUBING SUCTION CONN 6FT STERILE

## (undated) DEVICE — SENSOR O2 FINGER ADULT

## (undated) DEVICE — TUBING SUCTION 20FT

## (undated) DEVICE — SUT SOFSILK 2-0 18" V-20 (POP-OFF)

## (undated) DEVICE — PACK CYSTO

## (undated) DEVICE — ADAPTER URETHRAL CATH CONNECTING

## (undated) DEVICE — PACK IV START WITH CHG

## (undated) DEVICE — VALVE YELLOW PORT SEAL PLUS 5MM

## (undated) DEVICE — D HELP - CLEARVIEW CLEARIFY SYSTEM

## (undated) DEVICE — SUT CHROMIC 3-0 30" V-20

## (undated) DEVICE — FORCEP RADIAL JAW 4 JUMBO 2.8MM 3.2MM 240CM ORANGE DISP

## (undated) DEVICE — MEDICATION LABELS W MARKER

## (undated) DEVICE — DRSG TELFA 3 X 8

## (undated) DEVICE — BIOPSY FORCEP RADIAL JAW 4 STANDARD WITH NEEDLE

## (undated) DEVICE — SYR LUER LOK 50CC

## (undated) DEVICE — FOLEY CATH 2-WAY 28FR 30CC SILICONE

## (undated) DEVICE — TUBING INSUFFLATION LAP FILTER 10FT

## (undated) DEVICE — SOL INJ NS 0.9% 500ML 2 PORT

## (undated) DEVICE — TUBING IV SET GRAVITY 3Y 100" MACRO